# Patient Record
Sex: FEMALE | Race: WHITE | NOT HISPANIC OR LATINO | Employment: FULL TIME | URBAN - METROPOLITAN AREA
[De-identification: names, ages, dates, MRNs, and addresses within clinical notes are randomized per-mention and may not be internally consistent; named-entity substitution may affect disease eponyms.]

---

## 2017-03-17 ENCOUNTER — LAB REQUISITION (OUTPATIENT)
Dept: LAB | Facility: HOSPITAL | Age: 37
End: 2017-03-17

## 2017-03-17 DIAGNOSIS — H60.399 OTHER INFECTIVE OTITIS EXTERNA, UNSPECIFIED EAR: ICD-10-CM

## 2017-03-17 PROCEDURE — 87070 CULTURE OTHR SPECIMN AEROBIC: CPT | Performed by: OTOLARYNGOLOGY

## 2017-03-17 PROCEDURE — 87205 SMEAR GRAM STAIN: CPT | Performed by: OTOLARYNGOLOGY

## 2017-03-17 PROCEDURE — 87147 CULTURE TYPE IMMUNOLOGIC: CPT | Performed by: OTOLARYNGOLOGY

## 2017-03-19 LAB
BACTERIA EAR AEROBE CULT: NORMAL
GRAM STN SPEC: NORMAL
GRAM STN SPEC: NORMAL

## 2017-05-30 ENCOUNTER — APPOINTMENT (EMERGENCY)
Dept: RADIOLOGY | Facility: HOSPITAL | Age: 37
End: 2017-05-30
Payer: COMMERCIAL

## 2017-05-30 ENCOUNTER — HOSPITAL ENCOUNTER (EMERGENCY)
Facility: HOSPITAL | Age: 37
Discharge: HOME | End: 2017-05-30
Attending: EMERGENCY MEDICINE | Admitting: EMERGENCY MEDICINE
Payer: COMMERCIAL

## 2017-05-30 VITALS
HEART RATE: 84 BPM | WEIGHT: 140 LBS | OXYGEN SATURATION: 96 % | DIASTOLIC BLOOD PRESSURE: 57 MMHG | SYSTOLIC BLOOD PRESSURE: 108 MMHG | HEIGHT: 62 IN | TEMPERATURE: 97.6 F | BODY MASS INDEX: 25.76 KG/M2 | RESPIRATION RATE: 18 BRPM

## 2017-05-30 DIAGNOSIS — N93.9 VAGINAL BLEEDING: ICD-10-CM

## 2017-05-30 DIAGNOSIS — R10.9 ABDOMINAL PAIN: Primary | ICD-10-CM

## 2017-05-30 LAB
ABO GROUP BLD: NORMAL
ALBUMIN SERPL BCP-MCNC: 3.6 G/DL (ref 3.5–5)
ALP SERPL-CCNC: 71 U/L (ref 46–116)
ALT SERPL W P-5'-P-CCNC: 38 U/L (ref 12–78)
ANION GAP SERPL CALCULATED.3IONS-SCNC: 5 MMOL/L (ref 4–13)
AST SERPL W P-5'-P-CCNC: 35 U/L (ref 5–45)
B-HCG SERPL-ACNC: ABNORMAL MIU/ML
BACTERIA UR QL AUTO: ABNORMAL /HPF
BASOPHILS # BLD AUTO: 0.06 THOUSANDS/ΜL (ref 0–0.1)
BASOPHILS NFR BLD AUTO: 1 % (ref 0–1)
BILIRUB SERPL-MCNC: 0.21 MG/DL (ref 0.2–1)
BILIRUB UR QL STRIP: NEGATIVE
BLD GP AB SCN SERPL QL: NEGATIVE
BUN SERPL-MCNC: 13 MG/DL (ref 5–25)
CALCIUM SERPL-MCNC: 8.9 MG/DL (ref 8.3–10.1)
CHLORIDE SERPL-SCNC: 108 MMOL/L (ref 100–108)
CLARITY UR: CLEAR
CLARITY, POC: CLEAR
CO2 SERPL-SCNC: 24 MMOL/L (ref 21–32)
COLOR UR: YELLOW
COLOR, POC: YELLOW
CREAT SERPL-MCNC: 0.54 MG/DL (ref 0.6–1.3)
EOSINOPHIL # BLD AUTO: 0.18 THOUSAND/ΜL (ref 0–0.61)
EOSINOPHIL NFR BLD AUTO: 2 % (ref 0–6)
ERYTHROCYTE [DISTWIDTH] IN BLOOD BY AUTOMATED COUNT: 16.7 % (ref 11.6–15.1)
GFR SERPL CREATININE-BSD FRML MDRD: >60 ML/MIN/1.73SQ M
GLUCOSE SERPL-MCNC: 93 MG/DL (ref 65–140)
GLUCOSE UR STRIP-MCNC: NEGATIVE MG/DL
HCG UR QL: POSITIVE
HCT VFR BLD AUTO: 36.4 % (ref 34.8–46.1)
HGB BLD-MCNC: 11.9 G/DL (ref 11.5–15.4)
HGB UR QL STRIP.AUTO: NEGATIVE
HYALINE CASTS #/AREA URNS LPF: ABNORMAL /LPF
KETONES UR STRIP-MCNC: NEGATIVE MG/DL
LEUKOCYTE ESTERASE UR QL STRIP: NEGATIVE
LYMPHOCYTES # BLD AUTO: 2.74 THOUSANDS/ΜL (ref 0.6–4.47)
LYMPHOCYTES NFR BLD AUTO: 35 % (ref 14–44)
MCH RBC QN AUTO: 27.3 PG (ref 26.8–34.3)
MCHC RBC AUTO-ENTMCNC: 32.7 G/DL (ref 31.4–37.4)
MCV RBC AUTO: 84 FL (ref 82–98)
MONOCYTES # BLD AUTO: 0.97 THOUSAND/ΜL (ref 0.17–1.22)
MONOCYTES NFR BLD AUTO: 12 % (ref 4–12)
NEUTROPHILS # BLD AUTO: 3.93 THOUSANDS/ΜL (ref 1.85–7.62)
NEUTS SEG NFR BLD AUTO: 50 % (ref 43–75)
NITRITE UR QL STRIP: NEGATIVE
NON-SQ EPI CELLS URNS QL MICRO: ABNORMAL /HPF
NRBC BLD AUTO-RTO: 0 /100 WBCS
PH UR STRIP.AUTO: 8.5 [PH] (ref 4.5–8)
PLATELET # BLD AUTO: 341 THOUSANDS/UL (ref 149–390)
PMV BLD AUTO: 10.3 FL (ref 8.9–12.7)
POTASSIUM SERPL-SCNC: 3.9 MMOL/L (ref 3.5–5.3)
PROT SERPL-MCNC: 7.5 G/DL (ref 6.4–8.2)
PROT UR STRIP-MCNC: ABNORMAL MG/DL
RBC # BLD AUTO: 4.36 MILLION/UL (ref 3.81–5.12)
RBC #/AREA URNS AUTO: ABNORMAL /HPF
RH BLD: POSITIVE
SODIUM SERPL-SCNC: 137 MMOL/L (ref 136–145)
SP GR UR STRIP.AUTO: 1.01 (ref 1–1.03)
SPECIMEN EXPIRATION DATE: NORMAL
UROBILINOGEN UR QL STRIP.AUTO: 0.2 E.U./DL
WBC # BLD AUTO: 7.9 THOUSAND/UL (ref 4.31–10.16)
WBC #/AREA URNS AUTO: ABNORMAL /HPF

## 2017-05-30 PROCEDURE — 81001 URINALYSIS AUTO W/SCOPE: CPT

## 2017-05-30 PROCEDURE — 96376 TX/PRO/DX INJ SAME DRUG ADON: CPT

## 2017-05-30 PROCEDURE — 86901 BLOOD TYPING SEROLOGIC RH(D): CPT | Performed by: EMERGENCY MEDICINE

## 2017-05-30 PROCEDURE — 76801 OB US < 14 WKS SINGLE FETUS: CPT

## 2017-05-30 PROCEDURE — 84702 CHORIONIC GONADOTROPIN TEST: CPT | Performed by: EMERGENCY MEDICINE

## 2017-05-30 PROCEDURE — 85025 COMPLETE CBC W/AUTO DIFF WBC: CPT | Performed by: EMERGENCY MEDICINE

## 2017-05-30 PROCEDURE — 99284 EMERGENCY DEPT VISIT MOD MDM: CPT

## 2017-05-30 PROCEDURE — 80053 COMPREHEN METABOLIC PANEL: CPT | Performed by: EMERGENCY MEDICINE

## 2017-05-30 PROCEDURE — 76705 ECHO EXAM OF ABDOMEN: CPT

## 2017-05-30 PROCEDURE — 86850 RBC ANTIBODY SCREEN: CPT | Performed by: EMERGENCY MEDICINE

## 2017-05-30 PROCEDURE — 36415 COLL VENOUS BLD VENIPUNCTURE: CPT | Performed by: EMERGENCY MEDICINE

## 2017-05-30 PROCEDURE — 96374 THER/PROPH/DIAG INJ IV PUSH: CPT

## 2017-05-30 PROCEDURE — 81002 URINALYSIS NONAUTO W/O SCOPE: CPT | Performed by: EMERGENCY MEDICINE

## 2017-05-30 PROCEDURE — 81025 URINE PREGNANCY TEST: CPT | Performed by: EMERGENCY MEDICINE

## 2017-05-30 PROCEDURE — 96361 HYDRATE IV INFUSION ADD-ON: CPT

## 2017-05-30 PROCEDURE — 86900 BLOOD TYPING SEROLOGIC ABO: CPT | Performed by: EMERGENCY MEDICINE

## 2017-05-30 RX ORDER — FERROUS SULFATE 325(65) MG
325 TABLET ORAL
COMMUNITY
End: 2018-06-01 | Stop reason: ALTCHOICE

## 2017-05-30 RX ORDER — ONDANSETRON 2 MG/ML
4 INJECTION INTRAMUSCULAR; INTRAVENOUS ONCE
Status: COMPLETED | OUTPATIENT
Start: 2017-05-30 | End: 2017-05-30

## 2017-05-30 RX ORDER — ONDANSETRON 4 MG/1
4 TABLET, FILM COATED ORAL EVERY 6 HOURS
Qty: 12 TABLET | Refills: 0 | Status: SHIPPED | OUTPATIENT
Start: 2017-05-30 | End: 2018-03-09 | Stop reason: HOSPADM

## 2017-05-30 RX ADMIN — ONDANSETRON 4 MG: 2 INJECTION INTRAMUSCULAR; INTRAVENOUS at 19:30

## 2017-05-30 RX ADMIN — SODIUM CHLORIDE 1000 ML: 0.9 INJECTION, SOLUTION INTRAVENOUS at 16:57

## 2017-05-30 RX ADMIN — ONDANSETRON 4 MG: 2 INJECTION INTRAMUSCULAR; INTRAVENOUS at 17:25

## 2017-06-01 ENCOUNTER — ALLSCRIPTS OFFICE VISIT (OUTPATIENT)
Dept: OTHER | Facility: OTHER | Age: 37
End: 2017-06-01

## 2017-06-02 ENCOUNTER — GENERIC CONVERSION - ENCOUNTER (OUTPATIENT)
Dept: OTHER | Facility: OTHER | Age: 37
End: 2017-06-02

## 2017-06-08 ENCOUNTER — ALLSCRIPTS OFFICE VISIT (OUTPATIENT)
Dept: OTHER | Facility: OTHER | Age: 37
End: 2017-06-08

## 2017-06-12 ENCOUNTER — ALLSCRIPTS OFFICE VISIT (OUTPATIENT)
Dept: OTHER | Facility: OTHER | Age: 37
End: 2017-06-12

## 2017-06-13 ENCOUNTER — LAB REQUISITION (OUTPATIENT)
Dept: LAB | Facility: HOSPITAL | Age: 37
End: 2017-06-13
Payer: COMMERCIAL

## 2017-06-13 DIAGNOSIS — R10.9 ABDOMINAL PAIN: ICD-10-CM

## 2017-06-13 LAB
BACTERIA UR QL AUTO: NORMAL /HPF
BILIRUB UR QL STRIP: NEGATIVE
CLARITY UR: CLEAR
COLOR UR: YELLOW
GLUCOSE UR STRIP-MCNC: NEGATIVE MG/DL
HGB UR QL STRIP.AUTO: ABNORMAL
HYALINE CASTS #/AREA URNS LPF: NORMAL /LPF
KETONES UR STRIP-MCNC: NEGATIVE MG/DL
LEUKOCYTE ESTERASE UR QL STRIP: NEGATIVE
NITRITE UR QL STRIP: NEGATIVE
NON-SQ EPI CELLS URNS QL MICRO: NORMAL /HPF
PH UR STRIP.AUTO: 6.5 [PH] (ref 4.5–8)
PROT UR STRIP-MCNC: NEGATIVE MG/DL
RBC #/AREA URNS AUTO: NORMAL /HPF
SP GR UR STRIP.AUTO: 1.01 (ref 1–1.03)
UROBILINOGEN UR QL STRIP.AUTO: 0.2 E.U./DL
WBC #/AREA URNS AUTO: NORMAL /HPF

## 2017-06-13 PROCEDURE — 81001 URINALYSIS AUTO W/SCOPE: CPT | Performed by: OBSTETRICS & GYNECOLOGY

## 2017-06-13 PROCEDURE — 87086 URINE CULTURE/COLONY COUNT: CPT | Performed by: OBSTETRICS & GYNECOLOGY

## 2017-06-14 ENCOUNTER — GENERIC CONVERSION - ENCOUNTER (OUTPATIENT)
Dept: OTHER | Facility: OTHER | Age: 37
End: 2017-06-14

## 2017-06-14 ENCOUNTER — TRANSCRIBE ORDERS (OUTPATIENT)
Dept: ADMINISTRATIVE | Facility: HOSPITAL | Age: 37
End: 2017-06-14

## 2017-06-14 ENCOUNTER — APPOINTMENT (OUTPATIENT)
Dept: LAB | Facility: MEDICAL CENTER | Age: 37
End: 2017-06-14
Payer: COMMERCIAL

## 2017-06-14 DIAGNOSIS — N93.9 VAGINAL HEMORRHAGE: ICD-10-CM

## 2017-06-14 DIAGNOSIS — O03.9 UNSPECIFIED SPONTANEOUS ABORTION WITHOUT MENTION OF COMPLICATION: ICD-10-CM

## 2017-06-14 DIAGNOSIS — O03.9 UNSPECIFIED SPONTANEOUS ABORTION WITHOUT MENTION OF COMPLICATION: Primary | ICD-10-CM

## 2017-06-14 LAB
BASOPHILS # BLD AUTO: 0.04 THOUSANDS/ΜL (ref 0–0.1)
BASOPHILS NFR BLD AUTO: 1 % (ref 0–1)
EOSINOPHIL # BLD AUTO: 0.2 THOUSAND/ΜL (ref 0–0.61)
EOSINOPHIL NFR BLD AUTO: 2 % (ref 0–6)
ERYTHROCYTE [DISTWIDTH] IN BLOOD BY AUTOMATED COUNT: 17.6 % (ref 11.6–15.1)
HCT VFR BLD AUTO: 34.8 % (ref 34.8–46.1)
HGB BLD-MCNC: 11.2 G/DL (ref 11.5–15.4)
LYMPHOCYTES # BLD AUTO: 2.14 THOUSANDS/ΜL (ref 0.6–4.47)
LYMPHOCYTES NFR BLD AUTO: 26 % (ref 14–44)
MCH RBC QN AUTO: 27.2 PG (ref 26.8–34.3)
MCHC RBC AUTO-ENTMCNC: 32.2 G/DL (ref 31.4–37.4)
MCV RBC AUTO: 85 FL (ref 82–98)
MONOCYTES # BLD AUTO: 0.78 THOUSAND/ΜL (ref 0.17–1.22)
MONOCYTES NFR BLD AUTO: 10 % (ref 4–12)
NEUTROPHILS # BLD AUTO: 5 THOUSANDS/ΜL (ref 1.85–7.62)
NEUTS SEG NFR BLD AUTO: 61 % (ref 43–75)
NRBC BLD AUTO-RTO: 0 /100 WBCS
PLATELET # BLD AUTO: 371 THOUSANDS/UL (ref 149–390)
PMV BLD AUTO: 11.1 FL (ref 8.9–12.7)
RBC # BLD AUTO: 4.12 MILLION/UL (ref 3.81–5.12)
WBC # BLD AUTO: 8.18 THOUSAND/UL (ref 4.31–10.16)

## 2017-06-14 PROCEDURE — 85025 COMPLETE CBC W/AUTO DIFF WBC: CPT

## 2017-06-14 PROCEDURE — 36415 COLL VENOUS BLD VENIPUNCTURE: CPT

## 2017-06-15 ENCOUNTER — GENERIC CONVERSION - ENCOUNTER (OUTPATIENT)
Dept: OTHER | Facility: OTHER | Age: 37
End: 2017-06-15

## 2017-06-15 LAB — BACTERIA UR CULT: NORMAL

## 2017-06-16 ENCOUNTER — GENERIC CONVERSION - ENCOUNTER (OUTPATIENT)
Dept: OTHER | Facility: OTHER | Age: 37
End: 2017-06-16

## 2017-06-23 ENCOUNTER — ALLSCRIPTS OFFICE VISIT (OUTPATIENT)
Dept: OTHER | Facility: OTHER | Age: 37
End: 2017-06-23

## 2017-11-20 ENCOUNTER — TRANSCRIBE ORDERS (OUTPATIENT)
Dept: ADMINISTRATIVE | Facility: HOSPITAL | Age: 37
End: 2017-11-20

## 2017-11-20 ENCOUNTER — GENERIC CONVERSION - ENCOUNTER (OUTPATIENT)
Dept: OTHER | Facility: OTHER | Age: 37
End: 2017-11-20

## 2017-11-20 ENCOUNTER — APPOINTMENT (OUTPATIENT)
Dept: LAB | Facility: MEDICAL CENTER | Age: 37
End: 2017-11-20
Payer: COMMERCIAL

## 2017-11-20 DIAGNOSIS — Z32.01 ENCOUNTER FOR PREGNANCY TEST, RESULT POSITIVE: ICD-10-CM

## 2017-11-20 DIAGNOSIS — Z87.59 PERSONAL HISTORY OF TROPHOBLASTIC DISEASE: Primary | ICD-10-CM

## 2017-11-20 DIAGNOSIS — Z87.59 PERSONAL HISTORY OF OTHER COMPLICATIONS OF PREGNANCY, CHILDBIRTH AND THE PUERPERIUM: ICD-10-CM

## 2017-11-20 LAB
B-HCG SERPL-ACNC: 734 MIU/ML
PROGEST SERPL-MCNC: 11.9 NG/ML

## 2017-11-20 PROCEDURE — 84144 ASSAY OF PROGESTERONE: CPT | Performed by: OBSTETRICS & GYNECOLOGY

## 2017-11-20 PROCEDURE — 36415 COLL VENOUS BLD VENIPUNCTURE: CPT

## 2017-11-20 PROCEDURE — 84702 CHORIONIC GONADOTROPIN TEST: CPT

## 2017-11-21 ENCOUNTER — GENERIC CONVERSION - ENCOUNTER (OUTPATIENT)
Dept: OTHER | Facility: OTHER | Age: 37
End: 2017-11-21

## 2017-11-22 ENCOUNTER — APPOINTMENT (OUTPATIENT)
Dept: LAB | Facility: MEDICAL CENTER | Age: 37
End: 2017-11-22
Payer: COMMERCIAL

## 2017-11-22 ENCOUNTER — TRANSCRIBE ORDERS (OUTPATIENT)
Dept: ADMINISTRATIVE | Facility: HOSPITAL | Age: 37
End: 2017-11-22

## 2017-11-22 DIAGNOSIS — O20.9 HEMORRHAGE BEFORE 22 WEEKS GESTATION: Primary | ICD-10-CM

## 2017-11-22 LAB — B-HCG SERPL-ACNC: 1630 MIU/ML

## 2017-11-22 PROCEDURE — 84702 CHORIONIC GONADOTROPIN TEST: CPT | Performed by: OBSTETRICS & GYNECOLOGY

## 2017-11-22 PROCEDURE — 36415 COLL VENOUS BLD VENIPUNCTURE: CPT | Performed by: OBSTETRICS & GYNECOLOGY

## 2017-11-28 ENCOUNTER — GENERIC CONVERSION - ENCOUNTER (OUTPATIENT)
Dept: OTHER | Facility: OTHER | Age: 37
End: 2017-11-28

## 2017-12-13 ENCOUNTER — GENERIC CONVERSION - ENCOUNTER (OUTPATIENT)
Dept: OTHER | Facility: OTHER | Age: 37
End: 2017-12-13

## 2017-12-22 ENCOUNTER — GENERIC CONVERSION - ENCOUNTER (OUTPATIENT)
Dept: OTHER | Facility: OTHER | Age: 37
End: 2017-12-22

## 2017-12-22 ENCOUNTER — APPOINTMENT (OUTPATIENT)
Dept: LAB | Facility: MEDICAL CENTER | Age: 37
End: 2017-12-22
Payer: COMMERCIAL

## 2017-12-22 DIAGNOSIS — Z91.89 OTHER SPECIFIED PERSONAL RISK FACTORS, NOT ELSEWHERE CLASSIFIED: ICD-10-CM

## 2017-12-22 LAB
BACTERIA UR QL AUTO: ABNORMAL /HPF
BILIRUB UR QL STRIP: NEGATIVE
CLARITY UR: ABNORMAL
COLOR UR: YELLOW
GLUCOSE UR STRIP-MCNC: NEGATIVE MG/DL
HGB UR QL STRIP.AUTO: ABNORMAL
HYALINE CASTS #/AREA URNS LPF: ABNORMAL /LPF
KETONES UR STRIP-MCNC: NEGATIVE MG/DL
LEUKOCYTE ESTERASE UR QL STRIP: ABNORMAL
NITRITE UR QL STRIP: NEGATIVE
NON-SQ EPI CELLS URNS QL MICRO: ABNORMAL /HPF
PH UR STRIP.AUTO: 7 [PH] (ref 4.5–8)
PROT UR STRIP-MCNC: ABNORMAL MG/DL
RBC #/AREA URNS AUTO: ABNORMAL /HPF
SP GR UR STRIP.AUTO: 1.02 (ref 1–1.03)
UROBILINOGEN UR QL STRIP.AUTO: 1 E.U./DL
WBC #/AREA URNS AUTO: ABNORMAL /HPF

## 2017-12-22 PROCEDURE — 81001 URINALYSIS AUTO W/SCOPE: CPT

## 2017-12-22 PROCEDURE — 87086 URINE CULTURE/COLONY COUNT: CPT

## 2017-12-23 LAB — BACTERIA UR CULT: NORMAL

## 2017-12-27 ENCOUNTER — ALLSCRIPTS OFFICE VISIT (OUTPATIENT)
Dept: OTHER | Facility: OTHER | Age: 37
End: 2017-12-27

## 2017-12-27 ENCOUNTER — GENERIC CONVERSION - ENCOUNTER (OUTPATIENT)
Dept: OTHER | Facility: OTHER | Age: 37
End: 2017-12-27

## 2017-12-28 ENCOUNTER — GENERIC CONVERSION - ENCOUNTER (OUTPATIENT)
Dept: OBGYN CLINIC | Facility: CLINIC | Age: 37
End: 2017-12-28

## 2018-01-03 ENCOUNTER — APPOINTMENT (OUTPATIENT)
Dept: LAB | Facility: HOSPITAL | Age: 38
End: 2018-01-03
Attending: OBSTETRICS & GYNECOLOGY
Payer: COMMERCIAL

## 2018-01-03 ENCOUNTER — TRANSCRIBE ORDERS (OUTPATIENT)
Dept: ADMINISTRATIVE | Facility: HOSPITAL | Age: 38
End: 2018-01-03

## 2018-01-03 DIAGNOSIS — Z34.91 ENCOUNTER FOR SUPERVISION OF NORMAL PREGNANCY IN FIRST TRIMESTER, UNSPECIFIED GRAVIDITY: ICD-10-CM

## 2018-01-03 DIAGNOSIS — Z34.91 ENCOUNTER FOR SUPERVISION OF NORMAL PREGNANCY IN FIRST TRIMESTER, UNSPECIFIED GRAVIDITY: Primary | ICD-10-CM

## 2018-01-03 LAB
ABO GROUP BLD: NORMAL
BASOPHILS # BLD AUTO: 0.05 THOUSANDS/ΜL (ref 0–0.1)
BASOPHILS NFR BLD AUTO: 1 % (ref 0–1)
BILIRUB UR QL STRIP: NEGATIVE
BLD GP AB SCN SERPL QL: NEGATIVE
CLARITY UR: CLEAR
COLOR UR: YELLOW
EOSINOPHIL # BLD AUTO: 0.25 THOUSAND/ΜL (ref 0–0.61)
EOSINOPHIL NFR BLD AUTO: 4 % (ref 0–6)
ERYTHROCYTE [DISTWIDTH] IN BLOOD BY AUTOMATED COUNT: 14.5 % (ref 11.6–15.1)
GLUCOSE UR STRIP-MCNC: NEGATIVE MG/DL
HCT VFR BLD AUTO: 35.4 % (ref 34.8–46.1)
HGB BLD-MCNC: 11.7 G/DL (ref 11.5–15.4)
HGB UR QL STRIP.AUTO: NEGATIVE
KETONES UR STRIP-MCNC: NEGATIVE MG/DL
LEUKOCYTE ESTERASE UR QL STRIP: NEGATIVE
LYMPHOCYTES # BLD AUTO: 1.98 THOUSANDS/ΜL (ref 0.6–4.47)
LYMPHOCYTES NFR BLD AUTO: 33 % (ref 14–44)
MCH RBC QN AUTO: 28.4 PG (ref 26.8–34.3)
MCHC RBC AUTO-ENTMCNC: 33.1 G/DL (ref 31.4–37.4)
MCV RBC AUTO: 86 FL (ref 82–98)
MONOCYTES # BLD AUTO: 0.53 THOUSAND/ΜL (ref 0.17–1.22)
MONOCYTES NFR BLD AUTO: 9 % (ref 4–12)
NEUTROPHILS # BLD AUTO: 3.22 THOUSANDS/ΜL (ref 1.85–7.62)
NEUTS SEG NFR BLD AUTO: 53 % (ref 43–75)
NITRITE UR QL STRIP: NEGATIVE
NRBC BLD AUTO-RTO: 0 /100 WBCS
PH UR STRIP.AUTO: 6 [PH] (ref 4.5–8)
PLATELET # BLD AUTO: 311 THOUSANDS/UL (ref 149–390)
PMV BLD AUTO: 10.8 FL (ref 8.9–12.7)
PROT UR STRIP-MCNC: NEGATIVE MG/DL
RBC # BLD AUTO: 4.12 MILLION/UL (ref 3.81–5.12)
RH BLD: POSITIVE
RUBV IGG SERPL IA-ACNC: 11.8 IU/ML
SP GR UR STRIP.AUTO: 1.02 (ref 1–1.03)
SPECIMEN EXPIRATION DATE: NORMAL
UROBILINOGEN UR QL STRIP.AUTO: 0.2 E.U./DL
WBC # BLD AUTO: 6.05 THOUSAND/UL (ref 4.31–10.16)

## 2018-01-03 PROCEDURE — 81003 URINALYSIS AUTO W/O SCOPE: CPT

## 2018-01-03 PROCEDURE — 87086 URINE CULTURE/COLONY COUNT: CPT

## 2018-01-03 PROCEDURE — 36415 COLL VENOUS BLD VENIPUNCTURE: CPT

## 2018-01-03 PROCEDURE — 80081 OBSTETRIC PANEL INC HIV TSTG: CPT

## 2018-01-04 ENCOUNTER — GENERIC CONVERSION - ENCOUNTER (OUTPATIENT)
Dept: OTHER | Facility: OTHER | Age: 38
End: 2018-01-04

## 2018-01-04 LAB
BACTERIA UR CULT: NORMAL
HBV SURFACE AG SER QL: NORMAL
RPR SER QL: NORMAL

## 2018-01-05 LAB — HIV 1+2 AB+HIV1 P24 AG SERPL QL IA: NORMAL

## 2018-01-12 NOTE — MISCELLANEOUS
Message   Recorded as Task   Date: 2017 08:39 AM, Created By: Ronaldo Jefferson   Task Name: Care Coordination   Assigned To: Jamilah Garcia   Regarding Patient: Sol Monreal, Status: In Progress   CommentDane Litten - 2017 8:39 AM     TASK CREATED  Caller: Self; Care Coordination; [phone] (Home)  pt had call wanted to see Dr Monica Sterling stated she has a history of having miscarriages and she is preganant her last period was 10/19/17 possitive test results  needs to know should she have any other testing done before she see dr per Dr Le Johnson stated the Dr knows her history and told her to call if she become's pregnant again  [phone]  Papito Tyron - 2017 9:06 AM     TASK IN PROGRESS    2:40 PM     TASK EDITED  What would you like for her? Thanks   Cookie Bray - 2017 4:16 PM     TASK REPLIED TO: Previously Assigned To Cookie Bray  let's check hcg and progesterone    6:31 PM     TASK EDITED  Pt said she spoke with someone earlier today - she already had 2 labs drawn - I'm not sure what cause I cannot find nurses note  Hope it was hcg and progesterone  Nat Prince - 2017 8:21 AM     TASK EDITED  Patient requesting further instructions regarding HCG and Progesterone results  Following ED's review  [phone]        Active Problems    1  Abdominal pain (789 00) (R10 9)   2  Bleeding in early pregnancy (640 90) (O20 9)   3  Complete spontaneous  (634 92) (O03 9)   4  Episode of heavy vaginal bleeding (626 9) (N93 9)   5  History of spontaneous  (V13 29) (Z87 59)   6  Positive urine pregnancy test (V72 42) (Z32 01)   7  SAB (spontaneous ) (634 90) (O03 9)    Current Meds   1  Prenatal Vitamin TABS; Therapy: (Recorded:2017) to Recorded    Allergies    1   No Known Drug Allergies    Signatures   Electronically signed by : Moi Kc, ; 2017 8:21AM EST                       (Author)

## 2018-01-13 VITALS
DIASTOLIC BLOOD PRESSURE: 70 MMHG | HEIGHT: 63 IN | SYSTOLIC BLOOD PRESSURE: 120 MMHG | BODY MASS INDEX: 24.27 KG/M2 | WEIGHT: 137 LBS

## 2018-01-13 VITALS
BODY MASS INDEX: 24.31 KG/M2 | DIASTOLIC BLOOD PRESSURE: 64 MMHG | SYSTOLIC BLOOD PRESSURE: 110 MMHG | HEIGHT: 63 IN | WEIGHT: 137.2 LBS

## 2018-01-13 VITALS
SYSTOLIC BLOOD PRESSURE: 110 MMHG | DIASTOLIC BLOOD PRESSURE: 60 MMHG | BODY MASS INDEX: 24.27 KG/M2 | WEIGHT: 137 LBS | HEIGHT: 63 IN

## 2018-01-13 NOTE — MISCELLANEOUS
Message   Recorded as Task   Date: 06/14/2017 09:15 AM, Created By: Isaías Blue   Task Name: Call Back   Assigned To: Analisa Zamora   Regarding Patient: Terry Coelho, Status: In Progress   Comment:    Hanny Rodríguez - 14 Jun 2017 9:15 AM     TASK CREATED  Pt's fiancÃÂ© called stating last night Jus Allison had very bad abdominal pains w/pelvic pains, when using the bathroom she noticed a jelly like clot being dark brown then turning dark red and now lighter red in color  Pt's fiancÃÂ© believes Jus Allison had miscarried was seen in office w/Dr Deepa Rust 6/12/17 for this, would like a nurse to call back and can be reached @[phone]  Seth Brown - 14 Jun 2017 9:25 AM     TASK IN PROGRESS   Seth Neighbor - 14 Jun 2017 9:28 AM     TASK EDITED  Spoke with pt  States she is still bleeding heavily (needs to change a maxi-pad q1 5hrs)  She believes she passed pregnancy last night - she put what she believes to be the pregnancy in a jar and has it home in the event genetic testing is recommended? Advised pt to stay home from work today, rest, hydrate, take OTC pain management for cramps  Her fiancÃÂ© will be with at home  She is requesting CBC given hx of anemia,,, Would it be ok to order this for pt? Would you like HCG as well? Thank you! Cookie Bray - 14 Jun 2017 11:02 AM     TASK REPLIED TO: Previously Assigned To Cookie Bray  that is fine, genetic testing is up to her - an office visit may be a good idea today if she is interested in that for a discussion about it - and for an ultrasound  cbc is okay, no hcg needed   Seth Brown - 14 Jun 2017 11:24 AM     TASK IN PROGRESS   Sethtess Brown  14 Jun 2017 11:39 AM     TASK REPLIED TO: Previously Assigned To [address] with pt  At this time she declines fetal genetic testing and will dispose of products of conception at home  She will go for CBC today or tomorrow  Out of work provided for today and tomorrow  Joanne Primus - 15 Óscar 2017 10:10 AM     TASK Holly Sawyer - 2017 8:26 AM     TASK EDITED   Joanne Primus - 2017 11:52 AM     TASK IN PROGRESS   Joanne Primus - 2017 11:52 AM     TASK REPLIED TO: Previously Assigned To ANDREINA CHAVIRA,Team  LMOM for pt to c/b - will review CBC and see how pt is feeling  Nayeli Haddad - 2017 12:06 PM     TASK REPLIED TO: Previously Assigned To [address] with pt  States she is feeling better  Bleeding as slowed, pain is improved  Fiance provides good emotional support  Pt will keep pending f/u apt next Friday  Active Problems    1  Abdominal pain (789 00) (R10 9)   2  Bleeding in early pregnancy (640 90) (O20 9)   3  Episode of heavy vaginal bleeding (626 9) (N93 9)   4  SAB (spontaneous ) (634 90) (O03 9)    Current Meds   1  Ondansetron 4 MG Oral Tablet Disintegrating; Therapy: (Recorded:2017) to Recorded   2  Prenatal Vitamin TABS;    Therapy: (Recorded:2017) to Recorded    Signatures   Electronically signed by : Jey Randle, ; 2017 12:06PM EST                       (Author)

## 2018-01-13 NOTE — MISCELLANEOUS
Message    To Whom it May Concern,    Raenelle Boeck is a current patient under our professional care  Please excuse her from work on 6/14/2017 and 6/15/2017  Signatures    Sincerely,      [facility]          Electronically signed by : Deisy Burnett, ; Jun 14 2017 11:38AM EST                       (Author)

## 2018-01-13 NOTE — MISCELLANEOUS
Message   Recorded as Task   Date: 2017 09:34 AM, Created By: Sandip Diaz   Task Name: Call Back   Assigned To: Dannette Shone   Regarding Patient: Annmarie Ramirez, Status: In Progress   Comment:    Regla Carrizales  9:34 AM     TASK CREATED  Caller: Self; Results Inquiry; [phone] (Home)  pt calling for lab results from  [facility]  she is @ [phone]  Paris Dorman - 2017 10:08 AM     TASK IN PROGRESS   [place]le - 2017 10:08 AM     TASK EDITED   [facility] - 2017 2:22 PM     TASK IN PROGRESS   [facility] - 2017 2:27 PM     TASK EDITED  Patient calling for repeat HCG results  Advised doubled but Dr Henri Santiago to review and advise with follow up  Patient feels well  No bleeding or cramping  Routed to provider to advise  [facility] - 2017 11:51 AM     TASK REASSIGNED: Previously Assigned To Annetta Shabazz - 2017 11:52 AM     TASK REASSIGNED: Previously Assigned To Neel Coombs - 2017 5:19 PM     TASK EDITED  Spoke with Colby Montenegro  Reviewed HCGs with pt and reassured her  Apt schedule with CAT for ~7 5wks  Active Problems    1  Abdominal pain (789 00) (R10 9)   2  Bleeding in early pregnancy (640 90) (O20 9)   3  Complete spontaneous  (634 92) (O03 9)   4  Episode of heavy vaginal bleeding (626 9) (N93 9)   5  History of spontaneous  (V13 29) (Z87 59)   6  Positive urine pregnancy test (V72 42) (Z32 01)   7  SAB (spontaneous ) (634 90) (O03 9)    Current Meds   1  Prenatal Vitamin TABS; Therapy: (Recorded:2017) to Recorded    Allergies    1   No Known Drug Allergies    Signatures   Electronically signed by : Jose Antonio Mae, ; 2017  5:19PM EST                       (Author)
(0) understands/communicates without difficulty

## 2018-01-15 VITALS
SYSTOLIC BLOOD PRESSURE: 110 MMHG | BODY MASS INDEX: 24.65 KG/M2 | WEIGHT: 139.13 LBS | DIASTOLIC BLOOD PRESSURE: 60 MMHG | HEIGHT: 63 IN

## 2018-01-15 NOTE — MISCELLANEOUS
Message   Recorded as Task   Date: 06/15/2017 12:33 PM, Created By: Honey Davis   Task Name: Call Back   Assigned To: Philip Mittal   Regarding Patient: Kelly Cordero, Status: In Progress   Steffiradha Sophy - 15 Óscar 2017 12:33 PM     TASK CREATED  Caller: Alea Trevino, Significant Other; Results Inquiry  Alea Trevino (pt fiancÃÂ©) calling for pt in regards to blood work results from   Alea Trevino is on pt consent form  Alea Trevino is @ [phone]   Jenifer Vazquez - 15 Óscar 2017 1:32 PM     TASK IN PROGRESS   Jenifer Vazquez - 15 Óscar 2017 1:38 PM     TASK EDITED  I told fiancÃÂ© - blood in urine but pt has some vag bleeding  cbc shows slightly low hgb  To eat iron rich foods - will f/u with another ob u/s next week   Jenifer Vazquez - 15 Óscar 2017 1:40 PM     TASK EDITED  I did not see an hcg - pt having another u/s next week, though        Active Problems    1  Abdominal pain (789 00) (R10 9)   2  Bleeding in early pregnancy (640 90) (O20 9)   3  Episode of heavy vaginal bleeding (626 9) (N93 9)   4  SAB (spontaneous ) (634 90) (O03 9)    Current Meds   1  Ondansetron 4 MG Oral Tablet Disintegrating; Therapy: (Recorded:2017) to Recorded   2  Prenatal Vitamin TABS; Therapy: (Recorded:2017) to Recorded    Signatures   Electronically signed by :  Carmela Pereira, ; Óscar 15 2017  1:40PM EST                       (Author)

## 2018-01-15 NOTE — MISCELLANEOUS
Message   Recorded as Task   Date: 05/30/2017 02:18 PM, Created By: Harriett Vazquez   Task Name: Medical Complaint Callback   Assigned To: [facility]   Regarding Patient: Brigette Parra, Status: In Progress   Comment:    Aviva Lopez - 30 May 2017 2:18 PM     TASK CREATED  Caller: Self; Medical Complaint; [phone] (Home)  Pt LMP 04/02   has appt 06/12 for early US  Pt started w moderate spotting on Sat 05/27  Donel Toy it was on & off  Mon it was light and today is very light)  Pt has cramping on R side  Pt would like to speak with a nurse  [facility] - 30 May 2017 2:40 PM     TASK IN PROGRESS   [facility] - 30 May 2017 2:50 PM     TASK REPLIED TO: Previously Assigned To [address] with pt  She reports siginificant bleeding over the weekend that has been improving  She has also had some severe pains - states they come intermittently and can be severe, always on the Right side  Per pt, this pain can travel up to her right shoulder blade  She has also had intermittent periods of shortness of breath  Pt is new to our office has not been seen in the pregnancy  Advised pt to report to [address] [place] [address] ER for evaluation and rule out ectopic pregnancy  States she will have her fiancÃÂ© take her now  She will call to schedule f/u upon d/c  [facility] - 30 May 2017 2:50 PM     TASK REASSIGNED: Previously Assigned To Rick Mobley - 02 Jun 2017 2:51 PM     TASK REPLIED TO: Previously Assigned To Nayeli Haddad  Pt was seen by CG yesterday in our office  Current Meds   1  Ondansetron 4 MG Oral Tablet Disintegrating; Therapy: (Recorded:01Jun2017) to Recorded   2  Prenatal Vitamin TABS;    Therapy: (Recorded:01Jun2017) to Recorded    Signatures   Electronically signed by : Jey Randle, ; Jun 2 2017  2:52PM EST                       (Author)

## 2018-01-15 NOTE — MISCELLANEOUS
Message   Recorded as Task   Date: 2017 11:42 AM, Created By: Glendy Stephens   Task Name: Call Back   Assigned To: [facility]   Regarding Patient: Aydin Lyman, Status: In Progress   CommentArabella Cortes - 2017 11:42 AM     TASK CREATED  Caller: Self; Results Inquiry; [phone] (Home)  Pt calling for results of HCG done yesterday   Paris Dorman - 2017 11:56 AM     TASK IN PROGRESS   Paris Dorman - 2017 12:41 PM     TASK EDITED  Spoke with pt - advised her the results are available but need to be verified by the provider  Please review and advise  Thanks! Vicky Jimenez - 2017 1:39 PM     TASK REASSIGNED: Previously Assigned To Vicky Jimenez  Please see results  She is requesting you but somehow they ordered the labs under my name  Neli Cortes - 2017 2:55 PM     TASK EDITED  Pt is waiting for a call back as to what the course of action will be  Cookie Bray - 2017 3:23 PM     TASK REPLIED TO: Previously Assigned To [facility],Team                    Can you let Mars Barron know we'll repeat her hcg in ~ 48 hours to follow trend? Her progesterone looks okay  Analisa Aguila - 2017 3:25 PM     TASK IN PROGRESS   Analisa Aguila - 2017 3:34 PM     TASK EDITED  order placed in allscripts for elizabeth hcg   unable to notify pt    mailbox is full   Analisa Aguila - 2017 3:47 PM     TASK EDITED  spke with pt    she will follow ct's instructions        Active Problems    1  Abdominal pain (789 00) (R10 9)   2  Bleeding in early pregnancy (640 90) (O20 9)   3  Complete spontaneous  (634 92) (O03 9)   4  Episode of heavy vaginal bleeding (626 9) (N93 9)   5  History of spontaneous  (V13 29) (Z87 59)   6  Positive urine pregnancy test (V72 42) (Z32 01)   7  SAB (spontaneous ) (634 90) (O03 9)    Current Meds   1  Prenatal Vitamin TABS;    Therapy: (Recorded:2017) to Recorded    Allergies    1  No Known Drug Allergies    Plan  Bleeding in early pregnancy    · (1) HCG QUANT; Status:Active - Retrospective By Protocol Authorization;  Requested  SLE:[id number];     Signatures   Electronically signed by : Encentuate Bryan, ; Nov 21 2017  3:48PM EST                       (Author)

## 2018-01-18 ENCOUNTER — ALLSCRIPTS OFFICE VISIT (OUTPATIENT)
Dept: OTHER | Facility: OTHER | Age: 38
End: 2018-01-18

## 2018-01-18 ENCOUNTER — APPOINTMENT (OUTPATIENT)
Dept: PERINATAL CARE | Facility: CLINIC | Age: 38
End: 2018-01-18
Payer: COMMERCIAL

## 2018-01-18 ENCOUNTER — GENERIC CONVERSION - ENCOUNTER (OUTPATIENT)
Dept: OTHER | Facility: OTHER | Age: 38
End: 2018-01-18

## 2018-01-18 PROCEDURE — 76813 OB US NUCHAL MEAS 1 GEST: CPT | Performed by: OBSTETRICS & GYNECOLOGY

## 2018-01-18 PROCEDURE — 87491 CHLMYD TRACH DNA AMP PROBE: CPT | Performed by: OBSTETRICS & GYNECOLOGY

## 2018-01-18 PROCEDURE — G0145 SCR C/V CYTO,THINLAYER,RESCR: HCPCS | Performed by: OBSTETRICS & GYNECOLOGY

## 2018-01-18 PROCEDURE — 87591 N.GONORRHOEAE DNA AMP PROB: CPT | Performed by: OBSTETRICS & GYNECOLOGY

## 2018-01-18 PROCEDURE — 87624 HPV HI-RISK TYP POOLED RSLT: CPT | Performed by: OBSTETRICS & GYNECOLOGY

## 2018-01-18 PROCEDURE — 76801 OB US < 14 WKS SINGLE FETUS: CPT | Performed by: OBSTETRICS & GYNECOLOGY

## 2018-01-23 ENCOUNTER — LAB REQUISITION (OUTPATIENT)
Dept: LAB | Facility: HOSPITAL | Age: 38
End: 2018-01-23
Payer: COMMERCIAL

## 2018-01-23 DIAGNOSIS — Z34.91 ENCOUNTER FOR SUPERVISION OF NORMAL PREGNANCY IN FIRST TRIMESTER: ICD-10-CM

## 2018-01-23 NOTE — MISCELLANEOUS
Message  Pt called RN line re: NIPT testing, needing authorization for [facility]  Pt's next appt is 18 @ 1130am for first trimester ultrasound  Pt's prenatal records faxed, along with authorization form to [facility] on 18 @ 111  Pt aware of this information, agreed with plan  Active Problems    1  Amenorrhea, secondary (626 0) (N91 1)   2  At risk of UTI (V49 89) (Z91 89)   3  Encounter for supervision of multigravida of advanced maternal age in first trimester   (V23 82) (O200 65)   4  Encounter for supervision of normal pregnancy in first trimester (V22 1) (Z34 91)   5  History of spontaneous  (V13 29) (Z87 59)    Current Meds   1  Nitrofurantoin Monohyd Macro 100 MG Oral Capsule (Macrobid); TAKE 1 CAPSULE   TWICE DAILY; Therapy: 23Eng6820 to (Evaluate:81Gph1732)  Requested for: 70Xza6235; Last   Rx:[id number] Ordered   2  Prenatal Vitamin TABS; Therapy: (Recorded:2017) to Recorded    Allergies    1   No Known Drug Allergies    Signatures   Electronically signed by : Linda Figueroa RN; 2018 11:23AM EST                       (Author)

## 2018-01-23 NOTE — PROGRESS NOTES
2018         RE: Stef Lyon                              To: [facility]  MR#: [id number]                                  [facility]  : [address]: [id number]:Cornelia Yi U  6    (Exam #: [id number])                           Fax: [phone]      The LMP of this 40year old,  G5, P3-0-1-3 patient was OCT 23 2017, giving   her an ARIEL of 2018 and a current gestational age of 17 weeks 0 days   by dates  A sonographic examination was performed on 2018 using   real time equipment  The ultrasound examination was performed using   abdominal technique  The patient has a BMI of 24 5  Her blood pressure   today was 118/60  Earliest US on record: 17  7w3d  18 ARIEL Multiple longitudinal   and transverse sections revealed a dowell intrauterine pregnancy with   the fetus in transverse presentation  The placenta is anterior in   implantation, grade I in appearance, and there is no placenta previa  A normal gestational sac was documented  A normal fetal pole was   visualized  Cardiac motion was observed at 157 bpm  The yolk sac was not   seen  INDICATIONS      first trimester genetic screening   advanced maternal age   previous  (s)      Exam Types      Level I      RESULTS      Fetus # 1 of 1   Transverse presentation   Fetal growth appeared normal      MEASUREMENTS (* Included In Average GA)      CRL              6 5 cm        12 weeks 4 days*   Nuchal Trans    1 70 mm   NBL              2 6 mm      THE AVERAGE GESTATIONAL AGE is 12 weeks 4 days +/- 7 days  ANATOMY COMMENTS      Anatomic detail is limited at this gestational age  The fetal cranium   appeared normal in shape and the nuchal translucency was normal in size   (1 7mm)  The nasal bone appears to be present  The intracranial anatomy   was unremarkable    Evaluation of the spine revealed no obvious evidence   for a neural tube defect  Anatomy of the fetal thorax appeared within   normal limits with a normal cardiac axis  The cardiac rhythm was regular   and documented with M-mode  Within the abdomen, the stomach & bladder   were visualized and the abdominal wall appeared intact  A three vessel   cord appears to be present  Active movement of the fetal body &   extremities was seen  There is no suspicion of a subchorionic bleed  The   placental cord insertion appeared normal     There is no suspicion of a   uterine myoma  Free fluid is not seen in the posterior cul-de-sac  ADNEXA      The left ovary was not visualized  The right ovary appeared normal and   measured 2 8 x 3 3 x 2 1 cm with a volume of 9 9 cc       AMNIOTIC FLUID         Largest Vertical Pocket = 2 4 cm   Amniotic Fluid: Normal      IMPRESSION      Hernandez IUP   12 weeks and 4 days by this ultrasound  (ARIEL=2018)   Transverse presentation   Fetal growth appeared normal   Regular fetal heart rate of 157 bpm   Anterior placenta   No placenta previa      GENERAL COMMENT      Ms Bryant Mcintyre is here for nuchal translucency  She is of advanced   maternal age with 3 prior  deliveries  There is a single live intrauterine pregnancy with size equivalent to   dates  No gross anomalies were identified on limited views  Amniotic fluid is within normal limits  Nuchal translucency measures 1 7mm which is within normal limits for this   crown-rump length  Evaluation and Management:   The patient was counseled regarding the above findings  A total of 15   minutes were spent in this encounter with >50% of the time spent in   face-to-face counseling and in coordination of care  The limitations of ultrasound and aneuploidy screening were explained to   her  Genetic screening and diagnostic testing options were discussed with   her    Blood work was drawn today for NIPT; she opted in for sex chromosome   analysis  She desires genetic counseling after NIPT results are back  She should return in 7 weeks for anatomy survey and cervical length   screening  We discussed her history of prior  section  In a setting of 3   prior  deliveries, assessment of placental location is indicated   at the time of anatomy scan to assess her risk factors of abnormal   placentation  Increasing number of  deliveries confers an   increased risk of placenta accreta spectrum however this is dramatically   amplified in the setting of a complete placenta previa if present  At the conclusion of today's encounter, all questions were answered to her   satisfaction  Thank you very much for this kind referral and please do   not hesitate to contact me with any further questions or concerns  RICARDO Moore M D     Maternal Fetal Medicine   Electronically signed 18 14:39

## 2018-01-23 NOTE — MISCELLANEOUS
Message   Recorded as Task   Date: 2017 07:44 AM, Created By: Kylah Du   Task Name: Medical Complaint Callback   Assigned To: Holland Lozano   Regarding Patient: Anton Ventura, Status: In Progress   ChrystalAntonallison Wina - 22 Dec 2017 7:44 AM     TASK CREATED  Caller: Self; Medical Complaint; [phone] (Home)  I received a call from her fiancÃÂ© Long Rivera  She has been having urinary frequency and burning for about 3 days  Denies fever, has some cramping and a little back pain  She has NKDA and uses [facility]  Terrance,Chavez - 22 Dec 2017 7:51 AM     TASK IN PROGRESS   Terrance,Chavez - 22 Dec 2017 8:12 AM     TASK EDITED  called pt- states, has frequency, burning with urination & when she urinates "dribbles, but goes every 15min " also has pelvic pain  UA & C&S ordered to [facility]  VO of DR WARREN for macrobid eprescribed to pharmacy  advised pt to have urine CC done prior to picking up rx & to increase water intake while taking antibiotic   pt verbalized understanding  Active Problems    1  Abdominal pain (789 00) (R10 9)   2  Amenorrhea, secondary (626 0) (N91 1)   3  At risk of UTI (V49 89) (Z91 89)   4  History of spontaneous  (V13 29) (Z87 59)    Current Meds   1  Prenatal Vitamin TABS; Therapy: (Recorded:2017) to Recorded    Allergies    1  No Known Drug Allergies    Plan  At risk of UTI    · Nitrofurantoin Monohyd Macro 100 MG Oral Capsule (Macrobid); TAKE 1 CAPSULE  TWICE DAILY   · (1) URINALYSIS w URINE C/S REFLEX (will reflex a microscopy if leukocytes, occult  blood, or nitrites are not within normal limits); Status:Active - Retrospective Authorization; Requested for:11Stf3423;    · (1) URINE CULTURE; Source:Urine, Clean Catch; Status:Active - Retrospective  Authorization;  Requested for:01Vwj6014;     Signatures   Electronically signed by : Dionisio Arvizu RN; Dec 22 2017  8:13AM EST                       (Author)

## 2018-01-24 VITALS
SYSTOLIC BLOOD PRESSURE: 100 MMHG | BODY MASS INDEX: 23.43 KG/M2 | DIASTOLIC BLOOD PRESSURE: 61 MMHG | HEIGHT: 63 IN | WEIGHT: 132.25 LBS

## 2018-01-24 VITALS
WEIGHT: 136.25 LBS | BODY MASS INDEX: 24.14 KG/M2 | DIASTOLIC BLOOD PRESSURE: 60 MMHG | SYSTOLIC BLOOD PRESSURE: 100 MMHG | HEIGHT: 63 IN

## 2018-01-24 VITALS
DIASTOLIC BLOOD PRESSURE: 60 MMHG | HEIGHT: 63 IN | HEART RATE: 75 BPM | WEIGHT: 133.8 LBS | SYSTOLIC BLOOD PRESSURE: 118 MMHG | BODY MASS INDEX: 23.71 KG/M2

## 2018-01-24 VITALS
SYSTOLIC BLOOD PRESSURE: 102 MMHG | HEIGHT: 63 IN | BODY MASS INDEX: 23.92 KG/M2 | DIASTOLIC BLOOD PRESSURE: 60 MMHG | WEIGHT: 135 LBS

## 2018-01-25 LAB
CHLAMYDIA DNA CVX QL NAA+PROBE: NORMAL
N GONORRHOEA DNA GENITAL QL NAA+PROBE: NORMAL

## 2018-01-26 LAB — HPV RRNA GENITAL QL NAA+PROBE: NORMAL

## 2018-01-29 ENCOUNTER — TELEPHONE (OUTPATIENT)
Dept: OBGYN CLINIC | Facility: CLINIC | Age: 38
End: 2018-01-29

## 2018-01-29 LAB
LAB AP GYN PRIMARY INTERPRETATION: NORMAL
LAB AP LMP: NORMAL
Lab: NORMAL

## 2018-01-29 NOTE — TELEPHONE ENCOUNTER
Returned Pt's call today  Pt states she was calling to obtain genetic testing results from lab work she completed on 1/18/18  Pt informed that genetic testing results are not available at this time  Pt further informed that [facility] staff will contact her upon receipt and review of said test results by a provider

## 2018-01-29 NOTE — TELEPHONE ENCOUNTER
Pt called office today, left voicemail message  Pt called to request recent test results  Pt saw Dr Ricky Farfan on 1/18/18  Pt can be reached @ [phone]

## 2018-01-31 ENCOUNTER — TELEPHONE (OUTPATIENT)
Dept: PERINATAL CARE | Facility: CLINIC | Age: 38
End: 2018-01-31

## 2018-02-01 ENCOUNTER — TELEPHONE (OUTPATIENT)
Dept: PERINATAL CARE | Facility: CLINIC | Age: 38
End: 2018-02-01

## 2018-02-02 ENCOUNTER — TELEPHONE (OUTPATIENT)
Dept: PERINATAL CARE | Facility: CLINIC | Age: 38
End: 2018-02-02

## 2018-02-02 ENCOUNTER — OFFICE VISIT (OUTPATIENT)
Dept: PERINATAL CARE | Facility: CLINIC | Age: 38
End: 2018-02-02
Payer: COMMERCIAL

## 2018-02-02 VITALS
SYSTOLIC BLOOD PRESSURE: 110 MMHG | HEART RATE: 78 BPM | BODY MASS INDEX: 23.46 KG/M2 | HEIGHT: 63 IN | WEIGHT: 132.4 LBS | DIASTOLIC BLOOD PRESSURE: 71 MMHG

## 2018-02-02 DIAGNOSIS — O09.522 MATERNAL AGE > 35, MULTIGRAVIDA, SECOND TRIMESTER: Primary | ICD-10-CM

## 2018-02-02 PROCEDURE — 99024 POSTOP FOLLOW-UP VISIT: CPT | Performed by: GENETIC COUNSELOR, MS

## 2018-02-02 RX ORDER — PNV NO.95/FERROUS FUM/FOLIC AC 28MG-0.8MG
TABLET ORAL
COMMUNITY
End: 2018-05-30 | Stop reason: ALTCHOICE

## 2018-02-02 RX ORDER — CALCIUM CARBONATE 200(500)MG
1 TABLET,CHEWABLE ORAL AS NEEDED
COMMUNITY
End: 2019-03-11 | Stop reason: ALTCHOICE

## 2018-02-02 NOTE — PROGRESS NOTES
I have reviewed the notes, assessments, and/or procedures performed by Aakash Palacios, I concur with her/his documentation of Mark Pemberton

## 2018-02-02 NOTE — PROGRESS NOTES
Genetic Counseling Note        Arya Morales     Appointment Date:  2018  Referred By: Barbee Frankel, MD  YOB: 1980  Partner:  Chapo Guajardo      Pregnancy History:   Estimated Date of Delivery: 18   Estimated Gestational Age: 14 weeks     Genetic Counseling:advanced maternal age    Hiral is a(n) 41 y o  female who is here to discuss maternal age related risk for aneuploidy    Issues Discussed:    Average population risk: 3-4% in the average pregnancy of serious condition or birth defect  2-3% risk of mental retardation  Not all detected by prenatal testing  Chromosomal: non-disjunction:  risk for aneuploidy overall,  risk for Down syndrome  Maternal age  Test results    Options Discussed:    Amniocentesis: risks and limitations discussed  Ethnic screening discussed: clinical and genetic basis of CF, SMA, Fragile X and hemoglobinopathies  Level II ultrasound to screen for structural anomalies  Cell free fetal DNA testing    Additional Information / Impression / Plan / Tests Ordered:  Patient and partner were advised of her cell free DNA test results which were screen negative for trisomy 21, 13 and 18  The patient was advised of fetal gender (female) in private  Discussed that cell free DNA is a screening test and remaining risk for aneuploidy is 1 in several [place] in with a negative test result  Patient and partner id by Jane Tierney of the availability of diagnostic testing via amniocentesis which they elected to decline  They are planning to pursue MSAFP screening and level 2 ultrasound evaluation at the appropriate times  During our counseling session histories were taken on the patient's family and her partner's family both of which were negative for any prior known cases of intellectual disability, birth defects or single gene disorders   Hiral reports being of [place] descent with no known Gnosticism ancestry while her partner reports being of [place], [place], [place] and [place] descent with no known Zoroastrian ancestry  They elected to decline all genetic carrier screening  Hemoglobinopathy screening is recommended if not previously performed  Lastly, we discussed the fact that it is important to keep in mind that everyone in the general population regardless of age, family history, or medical background has approximately a 3% risk of having a child with some type of her defect, genetic disease or intellectual disability  Currently there are no tests available to rule out all birth defects or health problems            Time spent with Genetic Counselor: 30 minutes

## 2018-02-15 ENCOUNTER — ROUTINE PRENATAL (OUTPATIENT)
Dept: OBGYN CLINIC | Facility: CLINIC | Age: 38
End: 2018-02-15

## 2018-02-15 VITALS — SYSTOLIC BLOOD PRESSURE: 110 MMHG | WEIGHT: 133.8 LBS | DIASTOLIC BLOOD PRESSURE: 60 MMHG | BODY MASS INDEX: 24.08 KG/M2

## 2018-02-15 DIAGNOSIS — Z98.891 HISTORY OF 3 CESAREAN SECTIONS: ICD-10-CM

## 2018-02-15 DIAGNOSIS — Z34.82 PRENATAL CARE, SUBSEQUENT PREGNANCY, SECOND TRIMESTER: Primary | ICD-10-CM

## 2018-02-15 DIAGNOSIS — O09.522 ADVANCED MATERNAL AGE IN MULTIGRAVIDA, SECOND TRIMESTER: ICD-10-CM

## 2018-02-15 PROCEDURE — PNV: Performed by: OBSTETRICS & GYNECOLOGY

## 2018-02-15 NOTE — ASSESSMENT & PLAN NOTE
Anterior Placenta  Plan repeat c/s @ 39wks  Most recent OP report reviewed, no major adhesions noted

## 2018-02-15 NOTE — PROGRESS NOTES
Some vulvar discomfort  Feeling fetal movement  It's a girl! Level II US in March at [facility]  Has labslip for MSAFP from [facility], will obtain      Problem List Items Addressed This Visit        Other    Advanced maternal age in multigravida, second trimester - Primary     [id number] - normal           History of 3  sections     Anterior Placenta  Plan repeat c/s @ 39wks  Most recent OP report reviewed, no major adhesions noted

## 2018-03-07 NOTE — PROGRESS NOTES
Education  Baby & Me Education 1st Trimester:   First Trimester Education provided:   benefits of breastfeeding, importance of exclusive breastfeeding, early initiation of breastfeeding and exclusive breastfeeding for the first 6 months   The patient is planning on breastfeeding     Prenatal education provided by: Marixa Torrez   Electronically signed by : Jillian Burnett, ; Dec 27 2017  3:42PM EST                       (Author)

## 2018-03-08 ENCOUNTER — TRANSCRIBE ORDERS (OUTPATIENT)
Dept: ADMINISTRATIVE | Facility: HOSPITAL | Age: 38
End: 2018-03-08

## 2018-03-08 ENCOUNTER — APPOINTMENT (OUTPATIENT)
Dept: LAB | Facility: HOSPITAL | Age: 38
End: 2018-03-08
Payer: COMMERCIAL

## 2018-03-08 DIAGNOSIS — Z36.9 RESEARCH REQUESTED ANTENATAL ULTRASOUND SCAN: Primary | ICD-10-CM

## 2018-03-08 DIAGNOSIS — Z36.9 RESEARCH REQUESTED ANTENATAL ULTRASOUND SCAN: ICD-10-CM

## 2018-03-08 DIAGNOSIS — Z33.1 PREGNANT STATE, INCIDENTAL: ICD-10-CM

## 2018-03-08 PROCEDURE — 36415 COLL VENOUS BLD VENIPUNCTURE: CPT

## 2018-03-08 PROCEDURE — 82105 ALPHA-FETOPROTEIN SERUM: CPT

## 2018-03-09 ENCOUNTER — ROUTINE PRENATAL (OUTPATIENT)
Dept: PERINATAL CARE | Facility: MEDICAL CENTER | Age: 38
End: 2018-03-09
Payer: COMMERCIAL

## 2018-03-09 VITALS
DIASTOLIC BLOOD PRESSURE: 65 MMHG | HEIGHT: 62 IN | BODY MASS INDEX: 25.76 KG/M2 | SYSTOLIC BLOOD PRESSURE: 114 MMHG | WEIGHT: 140 LBS | HEART RATE: 78 BPM

## 2018-03-09 DIAGNOSIS — Z36.86 ENCOUNTER FOR ANTENATAL SCREENING FOR CERVICAL LENGTH: Primary | ICD-10-CM

## 2018-03-09 DIAGNOSIS — Z3A.20 20 WEEKS GESTATION OF PREGNANCY: ICD-10-CM

## 2018-03-09 DIAGNOSIS — Z98.891 HISTORY OF 3 CESAREAN SECTIONS: ICD-10-CM

## 2018-03-09 DIAGNOSIS — O09.522 ELDERLY MULTIGRAVIDA IN SECOND TRIMESTER: ICD-10-CM

## 2018-03-09 PROCEDURE — 76817 TRANSVAGINAL US OBSTETRIC: CPT | Performed by: OBSTETRICS & GYNECOLOGY

## 2018-03-09 PROCEDURE — 76811 OB US DETAILED SNGL FETUS: CPT | Performed by: OBSTETRICS & GYNECOLOGY

## 2018-03-09 PROCEDURE — 99212 OFFICE O/P EST SF 10 MIN: CPT | Performed by: OBSTETRICS & GYNECOLOGY

## 2018-03-09 NOTE — PROGRESS NOTES
Please refer to the ultrasound report for additional information regarding the [facility] visit to [address] [place] [address] today

## 2018-03-09 NOTE — LETTER
March 9, 2018     Leelee [address]itas [address] [place] [address]    Patient: Mark Pemberton   YOB: 1980   Date of Visit: 3/9/2018       Dear Dr Chou Comes: Thank you for referring Mark Pemberton to me for evaluation  Below are my notes for this consultation  If you have questions, please do not hesitate to call me  I look forward to following your patient along with you  Sincerely,        Aly Lewis MD        CC: No Recipients  Aly Lewis MD  3/9/2018  2:18 PM  Sign at close encounter  Please refer to the ultrasound report for additional information regarding the [facility] visit to [facility]

## 2018-03-11 LAB
2ND TRIMESTER 4 SCREEN SERPL-IMP: NORMAL
AFP ADJ MOM SERPL: 0.69
AFP INTERP AMN-IMP: NORMAL
AFP INTERP SERPL-IMP: NORMAL
AFP INTERP SERPL-IMP: NORMAL
AFP SERPL-MCNC: 42 NG/ML
AGE AT DELIVERY: 37.5 YEARS
GA METHOD: NORMAL
GA: 20 WEEKS
IDDM PATIENT QL: NO
MULTIPLE PREGNANCY: NO
NEURAL TUBE DEFECT RISK FETUS: NORMAL %

## 2018-03-12 ENCOUNTER — TELEPHONE (OUTPATIENT)
Dept: PERINATAL CARE | Facility: CLINIC | Age: 38
End: 2018-03-12

## 2018-03-15 ENCOUNTER — ROUTINE PRENATAL (OUTPATIENT)
Dept: OBGYN CLINIC | Facility: CLINIC | Age: 38
End: 2018-03-15

## 2018-03-15 VITALS — BODY MASS INDEX: 25.61 KG/M2 | DIASTOLIC BLOOD PRESSURE: 64 MMHG | SYSTOLIC BLOOD PRESSURE: 110 MMHG | WEIGHT: 140 LBS

## 2018-03-15 DIAGNOSIS — Z98.891 HISTORY OF 3 CESAREAN SECTIONS: ICD-10-CM

## 2018-03-15 DIAGNOSIS — Z34.82 PRENATAL CARE, SUBSEQUENT PREGNANCY, SECOND TRIMESTER: Primary | ICD-10-CM

## 2018-03-15 PROCEDURE — PNV: Performed by: OBSTETRICS & GYNECOLOGY

## 2018-03-15 NOTE — PROGRESS NOTES
Patient had a normal level 2 ultrasound    Had been feeling tired  She had a history of anemia  She will start using iron tablets daily  For repeat  but not for tubal ligation

## 2018-04-11 ENCOUNTER — ROUTINE PRENATAL (OUTPATIENT)
Dept: OBGYN CLINIC | Facility: CLINIC | Age: 38
End: 2018-04-11

## 2018-04-11 VITALS — BODY MASS INDEX: 26.56 KG/M2 | DIASTOLIC BLOOD PRESSURE: 60 MMHG | SYSTOLIC BLOOD PRESSURE: 104 MMHG | WEIGHT: 145.2 LBS

## 2018-04-11 DIAGNOSIS — Z3A.24 24 WEEKS GESTATION OF PREGNANCY: Primary | ICD-10-CM

## 2018-04-11 DIAGNOSIS — O09.522 ADVANCED MATERNAL AGE IN MULTIGRAVIDA, SECOND TRIMESTER: ICD-10-CM

## 2018-04-11 DIAGNOSIS — Z98.891 HISTORY OF 3 CESAREAN SECTIONS: ICD-10-CM

## 2018-04-11 DIAGNOSIS — O09.522 ELDERLY MULTIGRAVIDA IN SECOND TRIMESTER: ICD-10-CM

## 2018-04-11 PROCEDURE — PNV: Performed by: OBSTETRICS & GYNECOLOGY

## 2018-04-11 NOTE — ASSESSMENT & PLAN NOTE
Bothered by seasonal allergies, not taking anything    Discussed OTC Flonase, loratadine, pseudophed, neti pot    28 week labs ordered

## 2018-04-11 NOTE — PROGRESS NOTES
Problem List Items Addressed This Visit        Other    Advanced maternal age in multigravida, second trimester    History of 3  sections     Will plan R C/S at 44 weeks         Elderly multigravida in second trimester     Bothered by seasonal allergies, not taking anything    Discussed OTC Flonase, loratadine, pseudophed, neti pot    28 week labs ordered           Other Visit Diagnoses     24 weeks gestation of pregnancy    -  Primary    Relevant Orders    RPR    CBC    Glucose, 1H PG

## 2018-04-13 ENCOUNTER — TELEPHONE (OUTPATIENT)
Dept: OBGYN CLINIC | Facility: CLINIC | Age: 38
End: 2018-04-13

## 2018-04-13 NOTE — TELEPHONE ENCOUNTER
Spoke with pt Ledy Kearney - advised for the seasonal allergies to take what was recommended by Donald Tom at her recent appointment - they are going to get it today  As for the cat scratches, cleanse the area really well - if it gets infected either go to PCP or Er

## 2018-04-13 NOTE — TELEPHONE ENCOUNTER
Pt's trevon Kulkarni called stating that the pt has concerns about what to take for seasonal and cat allergies  Pt's fijason states that the pt was scratched by their cat the other day and the site became: red, slightly swollen, and bled a little  Pt's trevon states that occasionally when the pt is scratched by a cat the pt experiences shortness of breath  Pt's trevon Kulkarni and/or the pt would like a call back       Best number to reach fiance/pt: [phone]

## 2018-04-25 ENCOUNTER — APPOINTMENT (OUTPATIENT)
Dept: LAB | Facility: MEDICAL CENTER | Age: 38
End: 2018-04-25
Payer: COMMERCIAL

## 2018-04-25 LAB
ERYTHROCYTE [DISTWIDTH] IN BLOOD BY AUTOMATED COUNT: 13.3 % (ref 11.6–15.1)
GLUCOSE 1H P 50 G GLC PO SERPL-MCNC: 92 MG/DL
HCT VFR BLD AUTO: 31.6 % (ref 34.8–46.1)
HGB BLD-MCNC: 10 G/DL (ref 11.5–15.4)
MCH RBC QN AUTO: 27.9 PG (ref 26.8–34.3)
MCHC RBC AUTO-ENTMCNC: 31.6 G/DL (ref 31.4–37.4)
MCV RBC AUTO: 88 FL (ref 82–98)
PLATELET # BLD AUTO: 436 THOUSANDS/UL (ref 149–390)
PMV BLD AUTO: 10.1 FL (ref 8.9–12.7)
RBC # BLD AUTO: 3.58 MILLION/UL (ref 3.81–5.12)
WBC # BLD AUTO: 9.19 THOUSAND/UL (ref 4.31–10.16)

## 2018-04-25 PROCEDURE — 82950 GLUCOSE TEST: CPT | Performed by: OBSTETRICS & GYNECOLOGY

## 2018-04-25 PROCEDURE — 85027 COMPLETE CBC AUTOMATED: CPT | Performed by: OBSTETRICS & GYNECOLOGY

## 2018-04-25 PROCEDURE — 36415 COLL VENOUS BLD VENIPUNCTURE: CPT | Performed by: OBSTETRICS & GYNECOLOGY

## 2018-04-25 PROCEDURE — 86592 SYPHILIS TEST NON-TREP QUAL: CPT | Performed by: OBSTETRICS & GYNECOLOGY

## 2018-04-26 ENCOUNTER — TELEPHONE (OUTPATIENT)
Dept: OBGYN CLINIC | Facility: CLINIC | Age: 38
End: 2018-04-26

## 2018-04-26 LAB — RPR SER QL: NORMAL

## 2018-04-26 NOTE — TELEPHONE ENCOUNTER
Patient is aware of results  She will take the Fe supplements and aware of iron riched foods  Will add them to her diet  She is taking her PNV - but they were the gummies - will change it back to her old ones

## 2018-04-26 NOTE — TELEPHONE ENCOUNTER
----- Message from Shagufta Eldridge DO sent at 4/25/2018  4:01 PM EDT -----  Hgb abnormal confirm pt taking PNV; add Fe supplement once daily(twice daily if Hgb < 10 0); review iron-rich foods(raisins, dried apricots, leafy green vegetables, cream of wheat cereal, occasional red meat)

## 2018-05-02 ENCOUNTER — ROUTINE PRENATAL (OUTPATIENT)
Dept: OBGYN CLINIC | Facility: CLINIC | Age: 38
End: 2018-05-02

## 2018-05-02 VITALS — WEIGHT: 147 LBS | SYSTOLIC BLOOD PRESSURE: 100 MMHG | DIASTOLIC BLOOD PRESSURE: 62 MMHG | BODY MASS INDEX: 26.89 KG/M2

## 2018-05-02 DIAGNOSIS — O09.522 ADVANCED MATERNAL AGE IN MULTIGRAVIDA, SECOND TRIMESTER: Primary | ICD-10-CM

## 2018-05-02 PROCEDURE — PNV: Performed by: PHYSICIAN ASSISTANT

## 2018-05-02 RX ORDER — AMOXICILLIN 875 MG/1
TABLET, COATED ORAL
Refills: 0 | COMMUNITY
Start: 2018-04-18 | End: 2018-05-16 | Stop reason: ALTCHOICE

## 2018-05-02 RX ORDER — LORATADINE 10 MG/1
CAPSULE, LIQUID FILLED ORAL
COMMUNITY
End: 2022-03-14 | Stop reason: ALTCHOICE

## 2018-05-02 NOTE — PROGRESS NOTES
Problem List Items Addressed This Visit     Advanced maternal age in multigravida, second trimester - Primary     Feels well overall  Tired  Taking iron - gave Niferex samples  Passed 1hr gtt  Had some bleeding from her vulva this past weekend with soreness - exam with multiple vulvar varicosities, large hemorrhoid  Samples of Analpram cream, warm soaks in Epsom salt water recommended  V2 supporter suggested  Works on her feet all day with trucks at [employer]  Some swelling at the end of the day - rec   Compression stockings  TDAP next visit

## 2018-05-02 NOTE — ASSESSMENT & PLAN NOTE
Feels well overall  Tired  Taking iron - gave Niferex samples  Passed 1hr gtt  Had some bleeding from her vulva this past weekend with soreness - exam with multiple vulvar varicosities, large hemorrhoid  Samples of Analpram cream, warm soaks in Epsom salt water recommended  V2 supporter suggested  Works on her feet all day with trucks at [employer]  Some swelling at the end of the day - rec   Compression stockings  TDAP next visit

## 2018-05-16 ENCOUNTER — ROUTINE PRENATAL (OUTPATIENT)
Dept: OBGYN CLINIC | Facility: CLINIC | Age: 38
End: 2018-05-16

## 2018-05-16 VITALS — DIASTOLIC BLOOD PRESSURE: 70 MMHG | SYSTOLIC BLOOD PRESSURE: 120 MMHG | BODY MASS INDEX: 27.25 KG/M2 | WEIGHT: 149 LBS

## 2018-05-16 DIAGNOSIS — O09.522 ADVANCED MATERNAL AGE IN MULTIGRAVIDA, SECOND TRIMESTER: Primary | ICD-10-CM

## 2018-05-16 DIAGNOSIS — Z98.891 HISTORY OF 3 CESAREAN SECTIONS: ICD-10-CM

## 2018-05-16 PROCEDURE — PNV: Performed by: PHYSICIAN ASSISTANT

## 2018-05-16 NOTE — ASSESSMENT & PLAN NOTE
Feels well  Good fetal movement  It's a girl  Taking her iron without problems  Getting  on Saturday

## 2018-05-16 NOTE — PROGRESS NOTES
Problem List Items Addressed This Visit     Advanced maternal age in multigravida, second trimester - Primary     Feels well  Good fetal movement  It's a girl  Taking her iron without problems  Getting  on Saturday         History of 3  sections

## 2018-05-29 ENCOUNTER — TELEPHONE (OUTPATIENT)
Dept: OBGYN CLINIC | Facility: CLINIC | Age: 38
End: 2018-05-29

## 2018-05-29 NOTE — TELEPHONE ENCOUNTER
Patient called, she is 32 weeks  Last night from 8 pm until 2 am she said the baby was very active and she had back pain, she said she could not get into a comfortable position to sleep  Today her back feels sore where the pain was and she has a slight headache  I recommended she take tylenol and call with any concerns  She has an appointment tomorrow

## 2018-05-30 ENCOUNTER — ROUTINE PRENATAL (OUTPATIENT)
Dept: OBGYN CLINIC | Facility: CLINIC | Age: 38
End: 2018-05-30
Payer: COMMERCIAL

## 2018-05-30 VITALS — WEIGHT: 154 LBS | SYSTOLIC BLOOD PRESSURE: 110 MMHG | DIASTOLIC BLOOD PRESSURE: 70 MMHG | BODY MASS INDEX: 28.17 KG/M2

## 2018-05-30 DIAGNOSIS — Z23 NEED FOR TDAP VACCINATION: ICD-10-CM

## 2018-05-30 DIAGNOSIS — O09.523 ADVANCED MATERNAL AGE IN MULTIGRAVIDA, THIRD TRIMESTER: Primary | ICD-10-CM

## 2018-05-30 DIAGNOSIS — Z98.891 HISTORY OF 3 CESAREAN SECTIONS: ICD-10-CM

## 2018-05-30 PROCEDURE — 90715 TDAP VACCINE 7 YRS/> IM: CPT

## 2018-05-30 PROCEDURE — 90471 IMMUNIZATION ADMIN: CPT

## 2018-05-30 PROCEDURE — PNV: Performed by: OBSTETRICS & GYNECOLOGY

## 2018-05-30 NOTE — PROGRESS NOTES
Patient states she got bruise from scratching leg - reassured last platelets were high normal   Will monitor  She is currently taking Niferex 150mg - concerned about dose given she was previously on higher dose of iron  Reassured that different types of iron have different doses  Discussed normal aches/pains of third trimester   labor precautions given - history of term deliveries, last two at 38 weeks  Wishes to have  as soon as we can schedule  Will send task to Hazel Rojas to have him schedule on   Tdap given today  Breast pump slip given  Check-in card

## 2018-06-01 ENCOUNTER — ULTRASOUND (OUTPATIENT)
Dept: PERINATAL CARE | Facility: MEDICAL CENTER | Age: 38
End: 2018-06-01
Payer: COMMERCIAL

## 2018-06-01 VITALS
HEIGHT: 62 IN | SYSTOLIC BLOOD PRESSURE: 123 MMHG | HEART RATE: 81 BPM | BODY MASS INDEX: 27.79 KG/M2 | DIASTOLIC BLOOD PRESSURE: 72 MMHG | WEIGHT: 151 LBS

## 2018-06-01 DIAGNOSIS — Z98.891 HISTORY OF 3 CESAREAN SECTIONS: ICD-10-CM

## 2018-06-01 DIAGNOSIS — O09.523 ADVANCED MATERNAL AGE IN MULTIGRAVIDA, THIRD TRIMESTER: Primary | ICD-10-CM

## 2018-06-01 DIAGNOSIS — Z3A.32 32 WEEKS GESTATION OF PREGNANCY: ICD-10-CM

## 2018-06-01 PROCEDURE — 76816 OB US FOLLOW-UP PER FETUS: CPT | Performed by: OBSTETRICS & GYNECOLOGY

## 2018-06-01 NOTE — PATIENT INSTRUCTIONS

## 2018-06-14 ENCOUNTER — ROUTINE PRENATAL (OUTPATIENT)
Dept: OBGYN CLINIC | Facility: CLINIC | Age: 38
End: 2018-06-14

## 2018-06-14 VITALS — DIASTOLIC BLOOD PRESSURE: 68 MMHG | WEIGHT: 155 LBS | BODY MASS INDEX: 28.35 KG/M2 | SYSTOLIC BLOOD PRESSURE: 120 MMHG

## 2018-06-14 DIAGNOSIS — Z98.891 HISTORY OF 3 CESAREAN SECTIONS: ICD-10-CM

## 2018-06-14 DIAGNOSIS — O99.013 ANEMIA DURING PREGNANCY IN THIRD TRIMESTER: ICD-10-CM

## 2018-06-14 DIAGNOSIS — O09.523 ELDERLY MULTIGRAVIDA IN THIRD TRIMESTER: Primary | ICD-10-CM

## 2018-06-14 PROCEDURE — PNV: Performed by: OBSTETRICS & GYNECOLOGY

## 2018-06-14 NOTE — PROGRESS NOTES
Problem List Items Addressed This Visit        Other    History of 3  sections    Elderly multigravida in third trimester - Primary    Anemia during pregnancy in third trimester     Stopped iron - was making her itchy  Will recheck CBC  Relevant Orders    CBC and Platelet        Has  scheduled with RK, but does not think she will make it until then  GBS next visit  Planning to use [facility] peds at [facility]

## 2018-06-20 ENCOUNTER — APPOINTMENT (OUTPATIENT)
Dept: LAB | Facility: MEDICAL CENTER | Age: 38
End: 2018-06-20
Payer: COMMERCIAL

## 2018-06-20 DIAGNOSIS — O99.013 ANEMIA DURING PREGNANCY IN THIRD TRIMESTER: ICD-10-CM

## 2018-06-20 LAB
ERYTHROCYTE [DISTWIDTH] IN BLOOD BY AUTOMATED COUNT: 14.4 % (ref 11.6–15.1)
HCT VFR BLD AUTO: 31.4 % (ref 34.8–46.1)
HGB BLD-MCNC: 9.2 G/DL (ref 11.5–15.4)
MCH RBC QN AUTO: 24.3 PG (ref 26.8–34.3)
MCHC RBC AUTO-ENTMCNC: 29.3 G/DL (ref 31.4–37.4)
MCV RBC AUTO: 83 FL (ref 82–98)
PLATELET # BLD AUTO: 364 THOUSANDS/UL (ref 149–390)
PMV BLD AUTO: 11.3 FL (ref 8.9–12.7)
RBC # BLD AUTO: 3.79 MILLION/UL (ref 3.81–5.12)
WBC # BLD AUTO: 7.92 THOUSAND/UL (ref 4.31–10.16)

## 2018-06-20 PROCEDURE — 85027 COMPLETE CBC AUTOMATED: CPT

## 2018-06-20 PROCEDURE — 36415 COLL VENOUS BLD VENIPUNCTURE: CPT

## 2018-06-21 ENCOUNTER — TELEPHONE (OUTPATIENT)
Dept: OBGYN CLINIC | Facility: CLINIC | Age: 38
End: 2018-06-21

## 2018-06-21 DIAGNOSIS — O99.019 ANEMIA IN PREGNANCY: Primary | ICD-10-CM

## 2018-06-21 RX ORDER — FERROUS SULFATE TAB EC 324 MG (65 MG FE EQUIVALENT) 324 (65 FE) MG
324 TABLET DELAYED RESPONSE ORAL
Qty: 60 TABLET | Refills: 2 | Status: SHIPPED | OUTPATIENT
Start: 2018-06-21 | End: 2021-04-19 | Stop reason: SDUPTHER

## 2018-06-21 NOTE — TELEPHONE ENCOUNTER
Spoke with pt - she is aware of results  Does not want to do Iron infusion  She would like an Rx for the iron supplements  Advised she can get it OTC, but states that she rather have an RX  Please advise  Thanks!

## 2018-06-21 NOTE — TELEPHONE ENCOUNTER
----- Message from Quin Crump MD sent at 6/21/2018  9:31 AM EDT -----  Can you let Carola Okeefe know that her hemoglobin dropped further - should definitely restart iron, or see hematology if she would prefer iron infusions    Thanks

## 2018-06-21 NOTE — TELEPHONE ENCOUNTER
Spoke with Pt today via phone call  Pt informed that Dr Abimael Santa filled prescription for iron supplements as requested by Pt  Pt further informed prescription was forwarded to [facility] in [place], [place]  Reiterated to Pt that if she has any questions/concerns to contact office

## 2018-06-28 ENCOUNTER — ROUTINE PRENATAL (OUTPATIENT)
Dept: OBGYN CLINIC | Facility: CLINIC | Age: 38
End: 2018-06-28

## 2018-06-28 VITALS — DIASTOLIC BLOOD PRESSURE: 64 MMHG | BODY MASS INDEX: 28.53 KG/M2 | SYSTOLIC BLOOD PRESSURE: 122 MMHG | WEIGHT: 156 LBS

## 2018-06-28 DIAGNOSIS — Z3A.36 36 WEEKS GESTATION OF PREGNANCY: Primary | ICD-10-CM

## 2018-06-28 DIAGNOSIS — O09.523 ADVANCED MATERNAL AGE IN MULTIGRAVIDA, THIRD TRIMESTER: ICD-10-CM

## 2018-06-28 PROCEDURE — PNV: Performed by: PHYSICIAN ASSISTANT

## 2018-06-28 PROCEDURE — 87653 STREP B DNA AMP PROBE: CPT | Performed by: PHYSICIAN ASSISTANT

## 2018-06-28 NOTE — PROGRESS NOTES
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

## 2018-06-28 NOTE — ASSESSMENT & PLAN NOTE
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

## 2018-06-30 LAB — GP B STREP DNA SPEC QL NAA+PROBE: NORMAL

## 2018-07-05 ENCOUNTER — ROUTINE PRENATAL (OUTPATIENT)
Dept: OBGYN CLINIC | Facility: CLINIC | Age: 38
End: 2018-07-05

## 2018-07-05 VITALS — DIASTOLIC BLOOD PRESSURE: 70 MMHG | BODY MASS INDEX: 28.9 KG/M2 | SYSTOLIC BLOOD PRESSURE: 124 MMHG | WEIGHT: 158 LBS

## 2018-07-05 DIAGNOSIS — O09.523 ELDERLY MULTIGRAVIDA IN THIRD TRIMESTER: Primary | ICD-10-CM

## 2018-07-05 PROCEDURE — PNV: Performed by: PHYSICIAN ASSISTANT

## 2018-07-05 NOTE — PROGRESS NOTES
Patient w/o OB complaints  (+) good fetal movement, denies any bleeding, fluid leakage or ctx  C/S scheduled  GBS negative      Problem List Items Addressed This Visit     Elderly multigravida in third trimester - Primary     RTO 1 wk  For C/S, surgical instructions given, office out of hibiclens  GBS negative  Reviewed labor precautions fetal kick counts and reasons to call

## 2018-07-05 NOTE — ASSESSMENT & PLAN NOTE
RTO 1 wk  For C/S, surgical instructions given, office out of hibiclens  GBS negative  Reviewed labor precautions fetal kick counts and reasons to call

## 2018-07-11 ENCOUNTER — ROUTINE PRENATAL (OUTPATIENT)
Dept: OBGYN CLINIC | Facility: CLINIC | Age: 38
End: 2018-07-11

## 2018-07-11 VITALS — SYSTOLIC BLOOD PRESSURE: 126 MMHG | BODY MASS INDEX: 28.72 KG/M2 | DIASTOLIC BLOOD PRESSURE: 80 MMHG | WEIGHT: 157 LBS

## 2018-07-11 DIAGNOSIS — B37.3 YEAST VAGINITIS: Primary | ICD-10-CM

## 2018-07-11 DIAGNOSIS — O09.523 ELDERLY MULTIGRAVIDA IN THIRD TRIMESTER: ICD-10-CM

## 2018-07-11 PROBLEM — B37.31 YEAST VAGINITIS: Status: ACTIVE | Noted: 2018-07-11

## 2018-07-11 PROCEDURE — PNV: Performed by: OBSTETRICS & GYNECOLOGY

## 2018-07-11 NOTE — ASSESSMENT & PLAN NOTE
Has been having fluid discharge past 3 days  Occ ctx      Spec exam:  Thick white discharge, pH 3, no fern(numerous WBC and hyphae on slide)    Labor precautions discussed

## 2018-07-11 NOTE — PROGRESS NOTES
Problem List Items Addressed This Visit        Genitourinary    Yeast vaginitis - Primary     Will RX terconazole         Relevant Medications    terconazole (TERAZOL 3) 0 8 % vaginal cream       Other    Elderly multigravida in third trimester     Has been having fluid discharge past 3 days  Occ ctx      Spec exam:  Thick white discharge, pH 3, no fern(numerous WBC and hyphae on slide)    Labor precautions discussed

## 2018-07-19 ENCOUNTER — HOSPITAL ENCOUNTER (INPATIENT)
Facility: HOSPITAL | Age: 38
LOS: 3 days | Discharge: HOME | End: 2018-07-22
Attending: OBSTETRICS & GYNECOLOGY | Admitting: OBSTETRICS & GYNECOLOGY
Payer: COMMERCIAL

## 2018-07-19 ENCOUNTER — ANESTHESIA (INPATIENT)
Dept: LABOR AND DELIVERY | Facility: HOSPITAL | Age: 38
End: 2018-07-19
Payer: COMMERCIAL

## 2018-07-19 ENCOUNTER — ANESTHESIA EVENT (INPATIENT)
Dept: LABOR AND DELIVERY | Facility: HOSPITAL | Age: 38
End: 2018-07-19
Payer: COMMERCIAL

## 2018-07-19 DIAGNOSIS — O34.219 PREVIOUS CESAREAN SECTION COMPLICATING PREGNANCY: ICD-10-CM

## 2018-07-19 DIAGNOSIS — Z98.891 S/P REPEAT LOW TRANSVERSE C-SECTION: ICD-10-CM

## 2018-07-19 DIAGNOSIS — Z98.891 HISTORY OF 3 CESAREAN SECTIONS: Primary | ICD-10-CM

## 2018-07-19 PROBLEM — O09.523 ELDERLY MULTIGRAVIDA IN THIRD TRIMESTER: Status: RESOLVED | Noted: 2018-03-09 | Resolved: 2018-07-19

## 2018-07-19 PROBLEM — O99.013 ANEMIA DURING PREGNANCY IN THIRD TRIMESTER: Status: RESOLVED | Noted: 2018-06-14 | Resolved: 2018-07-19

## 2018-07-19 PROBLEM — B37.3 YEAST VAGINITIS: Status: RESOLVED | Noted: 2018-07-11 | Resolved: 2018-07-19

## 2018-07-19 PROBLEM — B37.31 YEAST VAGINITIS: Status: RESOLVED | Noted: 2018-07-11 | Resolved: 2018-07-19

## 2018-07-19 LAB
ABO GROUP BLD: NORMAL
BASE EXCESS BLDCOA CALC-SCNC: -3.7 MMOL/L (ref 3–11)
BASE EXCESS BLDCOV CALC-SCNC: -2.9 MMOL/L (ref 1–9)
BLD GP AB SCN SERPL QL: NEGATIVE
ERYTHROCYTE [DISTWIDTH] IN BLOOD BY AUTOMATED COUNT: 16.4 % (ref 11.6–15.1)
HCO3 BLDCOA-SCNC: 24.6 MMOL/L (ref 17.3–27.3)
HCO3 BLDCOV-SCNC: 24.7 MMOL/L (ref 12.2–28.6)
HCT VFR BLD AUTO: 35.1 % (ref 34.8–46.1)
HGB BLD-MCNC: 10.4 G/DL (ref 11.5–15.4)
MCH RBC QN AUTO: 23.5 PG (ref 26.8–34.3)
MCHC RBC AUTO-ENTMCNC: 29.6 G/DL (ref 31.4–37.4)
MCV RBC AUTO: 79 FL (ref 82–98)
O2 CT VFR BLDCOA CALC: 6.8 ML/DL
OXYHGB MFR BLDCOA: 33.7 %
OXYHGB MFR BLDCOV: 45.8 %
PCO2 BLDCOA: 58.7 MM[HG] (ref 30–60)
PCO2 BLDCOV: 54.2 MM HG (ref 27–43)
PH BLDCOA: 7.24 [PH] (ref 7.23–7.43)
PH BLDCOV: 7.28 [PH] (ref 7.19–7.49)
PLATELET # BLD AUTO: 345 THOUSANDS/UL (ref 149–390)
PMV BLD AUTO: 11.7 FL (ref 8.9–12.7)
PO2 BLDCOA: 17.2 MM HG (ref 5–25)
PO2 BLDCOV: 20.2 MM HG (ref 15–45)
RBC # BLD AUTO: 4.42 MILLION/UL (ref 3.81–5.12)
RH BLD: POSITIVE
SAO2 % BLDCOV: 9.5 ML/DL
SPECIMEN EXPIRATION DATE: NORMAL
WBC # BLD AUTO: 7.99 THOUSAND/UL (ref 4.31–10.16)

## 2018-07-19 PROCEDURE — 4A1HXCZ MONITORING OF PRODUCTS OF CONCEPTION, CARDIAC RATE, EXTERNAL APPROACH: ICD-10-PCS | Performed by: OBSTETRICS & GYNECOLOGY

## 2018-07-19 PROCEDURE — 86900 BLOOD TYPING SEROLOGIC ABO: CPT | Performed by: OBSTETRICS & GYNECOLOGY

## 2018-07-19 PROCEDURE — 86850 RBC ANTIBODY SCREEN: CPT | Performed by: OBSTETRICS & GYNECOLOGY

## 2018-07-19 PROCEDURE — 85027 COMPLETE CBC AUTOMATED: CPT | Performed by: OBSTETRICS & GYNECOLOGY

## 2018-07-19 PROCEDURE — 59510 CESAREAN DELIVERY: CPT | Performed by: OBSTETRICS & GYNECOLOGY

## 2018-07-19 PROCEDURE — 94762 N-INVAS EAR/PLS OXIMTRY CONT: CPT | Performed by: SOCIAL WORKER

## 2018-07-19 PROCEDURE — 86901 BLOOD TYPING SEROLOGIC RH(D): CPT | Performed by: OBSTETRICS & GYNECOLOGY

## 2018-07-19 PROCEDURE — 04LF0ZU OCCLUSION OF LEFT UTERINE ARTERY, OPEN APPROACH: ICD-10-PCS | Performed by: OBSTETRICS & GYNECOLOGY

## 2018-07-19 PROCEDURE — 10907ZC DRAINAGE OF AMNIOTIC FLUID, THERAPEUTIC FROM PRODUCTS OF CONCEPTION, VIA NATURAL OR ARTIFICIAL OPENING: ICD-10-PCS | Performed by: OBSTETRICS & GYNECOLOGY

## 2018-07-19 PROCEDURE — 0DNW0ZZ RELEASE PERITONEUM, OPEN APPROACH: ICD-10-PCS | Performed by: OBSTETRICS & GYNECOLOGY

## 2018-07-19 PROCEDURE — 86592 SYPHILIS TEST NON-TREP QUAL: CPT | Performed by: OBSTETRICS & GYNECOLOGY

## 2018-07-19 PROCEDURE — 82805 BLOOD GASES W/O2 SATURATION: CPT | Performed by: OBSTETRICS & GYNECOLOGY

## 2018-07-19 RX ORDER — SODIUM CHLORIDE, SODIUM LACTATE, POTASSIUM CHLORIDE, CALCIUM CHLORIDE 600; 310; 30; 20 MG/100ML; MG/100ML; MG/100ML; MG/100ML
125 INJECTION, SOLUTION INTRAVENOUS CONTINUOUS
Status: DISCONTINUED | OUTPATIENT
Start: 2018-07-19 | End: 2018-07-22 | Stop reason: HOSPADM

## 2018-07-19 RX ORDER — DOCUSATE SODIUM 100 MG/1
100 CAPSULE, LIQUID FILLED ORAL 2 TIMES DAILY
Status: DISCONTINUED | OUTPATIENT
Start: 2018-07-19 | End: 2018-07-22 | Stop reason: HOSPADM

## 2018-07-19 RX ORDER — DIPHENHYDRAMINE HYDROCHLORIDE 50 MG/ML
25 INJECTION INTRAMUSCULAR; INTRAVENOUS EVERY 6 HOURS PRN
Status: DISCONTINUED | OUTPATIENT
Start: 2018-07-19 | End: 2018-07-22 | Stop reason: HOSPADM

## 2018-07-19 RX ORDER — DEXAMETHASONE SODIUM PHOSPHATE 4 MG/ML
8 INJECTION, SOLUTION INTRA-ARTICULAR; INTRALESIONAL; INTRAMUSCULAR; INTRAVENOUS; SOFT TISSUE ONCE AS NEEDED
Status: ACTIVE | OUTPATIENT
Start: 2018-07-19 | End: 2018-07-20

## 2018-07-19 RX ORDER — ONDANSETRON 2 MG/ML
4 INJECTION INTRAMUSCULAR; INTRAVENOUS EVERY 8 HOURS PRN
Status: DISCONTINUED | OUTPATIENT
Start: 2018-07-19 | End: 2018-07-22 | Stop reason: HOSPADM

## 2018-07-19 RX ORDER — CEFAZOLIN SODIUM 1 G/3ML
INJECTION, POWDER, FOR SOLUTION INTRAMUSCULAR; INTRAVENOUS AS NEEDED
Status: DISCONTINUED | OUTPATIENT
Start: 2018-07-19 | End: 2018-07-19 | Stop reason: SURG

## 2018-07-19 RX ORDER — OXYCODONE HYDROCHLORIDE AND ACETAMINOPHEN 5; 325 MG/1; MG/1
2 TABLET ORAL EVERY 4 HOURS PRN
Status: DISCONTINUED | OUTPATIENT
Start: 2018-07-19 | End: 2018-07-22 | Stop reason: HOSPADM

## 2018-07-19 RX ORDER — ONDANSETRON 2 MG/ML
4 INJECTION INTRAMUSCULAR; INTRAVENOUS EVERY 4 HOURS PRN
Status: DISCONTINUED | OUTPATIENT
Start: 2018-07-19 | End: 2018-07-19

## 2018-07-19 RX ORDER — ONDANSETRON 2 MG/ML
INJECTION INTRAMUSCULAR; INTRAVENOUS AS NEEDED
Status: DISCONTINUED | OUTPATIENT
Start: 2018-07-19 | End: 2018-07-19 | Stop reason: SURG

## 2018-07-19 RX ORDER — METOCLOPRAMIDE HYDROCHLORIDE 5 MG/ML
5 INJECTION INTRAMUSCULAR; INTRAVENOUS EVERY 6 HOURS PRN
Status: ACTIVE | OUTPATIENT
Start: 2018-07-19 | End: 2018-07-20

## 2018-07-19 RX ORDER — OXYTOCIN/RINGER'S LACTATE 30/500 ML
PLASTIC BAG, INJECTION (ML) INTRAVENOUS
Status: COMPLETED
Start: 2018-07-19 | End: 2018-07-19

## 2018-07-19 RX ORDER — DIPHENHYDRAMINE HYDROCHLORIDE 50 MG/ML
INJECTION INTRAMUSCULAR; INTRAVENOUS
Status: COMPLETED
Start: 2018-07-19 | End: 2018-07-19

## 2018-07-19 RX ORDER — KETOROLAC TROMETHAMINE 30 MG/ML
30 INJECTION, SOLUTION INTRAMUSCULAR; INTRAVENOUS EVERY 6 HOURS PRN
Status: DISCONTINUED | OUTPATIENT
Start: 2018-07-19 | End: 2018-07-22 | Stop reason: HOSPADM

## 2018-07-19 RX ORDER — NALBUPHINE HCL 10 MG/ML
5 AMPUL (ML) INJECTION
Status: DISPENSED | OUTPATIENT
Start: 2018-07-19 | End: 2018-07-20

## 2018-07-19 RX ORDER — DEXAMETHASONE SODIUM PHOSPHATE 4 MG/ML
INJECTION, SOLUTION INTRA-ARTICULAR; INTRALESIONAL; INTRAMUSCULAR; INTRAVENOUS; SOFT TISSUE AS NEEDED
Status: DISCONTINUED | OUTPATIENT
Start: 2018-07-19 | End: 2018-07-19 | Stop reason: SURG

## 2018-07-19 RX ORDER — KETOROLAC TROMETHAMINE 30 MG/ML
INJECTION, SOLUTION INTRAMUSCULAR; INTRAVENOUS AS NEEDED
Status: DISCONTINUED | OUTPATIENT
Start: 2018-07-19 | End: 2018-07-19 | Stop reason: SURG

## 2018-07-19 RX ORDER — BUPIVACAINE HYDROCHLORIDE 7.5 MG/ML
INJECTION, SOLUTION INTRASPINAL AS NEEDED
Status: DISCONTINUED | OUTPATIENT
Start: 2018-07-19 | End: 2018-07-19 | Stop reason: SURG

## 2018-07-19 RX ORDER — IBUPROFEN 600 MG/1
600 TABLET ORAL EVERY 4 HOURS PRN
Status: DISCONTINUED | OUTPATIENT
Start: 2018-07-19 | End: 2018-07-22 | Stop reason: HOSPADM

## 2018-07-19 RX ORDER — OXYTOCIN/RINGER'S LACTATE 30/500 ML
62.5 PLASTIC BAG, INJECTION (ML) INTRAVENOUS ONCE
Status: COMPLETED | OUTPATIENT
Start: 2018-07-19 | End: 2018-07-19

## 2018-07-19 RX ORDER — SODIUM CHLORIDE, SODIUM LACTATE, POTASSIUM CHLORIDE, CALCIUM CHLORIDE 600; 310; 30; 20 MG/100ML; MG/100ML; MG/100ML; MG/100ML
250 INJECTION, SOLUTION INTRAVENOUS CONTINUOUS
Status: DISCONTINUED | OUTPATIENT
Start: 2018-07-19 | End: 2018-07-19

## 2018-07-19 RX ORDER — ACETAMINOPHEN 325 MG/1
650 TABLET ORAL EVERY 4 HOURS PRN
Status: DISCONTINUED | OUTPATIENT
Start: 2018-07-19 | End: 2018-07-22 | Stop reason: HOSPADM

## 2018-07-19 RX ORDER — CALCIUM CARBONATE 200(500)MG
1000 TABLET,CHEWABLE ORAL DAILY PRN
Status: DISCONTINUED | OUTPATIENT
Start: 2018-07-19 | End: 2018-07-22 | Stop reason: HOSPADM

## 2018-07-19 RX ORDER — OXYCODONE HYDROCHLORIDE AND ACETAMINOPHEN 5; 325 MG/1; MG/1
1 TABLET ORAL EVERY 4 HOURS PRN
Status: DISCONTINUED | OUTPATIENT
Start: 2018-07-19 | End: 2018-07-22 | Stop reason: HOSPADM

## 2018-07-19 RX ADMIN — DIPHENHYDRAMINE HYDROCHLORIDE 25 MG: 50 INJECTION INTRAMUSCULAR; INTRAVENOUS at 13:40

## 2018-07-19 RX ADMIN — NALBUPHINE HYDROCHLORIDE 5 MG: 10 INJECTION, SOLUTION INTRAMUSCULAR; INTRAVENOUS; SUBCUTANEOUS at 20:25

## 2018-07-19 RX ADMIN — SODIUM CHLORIDE, SODIUM LACTATE, POTASSIUM CHLORIDE, AND CALCIUM CHLORIDE 250 ML/HR: .6; .31; .03; .02 INJECTION, SOLUTION INTRAVENOUS at 09:28

## 2018-07-19 RX ADMIN — PHENYLEPHRINE HYDROCHLORIDE 20 MCG/MIN: 10 INJECTION INTRAVENOUS at 10:42

## 2018-07-19 RX ADMIN — SODIUM CHLORIDE, SODIUM LACTATE, POTASSIUM CHLORIDE, AND CALCIUM CHLORIDE 1000 ML: .6; .31; .03; .02 INJECTION, SOLUTION INTRAVENOUS at 08:48

## 2018-07-19 RX ADMIN — SODIUM CHLORIDE, SODIUM LACTATE, POTASSIUM CHLORIDE, AND CALCIUM CHLORIDE 125 ML/HR: .6; .31; .03; .02 INJECTION, SOLUTION INTRAVENOUS at 12:37

## 2018-07-19 RX ADMIN — Medication 30 UNITS: at 12:43

## 2018-07-19 RX ADMIN — SODIUM CHLORIDE, SODIUM LACTATE, POTASSIUM CHLORIDE, AND CALCIUM CHLORIDE 125 ML/HR: .6; .31; .03; .02 INJECTION, SOLUTION INTRAVENOUS at 20:29

## 2018-07-19 RX ADMIN — BUPIVACAINE HYDROCHLORIDE IN DEXTROSE 1.4 ML: 7.5 INJECTION, SOLUTION SUBARACHNOID at 10:32

## 2018-07-19 RX ADMIN — DEXAMETHASONE SODIUM PHOSPHATE 4 MG: 4 INJECTION, SOLUTION INTRAMUSCULAR; INTRAVENOUS at 10:48

## 2018-07-19 RX ADMIN — SODIUM CHLORIDE, SODIUM LACTATE, POTASSIUM CHLORIDE, AND CALCIUM CHLORIDE: .6; .31; .03; .02 INJECTION, SOLUTION INTRAVENOUS at 10:33

## 2018-07-19 RX ADMIN — DIPHENHYDRAMINE HYDROCHLORIDE 25 MG: 50 INJECTION, SOLUTION INTRAMUSCULAR; INTRAVENOUS at 13:40

## 2018-07-19 RX ADMIN — NALBUPHINE HYDROCHLORIDE 5 MG: 10 INJECTION, SOLUTION INTRAMUSCULAR; INTRAVENOUS; SUBCUTANEOUS at 12:35

## 2018-07-19 RX ADMIN — CEFAZOLIN 2000 MG: 1 INJECTION, POWDER, FOR SOLUTION INTRAVENOUS at 10:39

## 2018-07-19 RX ADMIN — ONDANSETRON 4 MG: 2 INJECTION INTRAMUSCULAR; INTRAVENOUS at 10:35

## 2018-07-19 RX ADMIN — KETOROLAC TROMETHAMINE 30 MG: 30 INJECTION, SOLUTION INTRAMUSCULAR at 11:12

## 2018-07-19 RX ADMIN — NALBUPHINE HYDROCHLORIDE 5 MG: 10 INJECTION, SOLUTION INTRAMUSCULAR; INTRAVENOUS; SUBCUTANEOUS at 15:53

## 2018-07-19 RX ADMIN — KETOROLAC TROMETHAMINE 30 MG: 30 INJECTION, SOLUTION INTRAMUSCULAR at 20:25

## 2018-07-19 RX ADMIN — DOCUSATE SODIUM 100 MG: 100 CAPSULE, LIQUID FILLED ORAL at 17:23

## 2018-07-19 NOTE — H&P
H&P Exam - Obstetrics   Ilia Watson 41 y o  female MRN: [id number]  Unit/Bed#:  Encounter: [id number]    >2 Midnights    INPATIENT     History of Present Illness   Chief Complaint: Elective     HPI:  Ilia Watson is a 41 y o   female with an ARIEL of 2018, by Last Menstrual Period at 38w6d weeks gestation who is being admitted for Elective   Her current obstetrical history is significant for prior  x3  Contractions: None  Leakage of fluid: None  Bleeding: None  Fetal movement: present  Pregnancy complications: none  Review of Systems   All other systems reviewed and are negative  Historical Information   OB History    Para Term  AB Living   5 3 3   1 3   SAB TAB Ectopic Multiple Live Births   1              # Outcome Date GA Lbr Jani/2nd Weight Sex Delivery Anes PTL Lv   5 Current            4 Term            3 Term            2 Term            1 SAB                 Baby complications/comments: none  Past Medical History:   Diagnosis Date    Asthma     History of one miscarriage     2017 10 weeks     Past Surgical History:   Procedure Laterality Date     SECTION      2004 son,    daughter,    daughter     Social History   History   Alcohol Use No     History   Drug Use No     History   Smoking Status    Never Smoker   Smokeless Tobacco    Never Used     Family History: non-contributory    Meds/Allergies   {  No prescriptions prior to admission  No Known Allergies    Objective   Vitals: Last menstrual period 10/19/2017  There is no height or weight on file to calculate BMI  Invasive Devices          No matching active lines, drains, or airways          Physical Exam   Constitutional: She is oriented to person, place, and time  She appears well-developed and well-nourished  HENT:   Head: Normocephalic and atraumatic  Nose: Nose normal    Eyes: EOM are normal  Pupils are equal, round, and reactive to light     Neck: Normal range of motion  Neck supple  No thyromegaly present  Cardiovascular: Normal rate and regular rhythm  Pulmonary/Chest: [place] normal and breath sounds normal  No respiratory distress  Breasts no masses, tenderness, skin changes   Abdominal: Soft  Bowel sounds are normal  She exhibits no distension and no mass  There is no tenderness  Hernia confirmed negative in the right inguinal area and confirmed negative in the left inguinal area  Genitourinary: Vagina normal and uterus normal  No breast swelling, tenderness, discharge or bleeding  Pelvic exam was performed with patient supine  No labial fusion  There is no rash, tenderness, lesion or injury on the right labia  There is no rash, tenderness, lesion or injury on the left labia  Cervix exhibits no motion tenderness, no discharge and no friability  Genitourinary Comments: Ext genitalia nl female w/o lesions  Vag healthy without lesions or discharge  Cervix healthy w/o lesions or discharge  uterus nl size, NT, no mass  Adnexa NT, no mass   Musculoskeletal: Normal range of motion  She exhibits no edema  Lymphadenopathy:        Right: No inguinal adenopathy present  Left: No inguinal adenopathy present  Neurological: She is alert and oriented to person, place, and time  She has normal reflexes  Skin: Skin is warm and dry  No rash noted  Psychiatric: She has a normal mood and affect  Her behavior is normal  Thought content normal    Nursing note and vitals reviewed      not evaluated  not felt  no lesions or cervical motion tenderness      Prenatal Labs:   Blood Type:   Lab Results   Component Value Date/Time    ABO Grouping O 01/03/2018 11:06 AM     , D (Rh type):   Lab Results   Component Value Date/Time    Rh Factor Positive 01/03/2018 11:06 AM     , Antibody Screen:   Lab Results   Component Value Date/Time    Antibody Screen Negative 01/03/2018 11:06 AM    , HCT/HGB:   Lab Results   Component Value Date/Time    Hematocrit 31 4 (L) 06/20/2018 04:09 PM    Hemoglobin 9 2 (L) 06/20/2018 04:09 PM      , MCV:   Lab Results   Component Value Date/Time    MCV 83 06/20/2018 04:09 PM      , Platelets:   Lab Results   Component Value Date/Time    Platelets 004 94/84/8965 04:09 PM      , 1 hour Glucola:   Lab Results   Component Value Date/Time    Glucose 92 04/25/2018 12:36 PM   , Rubella:   Lab Results   Component Value Date/Time    Rubella IgG Quant 11 8 01/03/2018 11:06 AM        , VDRL/RPR:   Lab Results   Component Value Date/Time    RPR Non-Reactive 04/25/2018 12:36 PM      , Urine Culture/Screen:   Lab Results   Component Value Date/Time    Urine Culture No Growth <1000 cfu/mL 01/03/2018 11:06 AM       , Hep B:   Lab Results   Component Value Date/Time    Hepatitis B Surface Ag Non-reactive 01/03/2018 11:06 AM     , Hep C: No components found for: HEPCSAG, EXTHEPCSAG   , HIV:   Lab Results   Component Value Date/Time    HIV-1/HIV-2 Ab Non-Reactive 01/03/2018 11:06 AM     , Chlamydia: No results found for: EXTCHLAMYDIA  , Gonorrhea:   Lab Results   Component Value Date/Time    N gonorrhoeae, DNA Probe N  gonorrhoeae Amplified DNA Negative 01/18/2018         Imaging, EKG, Pathology, and Other Studies: I have personally reviewed pertinent reports        Assessment/Plan     Assessment:  IUP at 39w0d  Previous C/S X3  Plan:  R C/S

## 2018-07-19 NOTE — PROGRESS NOTES
Patient is PO Day #0 following RLTCS with left uterine artery ligation  She is doing well  No complaints  She is now eating a small lunch as she was just moved to postpartum room from PACU  Denies nausea and vomiting  Adequately voiding through aaron  No bowel movements  Has not ambulated  Breastfeeding going well  Will check CBC tomorrow morning  Preop Hgb 10 4g/dL  Incision covered by pressure dressing  Will uncover dressing tomorrow morning  Uterus at umbilicus      Guero Hatch MD  7/19/2018  4:41 PM

## 2018-07-19 NOTE — LACTATION NOTE
This note was copied from a baby's chart  CONSULT - LACTATION  Baby Girl  Kavin Goldberg) Specian 0 days female MRN: [id number]    [facility] Room / Bed: (N)/(N) Encounter: [id number]    Maternal Information     MOTHER:  Sen Durbin  Maternal Age: 41 y o    OB History: #: 1, Date: None, Sex: None, Weight: None, GA: None, Delivery: None, Apgar1: None, Apgar5: None, Living: None, Birth Comments: None    #: 2, Date: 04, Sex: Female, Weight: 3175 g (7 lb), GA: 39w5d, Delivery: , Unspecified, Apgar1: None, Apgar5: None, Living: Living, Birth Comments: None    #: 3, Date: 07, Sex: Female, Weight: 3175 g (7 lb), GA: 38w0d, Delivery: , Unspecified, Apgar1: None, Apgar5: None, Living: Living, Birth Comments: None    #: 4, Date: 11, Sex: Male, Weight: 3175 g (7 lb), GA: 38w0d, Delivery: , Unspecified, Apgar1: None, Apgar5: None, Living: Living, Birth Comments: None    #: 5, Date: 18, Sex: Female, Weight: 3340 g (7 lb 5 8 oz), GA: 39w0d, Delivery: , Low Transverse, Apgar1: 9, Apgar5: 9, Living: Living, Birth Comments: None   Previouse breast reduction surgery?  No    Lactation history:   Has patient previously breast fed: Yes   How long had patient previously breast fed: 14 months, 6 months and 2 months each   Previous breast feeding complications: Infant separation     Past Surgical History:   Procedure Laterality Date     SECTION      2004 son,    daughter,    daughter       Birth information:  YOB: 2017   Time of birth: 11:06 AM   Sex: female   Delivery type: , Low Transverse   Birth Weight: 3340 g (7 lb 5 8 oz)   Percent of Weight Change: 0%     Gestational Age: 39w0d   [unfilled]    Assessment     Breast and nipple assessment: normal assessment     Assessment: normal assessment    Feeding assessment: feeding well  LATCH:  Latch: Grasps breast, tongue down, lips flanged, rhythmic sucking   Audible Swallowing: Spontaneous and intermittent (24 hours old)   Type of Nipple: Everted (After stimulation)   Comfort (Breast/Nipple): Soft/non-tender   Hold (Positioning): Full assist, teach one side, mother does other, staff holds   [facility] Score: 9          Feeding recommendations:  breast feed on demand     Jennifer and baby's father fed infant 2 ounces of enfamil while in pacu before they were educated about how much to feed her and what to limit intake to  Discussed 2nd night syndrome and ways to calm infant  Hand out given  Information on hand expression given  Discussed benefits of knowing how to manually express breast including stimulating milk supply, softening nipple for latch and evacuating breast in the event of engorgement  Met with mother  Provided mother with Ready, Set, Baby booklet  Discussed Skin to Skin contact an benefits to mom and baby  Talked about the delay of the first bath until baby has adjusted  Spoke about the benefits of rooming in  Feeding on cue and what that means for recognizing infant's hunger  Avoidance of pacifiers for the first month discussed  Talked about exclusive breastfeeding for the first 6 months  Positioning and latch reviewed as well as showing images of other feeding positions  Discussed the properties of a good latch in any position  Reviewed hand/manual expression  Discussed s/s that baby is getting enough milk and some s/s that breastfeeding dyad may need further help  Gave information on common concerns, what to expect the first few weeks after delivery, preparing for other caregivers, and how partners can help  Resources for support also provided  Gave suggestions on how to accomplish deep latch by starting latch with infant's nose at the nipple  Then, stroke the upper lip with the nipple   As infant opens mouth, insert nipple in on an upward angle so that the nipple impacts with the soft palate to increase comfort with the feeding and to keep infant interested in the feeding longer  Encoraged MOB and FOB to call for assistance, questions and concerns  Extension number for inpatient lactation support provided      Prasanth Sánchez RN 7/19/2018 7:50 PM

## 2018-07-19 NOTE — ANESTHESIA PREPROCEDURE EVALUATION
Review of Systems/Medical History  Patient summary reviewed  Chart reviewed      Cardiovascular   Pulmonary  Asthma , well controlled/ stable ,        GI/Hepatic            Endo/Other     GYN  Currently pregnant ,          Hematology  Anemia anemia of chronic disease,     Musculoskeletal       Neurology   Psychology           Physical Exam    Airway    Mallampati score: II  TM Distance: >3 FB  Neck ROM: full     Dental   No notable dental hx     Cardiovascular  Rhythm: regular, Rate: normal, Cardiovascular exam normal    Pulmonary  Pulmonary exam normal Breath sounds clear to auscultation,     Other Findings        Anesthesia Plan  ASA Score- 2     Anesthesia Type- spinal with ASA Monitors  Additional Monitors:   Airway Plan:         Plan Factors-    Induction-     Postoperative Plan- Plan for postoperative opioid use  Informed Consent- Anesthetic plan and risks discussed with patient and spouse  I personally reviewed this patient with the CRNA  Discussed and agreed on the Anesthesia Plan with the CRNA  Janet Marie

## 2018-07-19 NOTE — ANESTHESIA PROCEDURE NOTES
Spinal Block    Patient location during procedure: OB  Start time: 7/19/2018 10:28 AM  Reason for block: procedure for pain, at surgeon's request, post-op pain management and primary anesthetic  Staffing  Anesthesiologist: Coreen Graham  Performed: anesthesiologist   Preanesthetic Checklist  Completed: patient identified, site marked, surgical consent, pre-op evaluation, timeout performed, IV checked, risks and benefits discussed and monitors and equipment checked  Spinal Block  Patient position: sitting  Prep: Betadine  Patient monitoring: heart rate, cardiac monitor, continuous pulse ox and frequent blood pressure checks  Approach: midline  Location: L3-4  Injection technique: single-shot  Needle  Needle type: pencil-tip   Needle gauge: 22 G  Needle length: 5 cm  Assessment  Sensory level: T4  Events: cerebrospinal fluid  Injection Assessment:  negative aspiration for heme, no paresthesia on injection and positive aspiration for clear CSF    Post-procedure:  site cleaned

## 2018-07-19 NOTE — DISCHARGE SUMMARY
Discharge Summary - Frankey Salvage 41 y o  female MRN: [id number]    Unit/Bed#: LD PACU-03 Encounter: [id number]    Admission Date: 2018     Discharge Date: 2018    Discharge Attending: Dr Marita Lucas    Diagnosis:  Pregnancy at 39w0d   Prior  section x 3  Advanced maternal age  Anemia     Procedures: Repeat low transverse  section with left uterine artery ligation    Complications: none apparent     Pt is a 47yo [id number] who was admitted for repeat low transverse  section at 39w0d  Her pregnancy is complicated by advanced maternal age and three prior  sections  She underwent a low transverse  section where a left uterine artery ligation was completed for hemostasis  She delivered a viable female  at 0 on 18  Weight was 7lbs 6oz with APGARs of 9 and 9 at 1 and 5 minutes  Her preoperative Hb was 10 4  Her postoperative Hb was 8 4  Her postoperative course was uncomplicated  Condition at discharge: stable     On day of discharge pain was well controlled, patient was tolerating PO, passing flatus  She was discharged with standard post partum/ post operative instructions to follow up with her physician in 1 week for an incision check and in 3-6 weeks for a postpartum appointment  Discharge instructions/Information to patient and family:   -Do not place anything (no partner, tampons or douche) in your vagina for 6 weeks    -You may walk for exercise for the first 6 weeks then gradually return to your usual activities    -Please do not drive for 1 week if you have no stitches and for 2 weeks if you have stitches or underwent a  delivery     -You may take baths or shower per your preference    -Please look at your bust (breasts) in the mirror daily and call for redness or tenderness or increased warmth    -Please call us for temperature > 100 4*F or 38* C, worsening pain or a foul discharge       Discharge Medications:   Prenatal vitamin daily for 6 months or the duration of nursing whichever is longer  Motrin 600 mg orally every 6 hours as needed for pain  Tylenol (over the counter) per bottle directions as needed for pain: do NOT use with percocet  Hydrocortisone cream 1% (over the counter) applied 1-2x daily to hemorrhoids as needed  Percocet as needed    Provisions for Follow-Up Care: Follow up with your doctor in 1 week for incision site check       Planned Readmission: no

## 2018-07-19 NOTE — DISCHARGE INSTRUCTIONS
WHAT YOU NEED TO KNOW:   A , or  section, is abdominal surgery to deliver your baby  DISCHARGE INSTRUCTIONS:   Call 911 for any of the following:   · You feel lightheaded, short of breath, and have chest pain  · You cough up blood  Seek care immediately if:   · Blood soaks through your bandage  · Your stitches come apart  · Your arm or leg feels warm, tender, and painful  It may look swollen and red  Contact your OB if:   · You have heavy vaginal bleeding that fills 1 or more sanitary pads in 1 hour  · You have a fever  · Your incision is swollen, red, or draining pus  · You have questions or concerns about yourself or your baby  Medicines: You may  need any of the following:  · Prescription pain medicine  may be given  Ask how to take this medicine safely  · Acetaminophen  decreases pain and fever  It is available without a doctor's order  Ask how much to take and how often to take it  Follow directions  Acetaminophen can cause liver damage if not taken correctly  · NSAIDs , such as ibuprofen, help decrease swelling, pain, and fever  NSAIDs can cause stomach bleeding or kidney problems in certain people  If you take blood thinner medicine, always ask your healthcare provider if NSAIDs are safe for you  Always read the medicine label and follow directions  · Take your medicine as directed  Contact your healthcare provider if you think your medicine is not helping or if you have side effects  Tell him or her if you are allergic to any medicine  Keep a list of the medicines, vitamins, and herbs you take  Include the amounts, and when and why you take them  Bring the list or the pill bottles to follow-up visits  Carry your medicine list with you in case of an emergency  Wound care:  Carefully wash your wound with soap and water every day  Keep your wound clean and dry  Wear loose, comfortable clothes that do not rub against your wound   Ask your OB about bathing and showering  Limit activity as directed:   · Ask when it is safe for you to drive, walk up stairs, lift heavy objects, and have sex  · Ask when it is okay to exercise, and what types of exercise to do  Start slowly and do more as you get stronger  Drink liquids as directed:  Liquids help keep you hydrated after your procedure and decrease your risk for a blood clot  Ask how much liquid to drink each day and which liquids are best for you  Follow up with your OB as directed: You may need to return to have your stitches or staples removed  Write down your questions so you remember to ask them during your visits  © 2017 [employer] Information is for End User's use only and may not be sold, redistributed or otherwise used for commercial purposes  All illustrations and images included in CareNotes® are the copyrighted property of [employer]  or Butch Aparicio  The above information is an  only  It is not intended as medical advice for individual conditions or treatments  Talk to your doctor, nurse or pharmacist before following any medical regimen to see if it is safe and effective for you

## 2018-07-19 NOTE — OP NOTE
OPERATIVE REPORT  PATIENT NAME: Crystal Kumar    :  1980  MRN: [id number]  Pt Location: [facility] L&D OR ROOM 02    SURGERY DATE: 2018    Surgeon(s) and Role:     * Jesus Alberto Pitt DO - Primary     * Moi Klein MD - Assisting    Preop Diagnosis:  Previous  section complicating pregnancy [O91 257]    Post-Op Diagnosis Codes:     * Previous  section complicating pregnancy [Z40 107]    Procedure(s) (LRB):   SECTION () REPEAT (N/A)    Specimen(s):  ID Type Source Tests Collected by Time Destination   A :  Cord Blood Cord BLOOD GAS, VENOUS, CORD, BLOOD GAS, ARTERIAL, CORD Jesus Alberto Pitt DO 2018 11:08 AM    B :  Tissue (Placenta on Hold) OB Only Placenta PLACENTA IN STORAGE Jesus Alberto Pitt DO 2018 11:08 AM        Estimated Blood Loss:   900 mL    Drains:  Urethral Catheter (Active)   Site Assessment Skin intact; Clean 2018 12:05 PM   Collection Container Standard drainage bag 2018 12:05 PM   Securement Method Securing device (Describe) 2018 12:05 PM   Number of days: 0       Anesthesia Type:   spinal    Operative Indications:  Previous  section complicating pregnancy [O26 340]      Operative Findings:  Extensive intraperitoneal adhesions  Normal uterus, fallopian tubes ovaries    Complications:   None       Section Procedure Note      Pre-operative Diagnosis: 39 week 0 day pregnancy  Previous C/S X 3                 Post-operative Diagnosis: same               Viable female                Intraperitoneal adhesions    Procedure(s) performed:  Repeat LTCS              Lysis of adhesions    Surgeon: Jesus Alberto Pitt DO      Assistant(s): Fan    Findings:  Dense intraperitoneal adhesions  Viable female weighing 7lbs 6oz;  Apgar scores of 9 at one minute and 9 at five minutes     clear amniotic fluid  Normal uterus, tubes, and ovaries  Normal placenta with 3-vessel cord    Specimens: Arterial and venous cord gases, cord blood, segment of umbilical cord, placenta to routine storage     Estimated Blood Loss:  900 mL    Drains: aaron, clear           Complications:  None; patient tolerated the procedure well  Disposition: PACU            Condition: stable    Procedure Details         The patient was seen prior to the procedure  Risks, benefits, possible complications, alternate treatment options, and expected outcomes were discussed with the patient  The patient agreed with the proposed plan and gave informed consent  The patient was taken to [facility] Operating Room  She had already received appropriate spinal anesthesia  Fetal heart tones had been assessed and a Aaron catheter and SCDs were in place  Betadine vaginal prep was accomplished  The abdomen was prepped with Chloraprep and following appropriate drying time, the patient was draped in the usual sterile manner  A Time Out was held and the above information confirmed  The patient was identified as Sneha Samano and the procedure verified as  Delivery  The patient had received Ancef for prophylaxis  Patient was noted to have a prior Pfannenstiel scar that was significantly low on the abdomen within mm of her pubic symphysis  A Pfannenstiel incision was made and carried down through the underlying subcutaneous tissue to the fascia using a scalpel  Rectus fascia was then incised  The rectus muscles were  and the peritoneum was identified, entered, and extended longitudinally with blunt dissection  There were dense adhesions in the intraperitoneal space necessitating sharp dissection  The bladder blade was inserted  A low transverse uterine incision was made with the scalpel and extended laterally with blunt dissection  The amnion was entered bluntly the fetal head was in a variable presentation and despite making additional room with bandage scissors the head was not easily delivered through the uterine incision   A kiwi vacuum was then placed just anterior to the posterior fontanelle and with 1 tractive effort the head was delivered through the incision, followed by the body without difficulty  Baby had spontaneous cry with good color and tone  Delayed cord clamping was employed  The umbilical cord was clamped and cut  The infant was handed off to the  providers  Pitocin infusion was begun  Arterial and venous cord gases, cord blood, and a segment of umbilical cord were obtained for evaluation  The placenta delivered spontaneously with uterine fundal massage and appeared normal  The uterus was exteriorized and curretted with a moist lap sponge  Uterus, tubes and ovaries appeared normal   The uterine incision was closed with a running locked suture of 0 Vicryl  A second layer of the same suture was used to imbricate the first   Additional bleeding was and countered at the midline of the hysterotomy attempt was made at control with figure-of-eight sutures  This was initially ineffective  A window was created in the left broad ligament and a suture ligature of 0 Vicryl was then passed in typical O Sarles stitch fashion  Hemostasis was noted to be excellent  The uterus was returned to the abdomen  The gutters were inspected and cleared of all clots and debris with irrigation and moist lap sponges  The fascia was closed with a running suture of 0 Vicryl  Subcutaneous tissues were closed with 2- 0 Vicryl suture     The skin was closed with surgical steel staples  Sterile dressing was applied  An abdominal binder was then placed    ApH:7 241      BE:-3 7           VpH:7 277        BE:-2 9    The patient appeared to tolerate the procedure very well  Lap sponge, needle, and instrument counts were correct x2  The patient was transferred to her postpartum recovery room in stable condition and her infant went to the  nursery  Attending Attestation: I was present for the entire procedure                   I was present for the entire procedure    Patient Disposition:  PACU     SIGNATURE: Shalom Monk DO  DATE: July 19, 2018  TIME: 12:28 PM

## 2018-07-19 NOTE — ANESTHESIA POSTPROCEDURE EVALUATION
Post-Op Assessment Note      CV Status:  Stable    Mental Status:  Awake and alert    Hydration Status:  Stable    PONV Controlled:  None    Airway Patency:  Patent    Post Op Vitals Reviewed: Yes          Staff: Anesthesiologist           BP   106/58   Temp      Pulse  74   Resp      SpO2   96

## 2018-07-20 LAB
ERYTHROCYTE [DISTWIDTH] IN BLOOD BY AUTOMATED COUNT: 16.5 % (ref 11.6–15.1)
HCT VFR BLD AUTO: 28.4 % (ref 34.8–46.1)
HGB BLD-MCNC: 8.4 G/DL (ref 11.5–15.4)
MCH RBC QN AUTO: 23.9 PG (ref 26.8–34.3)
MCHC RBC AUTO-ENTMCNC: 29.6 G/DL (ref 31.4–37.4)
MCV RBC AUTO: 81 FL (ref 82–98)
PLATELET # BLD AUTO: 277 THOUSANDS/UL (ref 149–390)
PMV BLD AUTO: 11.8 FL (ref 8.9–12.7)
RBC # BLD AUTO: 3.51 MILLION/UL (ref 3.81–5.12)
RPR SER QL: NORMAL
WBC # BLD AUTO: 8.83 THOUSAND/UL (ref 4.31–10.16)

## 2018-07-20 PROCEDURE — 85027 COMPLETE CBC AUTOMATED: CPT | Performed by: OBSTETRICS & GYNECOLOGY

## 2018-07-20 RX ORDER — FERROUS SULFATE 325(65) MG
325 TABLET ORAL 2 TIMES DAILY WITH MEALS
Status: DISCONTINUED | OUTPATIENT
Start: 2018-07-20 | End: 2018-07-22 | Stop reason: HOSPADM

## 2018-07-20 RX ORDER — SIMETHICONE 80 MG
80 TABLET,CHEWABLE ORAL EVERY 6 HOURS PRN
Status: DISCONTINUED | OUTPATIENT
Start: 2018-07-20 | End: 2018-07-22 | Stop reason: HOSPADM

## 2018-07-20 RX ADMIN — KETOROLAC TROMETHAMINE 30 MG: 30 INJECTION, SOLUTION INTRAMUSCULAR at 16:03

## 2018-07-20 RX ADMIN — SIMETHICONE CHEW TAB 80 MG 80 MG: 80 TABLET ORAL at 11:35

## 2018-07-20 RX ADMIN — NALBUPHINE HYDROCHLORIDE 5 MG: 10 INJECTION, SOLUTION INTRAMUSCULAR; INTRAVENOUS; SUBCUTANEOUS at 00:28

## 2018-07-20 RX ADMIN — SIMETHICONE CHEW TAB 80 MG 80 MG: 80 TABLET ORAL at 03:53

## 2018-07-20 RX ADMIN — Medication 325 MG: at 19:12

## 2018-07-20 RX ADMIN — DOCUSATE SODIUM 100 MG: 100 CAPSULE, LIQUID FILLED ORAL at 19:12

## 2018-07-20 RX ADMIN — KETOROLAC TROMETHAMINE 30 MG: 30 INJECTION, SOLUTION INTRAMUSCULAR at 03:30

## 2018-07-20 RX ADMIN — NALBUPHINE HYDROCHLORIDE 5 MG: 10 INJECTION, SOLUTION INTRAMUSCULAR; INTRAVENOUS; SUBCUTANEOUS at 08:25

## 2018-07-20 RX ADMIN — OXYCODONE HYDROCHLORIDE AND ACETAMINOPHEN 1 TABLET: 5; 325 TABLET ORAL at 21:52

## 2018-07-20 RX ADMIN — Medication 325 MG: at 07:46

## 2018-07-20 RX ADMIN — DOCUSATE SODIUM 100 MG: 100 CAPSULE, LIQUID FILLED ORAL at 07:46

## 2018-07-20 RX ADMIN — SIMETHICONE CHEW TAB 80 MG 80 MG: 80 TABLET ORAL at 16:03

## 2018-07-20 NOTE — SOCIAL WORK
Breast pump consult  Per pt request, Ameda pump ordered from [facility] via [facility] for room delivery today  No other CM needs noted

## 2018-07-21 PROCEDURE — 99024 POSTOP FOLLOW-UP VISIT: CPT | Performed by: OBSTETRICS & GYNECOLOGY

## 2018-07-21 RX ADMIN — Medication 325 MG: at 08:47

## 2018-07-21 RX ADMIN — Medication 325 MG: at 17:29

## 2018-07-21 RX ADMIN — OXYCODONE HYDROCHLORIDE AND ACETAMINOPHEN 1 TABLET: 5; 325 TABLET ORAL at 08:47

## 2018-07-21 RX ADMIN — OXYCODONE HYDROCHLORIDE AND ACETAMINOPHEN 1 TABLET: 5; 325 TABLET ORAL at 17:29

## 2018-07-21 RX ADMIN — DOCUSATE SODIUM 100 MG: 100 CAPSULE, LIQUID FILLED ORAL at 08:47

## 2018-07-21 RX ADMIN — IBUPROFEN 600 MG: 600 TABLET ORAL at 06:12

## 2018-07-21 RX ADMIN — DOCUSATE SODIUM 100 MG: 100 CAPSULE, LIQUID FILLED ORAL at 17:29

## 2018-07-21 NOTE — PROGRESS NOTES
Progress Note - OB/GYN  Kia Dill 41 y o  female MRN: [id number]  Unit/Bed#: -01 Encounter: [id number]      A/P: POD # 2 s/p repeat low transverse  section with left uterine artery ligation    1) Acute blood loss anemia   - Hgb 10 4 --> 8 4   - Patient asymptomatic   - PO ferrous sulfate, BID  2) Continue routine post-op care   - Encourage ambulation    - Encourage breastfeeding   - Anticipate discharge 2018      ______________      Subjective:  Patient doing well this morning  She complains of mild soreness at her incision site and on the right side of her lower abdomen  Pain: well controlled  Tolerating PO: yes  Voiding: yes  Flatus: yes  BM: no  Ambulating: yes  Breastfeeding:  yes  Chest pain: no  Shortness of breath: no  Leg pain: no  Lochia: minimal    Vitals:   /69 (BP Location: Right arm)   Pulse 74   Temp 98 6 °F (37 °C) (Oral)   Resp 20   Ht 5' 2" (1 575 m)   Wt 71 7 kg (158 lb)   LMP 10/19/2017   SpO2 98%   Breastfeeding?  Yes   BMI 28 90 kg/m²       Intake/Output Summary (Last 24 hours) at 18 0714  Last data filed at 18 1213   Gross per 24 hour   Intake                0 ml   Output             1100 ml   Net            -1100 ml       Physical Exam:   GEN: alert and oriented x 3, pleasant and cooperative  PULM: CTAB  CVS: S1 S2 regular rate and rhythm   ABDOMEN: soft, no tenderness, fundus 1 cm below the umbilicus, Incision C/D/I with staples  EXTREMITIES: SCDs on, 2+ DP pulses B/L      Labs:   Admission on 2018   Component Date Value    WBC 2018 7 99     RBC 2018 4 42     Hemoglobin 2018 10 4*    Hematocrit 2018 35 1     MCV 2018 79*    MCH 2018 23 5*    MCHC 2018 29 6*    RDW 2018 16 4*    Platelets  345     MPV 2018 11 7     RPR 2018 Non-Reactive     ABO Grouping 2018 O     Rh Factor 2018 Positive     Antibody Screen 2018 Negative     Specimen Expiration Date 2018 [id number]     pH, Cord Justen 2018 7 277     pCO2, Cord Justen 2018 54 2*    pO2, Cord Justen 2018 20 2     HCO3, Cord Justen 2018 24 7    [facility] Exc, Cord Justen 2018 -2 9*    O2 Cont, Cord Justen 2018 9 5     O2 HGB,VENOUS CORD 2018 45 8     pH, Cord Art 2018 7  241     pCO2, Cord Art 2018 58 7     pO2, Cord Art 2018 17 2     HCO3, Cord Art 2018 24 6     Base Exc, Cord Art 2018 -3 7*    O2 Content, Cord Art 2018 6 8     O2 Hgb, Arterial Cord 2018 33 7     WBC 2018 8 83     RBC 2018 3 51*    Hemoglobin 2018 8 4*    Hematocrit 2018 28 4*    MCV 2018 81*    MCH 2018 23 9*    MCHC 2018 29 6*    RDW 2018 16 5*    Platelets  277     MPV 2018 11 8          Patient Active Problem List   Diagnosis    History of 3  sections    S/P repeat low transverse           Pernell Hernandez MD  2018  7:14 AM

## 2018-07-21 NOTE — LACTATION NOTE
This note was copied from a baby's chart  Mom states infant is feeding well  Encouraged continued feeding on cue  Encouraged to call forr additional assistance as needed

## 2018-07-22 VITALS
OXYGEN SATURATION: 98 % | TEMPERATURE: 98.2 F | HEIGHT: 62 IN | BODY MASS INDEX: 29.08 KG/M2 | RESPIRATION RATE: 20 BRPM | HEART RATE: 76 BPM | DIASTOLIC BLOOD PRESSURE: 76 MMHG | SYSTOLIC BLOOD PRESSURE: 122 MMHG | WEIGHT: 158 LBS

## 2018-07-22 PROCEDURE — 99024 POSTOP FOLLOW-UP VISIT: CPT | Performed by: OBSTETRICS & GYNECOLOGY

## 2018-07-22 RX ORDER — OXYCODONE HYDROCHLORIDE AND ACETAMINOPHEN 5; 325 MG/1; MG/1
1 TABLET ORAL EVERY 4 HOURS PRN
Qty: 15 TABLET | Refills: 0
Start: 2018-07-22 | End: 2018-08-01

## 2018-07-22 RX ORDER — OXYCODONE HYDROCHLORIDE AND ACETAMINOPHEN 5; 325 MG/1; MG/1
1 TABLET ORAL EVERY 6 HOURS PRN
Qty: 25 TABLET | Refills: 0
Start: 2018-07-22 | End: 2018-07-22

## 2018-07-22 RX ORDER — OXYCODONE HYDROCHLORIDE AND ACETAMINOPHEN 5; 325 MG/1; MG/1
1 TABLET ORAL EVERY 6 HOURS PRN
Qty: 15 TABLET | Refills: 0 | Status: SHIPPED | OUTPATIENT
Start: 2018-07-22 | End: 2018-08-01

## 2018-07-22 RX ADMIN — OXYCODONE HYDROCHLORIDE AND ACETAMINOPHEN 1 TABLET: 5; 325 TABLET ORAL at 02:04

## 2018-07-22 RX ADMIN — Medication 325 MG: at 10:35

## 2018-07-22 RX ADMIN — DOCUSATE SODIUM 100 MG: 100 CAPSULE, LIQUID FILLED ORAL at 10:35

## 2018-07-22 NOTE — PLAN OF CARE
INFECTION - ADULT     Absence of fever/infection during neutropenic period Completed          ALTERATION IN THE BREAST     Optimize infant feeding at the breast [address] Discharge to home or other facility with appropriate resources Progressing        INADEQUATE LATCH, SUCK OR SWALLOW     Demonstrate ability to latch and sustain latch, audible swallowing and satiety Progressing        INFECTION - ADULT     Absence or prevention of progression during hospitalization Progressing        Knowledge Deficit     Patient/family/caregiver demonstrates understanding of disease process, treatment plan, medications, and discharge instructions Progressing        PAIN - ADULT     Verbalizes/displays adequate comfort level or baseline comfort level Progressing        POSTPARTUM     Experiences normal postpartum course Progressing     Appropriate maternal -  bonding Progressing     Establishment of infant feeding pattern Progressing     Incision(s), wounds(s) or drain site(s) healing without S/S of infection Progressing        SAFETY ADULT     Patient will remain free of falls Progressing     Maintain or return to baseline ADL function Progressing     Maintain or return mobility status to optimal level Progressing

## 2018-07-22 NOTE — PROGRESS NOTES
Patient seen and examined 2/2 incisional erythema per RN  Incision clean/dry/intact with staples, no incisional erythema or discharge noted  There is mild ecchymoses and erythema around the perimeter of the incision,, no warmth, appropriate tenderness  Left inferior incisional edge with mild induration and ecchyomosis  No clinical indication of infection, afebrile  Examination consistent with postoperatively bruising  Left lower border marked to assess for progressing, will evaluate in a few hours to assess borders

## 2018-07-22 NOTE — PROGRESS NOTES
Progress Note - OB/GYN   Grzegorz Mcdonnell 41 y o  female MRN: [id number]  Unit/Bed#: -01 Encounter: [id number]    Subjective/Objective   Chief Complaint: Post-operative Day #3 s/p RLTCS & left uterine artery ligation    Subjective: Pain well controlled  Eating a regular diet without difficulty  Flatus Yes  Bowel movements are not yet  Voiding without difficulty  Ambulating Yes  Breastfeeding Yes  Lochia minimal  Denies chest pain, shortness of breath, leg pains  Objective:    Vitals: Blood pressure 98/52, pulse 57, temperature 98 3 °F (36 8 °C), temperature source Oral, resp  rate 18, height 5' 2" (1 575 m), weight 71 7 kg (158 lb), last menstrual period 10/19/2017, SpO2 98 %, currently breastfeeding  ,Body mass index is 28 9 kg/m²  No intake or output data in the 24 hours ending 07/22/18 0652    Invasive Devices          No matching active lines, drains, or airways          Physical Exam:  GEN: Grzegorz Mcdonnell appears well, alert and oriented x 3, pleasant and cooperative    HEART: regular rhythm, normal S1 and S2, no murmurs, clicks, gallops or rubs   LUNGS: clear to auscultation bilaterally; no wheezes, rales, or rhonchi   ABDOMEN: normal bowel sounds, soft, no tenderness, no distention  : Uterus firm at umbilicus nontender; incision clean dry intact w/ staples, mild erythema/ecchymosis surrounding incision  EXTREMITIES: peripheral pulses normal; no clubbing, cyanosis, or edema    Labs:   Lab Results   Component Value Date    WBC 8 83 07/20/2018    HGB 8 4 (L) 07/20/2018    HCT 28 4 (L) 07/20/2018    MCV 81 (L) 07/20/2018     07/20/2018       Lab, Imaging and other studies: I have personally reviewed pertinent reports      MEDS:   Current Facility-Administered Medications   Medication Dose Route Frequency    acetaminophen (TYLENOL) tablet 650 mg  650 mg Oral Q4H PRN    calcium carbonate (TUMS) chewable tablet 1,000 mg  1,000 mg Oral Daily PRN    diphenhydrAMINE (BENADRYL) injection 25 mg  25 mg Intravenous Q6H PRN    docusate sodium (COLACE) capsule 100 mg  100 mg Oral BID    ferrous sulfate tablet 325 mg  325 mg Oral BID With Meals    HYDROmorphone (DILAUDID) injection 0 5 mg  0 5 mg Intravenous Q1H PRN    HYDROmorphone (DILAUDID) injection 1 mg  1 mg Intravenous Q2H PRN    ibuprofen (MOTRIN) tablet 600 mg  600 mg Oral Q4H PRN    ketorolac (TORADOL) injection 30 mg  30 mg Intravenous Q6H PRN    lactated ringers infusion  125 mL/hr Intravenous Continuous    ondansetron (ZOFRAN) injection 4 mg  4 mg Intravenous Q8H PRN    oxyCODONE-acetaminophen (PERCOCET) 5-325 mg per tablet 1 tablet  1 tablet Oral Q4H PRN    oxyCODONE-acetaminophen (PERCOCET) 5-325 mg per tablet 2 tablet  2 tablet Oral Q4H PRN    simethicone (MYLICON) chewable tablet 80 mg  80 mg Oral Q6H PRN       Assessment:  Post-Operative Day #3 s/p RLTCS & left uterine artery ligation    Plan:  Continue routine postpartum care  Encourage ambulation  Periincisional ecchymoses: stable, minimal, no induration  Afebrile, no evidence of infection  Anemia: Hgb 10 4-->8  4  Secondary to surgical blood loss  No evidence of active bleeding  PO iron  Anticipate D/C home today      Andrew Arriola MD  PGY-IV

## 2018-07-23 ENCOUNTER — HOSPITAL ENCOUNTER (OUTPATIENT)
Facility: HOSPITAL | Age: 38
Setting detail: OBSERVATION
Discharge: HOME | End: 2018-07-25
Attending: EMERGENCY MEDICINE | Admitting: OBSTETRICS & GYNECOLOGY
Payer: COMMERCIAL

## 2018-07-23 ENCOUNTER — TELEPHONE (OUTPATIENT)
Dept: LABOR AND DELIVERY | Facility: HOSPITAL | Age: 38
End: 2018-07-23

## 2018-07-23 DIAGNOSIS — R06.02 SOB (SHORTNESS OF BREATH): Primary | ICD-10-CM

## 2018-07-23 DIAGNOSIS — R60.0 PEDAL EDEMA: ICD-10-CM

## 2018-07-24 ENCOUNTER — APPOINTMENT (EMERGENCY)
Dept: RADIOLOGY | Facility: HOSPITAL | Age: 38
End: 2018-07-24
Payer: COMMERCIAL

## 2018-07-24 ENCOUNTER — APPOINTMENT (OUTPATIENT)
Dept: NON INVASIVE DIAGNOSTICS | Facility: HOSPITAL | Age: 38
End: 2018-07-24
Payer: COMMERCIAL

## 2018-07-24 PROBLEM — R06.00 DYSPNEA: Status: ACTIVE | Noted: 2018-07-24

## 2018-07-24 LAB
ALBUMIN SERPL BCP-MCNC: 2.3 G/DL (ref 3.5–5)
ALP SERPL-CCNC: 172 U/L (ref 46–116)
ALT SERPL W P-5'-P-CCNC: 47 U/L (ref 12–78)
ANION GAP SERPL CALCULATED.3IONS-SCNC: 7 MMOL/L (ref 4–13)
AST SERPL W P-5'-P-CCNC: 62 U/L (ref 5–45)
ATRIAL RATE: 83 BPM
BASOPHILS # BLD AUTO: 0.05 THOUSANDS/ΜL (ref 0–0.1)
BASOPHILS NFR BLD AUTO: 1 % (ref 0–1)
BILIRUB SERPL-MCNC: 0.34 MG/DL (ref 0.2–1)
BUN SERPL-MCNC: 6 MG/DL (ref 5–25)
CALCIUM SERPL-MCNC: 8.3 MG/DL (ref 8.3–10.1)
CHLORIDE SERPL-SCNC: 109 MMOL/L (ref 100–108)
CO2 SERPL-SCNC: 24 MMOL/L (ref 21–32)
CREAT SERPL-MCNC: 0.41 MG/DL (ref 0.6–1.3)
EOSINOPHIL # BLD AUTO: 0.41 THOUSAND/ΜL (ref 0–0.61)
EOSINOPHIL NFR BLD AUTO: 4 % (ref 0–6)
ERYTHROCYTE [DISTWIDTH] IN BLOOD BY AUTOMATED COUNT: 17.3 % (ref 11.6–15.1)
GFR SERPL CREATININE-BSD FRML MDRD: 132 ML/MIN/1.73SQ M
GLUCOSE SERPL-MCNC: 89 MG/DL (ref 65–140)
HCT VFR BLD AUTO: 30.9 % (ref 34.8–46.1)
HGB BLD-MCNC: 9.3 G/DL (ref 11.5–15.4)
IMM GRANULOCYTES # BLD AUTO: 0.03 THOUSAND/UL (ref 0–0.2)
IMM GRANULOCYTES NFR BLD AUTO: 0 % (ref 0–2)
LYMPHOCYTES # BLD AUTO: 2.16 THOUSANDS/ΜL (ref 0.6–4.47)
LYMPHOCYTES NFR BLD AUTO: 22 % (ref 14–44)
MCH RBC QN AUTO: 24.3 PG (ref 26.8–34.3)
MCHC RBC AUTO-ENTMCNC: 30.1 G/DL (ref 31.4–37.4)
MCV RBC AUTO: 81 FL (ref 82–98)
MONOCYTES # BLD AUTO: 0.65 THOUSAND/ΜL (ref 0.17–1.22)
MONOCYTES NFR BLD AUTO: 7 % (ref 4–12)
NEUTROPHILS # BLD AUTO: 6.53 THOUSANDS/ΜL (ref 1.85–7.62)
NEUTS SEG NFR BLD AUTO: 66 % (ref 43–75)
NRBC BLD AUTO-RTO: 0 /100 WBCS
NT-PROBNP SERPL-MCNC: 180 PG/ML
P AXIS: 65 DEGREES
PLATELET # BLD AUTO: 429 THOUSANDS/UL (ref 149–390)
PMV BLD AUTO: 10.7 FL (ref 8.9–12.7)
POTASSIUM SERPL-SCNC: 3.9 MMOL/L (ref 3.5–5.3)
PR INTERVAL: 150 MS
PROT SERPL-MCNC: 6 G/DL (ref 6.4–8.2)
QRS AXIS: 63 DEGREES
QRSD INTERVAL: 82 MS
QT INTERVAL: 342 MS
QTC INTERVAL: 401 MS
RBC # BLD AUTO: 3.82 MILLION/UL (ref 3.81–5.12)
SODIUM SERPL-SCNC: 140 MMOL/L (ref 136–145)
T WAVE AXIS: 50 DEGREES
TROPONIN I SERPL-MCNC: <0.02 NG/ML
VENTRICULAR RATE: 83 BPM
WBC # BLD AUTO: 9.83 THOUSAND/UL (ref 4.31–10.16)

## 2018-07-24 PROCEDURE — 84484 ASSAY OF TROPONIN QUANT: CPT | Performed by: EMERGENCY MEDICINE

## 2018-07-24 PROCEDURE — 36415 COLL VENOUS BLD VENIPUNCTURE: CPT | Performed by: EMERGENCY MEDICINE

## 2018-07-24 PROCEDURE — 99204 OFFICE O/P NEW MOD 45 MIN: CPT | Performed by: INTERNAL MEDICINE

## 2018-07-24 PROCEDURE — 83880 ASSAY OF NATRIURETIC PEPTIDE: CPT | Performed by: EMERGENCY MEDICINE

## 2018-07-24 PROCEDURE — 80053 COMPREHEN METABOLIC PANEL: CPT | Performed by: EMERGENCY MEDICINE

## 2018-07-24 PROCEDURE — 99285 EMERGENCY DEPT VISIT HI MDM: CPT

## 2018-07-24 PROCEDURE — 85025 COMPLETE CBC W/AUTO DIFF WBC: CPT | Performed by: EMERGENCY MEDICINE

## 2018-07-24 PROCEDURE — 71046 X-RAY EXAM CHEST 2 VIEWS: CPT

## 2018-07-24 PROCEDURE — 93005 ELECTROCARDIOGRAM TRACING: CPT

## 2018-07-24 PROCEDURE — 93306 TTE W/DOPPLER COMPLETE: CPT | Performed by: INTERNAL MEDICINE

## 2018-07-24 PROCEDURE — 71275 CT ANGIOGRAPHY CHEST: CPT

## 2018-07-24 PROCEDURE — 93306 TTE W/DOPPLER COMPLETE: CPT

## 2018-07-24 PROCEDURE — 93010 ELECTROCARDIOGRAM REPORT: CPT | Performed by: INTERNAL MEDICINE

## 2018-07-24 RX ORDER — IBUPROFEN 600 MG/1
600 TABLET ORAL EVERY 6 HOURS PRN
Status: DISCONTINUED | OUTPATIENT
Start: 2018-07-24 | End: 2018-07-25 | Stop reason: HOSPADM

## 2018-07-24 RX ORDER — POTASSIUM CHLORIDE 20 MEQ/1
20 TABLET, EXTENDED RELEASE ORAL ONCE
Status: COMPLETED | OUTPATIENT
Start: 2018-07-24 | End: 2018-07-24

## 2018-07-24 RX ORDER — OXYCODONE HYDROCHLORIDE AND ACETAMINOPHEN 5; 325 MG/1; MG/1
2 TABLET ORAL EVERY 4 HOURS PRN
Status: DISCONTINUED | OUTPATIENT
Start: 2018-07-24 | End: 2018-07-25 | Stop reason: HOSPADM

## 2018-07-24 RX ORDER — FUROSEMIDE 10 MG/ML
40 INJECTION INTRAMUSCULAR; INTRAVENOUS ONCE
Status: COMPLETED | OUTPATIENT
Start: 2018-07-24 | End: 2018-07-24

## 2018-07-24 RX ORDER — ONDANSETRON 2 MG/ML
4 INJECTION INTRAMUSCULAR; INTRAVENOUS EVERY 6 HOURS PRN
Status: DISCONTINUED | OUTPATIENT
Start: 2018-07-24 | End: 2018-07-25 | Stop reason: HOSPADM

## 2018-07-24 RX ORDER — OXYCODONE HYDROCHLORIDE AND ACETAMINOPHEN 5; 325 MG/1; MG/1
1 TABLET ORAL EVERY 4 HOURS PRN
Status: DISCONTINUED | OUTPATIENT
Start: 2018-07-24 | End: 2018-07-25 | Stop reason: HOSPADM

## 2018-07-24 RX ORDER — SODIUM CHLORIDE, SODIUM LACTATE, POTASSIUM CHLORIDE, CALCIUM CHLORIDE 600; 310; 30; 20 MG/100ML; MG/100ML; MG/100ML; MG/100ML
125 INJECTION, SOLUTION INTRAVENOUS CONTINUOUS
Status: DISCONTINUED | OUTPATIENT
Start: 2018-07-24 | End: 2018-07-24

## 2018-07-24 RX ADMIN — IOHEXOL 85 ML: 350 INJECTION, SOLUTION INTRAVENOUS at 05:19

## 2018-07-24 RX ADMIN — SODIUM CHLORIDE, SODIUM LACTATE, POTASSIUM CHLORIDE, AND CALCIUM CHLORIDE 125 ML/HR: .6; .31; .03; .02 INJECTION, SOLUTION INTRAVENOUS at 06:43

## 2018-07-24 RX ADMIN — FUROSEMIDE 40 MG: 10 INJECTION, SOLUTION INTRAMUSCULAR; INTRAVENOUS at 16:33

## 2018-07-24 RX ADMIN — POTASSIUM CHLORIDE 20 MEQ: 1500 TABLET, EXTENDED RELEASE ORAL at 16:33

## 2018-07-24 NOTE — CONSULTS
Met with Salma Graves and her significant other who had questions about the safety of contrast dye and breastfeeding  Medication that was used was Iohexol which is an L2 Category drug which classifies it as "Probably Compatible" on a scale of L1-L5 which ranges from "Compatible" with breastfeeding to "Hazardous" in Medications & Mothers' Milk by Ad Boyle @  Rosa Horton, PhD     Reminded Salma Graves of paced bottle feeding and made mention of the resource she already has at home in her discharge booklet breastfeeding log which lists [address] [place] [address] at [phone] if she ever has further questions of compatibility of medications and feeding her infant breast milk  [facility] MOB and FOB to call for assistance, questions and concerns  Extension number for inpatient lactation support provided

## 2018-07-24 NOTE — ED ATTENDING ATTESTATION
Huma Neely MD, saw and evaluated the patient  I have discussed the patient with the resident/non-physician practitioner and agree with the resident's/non-physician practitioner's findings, Plan of Care, and MDM as documented in the resident's/non-physician practitioner's note, except where noted  All available labs and Radiology studies were reviewed  At this point I agree with the current assessment done in the Emergency Department  I have conducted an independent evaluation of this patient including a focused history and a physical exam     80-year-old female, , 5 days postop from Caesarean section for repeat , was discharged from the hospital head is read presenting for evaluation increased dyspnea  Patient states that she was unable to lie recumbent last night because of significant shortness of breath  Additionally the patient has noted moderate pitting edema of her bilateral lower extremities with her right affected worse than her left  Patient denies any chest pain, reports a mild intermittent nonproductive cough, denies any fever  Patient has a little bit of localized pain around her  incision site, however states that there has been no drainage or worsening erythema  Patient describes lochia  Ten systems reviewed negative except as noted in the history of present illness  Patient is noted to be mildly tachypneic  The patient appears nontoxic  HEENT reveals moist mucous membranes  Head is normocephalic and atraumatic  Conjunctiva and sclera are normal  Neck is nontender and supple with full range of motion to flexion, extension, lateral rotation  No meningismus appreciated  No masses are appreciated  Lungs are clear to auscultation bilaterally without any wheezes, rales or rhonchi  Heart is regular rate and rhythm without any murmurs, rubs or gallops  Abdomen is soft and nontender without any rebound or guarding   Lower extremities are significant for 2+ bilateral lower extremity pitting edema  Patient is awake, alert, and oriented x3  The patient has normal interaction  Motor is 5 out of 5  Assessment and plan:  50-year-old female presenting to the emergency department for dyspnea and lower extremity edema  Differential considerations include cardiomyopathy of pregnancy, pulmonary embolism, anemia, volume over resuscitation during operative procedure      Labs Reviewed   CBC AND DIFFERENTIAL - Abnormal        Result Value Ref Range Status    WBC 9 83  4 31 - 10 16 Thousand/uL Final    RBC 3 82  3 81 - 5 12 Million/uL Final    Hemoglobin 9 3 (*) 11 5 - 15 4 g/dL Final    Hematocrit 30 9 (*) 34 8 - 46 1 % Final    MCV 81 (*) 82 - 98 fL Final    MCH 24 3 (*) 26 8 - 34 3 pg Final    MCHC 30 1 (*) 31 4 - 37 4 g/dL Final    RDW 17 3 (*) 11 6 - 15 1 % Final    MPV 10 7  8 9 - 12 7 fL Final    Platelets 340 (*) 884 - 390 Thousands/uL Final    nRBC 0  /100 WBCs Final    Neutrophils Relative 66  43 - 75 % Final    Immat GRANS % 0  0 - 2 % Final    Lymphocytes Relative 22  14 - 44 % Final    Monocytes Relative 7  4 - 12 % Final    Eosinophils Relative 4  0 - 6 % Final    Basophils Relative 1  0 - 1 % Final    Neutrophils Absolute 6 53  1 85 - 7 62 Thousands/µL Final    Immature Grans Absolute 0 03  0 00 - 0 20 Thousand/uL Final    Lymphocytes Absolute 2 16  0 60 - 4 47 Thousands/µL Final    Monocytes Absolute 0 65  0 17 - 1 22 Thousand/µL Final    Eosinophils Absolute 0 41  0 00 - 0 61 Thousand/µL Final    Basophils Absolute 0 05  0 00 - 0 10 Thousands/µL Final   COMPREHENSIVE METABOLIC PANEL - Abnormal     Sodium 140  136 - 145 mmol/L Final    Potassium 3 9  3 5 - 5 3 mmol/L Final    Chloride 109 (*) 100 - 108 mmol/L Final    CO2 24  21 - 32 mmol/L Final    Anion Gap 7  4 - 13 mmol/L Final    BUN 6  5 - 25 mg/dL Final    Creatinine 0 41 (*) 0 60 - 1 30 mg/dL Final    Comment: Standardized to IDMS reference method    Glucose 89  65 - 140 mg/dL Final    Comment:   If the patient is fasting, the ADA then defines impaired fasting glucose as > 100 mg/dL and diabetes as > or equal to 123 mg/dL  Specimen collection should occur prior to Sulfasalazine administration due to the potential for falsely depressed results  Specimen collection should occur prior to Sulfapyridine administration due to the potential for falsely elevated results  Calcium 8 3  8 3 - 10 1 mg/dL Final    AST 62 (*) 5 - 45 U/L Final    Comment:   Specimen collection should occur prior to Sulfasalazine administration due to the potential for falsely depressed results  ALT 47  12 - 78 U/L Final    Comment:   Specimen collection should occur prior to Sulfasalazine and/or Sulfapyridine administration due to the potential for falsely depressed results  Alkaline Phosphatase 172 (*) 46 - 116 U/L Final    Total Protein 6 0 (*) 6 4 - 8 2 g/dL Final    Albumin 2 3 (*) 3 5 - 5 0 g/dL Final    Total Bilirubin 0 34  0 20 - 1 00 mg/dL Final    eGFR 132  ml/min/1 73sq m Final    Narrative:     National Kidney Disease Education Program recommendations are as follows:  GFR calculation is accurate only with a steady state creatinine  Chronic Kidney disease less than 60 ml/min/1 73 sq  meters  Kidney failure less than 15 ml/min/1 73 sq  meters  NT-BNP PRO (BRAIN NATRIURETIC PEPTIDE) - Abnormal     NT-proBNP 180 (*) <125 pg/mL Final   TROPONIN I - Normal    Troponin I <0 02  <=0 04 ng/mL Final    Comment:   [employer] Chemistry analyzer 99% cutoff is > 0 04 ng/mL in network labs     o cTnI 99% cutoff is useful only when applied to patients in the clinical setting of myocardial ischemia   o cTnI 99% cutoff should be interpreted in the context of clinical history, ECG findings and possibly cardiac imaging to establish correct diagnosis  o cTnI 99% cutoff may be suggestive but clearly not indicative of a coronary event without the clinical setting of myocardial ischemia       PROTEIN / CREATININE RATIO, URINE   POCT URINALYSIS DIPSTICK       CTA ED chest PE study   Final Result      1  There is no main or lobar pulmonary embolism  Evaluation of the pulmonary arteries distal to this level is severely limited due to poor contrast bolus  2   Small bilateral pleural effusions  Workstation performed: [id number]         XR chest 2 views   ED Interpretation   No acute abnormality        Bedside ultrasound was performed and demonstrated normal myocardial ejection fraction

## 2018-07-24 NOTE — LACTATION NOTE
Leelee Wilkinson called on [facility]'s behalf to find out the effects of Lasix on breastfeeding  Informed him that lasix is an L3 which means it is "Probably Compatible" on Dr Oralia Henriquez Category scale  Based on the protein binding and the adult max half life, it is thought this medication would not reach the infant through the breast milk  [facility] MOB and FOB to call for assistance, questions and concerns  Extension number for inpatient lactation support provided

## 2018-07-24 NOTE — PROGRESS NOTES
Pt ordered 1 time dose of 40mg IV lasix  Per Dr Jeff Gonzalez with cardiology pt cannot breastfeed with this  Notified pt  Pt wants to pump so she has milk for baby prior to receiving dose  Alejandro Mancini with OB down to clean her pump and set her up  Will give dose of lasix after pt is finished pumping  Pt also was given formula for baby  Will continue to monitor

## 2018-07-24 NOTE — SOCIAL WORK
Met with the patient and her spouse to complete assessment and explain role of CM  The patient lives with her spouse and her 4 children ages 13,6, 9, and  in a ranch style home with 2 ERNESTINE  She is independent, employed and drives  The patient has no DME except for breast pump  The patient's sister, mother and mother-in-law are caring for the children while patient is hospitalized  The  infant is in patient's room and her souse is here to assist  The patient has no PCP and was given an [employer] card  The patient has prescription coverage and uses either [facility] [place] or [facility] [place]  She has no Advance directive and her spouse is contact-[place] cell # [phone]  CM reviewed d/c planning process including the following: identifying help at home, patient preference for d/c planning needs, Discharge Lounge, [facility] Meds to Bed program, availability of treatment team to discuss questions or concerns patient and/or family may have regarding understanding medications and recognizing signs and symptoms once discharged  CM also encouraged patient to follow up with all recommended appointments after discharge  Patient advised of importance for patient and family to participate in managing patients medical well being  Patient/caregiver received discharge checklist  Content reviewed  Patient/caregiver encouraged to participate in discharge plan of care prior to discharge home

## 2018-07-24 NOTE — ED NOTES
Attempt at straight cath the pt for a urine sample was performed, however the pt is in too much pain to allow for the advancement of the catheter and screamed, requesting that I stop and remove the catheter at once  I did so as there was no urine return noted  I have paged OB and informed them of this and that the pt refuses at this point to allow for a catheter to be attempted  The resident has returned my page and said that it is ok and will inform the dr    I will now inform, ED resident, Dr Amira Bedolla of these events        Aimee Hoffman RN  07/24/18 0039

## 2018-07-24 NOTE — H&P
History and Physical - OB/GYN   Christos Arnold 41 y o  female MRN: [id number]  [facility]: [id number]        Please see consultation for full evaluation of the patient  In the interim, patient underwent CTA as planned, which was not suggestive of pulmonary embolism  Decision was made to admit patient after this time given still unknown etiology of her current complaint  Assessment:  41 y o  [id number] POD#5 s/p RLTCS with gradually worsening dyspnea  CTA not suggestive of pulmonary embolism  Her BPs remain well controlled and not overtly suggestive of preeclampsia  Again, no significant pulmonary edema appreciated by CT  Peripartum cardiomyopathy remains on differential and will require further investigation to exclude  1  Progressively worsening dyspnea  - Admit for OBS to OBGYN  - Echo  - Cardiology consultation  - Telemetry ordered until cleared by Cardiology  2  Breastfeeding mother  - Desires to room-in with her   She understands that while admitted for management of her own medical problems, she is not a sufficient guardian for her child and she needs another adult with her for this to be safe  - Breast pump to bedside as needed  - Please do not hesitate to contact OB/GYN for concerns related to this   3  POD#5 s/p   - Routine postop care  4  FEN  - Regular diet  - IVF  5   VTE ppx  - SCDs    Discussed with Dr Robert Shaffer

## 2018-07-24 NOTE — ED PROVIDER NOTES
History  Chief Complaint   Patient presents with    Post-op Problem     pt reports SOB, feet swelling, blurred vision from left eye, pt s/p C section on 2018     Patient is a 52-year-old female with a history of repeat   who presents with 2 days shortness of breath, bilateral leg swelling, and temporary blurry vision in the left eye  She was discharged from the hospital yesterday  Since arriving home, she has noted persistent shortness of breath that is moderate in severity and worse with sitting and laying down  Interfered with her ability to sleep last night  She notes new L>R bilateral ankle swelling since yesterday as well  She also reports 1 episode L eye blurry vision that started suddenly without inciting event and resolved spontaneously after an hour  She denies similar symptoms in the past  She denies associated headache, sore throat, cough, fever, chest pain, nausea/vomiting, change in urinary frequency, or rash  She does report abdominal pain and vaginal bleeding secondary to the CS  She denies cardiac history or history of DVT or PE  Prior to Admission Medications   Prescriptions Last Dose Informant Patient Reported? Taking?    Docosahexaenoic Acid (PRENATAL DHA) 200 MG CAPS 2018 at Unknown time Self Yes Yes   Sig: Take by mouth   Loratadine (CLARITIN) 10 MG CAPS Past Week at Unknown time Self Yes Yes   Sig: Take by mouth   calcium carbonate (TUMS) 500 mg chewable tablet Past Week at Unknown time Self Yes Yes   Sig: Chew 1 tablet as needed for indigestion or heartburn   ferrous sulfate 324 (65 Fe) mg Past Week at Unknown time Self No Yes   Sig: Take 1 tablet (324 mg total) by mouth 2 (two) times a day before meals   oxyCODONE-acetaminophen (PERCOCET) 5-325 mg per tablet Past Week at Unknown time  No Yes   Sig: Take 1 tablet by mouth every 4 (four) hours as needed for moderate pain for up to 10 days Max Daily Amount: 6 tablets   oxyCODONE-acetaminophen (PERCOCET) 5-325 mg per tablet Past Week at Unknown time  No Yes   Sig: Take 1 tablet by mouth every 6 (six) hours as needed for severe pain for up to 10 days Max Daily Amount: 4 tablets      Facility-Administered Medications: None       Past Medical History:   Diagnosis Date    Asthma     not as adult    History of one miscarriage     2017 10 weeks    Varicella        Past Surgical History:   Procedure Laterality Date     SECTION      2004 son,    daughter,    daughter   Dory Mishra N/A 2018    Procedure: Theoplis Cumber () REPEAT;  Surgeon: Ginger Wiggins DO;  Location: [facility] ;  Service: Obstetrics       Family History   Problem Relation Age of Onset    Adopted: Yes    Crohn's disease Mother     Hypertension Mother     Cancer Maternal Grandmother         lung    Arthritis Maternal Grandmother     Alcohol abuse Father     Drug abuse Father     Asthma Sister     Diabetes Maternal Grandfather     Hearing loss Maternal Grandfather     Heart disease Maternal Grandfather      I have reviewed and agree with the history as documented  Social History   Substance Use Topics    Smoking status: Never Smoker    Smokeless tobacco: Never Used    Alcohol use No        Review of Systems   Constitutional: Negative for chills, fatigue and fever  HENT: Negative for congestion and sore throat  Eyes: Positive for visual disturbance  Negative for pain, discharge and redness  Respiratory: Positive for shortness of breath  Negative for cough  Cardiovascular: Positive for leg swelling  Negative for chest pain and palpitations  Gastrointestinal: Positive for abdominal pain  Negative for diarrhea, nausea and vomiting  Endocrine: Negative for polyuria  Genitourinary: Positive for pelvic pain and vaginal bleeding  Negative for decreased urine volume, difficulty urinating, dysuria, flank pain and frequency  Musculoskeletal: Negative for arthralgias  Skin: Negative for rash  Neurological: Negative for dizziness, light-headedness and headaches  All other systems reviewed and are negative  Physical Exam  ED Triage Vitals   Temperature Pulse Respirations Blood Pressure SpO2   07/23/18 2258 07/23/18 2258 07/23/18 2258 07/23/18 2258 07/23/18 2258   (!) 97 3 °F (36 3 °C) 72 16 143/86 95 %      Temp Source Heart Rate Source Patient Position - Orthostatic VS BP Location FiO2 (%)   07/23/18 2258 07/23/18 2258 07/23/18 2258 07/23/18 2258 --   Tympanic Monitor Sitting Left arm       Pain Score       07/24/18 0009       3           Orthostatic Vital Signs  Vitals:    07/24/18 1917 07/24/18 2300 07/25/18 0500 07/25/18 0759   BP: 112/66 111/62 106/58 116/73   Pulse: 78 72 66 81   Patient Position - Orthostatic VS: Lying Lying Lying        Physical Exam   Constitutional: She is oriented to person, place, and time  She appears well-developed and well-nourished  No distress  HENT:   Head: Normocephalic and atraumatic  Eyes: EOM are normal  Pupils are equal, round, and reactive to light  Neck: Normal range of motion  Neck supple  Cardiovascular: Normal rate, regular rhythm and normal heart sounds  Pulmonary/Chest: Breath sounds normal  Tachypnea noted  No respiratory distress  Abdominal: Soft  Bowel sounds are normal  There is tenderness in the suprapubic area  There is no rigidity, no rebound and no guarding  Musculoskeletal: Normal range of motion  Trace pitting edema BLE   Neurological: She is alert and oriented to person, place, and time  Skin: Skin is warm and dry  Psychiatric: She has a normal mood and affect  Nursing note and vitals reviewed        ED Medications  Medications   iohexol (OMNIPAQUE) 350 MG/ML injection (MULTI-DOSE) 85 mL (85 mL Intravenous Given 7/24/18 0519)   furosemide (LASIX) injection 40 mg (40 mg Intravenous Given 7/24/18 1633)   potassium chloride (K-DUR,KLOR-CON) CR tablet 20 mEq (20 mEq Oral Given 7/24/18 1633)       Diagnostic Studies  Results Reviewed     Procedure Component Value Units Date/Time    CBC and differential [[id number]]  (Abnormal) Collected:  07/24/18 0046    Lab Status:  Final result Specimen:  Blood from Arm, Right Updated:  07/24/18 0116     WBC 9 83 Thousand/uL      RBC 3 82 Million/uL      Hemoglobin 9 3 (L) g/dL      Hematocrit 30 9 (L) %      MCV 81 (L) fL      MCH 24 3 (L) pg      MCHC 30 1 (L) g/dL      RDW 17 3 (H) %      MPV 10 7 fL      Platelets 625 (H) Thousands/uL      nRBC 0 /100 WBCs      Neutrophils Relative 66 %      Immat GRANS % 0 %      Lymphocytes Relative 22 %      Monocytes Relative 7 %      Eosinophils Relative 4 %      Basophils Relative 1 %      Neutrophils Absolute 6 53 Thousands/µL      Immature Grans Absolute 0 03 Thousand/uL      Lymphocytes Absolute 2 16 Thousands/µL      Monocytes Absolute 0 65 Thousand/µL      Eosinophils Absolute 0 41 Thousand/µL      Basophils Absolute 0 05 Thousands/µL     B-type natriuretic peptide [[id number]]  (Abnormal) Collected:  07/24/18 0046    Lab Status:  Final result Specimen:  Blood from Arm, Right Updated:  07/24/18 0113     NT-proBNP 180 (H) pg/mL     Troponin I [[id number]]  (Normal) Collected:  07/24/18 0046    Lab Status:  Final result Specimen:  Blood from Arm, Right Updated:  07/24/18 0112     Troponin I <0 02 ng/mL     Comprehensive metabolic panel [[id number]]  (Abnormal) Collected:  07/24/18 0046    Lab Status:  Final result Specimen:  Blood from Arm, Right Updated:  07/24/18 0110     Sodium 140 mmol/L      Potassium 3 9 mmol/L      Chloride 109 (H) mmol/L      CO2 24 mmol/L      Anion Gap 7 mmol/L      BUN 6 mg/dL      Creatinine 0 41 (L) mg/dL      Glucose 89 mg/dL      Calcium 8 3 mg/dL      AST 62 (H) U/L      ALT 47 U/L      Alkaline Phosphatase 172 (H) U/L      Total Protein 6 0 (L) g/dL      Albumin 2 3 (L) g/dL      Total Bilirubin 0 34 mg/dL      eGFR 132 ml/min/1 73sq m     Narrative:         National Kidney Disease Education Program recommendations are as follows:  GFR calculation is accurate only with a steady state creatinine  Chronic Kidney disease less than 60 ml/min/1 73 sq  meters  Kidney failure less than 15 ml/min/1 73 sq  meters  XR chest 2 views   ED Interpretation by Foreign Benitez MD ( [id number])   No acute abnormality      Final Result by Kvng Willams MD ( [id number])      No acute cardiopulmonary disease  Workstation performed: [id number]         CTA ED chest PE study   Final Result by Niya Philippe MD ( [id number])      1  There is no main or lobar pulmonary embolism  Evaluation of the pulmonary arteries distal to this level is severely limited due to poor contrast bolus  2   Small bilateral pleural effusions           Workstation performed: [id number]               Procedures  ECG 12 Lead Documentation  Date/Time: 2018 12:59 AM  Performed by: July Brown  Authorized by: July Brown     Indications / Diagnosis:  Shortness of breath  ECG reviewed by me, the ED Provider: yes    Patient location:  ED  Previous ECG:     Previous ECG:  Unavailable    Comparison to cardiac monitor: Yes    Interpretation:     Interpretation: normal    Rate:     ECG rate:  83    ECG rate assessment: normal    Rhythm:     Rhythm: sinus rhythm    Ectopy:     Ectopy: none    QRS:     QRS axis:  Normal  Conduction:     Conduction: normal    ST segments:     ST segments:  Normal  T waves:     T waves: normal            Phone Consults  ED Phone Contact    ED Course  ED Course as of e 2018   0121 Alkaline Phosphatase: (!) 172   0122 AST: (!) 62                               MDM  Number of Diagnoses or Management Options  Pedal edema:   SOB (shortness of breath):   Diagnosis management comments: Patient is a 44-year-old female with a history of repeat   who presents with 2 days shortness of breath, bilateral leg swelling, and temporary blurry vision in the left eye  On exam, she is satting 95% on RA, has mild tachypnea and increased work of breathing, and has trace pitting edema BLE  Will obtain labs to evaluate for anemia, CXR for signs of fluid overload or pneumonia, and perform bedside echo to evaluate EF  Labs show mild anemia stable from prior; CXR is wnl; bedside echo grossly nl  Discussed with OB and will obtain CTA to evaluate for PE  CTA negative for PE  Will therefore admit to [facility] for further monitoring and formal cardiac echo  CritCare Time    Disposition  Final diagnoses:   SOB (shortness of breath)   Pedal edema     Time reflects when diagnosis was documented in both MDM as applicable and the Disposition within this note     Time User Action Codes Description Comment    7/24/2018 12:06 AM Alison Sheppard Add [R06 02] SOB (shortness of breath)     7/24/2018 12:06 AM Alison Sheppard Add [R60 0] Pedal edema       ED Disposition     ED Disposition Condition Comment    Admit  Case was discussed with OBGYN and the patient's admission status was agreed to be Admission Status: observation status to the service of Dr Jose G Torres           Follow-up Information     Follow up With Specialties Details Why Guerline Brar MD Obstetrics and Gynecology, Obstetrics, Gynecology Call in 1 week(s)  [address]  [phone]            Discharge Medication List as of 7/25/2018  8:59 AM      CONTINUE these medications which have NOT CHANGED    Details   calcium carbonate (TUMS) 500 mg chewable tablet Chew 1 tablet as needed for indigestion or heartburn, Historical Med      Docosahexaenoic Acid (PRENATAL DHA) 200 MG CAPS Take by mouth, Historical Med      ferrous sulfate 324 (65 Fe) mg Take 1 tablet (324 mg total) by mouth 2 (two) times a day before meals, Starting Thu 6/21/2018, Normal      Loratadine (CLARITIN) 10 MG CAPS Take by mouth, Historical Med      !! oxyCODONE-acetaminophen (PERCOCET) 5-325 mg per tablet Take 1 tablet by mouth every 4 (four) hours as needed for moderate pain for up to 10 days Max Daily Amount: 6 tablets, Starting Sun 7/22/2018, Until Wed 8/1/2018, No Print      !! oxyCODONE-acetaminophen (PERCOCET) 5-325 mg per tablet Take 1 tablet by mouth every 6 (six) hours as needed for severe pain for up to 10 days Max Daily Amount: 4 tablets, Starting Sun 7/22/2018, Until Wed 8/1/2018, Print       !! - Potential duplicate medications found  Please discuss with provider  No discharge procedures on file  ED Provider  Attending physically available and evaluated Heddy Lennox  ROGER managed the patient along with the ED Attending      Electronically Signed by         Edilson Linder MD  07/27/18 8744

## 2018-07-24 NOTE — TELEPHONE ENCOUNTER
Patient called with increasing SOB and left leg swelling greater than right  Patient recently underwent  18  Patient denies history of DVT  Recommended patient go to [facility] AND [facility] ER for evaluation

## 2018-07-24 NOTE — CONSULTS
H&P Exam - Cardiology   Darren Hodges 41 y o  female MRN: [id number]  Unit/Bed#: [facility] 523-01 Encounter: [id number]    Assessment/Plan     Bilateral pleural effusions       Secondary to fluid overload  NT-proBNP slightly elevated at 180 pg/mL with signs and symptoms of fluid overload (orthopnea, SOB, BRIONES, pedal edema)       Discontinue lactated ringers infusion  Start IV furosemide 40mg once  EKG - NSR with HR of 83  Echo performed with EF of 60% and otherwise normal        CTA negative for main or lobar pulmonary embolisms with inability to evaluate distal pulmonary arteries due to poor contrast bolus  CTA positive for small bilateral pleural effusions      History of Present Illness   HPI:  Darren Hodges is a 41 y o  female s/p scheduled   with no significant medical history who presents with two days of SOB, BRIONES, bilateral leg swelling (left greater than right), and episode of left eye blurry vision with left-sided headache prior to arrival at ED  Pt states that blurry vision and headache have resolved but she continues to have shortness of breath at rest which is worse while lying flat and with exertion  Pt states that her leg swelling has improved slightly overnight while resting  Pt denies chest pain, palpitations, or abdominal pain besides expected  incision pain  Pt states she has never experienced these symptoms and has never had an echocardiogram     Review of Systems   Constitutional: Negative for chills, diaphoresis and fever  Eyes: Negative for pain and visual disturbance  Respiratory: Positive for shortness of breath  Negative for wheezing  Cardiovascular: Positive for leg swelling  Negative for chest pain and palpitations  Gastrointestinal: Negative for abdominal pain, constipation, diarrhea, nausea and vomiting  Allergic/Immunologic: Positive for environmental allergies  Neurological: Negative for headaches         Historical Information   Past Medical History:   Diagnosis Date    Asthma     not as adult    History of one miscarriage     2017 10 weeks    Varicella      Past Surgical History:   Procedure Laterality Date     SECTION      2004 son,   2007 daughter,   2011 daughter   Jeffkamran Marcano N/A 2018    Procedure: Alcario Shallow () REPEAT;  Surgeon: Shelton Mcghee DO;  Location: [facility];  Service: Obstetrics     Social History   History   Alcohol Use No     History   Drug Use No     History   Smoking Status    Never Smoker   Smokeless Tobacco    Never Used     Family History:   Family History   Problem Relation Age of Onset    Adopted: Yes    Crohn's disease Mother     Hypertension Mother     Cancer Maternal Grandmother         lung    Arthritis Maternal Grandmother     Alcohol abuse Father     Drug abuse Father     Asthma Sister     Diabetes Maternal Grandfather     Hearing loss Maternal Grandfather     Heart disease Maternal Grandfather        Meds/Allergies   all medications and allergies reviewed  Allergies   Allergen Reactions    Iron        Objective   Vitals: Blood pressure 136/90, pulse 75, temperature 97 5 °F (36 4 °C), resp  rate 18, height 5' 2" (1 575 m), weight 68 1 kg (150 lb 2 1 oz), last menstrual period 10/19/2017, SpO2 96 %, currently breastfeeding  Orthostatic Blood Pressures      Most Recent Value   Blood Pressure  136/90 filed at 2018 9192   Patient Position - Orthostatic VS  Lying filed at 2018 0559          No intake or output data in the 24 hours ending 18 0909    Invasive Devices     Peripheral Intravenous Line            Peripheral IV 18 Right Antecubital less than 1 day                Physical Exam   Constitutional: She is oriented to person, place, and time  She appears well-developed and well-nourished  No distress  HENT:   Head: Normocephalic and atraumatic     Mouth/Throat: Oropharynx is clear and moist    Eyes: Right eye exhibits no discharge  Left eye exhibits no discharge  Cardiovascular: Normal rate, regular rhythm and normal heart sounds  No extrasystoles are present  Exam reveals no gallop and no friction rub  No murmur heard  Pulmonary/Chest: Effort normal and breath sounds normal  No stridor  No respiratory distress  She has no wheezes  She has no rales  Abdominal: Soft  Musculoskeletal:   Nonpitting edema, left greater than right   Neurological: She is alert and oriented to person, place, and time  Skin: Skin is warm and dry  Psychiatric: She has a normal mood and affect  Her behavior is normal        Lab Results: I have personally reviewed pertinent lab results  Imaging: I have personally reviewed pertinent reports  Pathology and Other Studies: I have personally reviewed pertinent reports      VTE Prophylaxis: Sequential compression device (Venodyne)     Code Status: Level 1 - Full Code

## 2018-07-24 NOTE — CONSULTS
Consult - OB/GYN   Heddy Lennox 41 y o  female MRN: [id number]  Unit/Bed#: ED 10 Encounter: [id number]      Chief complaint:  Shortness of breath, leg swelling    HPI:  Ms Helga Godoy is a 41 y o   who is POD#5 s/p RLTCS (18) presents with complaints of shortness of breath and leg swelling (L>R)  Patient reports her symptoms started yesterday after she was discharged  She notes that she went to bed that evening, laid down, and "felt claustrophobic " She felt a chest heaviness at that time and started with the shortness of breath  She got out of bed and went to the restroom, where she noted her left eye was "blurry, as if you were crying really hard and couldn't see through it " This was accompanied by a left sided headache  Her acute symptoms resolved, but she continued to have shortness of breath  She is unable to lay flat or rest comfortably  She also feels she cannot eat, as she is struggling to breathe with food in her mouth  She notes her legs are both swollen, but her left is moreso than her right  They feel tense, but no calf pain or redness per pt report  Denies fever/chills, chest pain, palpitations, nausea/vomiting, abdominal pain out of proportion with expected  pain         Pregnancy Complications:  Patient Active Problem List   Diagnosis    History of 3  sections    S/P repeat low transverse        PMH:  Past Medical History:   Diagnosis Date    Asthma     not as adult    History of one miscarriage     2017 10 weeks    Varicella        PSH:  Past Surgical History:   Procedure Laterality Date     SECTION       son,    daughter,    daughter   Malcom Hdz N/A 2018    Procedure:  SECTION () REPEAT;  Surgeon: Prasanna Vazquez DO;  Location: [facility] ;  Service: Obstetrics       Social Hx:  Denies tobacco, drugs, EtOH    OB Hx:  Obstetric History       T4      L4     SAB1   TAB0   Arbie Copper Live Births4       # Outcome Date GA Lbr Jani/2nd Weight Sex Delivery Anes PTL Lv   5 Term 07/19/18 39w0d  3340 g (7 lb 5 8 oz) F CS-LTranv Spinal N KONSTANTIN      Name: Marie Castellon  (Livier Crandall)      Apgar1:  9                Apgar5: 9   4 Term 02/04/11 38w0d  3175 g (7 lb) M CS-Unspec Spinal  KONSTANTIN   3 Term 09/21/07 38w0d  3175 g (7 lb) F CS-Unspec Spinal  KONSTANTIN   2 Term 11/09/04 39w5d  3175 g (7 lb) F CS-Unspec EPI  KONSTANTIN      Complications: Failure to Progress in Second Stage   1 SAB                   Meds:  No current facility-administered medications on file prior to encounter  Current Outpatient Prescriptions on File Prior to Encounter   Medication Sig Dispense Refill    calcium carbonate (TUMS) 500 mg chewable tablet Chew 1 tablet as needed for indigestion or heartburn      Docosahexaenoic Acid (PRENATAL DHA) 200 MG CAPS Take by mouth      ferrous sulfate 324 (65 Fe) mg Take 1 tablet (324 mg total) by mouth 2 (two) times a day before meals 60 tablet 2    Loratadine (CLARITIN) 10 MG CAPS Take by mouth      oxyCODONE-acetaminophen (PERCOCET) 5-325 mg per tablet Take 1 tablet by mouth every 4 (four) hours as needed for moderate pain for up to 10 days Max Daily Amount: 6 tablets 15 tablet 0    oxyCODONE-acetaminophen (PERCOCET) 5-325 mg per tablet Take 1 tablet by mouth every 6 (six) hours as needed for severe pain for up to 10 days Max Daily Amount: 4 tablets 15 tablet 0       Allergies: Allergies   Allergen Reactions    Iron          Physical Exam:  /69   Pulse 82   Temp (!) 97 3 °F (36 3 °C) (Tympanic)   Resp 19   LMP 10/19/2017   SpO2 94%     Physical Exam   Constitutional: She is oriented to person, place, and time  She appears well-developed and well-nourished  No distress  HENT:   Head: Normocephalic and atraumatic  Eyes: No scleral icterus  Cardiovascular: Normal rate, regular rhythm and normal heart sounds  Exam reveals no gallop and no friction rub      No murmur heard   Pulmonary/Chest: She is in respiratory distress (increased work of breathing, using accessory muscles, splinting)  She has no wheezes  She has no rales  Decreased breath sounds in lower lobes bilaterally  Otherwise clear to auscultation   Abdominal: She exhibits no distension  There is no tenderness  There is no rebound and no guarding  Incision clean, dry, and intact  Reapproximated with staples  No erythema or drainage  Musculoskeletal: She exhibits edema (bilateral 1+ pitting edema of LE to knees, left very slightly greater than right)  She exhibits no tenderness (neg homans) or deformity  Neurological: She is alert and oriented to person, place, and time  Skin: Skin is warm and dry  No rash noted  She is not diaphoretic  No erythema  No pallor  Psychiatric: She has a normal mood and affect  Her behavior is normal      Lab Results   Component Value Date    WBC 9 83 07/24/2018    HGB 9 3 (L) 07/24/2018    HCT 30 9 (L) 07/24/2018    MCV 81 (L) 07/24/2018     (H) 07/24/2018     Lab Results   Component Value Date    GLUCOSE 89 07/24/2018    CALCIUM 8 3 07/24/2018     07/24/2018    K 3 9 07/24/2018    CO2 24 07/24/2018     (H) 07/24/2018    BUN 6 07/24/2018    CREATININE 0 41 (L) 07/24/2018     Lab Results   Component Value Date    ALT 47 07/24/2018    AST 62 (H) 07/24/2018    ALKPHOS 172 (H) 07/24/2018    BILITOT 0 34 07/24/2018     BNP: 180   Troponin: <0 02    CXR  No acute abnormalities by ED wet read    Per ED physicians, their bedside echocardiogram showed a normal ejection fraction    Assessment:   41 y o  [id number] POD#5 s/p RLTCS with gradually worsening dyspnea  Increased work of breathing noted on exam despite overall reassuring vital signs  Differential for this patient includes preeclampsia, pulmonary embolism, and peripartum cardiomyopathy   Preeclampsia is unlikely given only 1 elevated BP and no evidence of pulmonary edema, however cannot reliably exclude PE/cardiomyopathy without further evaluation  Plan:   1  Progressively worsening dyspnea  - CTA pending  - Counseled patient on need for contrast administration  She does not need to avoid feeding her  after this and may resume breastfeeding   - If CTA negative, will admit to [facility] for Cardiology consultation and formal echocardigram      Discussed and examined with Dr Janey Santana

## 2018-07-24 NOTE — CASE MANAGEMENT
Initial Clinical Review    Admission: Date/Time/Statement:18 @ [address] [place] [address] This Encounter   Procedures    Place in Observation (expected length of stay for this patient is less than two midnights)     Standing Status:   Standing     Number of Occurrences:   1     Order Specific Question:   Admitting Physician     Answer:   Riccardo Primrose [[id number]]     Order Specific Question:   Level of Care     Answer:   Med Surg [[id number]]     ED: Date/Time/Mode of Arrival:   ED Arrival Information     Expected Arrival Acuity Means of Arrival Escorted By Service Admission Type    - 2018 22:55 Emergent Walk-In Self OB/GYN Emergency    Arrival Complaint    SOB, Leg Swelling, Blurred Vision          Chief Complaint:   Chief Complaint   Patient presents with    Post-op Problem     pt reports SOB, feet swelling, blurred vision from left eye, pt s/p C section on 2018       History of Illness:     45-year-old female, , 5 days postop from Caesarean section for repeat , was discharged from the hospital  is  presenting for evaluation increased dyspnea  Patient states that she was unable to lie recumbent last night because of significant shortness of breath  Additionally the patient has noted moderate pitting edema of her bilateral lower extremities with her right affected worse than her left  Patient denies any chest pain, reports a mild intermittent nonproductive cough, denies any fever  Patient has a little bit of localized pain around her  incision site, however states that there has been no drainage or worsening erythema  Patient describes lochia          ED Vital Signs:   ED Triage Vitals   Temperature Pulse Respirations Blood Pressure SpO2   18   (!) 97 3 °F (36 3 °C) 72 16 143/86 95 %      Temp Source Heart Rate Source Patient Position - Orthostatic VS BP Location FiO2 (%)   18 18 --   Tympanic Monitor Sitting Left arm       Pain Score       18 0009       3        Wt Readings from Last 1 Encounters:   18 68 1 kg (150 lb 2 1 oz)       Vital Signs: WNL    Abnormal Labs/Diagnostic Test Results:     proBNP = 180, AST = 62, Alk Phos = 172, Albumin = 2 3, H&H = 9 3/30 9, Platelets = 687,338    CT chest PE study: B/L pleural effusions  No PE     EKG: NSR    Chest x-ray: WNL      ED Treatment:   Medication Administration from 2018 2255 to 2018 8219       Date/Time Order Dose Route Action Comments     2018 0519 iohexol (OMNIPAQUE) 350 MG/ML injection (MULTI-DOSE) 85 mL 85 mL Intravenous Given           Past Medical/Surgical History: Active Ambulatory Problems     Diagnosis Date Noted    History of 3  sections 02/15/2018    S/P repeat low transverse  2018     Resolved Ambulatory Problems     Diagnosis Date Noted    Advanced maternal age in multigravida, third trimester 02/15/2018    Elderly multigravida in third trimester 2018    Anemia during pregnancy in third trimester 2018    Yeast vaginitis 2018     Past Medical History:   Diagnosis Date    Asthma     History of one miscarriage     Varicella        Admitting Diagnosis: SOB (shortness of breath) [R06 02]  Pedal edema [R60 0]    Age/Sex: 41 y o  female    Assessment/Plan:     1  Progressively worsening dyspnea  - Admit for OBS to OBGYN  - Echo  - Cardiology consultation  - Telemetry ordered until cleared by Cardiology  2  Breastfeeding mother  - Desires to room-in with her   She understands that while admitted for management of her own medical problems, she is not a sufficient guardian for her child and she needs another adult with her for this to be safe  - Breast pump to bedside as needed  - Please do not hesitate to contact OB/GYN for concerns related to this   3  POD#5 s/p   - Routine postop care  4   FEN  - Regular diet  - IVF  5  VTE ppx  - SCDs    Admission Orders:    Cardiology consult, ECHO, Telemetry monitoring, sequential compression device, OOB      Scheduled Meds:   Current Facility-Administered Medications:  ibuprofen 600 mg Oral Q6H PRN   ondansetron 4 mg Intravenous Q6H PRN   oxyCODONE-acetaminophen 1 tablet Oral Q4H PRN   oxyCODONE-acetaminophen 2 tablet Oral Q4H PRN     Continuous Infusions:   lactated ringers 125 mL/hr     ==============================================================    Continued Stay Review    Date: 7/24/18 @ 1514    Vital Signs: /71 (BP Location: Left arm)   Pulse 73   Temp 97 9 °F (36 6 °C) (Oral)   Resp 18   Ht 5' 2" (1 575 m)   Wt 68 1 kg (150 lb 2 1 oz)   LMP 10/19/2017   SpO2 96% on Room Air  BMI 27 46 kg/m²     Medications:   Scheduled Meds:   Current Facility-Administered Medications:  furosemide 40 mg Intravenous Once   ibuprofen 600 mg Oral Q6H PRN   ondansetron 4 mg Intravenous Q6H PRN   oxyCODONE-acetaminophen 1 tablet Oral Q4H PRN   oxyCODONE-acetaminophen 2 tablet Oral Q4H PRN   potassium chloride 20 mEq Oral Once     Abnormal Labs/Diagnostic Results:     ECHO:    LEFT VENTRICLE: Size was normal  Systolic function was normal  Ejection fraction was estimated to be 60 %  There were no regional wall motion abnormalities  Wall thickness was normal  DOPPLER: The transmitral flow pattern was normal  Left  ventricular diastolic function parameters were normal      RIGHT VENTRICLE: The size was normal  Systolic function was normal  Wall thickness was normal      LEFT ATRIUM: Size was normal      RIGHT ATRIUM: Size was normal       PERICARDIUM: There was no pericardial effusion  The pericardium was normal in appearance      AORTA: The root exhibited normal size      SYSTEMIC VEINS: IVC: The inferior vena cava was normal in size   Respirophasic changes were normal       Age/Sex: 41 y o  female        ==========================================================  7/24/18 Cardiology Consult:      Bilateral pleural effusions       Secondary to fluid overload  NT-proBNP slightly elevated at 180 pg/mL with signs and symptoms of fluid overload (orthopnea, SOB, BRIONES, pedal edema)       Discontinue lactated ringers infusion  Start IV furosemide 40mg once  EKG - NSR with HR of 83  Echo performed with EF of 60% and otherwise normal        CTA negative for main or lobar pulmonary embolisms with inability to evaluate distal pulmonary arteries due to poor contrast bolus        Discharge Plan: KAIN                      Thank you,  Luis Armando Pearson  [address] Utilization Review Department  Phone: [phone]; Fax [phone]  ATTENTION: The Network Utilization Review Department is now centralized for our 9 Facilities  Make a note that we have a new phone and fax numbers for our Department  Please call with any questions or concerns to [phone] and carefully follow the prompts so that you are directed to the right person  All voicemails are confidential  Fax any determinations, approvals, denials, and requests for initial or continue stay review clinical to [phone]  Due to HIGH CALL volume, it would be easier if you could please send faxed requests to expedite your requests and in part, help us provide discharge notifications faster

## 2018-07-25 VITALS
OXYGEN SATURATION: 100 % | HEIGHT: 62 IN | DIASTOLIC BLOOD PRESSURE: 73 MMHG | WEIGHT: 150.13 LBS | SYSTOLIC BLOOD PRESSURE: 116 MMHG | BODY MASS INDEX: 27.63 KG/M2 | RESPIRATION RATE: 18 BRPM | TEMPERATURE: 98 F | HEART RATE: 81 BPM

## 2018-07-25 PROCEDURE — 99214 OFFICE O/P EST MOD 30 MIN: CPT | Performed by: INTERNAL MEDICINE

## 2018-07-25 NOTE — DISCHARGE INSTRUCTIONS
Please call your OB to schedule a follow-up appointment including postoperative  Dyspnea   WHAT YOU NEED TO KNOW:   Dyspnea is breathing difficulty or discomfort  You may have labored, painful, or shallow breathing  You may feel breathless or short of breath  Dyspnea can occur during rest or with activity  You may have dyspnea for a short time, or it might become chronic  Dyspnea is often a symptom of a disease or condition  DISCHARGE INSTRUCTIONS:   Seek care immediately if:   · Your signs and symptoms are the same or worse within 24 hours of treatment  · You have shaking chills or a fever over 102°F      · You have new pain, pressure, or tightness in your chest      · You have a new or worse cough or wheezing, or you cough up blood  · You feel like you cannot get enough air  · The skin over your ribs or on your neck sinks in when you breathe  · You have a severe headache with vomiting and abdominal pain  · You feel confused or dizzy  Contact your healthcare provider or specialist if:   · You have questions or concerns about your condition or care  Medicines:   · Medicines  may be used to treat the cause of your dyspnea  Medicines may reduce swelling in your airway or decrease extra fluid from around your heart or lungs  Other medicines may be used to decrease anxiety and help you feel calm and relaxed  · Take your medicine as directed  Contact your healthcare provider if you think your medicine is not helping or if you have side effects  Tell him or her if you are allergic to any medicine  Keep a list of the medicines, vitamins, and herbs you take  Include the amounts, and when and why you take them  Bring the list or the pill bottles to follow-up visits  Carry your medicine list with you in case of an emergency  Manage long-term dyspnea:   · Create an action plan  You and your healthcare provider can work together to create a plan for how to handle episodes of dyspnea   The plan can include daily activities, treatment changes, and what to do if you have severe breathing problems  · Lean forward on your elbows when you sit  This helps your lungs expand and may make it easier to breathe  · Use pursed-lip breathing any time you feel short of breath  Breathe in through your nose and then slowly breathe out through your mouth with your lips slightly puckered  It should take you twice as long to breathe out as it did to breathe in  · Do not smoke  Nicotine and other chemicals in cigarettes and cigars can cause lung damage and make it harder to breathe  Ask your healthcare provider for information if you currently smoke and need help to quit  E-cigarettes or smokeless tobacco still contain nicotine  Talk to your healthcare provider before you use these products  · Reach or maintain a healthy weight  Your healthcare provider can help you create a safe weight loss plan if you are overweight  · Exercise as directed  Exercise can help your lungs work more easily  Exercise can also help you lose weight if needed  Try to get at least 30 minutes of exercise most days of the week  Your healthcare provider can help you create an exercise plan that is safe for you  Follow up with your healthcare provider or specialist as directed:  Write down your questions so you remember to ask them during your visits  © 2017 [facility] Information is for End User's use only and may not be sold, redistributed or otherwise used for commercial purposes  All illustrations and images included in CareNotes® are the copyrighted property of [employer] , [employer]  or Butch Aparicio  The above information is an  only  It is not intended as medical advice for individual conditions or treatments  Talk to your doctor, nurse or pharmacist before following any medical regimen to see if it is safe and effective for you

## 2018-07-25 NOTE — PROGRESS NOTES
Progress Note - Cardiology   Hellen Villalobos 41 y o  female MRN: [id number]  Unit/Bed#: [facility] 523-01 Encounter: [id number]    Assessment/Plan:  Dyspnea, resolved       Likely due to iatrogenic fluid overload and fluid shifts of pregnancy       Fluids were discontinued and one dose of furosemide 40mg were given yesterday with oral potassium to avoid hypokalemia  Pt feels well today with resolution of presenting symptoms and no new complaints, and she will not require cardiology followup as outpatient  Subjective/Objective   Subjective: 47yo female s/p   who presented with symptoms of SOB, BRIONES, and orthopnea feels well today  Denies SOB, states she was able to lie flat to sleep last night and slept well, has been walking around and conversing with no SOB and feels ready to go home  Denies leg swelling, nausea, vomiting, constipation, diarrhea, chest pain, palpitations, wheezing, or any other complaints  Objective: Physical Exam   Constitutional: She is oriented to person, place, and time  She appears well-developed and well-nourished  No distress  HENT:   Head: Normocephalic and atraumatic  Eyes: EOM are normal  Right eye exhibits no discharge  Left eye exhibits no discharge  Cardiovascular: Normal rate, regular rhythm, S1 normal, S2 normal and normal heart sounds  No extrasystoles are present  Exam reveals no gallop, no S3, no S4 and no friction rub  No murmur heard  Pulmonary/Chest: Effort normal  No accessory muscle usage  No tachypnea and no bradypnea  No respiratory distress  She has no wheezes  She has no rales  She exhibits no tenderness  Abdominal: Soft  Musculoskeletal: She exhibits no edema or tenderness  Neurological: She is alert and oriented to person, place, and time  Skin: Skin is warm and dry  She is not diaphoretic  No erythema  No pallor  Psychiatric: She has a normal mood and affect   Her behavior is normal        Vitals: /73   Pulse 81   Temp 98 °F (36 7 °C)   Resp 18   Ht 5' 2" (1 575 m)   Wt 68 1 kg (150 lb 2 1 oz)   LMP 10/19/2017   SpO2 100%   BMI 27 46 kg/m²   Vitals:    07/24/18 7356   Weight: 68 1 kg (150 lb 2 1 oz)     Orthostatic Blood Pressures      Most Recent Value   Blood Pressure  116/73 filed at 07/25/2018 0759   Patient Position - Orthostatic VS  Lying filed at 07/25/2018 0500            Intake/Output Summary (Last 24 hours) at 07/25/18 6414  Last data filed at 07/25/18 0802   Gross per 24 hour   Intake             1120 ml   Output             7500 ml   Net            -6380 ml       Invasive Devices     Peripheral Intravenous Line            Peripheral IV 07/24/18 Right Antecubital 1 day              Review of Systems   Constitutional: Negative for appetite change, chills and fever  Eyes: Negative for discharge and visual disturbance  Respiratory: Negative for chest tightness, shortness of breath and wheezing  Cardiovascular: Negative for chest pain, palpitations and leg swelling  Gastrointestinal: Negative for abdominal pain, constipation, diarrhea, nausea and vomiting  Musculoskeletal: Negative for joint swelling  Skin: Negative for color change and pallor  Psychiatric/Behavioral: Negative for sleep disturbance  Lab Results: I have personally reviewed pertinent lab results  Imaging: I have personally reviewed pertinent reports

## 2018-07-25 NOTE — PLAN OF CARE
Problem: PAIN - ADULT  Goal: Verbalizes/displays adequate comfort level or baseline comfort level  Interventions:  - Encourage patient to monitor pain and request assistance  - Assess pain using appropriate pain scale  - Administer analgesics based on type and severity of pain and evaluate response  - Implement non-pharmacological measures as appropriate and evaluate response  - Consider cultural and social influences on pain and pain management  - Notify physician/advanced practitioner if interventions unsuccessful or patient reports new pain   Outcome: Progressing      Problem: INFECTION - ADULT  Goal: Absence or prevention of progression during hospitalization  INTERVENTIONS:  - Assess and monitor for signs and symptoms of infection  - Monitor lab/diagnostic results  - Monitor all insertion sites, i e  indwelling lines, tubes, and drains  - Monitor endotracheal (as able) and nasal secretions for changes in amount and color  - [place] appropriate cooling/warming therapies per order  - Administer medications as ordered  - Instruct and encourage patient and family to use good hand hygiene technique  - Identify and instruct in appropriate isolation precautions for identified infection/condition   Outcome: Progressing    Goal: Absence of fever/infection during neutropenic period  INTERVENTIONS:  - Monitor WBC  - Implement neutropenic guidelines   Outcome: Progressing      Problem: SAFETY ADULT  Goal: Patient will remain free of falls  INTERVENTIONS:  - Assess patient frequently for physical needs  -  Identify cognitive and physical deficits and behaviors that affect risk of falls    -  [place] fall precautions as indicated by assessment   - Educate patient/family on patient safety including physical limitations  - Instruct patient to call for assistance with activity based on assessment  - Modify environment to reduce risk of injury  - Consider OT/PT consult to assist with strengthening/mobility   Outcome: Progressing    Goal: Maintain or return to baseline ADL function  INTERVENTIONS:  -  Assess patient's ability to carry out ADLs; assess patient's baseline for ADL function and identify physical deficits which impact ability to perform ADLs (bathing, care of mouth/teeth, toileting, grooming, dressing, etc )  - Assess/evaluate cause of self-care deficits   - Assess range of motion  - Assess patient's mobility; develop plan if impaired  - Assess patient's need for assistive devices and provide as appropriate  - Encourage maximum independence but intervene and supervise when necessary  ¯ Involve family in performance of ADLs  ¯ Assess for home care needs following discharge   ¯ Request OT consult to assist with ADL evaluation and planning for discharge  ¯ Provide patient education as appropriate   Outcome: Progressing    Goal: Maintain or return mobility status to optimal level  INTERVENTIONS:  - Assess patient's baseline mobility status (ambulation, transfers, stairs, etc )    - Identify cognitive and physical deficits and behaviors that affect mobility  - Identify mobility aids required to assist with transfers and/or ambulation (gait belt, sit-to-stand, lift, walker, cane, etc )  - [place] fall precautions as indicated by assessment  - Record patient progress and toleration of activity level on Mobility SBAR; progress patient to next Phase/Stage  - Instruct patient to call for assistance with activity based on assessment  - Request Rehabilitation consult to assist with strengthening/weightbearing, etc    Outcome: Progressing      Problem: DISCHARGE PLANNING  Goal: Discharge to home or other facility with appropriate resources  INTERVENTIONS:  - Identify barriers to discharge w/patient and caregiver  - Arrange for needed discharge resources and transportation as appropriate  - Identify discharge learning needs (meds, wound care, etc )  - Arrange for interpretive services to assist at discharge as needed  - Refer to Case Management Department for coordinating discharge planning if the patient needs post-hospital services based on physician/advanced practitioner order or complex needs related to functional status, cognitive ability, or social support system   Outcome: Progressing      Problem: Knowledge Deficit  Goal: Patient/family/caregiver demonstrates understanding of disease process, treatment plan, medications, and discharge instructions  Complete learning assessment and assess knowledge base  Interventions:  - Provide teaching at level of understanding  - Provide teaching via preferred learning methods   Outcome: Progressing      Problem: CARDIOVASCULAR - ADULT  Goal: Maintains optimal cardiac output and hemodynamic stability  INTERVENTIONS:  - Monitor I/O, vital signs and rhythm  - Monitor for S/S and trends of decreased cardiac output i e  bleeding, hypotension  - Administer and titrate ordered vasoactive medications to optimize hemodynamic stability  - Assess quality of pulses, skin color and temperature  - Assess for signs of decreased coronary artery perfusion - ex   Angina  - Instruct patient to report change in severity of symptoms   Outcome: Progressing    Goal: Absence of cardiac dysrhythmias or at baseline rhythm  INTERVENTIONS:  - Continuous cardiac monitoring, monitor vital signs, obtain 12 lead EKG if indicated  - Administer antiarrhythmic and heart rate control medications as ordered  - Monitor electrolytes and administer replacement therapy as ordered   Outcome: Progressing      Problem: RESPIRATORY - ADULT  Goal: Achieves optimal ventilation and oxygenation  INTERVENTIONS:  - Assess for changes in respiratory status  - Assess for changes in mentation and behavior  - Position to facilitate oxygenation and minimize respiratory effort  - Oxygen administration by appropriate delivery method based on oxygen saturation (per order) or ABGs  - Initiate smoking cessation education as indicated  - Encourage broncho-pulmonary hygiene including cough, deep breathe, Incentive Spirometry  - Assess the need for suctioning and aspirate as needed  - Assess and instruct to report SOB or any respiratory difficulty  - Respiratory Therapy support as indicated   Outcome: Progressing      Problem: DISCHARGE PLANNING - CARE MANAGEMENT  Goal: Discharge to post-acute care or home with appropriate resources  INTERVENTIONS:  - Conduct assessment to determine patient/family and health care team treatment goals, and need for post-acute services based on payer coverage, community resources, and patient preferences, and barriers to discharge  - Address psychosocial, clinical, and financial barriers to discharge as identified in assessment in conjunction with the patient/family and health care team  - Arrange appropriate level of post-acute services according to patient's   needs and preference and payer coverage in collaboration with the physician and health care team  - Communicate with and update the patient/family, physician, and health care team regarding progress on the discharge plan  - Arrange appropriate transportation to post-acute venues   Outcome: Progressing

## 2018-07-25 NOTE — PROGRESS NOTES
Called to bedside for patient with additional questions about lasix in breastfeeding  Patient reports she spoke to lactation and was told lasix was category L3  She was told to wait approx 2hr after her dose to feed baby  She just wants to confirm that she can feed her milk to her baby (waited >3h s/p dose)  Discussed with patient that L3 means that we don't have good data on the safety of the drug in breastfeeding  With lasix, this is because it's not a common medication for young, healthy patients  The data we do have does not suggest harm (or it would be L4+), but it is insufficient in quantity to achieve an L1-2 rating  Discussed lasix passes into breastmilk in very small quantities, but as with most medications once it is metabolized by her body it is no longer in her milk in significant quantity  Based on the half life, I agree with ANGELES Valdez's recommendation and her milk is safe to feed to her baby  Discussed side effects of lasix, which would be similar in her baby if affected  She should be watchful of increased urination in her child and, if noted, she should be watchful for hypovolemia  I do not expect her to have an issue with either of these, but they would be the most common side effects  Patient had all questions answered to her satisfaction      Phoebe Andres MD   07/24/18  8:54 PM

## 2018-07-25 NOTE — PROGRESS NOTES
Progress Note - OB/GYN   Kathy Sigala 41 y o  female MRN: [id number]  Unit/Bed#: [facility] 523-01 Encounter: [id number]    Assessment:  Patient is a 45yo [id number] POD #6 s/p RLTCS who presented with gradually worsening dyspnea, currently feeling well and asymptomatic  Plan:  1) Progressively worsening dyspnea, likely iatrogenic volume overload and fluid shifts   Patient reports feeling very well this morning, RN states pt had no issues overnight  S/p 40mg IV lasix   CTA showed no evidence of large vessel PE despite small bilateral pleural effusions  Echo showed preserved LV function with no evidence of diastolic function   CBC WNL, BNP elevated (180), trop neg   No IVF at this time  2) Postop   Routine care   Denies lochia or pelvic pain   Currently breastfeeding    3) Diet: regular    4) VTE prophylaxis: bilateral SCDs    Subjective/Objective   Chief Complaint:     Patient reports that she is feeling well this morning  Since 40mg lasix, she has not had any palpitations or dyspnea  She has noticed that the swelling in her lower extremities have gone down  She reports minimal uterine cramping and minimal lochia  She denies chest pain, shortness of breath, leg pain      Objective:     Vitals: /58 (BP Location: Right arm)   Pulse 66   Temp 97 8 °F (36 6 °C)   Resp 16   Ht 5' 2" (1 575 m)   Wt 68 1 kg (150 lb 2 1 oz)   LMP 10/19/2017   SpO2 97%   BMI 27 46 kg/m²       Intake/Output Summary (Last 24 hours) at 07/25/18 0603  Last data filed at 07/24/18 2301   Gross per 24 hour   Intake             1120 ml   Output             7300 ml   Net            -6180 ml       Physical Exam:     General: AAOx3, NAD  Cardiovascular: RRR, no S3 or S4 noted  Pulmonary: CTA b/l, no WRR  Abdomen: Soft, non-tender, non-distended, no rebound or guarding, no CVA tenderness      Lab Results   Component Value Date    WBC 9 83 07/24/2018    HGB 9 3 (L) 07/24/2018    HCT 30 9 (L) 07/24/2018    MCV 81 (L) 07/24/2018    PLT 429 (H) 07/24/2018                         Paul Burgos MD  7/25/2018  6:03 AM

## 2018-07-26 NOTE — DISCHARGE SUMMARY
Discharge Summary - Gynecology  Frankey Salvage 41 y o  female MRN: [id number]  Unit/Bed#: [facility] 523-01 Encounter: [id number]    Admission Date: 2018   Discharge Date: 18    Attending Physician:   Dr Hadley Medrano Physician(s):   Cardiology, Dr Nelida uGtierrez    Admitting Diagnosis:   Dyspnea    Discharge Diagnosis:   Dyspnea, resolved    HPI:    Hospital Course - Problem Based:    Patient is a 47yo  female who was POD #5 from repeat low transverse  section when she presented with gradually worsening dyspnea  She also reported PND and orthopnea  Labs were drawn  CBC was within normal limits, BNP was slightly elevated at 180 and troponins were negative  Due to concern for pulmonary embolism, CT was ordered which showed no evidence of large vessel PE despite small bilateral pleural effusions  An echocardiogram also showed preserved left ventricle function with no evidence of diastolic dysfunction  Due to negative imaging with no evidence of CHF and low risk for PE, her dyspnea was likely due to iatrogenic volume overload with fluid shifts in postpartum period  She was given 40mg lasix once and responded well  She felt significantly better and edema drastically improved  She was discharged with prescription for 20mg oral lasix prn  She was discharged on 18 after cleared by cardiology  Lab Results:   Lab Results   Component Value Date    WBC 9 83 2018    HGB 9 3 (L) 2018    HCT 30 9 (L) 2018    MCV 81 (L) 2018     (H) 2018     Lab Results   Component Value Date    GLUCOSE 89 2018    CALCIUM 8 3 2018     2018    K 3 9 2018    CO2 24 2018     (H) 2018    BUN 6 2018    CREATININE 0 41 (L)      Complications:   None  Condition at Discharge:   good     Discharge Medications:   See after visit summary for reconciled discharge medications provided to patient and family        Discharge instructions: See after visit summary for complete information  Do not place anything (no partner, tampons or douche) in your vagina for 6 weeks  You may walk for exercise for the first 6 weeks then gradually return to your usual activities     You may take stairs one at a time, touching each step with both feet for the first few days, then as tolerated  Please do not drive until tolerating pain and off of narcotics     Please look at your incision in the mirror daily and call for redness or tenderness or increased warmth   Call us for increasing redness or steady drainage from the incision     Please call us for temperature > 100 4*F or 38* C, worsening pain or a foul discharge  No heavy lifting more than one full gallon of milk (about 8 lbs)  No tub baths or swimming  Provisions for Follow-Up Care:   See after visit summary for information related to follow-up care and any pertinent home health orders        Disposition: Home  Planned Readmission: No    Code Status: Full    Angelito Gaona MD  7/26/2018  8:01 AM

## 2018-07-27 ENCOUNTER — POSTPARTUM VISIT (OUTPATIENT)
Dept: OBGYN CLINIC | Facility: MEDICAL CENTER | Age: 38
End: 2018-07-27

## 2018-07-27 VITALS — DIASTOLIC BLOOD PRESSURE: 74 MMHG | WEIGHT: 141.4 LBS | BODY MASS INDEX: 25.86 KG/M2 | SYSTOLIC BLOOD PRESSURE: 126 MMHG

## 2018-07-27 DIAGNOSIS — Z98.891 S/P REPEAT LOW TRANSVERSE C-SECTION: Primary | ICD-10-CM

## 2018-07-27 DIAGNOSIS — R06.02 SHORTNESS OF BREATH: ICD-10-CM

## 2018-07-27 LAB — PLACENTA IN STORAGE: NORMAL

## 2018-07-27 PROCEDURE — 99024 POSTOP FOLLOW-UP VISIT: CPT | Performed by: OBSTETRICS & GYNECOLOGY

## 2018-07-27 NOTE — PROGRESS NOTES
Pt here for staple removal  Staples removed  Incision C,D,I  Steri strips applied with benzoin  Pt is breastfeeding   "CARISA"  Baby is doing well    PP appt will be made

## 2018-07-27 NOTE — PROGRESS NOTES
Assessment/Plan:      Diagnoses and all orders for this visit:    Shortness of breath    S/P repeat low transverse     Postpartum care and examination        Patient had her staples removed and benzoin and Steri-Strips were placed  We discussed her shortness of breath an sequence of events likely due to postoperative fluid overload combined with acute blood loss anemia  Patient was cautioned that she still may have continued issues and that she requires close follow-up  I suggested that she take her prenatal vitamin as well as 2 additional iron tablets daily  We also discussed nutrition and hydration which she appears to be doing well with  I have asked her to return to the office in 1 week for re-evaluation  The patient was advised that if she has increase in shortness of breath or chest discomfort or worsening of her edema that she is to call immediately  Patient voiced understanding and will return as scheduled  Subjective:     Patient ID: Shivani Patel is a 41 y o  female  Patient returns 8 days status post repeat  section x4  Patient was discharged on postoperative day 3  She re-presented to the ER that evening with progressive shortness of breath  She notes that she had detailed evaluation including CT of the chest as well as EKG  She was diagnosed with fluid in the lungs  She notes that preeclamptic laboratory studies were normal and workup for embolic issues was negative  Patient was given a diuretic and is significantly improved with her shortness of breath  She does note that her discharge hemoglobin was 8  She has minimal lochia  She has some pain but is but not taking oxycodone regularly  Suture / Staple Removal         Review of Systems   All other systems reviewed and are negative  Objective:     Physical Exam   Constitutional: She is oriented to person, place, and time  She appears well-developed and well-nourished     Cardiovascular: Normal rate, regular rhythm and normal heart sounds  Pulmonary/Chest: Effort normal and breath sounds normal  No respiratory distress  She has no wheezes  She has no rales  She exhibits no tenderness  Abdominal: Soft  There is no tenderness  Incision with staples in place no evidence of erythema or discharge  Musculoskeletal:   Trace edema bilateral lower extremities   Neurological: She is alert and oriented to person, place, and time  Skin: Skin is warm and dry  Psychiatric: She has a normal mood and affect   Her behavior is normal  Judgment and thought content normal

## 2018-08-03 ENCOUNTER — OFFICE VISIT (OUTPATIENT)
Dept: OBGYN CLINIC | Facility: MEDICAL CENTER | Age: 38
End: 2018-08-03

## 2018-08-03 VITALS — BODY MASS INDEX: 25.35 KG/M2 | DIASTOLIC BLOOD PRESSURE: 80 MMHG | SYSTOLIC BLOOD PRESSURE: 128 MMHG | WEIGHT: 138.6 LBS

## 2018-08-03 DIAGNOSIS — Z98.891 S/P REPEAT LOW TRANSVERSE C-SECTION: Primary | ICD-10-CM

## 2018-08-03 PROCEDURE — 1111F DSCHRG MED/CURRENT MED MERGE: CPT | Performed by: OBSTETRICS & GYNECOLOGY

## 2018-08-03 PROCEDURE — 99024 POSTOP FOLLOW-UP VISIT: CPT | Performed by: OBSTETRICS & GYNECOLOGY

## 2018-08-03 NOTE — PROGRESS NOTES
Assessment/Plan:      Diagnoses and all orders for this visit:    S/P repeat low transverse         We discussed the patient's continued improvement  I suspect with time as her anemia corrects she will be much improved  I have asked patient to return to the office in 2 weeks for final postoperative evaluation  She was advised to call me in these upcoming 2 weeks if she does not feel that she is having progressive improvement  Subjective:     Patient ID: Kasi Reynolds is a 41 y o  female  Patient returns 2 weeks status post repeat  section  Patient had re-evaluation in the hospital for significant shortness of breath and was felt to have fluid overload  She was also noted to have acute blood loss anemia  Patient has been using iron supplementation and has been having proper nutrition and hydration  She notes an improvement in her level of fatigue and has noted a decline in her shortness of breath  Her lochia is minimal   She has lost 12 pounds since her visit last week  Wound Check         Review of Systems   All other systems reviewed and are negative  Objective:     Physical Exam   Constitutional: She appears well-developed and well-nourished  Pulmonary/Chest: Effort normal    Abdominal: Soft  There is no tenderness  Incision healing well without erythema; mild superior macario-incisional induration; Steri-Strips and skin noted to be intact  Musculoskeletal: She exhibits no edema

## 2018-08-03 NOTE — CASE MANAGEMENT
Inocencio Perry RN Registered Nurse Addendum   Case Management Date of Service: 2018 10:42 AM         []Hide copied text  Initial Clinical Review     Admission: Date/Time/Statement:18 @ 0543 OBSERVATION           Orders Placed This Encounter   Procedures    Place in Observation (expected length of stay for this patient is less than two midnights)       Standing Status:   Standing       Number of Occurrences:   1       Order Specific Question:   Admitting Physician       Answer:   Merlinda Bush [[id number]]       Order Specific Question:   Level of Care       Answer:   Med Surg [16]      ED: Date/Time/Mode of Arrival:             ED Arrival Information      Expected Arrival Acuity Means of Arrival Escorted By Service Admission Type     - 2018 22:55 Emergent Walk-In Self OB/GYN Emergency     Arrival Complaint     SOB, Leg Swelling, Blurred Vision             Chief Complaint:        Chief Complaint   Patient presents with    Post-op Problem       pt reports SOB, feet swelling, blurred vision from left eye, pt s/p C section on 2018         History of Illness:      26-year-old female, , 5 days postop from Caesarean section for repeat , was discharged from the hospital  is  presenting for evaluation increased dyspnea   Patient states that she was unable to lie recumbent last night because of significant shortness of breath   Additionally the patient has noted moderate pitting edema of her bilateral lower extremities with her right affected worse than her left   Patient denies any chest pain, reports a mild intermittent nonproductive cough, denies any fever   Patient has a little bit of localized pain around her  incision site, however states that there has been no drainage or worsening erythema   Patient describes lochia           ED Vital Signs:            ED Triage Vitals   Temperature Pulse Respirations Blood Pressure SpO2   188 18 18   (!) 97 3 °F (36 3 °C) 72 16 143/86 95 %       Temp Source Heart Rate Source Patient Position - Orthostatic VS BP Location FiO2 (%)   18 --   Tympanic Monitor Sitting Left arm         Pain Score           18 0009           3                Wt Readings from Last 1 Encounters:   18 68 1 kg (150 lb 2 1 oz)         Vital Signs: WNL     Abnormal Labs/Diagnostic Test Results:      proBNP = 180, AST = 62, Alk Phos = 172, Albumin = 2 3, H&H = 9 3/30 9, Platelets = 416,693     CT chest PE study: B/L pleural effusions  No PE      EKG: NSR     Chest x-ray: WNL        ED Treatment:              Medication Administration from 2018 2255 to 2018 3376        Date/Time Order Dose Route Action Comments       2018 0519 iohexol (OMNIPAQUE) 350 MG/ML injection (MULTI-DOSE) 85 mL 85 mL Intravenous Given               Past Medical/Surgical History: Active Ambulatory Problems     Diagnosis Date Noted    History of 3  sections 02/15/2018    S/P repeat low transverse  2018           Resolved Ambulatory Problems     Diagnosis Date Noted    Advanced maternal age in multigravida, third trimester 02/15/2018    Elderly multigravida in third trimester 2018    Anemia during pregnancy in third trimester 2018    Yeast vaginitis 2018           Past Medical History:   Diagnosis Date    Asthma      History of one miscarriage      Varicella           Admitting Diagnosis: SOB (shortness of breath) [R06 02]  Pedal edema [R60 0]     Age/Sex: 41 y o  female     Assessment/Plan:      1  Progressively worsening dyspnea  - Admit for OBS to OBGYN  - Echo  - Cardiology consultation  - Telemetry ordered until cleared by Cardiology  2  Breastfeeding mother  - Desires to room-in with her    She understands that while admitted for management of her own medical problems, she is not a sufficient guardian for her child and she needs another adult with her for this to be safe  - Breast pump to bedside as needed  - Please do not hesitate to contact OB/GYN for concerns related to this   3  POD#5 s/p   - Routine postop care  4  FEN  - Regular diet  - IVF  5  VTE ppx  - SCDs     Admission Orders:     Cardiology consult, ECHO, Telemetry monitoring, sequential compression device, OOB        Scheduled Meds:   Current Facility-Administered Medications:  ibuprofen 600 mg Oral Q6H PRN   ondansetron 4 mg Intravenous Q6H PRN   oxyCODONE-acetaminophen 1 tablet Oral Q4H PRN   oxyCODONE-acetaminophen 2 tablet Oral Q4H PRN      Continuous Infusions:   lactated ringers 125 mL/hr      ==============================================================     Continued Stay Review     Date: 18 @ 1514     Vital Signs: /71 (BP Location: Left arm)   Pulse 73   Temp 97 9 °F (36 6 °C) (Oral)   Resp 18   Ht 5' 2" (1 575 m)   Wt 68 1 kg (150 lb 2 1 oz)   LMP 10/19/2017   SpO2 96% on Room Air  BMI 27 46 kg/m²      Medications:   Scheduled Meds:   Current Facility-Administered Medications:  furosemide 40 mg Intravenous Once   ibuprofen 600 mg Oral Q6H PRN   ondansetron 4 mg Intravenous Q6H PRN   oxyCODONE-acetaminophen 1 tablet Oral Q4H PRN   oxyCODONE-acetaminophen 2 tablet Oral Q4H PRN   potassium chloride 20 mEq Oral Once      Abnormal Labs/Diagnostic Results:      ECHO:     LEFT VENTRICLE: Size was normal  Systolic function was normal  Ejection fraction was estimated to be 60 %  There were no regional wall motion abnormalities  Wall thickness was normal  DOPPLER: The transmitral flow pattern was normal  Left  ventricular diastolic function parameters were normal      RIGHT VENTRICLE: The size was normal  Systolic function was normal  Wall thickness was normal      LEFT ATRIUM: Size was normal      RIGHT ATRIUM: Size was normal       PERICARDIUM: There was no pericardial effusion   The pericardium was normal in appearance      AORTA: The root exhibited normal size      SYSTEMIC VEINS: IVC: The inferior vena cava was normal in size  Respirophasic changes were normal         Age/Sex: 41 y o  female         ==========================================================  7/24/18 Cardiology Consult:        Bilateral pleural effusions       Secondary to fluid overload        NT-proBNP slightly elevated at 180 pg/mL with signs and symptoms of fluid overload (orthopnea, SOB, BRIONES, pedal edema)       Discontinue lactated ringers infusion        Start IV furosemide 40mg once        EKG - NSR with HR of 83        Echo performed with EF of 60% and otherwise normal        CTA negative for main or lobar pulmonary embolisms with inability to evaluate distal pulmonary arteries due to poor contrast bolus         Discharge Plan: TBD        Notification of Discharge  This is a Notification of Discharge from our facility [address]  Please be advised that this patient has been discharge from our facility  Below you will find the admission and discharge date and time including the patients disposition  PRESENTATION DATE: 7/23/2018 11:13 PM  DISCHARGE DATE: 7/25/2018  9:50 AM  DISPOSITION: Home/Self Care    [address] [facility] in [facility]  Phone: [phone]; Fax [phone]  ATTENTION: The Network Utilization Review Department is now centralized for our 9 Facilities  Make a note that we have a new phone and fax numbers for our Department  Please call with any questions or concerns to [phone] and carefully follow the prompts so that you are directed to the right person  All voicemails are confidential  Fax any determinations, approvals, denials, and requests for initial or continue stay review clinical to [phone]   Due to HIGH CALL volume, it would be easier if you could please send faxed requests to expedite your requests and in part, help us provide discharge notifications faster

## 2018-08-17 ENCOUNTER — POSTPARTUM VISIT (OUTPATIENT)
Dept: OBGYN CLINIC | Facility: MEDICAL CENTER | Age: 38
End: 2018-08-17

## 2018-08-17 VITALS — BODY MASS INDEX: 25.42 KG/M2 | SYSTOLIC BLOOD PRESSURE: 100 MMHG | WEIGHT: 139 LBS | DIASTOLIC BLOOD PRESSURE: 72 MMHG

## 2018-08-17 PROCEDURE — 99024 POSTOP FOLLOW-UP VISIT: CPT | Performed by: OBSTETRICS & GYNECOLOGY

## 2018-08-17 NOTE — PROGRESS NOTES
Assessment/Plan:      Diagnoses and all orders for this visit:    Postpartum care and examination        Postpartum examination was completed  Patient will continue with iron and prenatal vitamin  Patient and  will discuss options for contraception I have encouraged further discussion on vasectomy given the patient's significant Caesarean history  The patient will use condoms for the interim  I have asked that she return to the office in 4 months for her annual visit  Subjective:     Patient ID: Rohan Verde is a 41 y o  female  Patient returns status post repeat  section  She has noted almost complete resolution of her edema  Her fatigue is slowly improving  She has no bleeding at this point  Her baby is doing well  She is breast-feeding  She has not decided plans for contraception  Review of Systems   All other systems reviewed and are negative  Objective:     Physical Exam   Constitutional: She appears well-developed and well-nourished  Pulmonary/Chest: Effort normal    Abdominal: Soft  There is no tenderness  Incision well healed   Genitourinary: Vagina normal and uterus normal    Musculoskeletal: She exhibits no edema  Skin: Skin is warm and dry  Psychiatric: She has a normal mood and affect   Her behavior is normal  Judgment and thought content normal

## 2018-10-08 ENCOUNTER — TELEPHONE (OUTPATIENT)
Dept: OBGYN CLINIC | Facility: CLINIC | Age: 38
End: 2018-10-08

## 2018-10-08 ENCOUNTER — APPOINTMENT (EMERGENCY)
Dept: RADIOLOGY | Facility: HOSPITAL | Age: 38
End: 2018-10-08
Payer: COMMERCIAL

## 2018-10-08 ENCOUNTER — HOSPITAL ENCOUNTER (EMERGENCY)
Facility: HOSPITAL | Age: 38
Discharge: HOME | End: 2018-10-08
Attending: EMERGENCY MEDICINE
Payer: COMMERCIAL

## 2018-10-08 VITALS
HEART RATE: 78 BPM | RESPIRATION RATE: 20 BRPM | SYSTOLIC BLOOD PRESSURE: 117 MMHG | TEMPERATURE: 98.4 F | DIASTOLIC BLOOD PRESSURE: 67 MMHG | OXYGEN SATURATION: 94 %

## 2018-10-08 DIAGNOSIS — R06.00 DYSPNEA: Primary | ICD-10-CM

## 2018-10-08 DIAGNOSIS — N28.1 RENAL CYST: ICD-10-CM

## 2018-10-08 LAB
ANION GAP SERPL CALCULATED.3IONS-SCNC: 6 MMOL/L (ref 4–13)
BASOPHILS # BLD AUTO: 0.06 THOUSANDS/ΜL (ref 0–0.1)
BASOPHILS NFR BLD AUTO: 1 % (ref 0–1)
BUN SERPL-MCNC: 7 MG/DL (ref 5–25)
CALCIUM SERPL-MCNC: 8.7 MG/DL (ref 8.3–10.1)
CHLORIDE SERPL-SCNC: 107 MMOL/L (ref 100–108)
CO2 SERPL-SCNC: 26 MMOL/L (ref 21–32)
CREAT SERPL-MCNC: 0.66 MG/DL (ref 0.6–1.3)
DEPRECATED D DIMER PPP: 368 NG/ML (FEU) (ref 0–424)
EOSINOPHIL # BLD AUTO: 0.49 THOUSAND/ΜL (ref 0–0.61)
EOSINOPHIL NFR BLD AUTO: 8 % (ref 0–6)
ERYTHROCYTE [DISTWIDTH] IN BLOOD BY AUTOMATED COUNT: 22.5 % (ref 11.6–15.1)
GFR SERPL CREATININE-BSD FRML MDRD: 113 ML/MIN/1.73SQ M
GLUCOSE SERPL-MCNC: 118 MG/DL (ref 65–140)
HCT VFR BLD AUTO: 37.2 % (ref 34.8–46.1)
HGB BLD-MCNC: 11.7 G/DL (ref 11.5–15.4)
IMM GRANULOCYTES # BLD AUTO: 0.01 THOUSAND/UL (ref 0–0.2)
IMM GRANULOCYTES NFR BLD AUTO: 0 % (ref 0–2)
LYMPHOCYTES # BLD AUTO: 2.56 THOUSANDS/ΜL (ref 0.6–4.47)
LYMPHOCYTES NFR BLD AUTO: 40 % (ref 14–44)
MCH RBC QN AUTO: 25.9 PG (ref 26.8–34.3)
MCHC RBC AUTO-ENTMCNC: 31.5 G/DL (ref 31.4–37.4)
MCV RBC AUTO: 83 FL (ref 82–98)
MONOCYTES # BLD AUTO: 0.61 THOUSAND/ΜL (ref 0.17–1.22)
MONOCYTES NFR BLD AUTO: 10 % (ref 4–12)
NEUTROPHILS # BLD AUTO: 2.68 THOUSANDS/ΜL (ref 1.85–7.62)
NEUTS SEG NFR BLD AUTO: 41 % (ref 43–75)
NRBC BLD AUTO-RTO: 0 /100 WBCS
NT-PROBNP SERPL-MCNC: 50 PG/ML
PLATELET # BLD AUTO: 305 THOUSANDS/UL (ref 149–390)
PMV BLD AUTO: 10.6 FL (ref 8.9–12.7)
POTASSIUM SERPL-SCNC: 3.8 MMOL/L (ref 3.5–5.3)
RBC # BLD AUTO: 4.51 MILLION/UL (ref 3.81–5.12)
SODIUM SERPL-SCNC: 139 MMOL/L (ref 136–145)
TROPONIN I SERPL-MCNC: <0.02 NG/ML
WBC # BLD AUTO: 6.41 THOUSAND/UL (ref 4.31–10.16)

## 2018-10-08 PROCEDURE — 83880 ASSAY OF NATRIURETIC PEPTIDE: CPT | Performed by: EMERGENCY MEDICINE

## 2018-10-08 PROCEDURE — 36415 COLL VENOUS BLD VENIPUNCTURE: CPT | Performed by: EMERGENCY MEDICINE

## 2018-10-08 PROCEDURE — 99285 EMERGENCY DEPT VISIT HI MDM: CPT

## 2018-10-08 PROCEDURE — 80048 BASIC METABOLIC PNL TOTAL CA: CPT | Performed by: EMERGENCY MEDICINE

## 2018-10-08 PROCEDURE — 85379 FIBRIN DEGRADATION QUANT: CPT | Performed by: EMERGENCY MEDICINE

## 2018-10-08 PROCEDURE — 71046 X-RAY EXAM CHEST 2 VIEWS: CPT

## 2018-10-08 PROCEDURE — 84484 ASSAY OF TROPONIN QUANT: CPT | Performed by: EMERGENCY MEDICINE

## 2018-10-08 PROCEDURE — 85025 COMPLETE CBC W/AUTO DIFF WBC: CPT | Performed by: EMERGENCY MEDICINE

## 2018-10-08 PROCEDURE — 93005 ELECTROCARDIOGRAM TRACING: CPT

## 2018-10-08 NOTE — ED ATTENDING ATTESTATION
Edita Escudero MD, saw and evaluated the patient  I have discussed the patient with the resident/non-physician practitioner and agree with the resident's/non-physician practitioner's findings, Plan of Care, and MDM as documented in the resident's/non-physician practitioner's note, except where noted  All available labs and Radiology studies were reviewed  At this point I agree with the current assessment done in the Emergency Department  I have conducted an independent evaluation of this patient a history and physical is as follows:  79-year-old female here with shortness of breath  Patient states that she is only short of breath when she is trying to speak  Patient states she has similar symptoms after having her child almost 3 months ago  At that time, the patient had peripheral edema and was placed on Lasix  She was admitted to the hospital had extensive evaluation which was unremarkable  The patient denies chest pain  She has not had fevers or limb swelling  She does not have cardiac risk factors  She has not had fevers or chills  Her review of systems otherwise negative in 12 systems reviewed  On exam Vital signs were reviewed  Patient is awake, alert, interactive  The patient's pupils are equally round reactive to light  Oropharynx is clear with moist mucous membranes  Neck is supple and nontender with no adenopathy or JVD  Heart is regular with no murmurs, rubs, or gallops  Lungs are clear and equal with no wheezes, rales, or rhonchi  Abdomen is soft and nontender with no masses, rebound, or guarding  There is no CVA tenderness  The patient was completely exposed  There is no skin breakdown  There are no rashes or skin changes  Extremities are warm and well perfused with good pulses  The patient has normal strength, sensation, and cranial nerves  Impression:  Dyspnea    Will plan to do cardiac evaluation, D-dimer    Critical Care Time  CritCare Time    Procedures

## 2018-10-08 NOTE — TELEPHONE ENCOUNTER
Pt delivered in July   Had pp edema and was sob  She went to ED 7/23 and they put her on lasix and sent her home  Pt had edema last Thurs but none now  But is extremely sob  I could hear it in her voice  Derek Dominguez it began last night  I am having  take her to ED  Just am notifying you  Per RK I was told to alert you  She is pp for 2 1/2 mos     She is not in ER as yet - waiting for  to get home

## 2018-10-08 NOTE — ED NOTES
Patient breastfeeding at this time, will obtain lab work and x-rays when patient is done     Garcia Sy RN  10/08/18 3182

## 2018-10-08 NOTE — ED PROVIDER NOTES
History  Chief Complaint   Patient presents with    Shortness of Breath     since last night, noticed difficulty breathing and difficulty cathing breath at work  pain "at the bottom of my lungs"  states she has had fluid retention before and needed lasix  noticed swelling in legs last week  41year old female approximately 2 5 months post partum presents for evaluation of conversational dyspnea that started last night  She also reports b/l LE edema last week that has resolved  Patient states she had similar symptoms right after delivery and was placed on Lasix which improved her symptoms  Denies associated chest pain, nausea, vomiting, abdominal pain, fevers, urinary symptoms of dysuria, frequency, urgency  No history of pulmonary embolism, no recent travel, surgery, trauma  Prior to Admission Medications   Prescriptions Last Dose Informant Patient Reported? Taking? Docosahexaenoic Acid (PRENATAL DHA) 200 MG CAPS  Self Yes No   Sig: Take by mouth   Loratadine (CLARITIN) 10 MG CAPS  Self Yes No   Sig: Take by mouth   calcium carbonate (TUMS) 500 mg chewable tablet  Self Yes No   Sig: Chew 1 tablet as needed for indigestion or heartburn   ferrous sulfate 324 (65 Fe) mg  Self No No   Sig: Take 1 tablet (324 mg total) by mouth 2 (two) times a day before meals      Facility-Administered Medications: None       Past Medical History:   Diagnosis Date    Asthma     not as adult    History of one miscarriage     2017 10 weeks    Varicella        Past Surgical History:   Procedure Laterality Date     SECTION       son,    daughter,    daughter   Lian Duarte N/A 2018    Procedure: Marita Samuel () REPEAT;  Surgeon: Yaritza Rodriguez DO;  [facility];  Service: Obstetrics       Family History   Problem Relation Age of Onset    Adopted:  Yes    Crohn's disease Mother     Hypertension Mother     Cancer Maternal Grandmother         lung    Arthritis Maternal Grandmother     Alcohol abuse Father     Drug abuse Father     Asthma Sister     Diabetes Maternal Grandfather     Hearing loss Maternal Grandfather     Heart disease Maternal Grandfather      I have reviewed and agree with the history as documented  Social History   Substance Use Topics    Smoking status: Never Smoker    Smokeless tobacco: Never Used    Alcohol use No        Review of Systems   Constitutional: Negative for chills, diaphoresis and fever  HENT: Negative for congestion and rhinorrhea  Eyes: Negative for pain and visual disturbance  Respiratory: Positive for shortness of breath  Negative for cough and wheezing  Cardiovascular: Negative for chest pain and leg swelling  Gastrointestinal: Negative for abdominal pain, diarrhea, nausea and vomiting  Genitourinary: Negative for difficulty urinating, dysuria, frequency, hematuria, pelvic pain, urgency, vaginal bleeding and vaginal discharge  Musculoskeletal: Negative for back pain and neck pain  Skin: Negative for color change and rash  Neurological: Negative for syncope, numbness and headaches  All other systems reviewed and are negative  Physical Exam  ED Triage Vitals [10/08/18 1638]   Temperature Pulse Respirations Blood Pressure SpO2   98 4 °F (36 9 °C) 83 20 128/62 95 %      Temp Source Heart Rate Source Patient Position - Orthostatic VS BP Location FiO2 (%)   Tympanic Monitor Sitting Right arm --      Pain Score       3           Orthostatic Vital Signs  Vitals:    10/08/18 1638 10/08/18 1815 10/08/18 1930 10/08/18 2000   BP: 128/62 144/80 106/60 117/67   Pulse: 83 82 80 78   Patient Position - Orthostatic VS: Sitting Lying Lying Sitting       Physical Exam   Constitutional: She is oriented to person, place, and time  She appears well-developed and well-nourished  No distress  HENT:   Head: Normocephalic and atraumatic  Eyes: Conjunctivae and EOM are normal    Neck: Normal range of motion  Neck supple  Cardiovascular: Normal rate and regular rhythm  Pulmonary/Chest: Effort normal and breath sounds normal  No respiratory distress  She has no wheezes  She has no rales  Abdominal: Soft  Bowel sounds are normal  There is no tenderness  There is no guarding  Musculoskeletal: Normal range of motion  She exhibits no edema or tenderness  Neurological: She is alert and oriented to person, place, and time  No cranial nerve deficit  Skin: Skin is warm  She is not diaphoretic  No erythema  Psychiatric: She has a normal mood and affect  Her behavior is normal    Nursing note and vitals reviewed  ED Medications  Medications - No data to display    Diagnostic Studies  Results Reviewed     Procedure Component Value Units Date/Time    D-dimer, quantitative [[id number]]  (Normal) Collected:  10/08/18 1843    Lab Status:  Final result Specimen:  Blood from Arm, Left Updated:  10/08/18 1921     D-Dimer, Quant 368 ng/ml (FEU)     Troponin I [[id number]]  (Normal) Collected:  10/08/18 1843    Lab Status:  Final result Specimen:  Blood from Arm, Left Updated:  10/08/18 1910     Troponin I <0 02 ng/mL     NT-BNP PRO [[id number]]  (Normal) Collected:  10/08/18 1843    Lab Status:  Final result Specimen:  Blood from Arm, Left Updated:  10/08/18 1909     NT-proBNP 50 pg/mL     Basic metabolic panel [[id number]] Collected:  10/08/18 1843    Lab Status:  Final result Specimen:  Blood from Arm, Left Updated:  10/08/18 1909     Sodium 139 mmol/L      Potassium 3 8 mmol/L      Chloride 107 mmol/L      CO2 26 mmol/L      ANION GAP 6 mmol/L      BUN 7 mg/dL      Creatinine 0 66 mg/dL      Glucose 118 mg/dL      Calcium 8 7 mg/dL      eGFR 113 ml/min/1 73sq m     Narrative:         National Kidney Disease Education Program recommendations are as follows:  GFR calculation is accurate only with a steady state creatinine  Chronic Kidney disease less than 60 ml/min/1 73 sq  meters  Kidney failure less than 15 ml/min/1 73 sq  meters  CBC and differential [[id number]]  (Abnormal) Collected:  10/08/18 1843    Lab Status:  Final result Specimen:  Blood from Arm, Left Updated:  10/08/18 1854     WBC 6 41 Thousand/uL      RBC 4 51 Million/uL      Hemoglobin 11 7 g/dL      Hematocrit 37 2 %      MCV 83 fL      MCH 25 9 (L) pg      MCHC 31 5 g/dL      RDW 22 5 (H) %      MPV 10 6 fL      Platelets 847 Thousands/uL      nRBC 0 /100 WBCs      Neutrophils Relative 41 (L) %      Immat GRANS % 0 %      Lymphocytes Relative 40 %      Monocytes Relative 10 %      Eosinophils Relative 8 (H) %      Basophils Relative 1 %      Neutrophils Absolute 2 68 Thousands/µL      Immature Grans Absolute 0 01 Thousand/uL      Lymphocytes Absolute 2 56 Thousands/µL      Monocytes Absolute 0 61 Thousand/µL      Eosinophils Absolute 0 49 Thousand/µL      Basophils Absolute 0 06 Thousands/µL                  X-ray chest 2 views    (Results Pending)         Procedures  Procedures      Phone Consults  ED Phone Contact    ED Course                               MDM  Number of Diagnoses or Management Options  Dyspnea:   Renal cyst:   Diagnosis management comments: 56-year-old female presenting with conversational dyspnea  Will obtain cardiac evaluation including EKG, troponin, labs, chest x-ray, BNP and D-dimer  Bedside ultrasound displays small physiologic pericardial effusion and left-sided renal simple cyst   Instructed to follow up with primary care provider for outpatient ultrasound  Strict return precautions provided  Discussion with patient regarding Lasix not being indicated at this time as she has no pleural effusion on chest x-ray and lungs are clear to auscultation bilaterally  Also no lower extremity edema  No evidence of fluid overload on physical exam findings or workup      CritCare Time    Disposition  Final diagnoses:   Dyspnea   Renal cyst     Time reflects when diagnosis was documented in both MDM as applicable and the Disposition within this note     Time User Action Codes Description Comment    10/8/2018  8:00 PM Billie Hetoya Add [R06 00] Dyspnea     10/8/2018  8:00 PM Billie  Add [N28 1] Renal cyst       ED Disposition     ED Disposition Condition Comment    Discharge  Jh Alberts discharge to home/self care  Condition at discharge: Stable        Follow-up Information     Follow up With Specialties Details Why Contact Info Additional Italo Victor  In 1 week For further evaluation [address] [facility] [address] [facility] [id number] [address] [facility] Emergency Department Emergency Medicine  If symptoms worsen [address]  [phone]  ED, [address] [place] [address], [facility]          Discharge Medication List as of 10/8/2018  8:01 PM      CONTINUE these medications which have NOT CHANGED    Details   calcium carbonate (TUMS) 500 mg chewable tablet Chew 1 tablet as needed for indigestion or heartburn, Historical Med      Docosahexaenoic Acid (PRENATAL DHA) 200 MG CAPS Take by mouth, Historical Med      ferrous sulfate 324 (65 Fe) mg Take 1 tablet (324 mg total) by mouth 2 (two) times a day before meals, Starting Thu 6/21/2018, Normal      Loratadine (CLARITIN) 10 MG CAPS Take by mouth, Historical Med           No discharge procedures on file  ED Provider  Attending physically available and evaluated Jh Alberts I managed the patient along with the ED Attending      Electronically Signed by         Vani Saravia DO  10/08/18 0667

## 2018-10-08 NOTE — TELEPHONE ENCOUNTER
Pt was told to call when she was having problems again goes back to when she had her baby  Says she is experience out breath, and ankles swollen  And not sure if she is retaining water again

## 2018-10-09 LAB
ATRIAL RATE: 84 BPM
P AXIS: 80 DEGREES
PR INTERVAL: 152 MS
QRS AXIS: 85 DEGREES
QRSD INTERVAL: 86 MS
QT INTERVAL: 364 MS
QTC INTERVAL: 430 MS
T WAVE AXIS: 40 DEGREES
VENTRICULAR RATE: 84 BPM

## 2018-10-09 PROCEDURE — 93010 ELECTROCARDIOGRAM REPORT: CPT | Performed by: INTERNAL MEDICINE

## 2018-10-09 NOTE — DISCHARGE INSTRUCTIONS
Dyspnea   WHAT YOU NEED TO KNOW:   Dyspnea is breathing difficulty or discomfort  You may have labored, painful, or shallow breathing  You may feel breathless or short of breath  Dyspnea can occur during rest or with activity  You may have dyspnea for a short time, or it might become chronic  Dyspnea is often a symptom of a disease or condition  DISCHARGE INSTRUCTIONS:   Return to the emergency department if:   · Your signs and symptoms are the same or worse within 24 hours of treatment  · You have shaking chills or a fever over 102°F      · You have new pain, pressure, or tightness in your chest      · You have a new or worse cough or wheezing, or you cough up blood  · You feel like you cannot get enough air  · The skin over your ribs or on your neck sinks in when you breathe  · You have a severe headache with vomiting and abdominal pain  · You feel confused or dizzy  Contact your healthcare provider or specialist if:   · You have questions or concerns about your condition or care  Medicines:   · Medicines  may be used to treat the cause of your dyspnea  Medicines may reduce swelling in your airway or decrease extra fluid from around your heart or lungs  Other medicines may be used to decrease anxiety and help you feel calm and relaxed  · Take your medicine as directed  Contact your healthcare provider if you think your medicine is not helping or if you have side effects  Tell him or her if you are allergic to any medicine  Keep a list of the medicines, vitamins, and herbs you take  Include the amounts, and when and why you take them  Bring the list or the pill bottles to follow-up visits  Carry your medicine list with you in case of an emergency  Manage long-term dyspnea:   · Create an action plan  You and your healthcare provider can work together to create a plan for how to handle episodes of dyspnea   The plan can include daily activities, treatment changes, and what to do if you have severe breathing problems  · Lean forward on your elbows when you sit  This helps your lungs expand and may make it easier to breathe  · Use pursed-lip breathing any time you feel short of breath  Breathe in through your nose and then slowly breathe out through your mouth with your lips slightly puckered  It should take you twice as long to breathe out as it did to breathe in  · Do not smoke  Nicotine and other chemicals in cigarettes and cigars can cause lung damage and make it harder to breathe  Ask your healthcare provider for information if you currently smoke and need help to quit  E-cigarettes or smokeless tobacco still contain nicotine  Talk to your healthcare provider before you use these products  · Reach or maintain a healthy weight  Your healthcare provider can help you create a safe weight loss plan if you are overweight  · Exercise as directed  Exercise can help your lungs work more easily  Exercise can also help you lose weight if needed  Try to get at least 30 minutes of exercise most days of the week  Your healthcare provider can help you create an exercise plan that is safe for you  Follow up with your healthcare provider or specialist as directed:  Write down your questions so you remember to ask them during your visits  © 2017 [employer]  Information is for End User's use only and may not be sold, redistributed or otherwise used for commercial purposes  All illustrations and images included in CareNotes® are the copyrighted property of [employer]  or [employer]  The above information is an  only  It is not intended as medical advice for individual conditions or treatments  Talk to your doctor, nurse or pharmacist before following any medical regimen to see if it is safe and effective for you  Kidney Cyst   WHAT YOU NEED TO KNOW:   A kidney cyst is a fluid-filled sac that grows in your kidney   A simple cyst usually does not contain cancer  A complex cyst contains calcium deposits and needs to be checked over time for cancer  You may have one or more cysts  Both kidneys may form cysts at the same time  DISCHARGE INSTRUCTIONS:   Medicines:   · Medicines  may be given to relieve pain or to prevent or treat a bacterial infection  Ask your healthcare provider how to take prescription pain medicine safely  · Take your medicine as directed  Contact your healthcare provider if you think your medicine is not helping or if you have side effects  Tell him or her if you are allergic to any medicine  Keep a list of the medicines, vitamins, and herbs you take  Include the amounts, and when and why you take them  Bring the list or the pill bottles to follow-up visits  Carry your medicine list with you in case of an emergency  Return to the emergency department if:   · You have blood in your urine or your urine is dark  · You have trouble urinating, or you are urinating more often than usual     · You have a fever along with pain or tenderness below your ribcage and above your hip bones  Contact your healthcare provider if:   · You have questions or concerns about your condition or care  Follow up with your healthcare provider as directed: You may need to have ultrasounds to check the size, shape, and contents of the cyst over time  Write down your questions so you remember to ask them during your visits  Drink liquids as directed:  Liquids help your kidneys work correctly  They can also help prevent a urinary tract infection  Ask your healthcare provider how much liquid to have each day and which liquids are best for you  Ask if you need to limit or not drink alcohol  Alcohol may damage your kidneys  Manage health conditions:  Over time, conditions such as diabetes and high blood pressure that are not controlled may damage your kidneys     Do not smoke:  Smoking may narrow blood vessels in your kidneys and raise your blood pressure  Smoking can also damage your kidneys  Ask your healthcare provider for information if you currently smoke and need help quitting  E-cigarettes or smokeless tobacco still contain nicotine  Talk to your healthcare provider before you use these products  © 2017 [facility] Information is for End User's use only and may not be sold, redistributed or otherwise used for commercial purposes  All illustrations and images included in CareNotes® are the copyrighted property of [employer]  or [employer]  The above information is an  only  It is not intended as medical advice for individual conditions or treatments  Talk to your doctor, nurse or pharmacist before following any medical regimen to see if it is safe and effective for you

## 2018-10-10 ENCOUNTER — OFFICE VISIT (OUTPATIENT)
Dept: FAMILY MEDICINE CLINIC | Facility: CLINIC | Age: 38
End: 2018-10-10
Payer: COMMERCIAL

## 2018-10-10 VITALS
HEART RATE: 69 BPM | OXYGEN SATURATION: 98 % | HEIGHT: 61 IN | SYSTOLIC BLOOD PRESSURE: 108 MMHG | BODY MASS INDEX: 25.86 KG/M2 | WEIGHT: 137 LBS | RESPIRATION RATE: 18 BRPM | DIASTOLIC BLOOD PRESSURE: 68 MMHG

## 2018-10-10 DIAGNOSIS — R06.02 SHORTNESS OF BREATH: ICD-10-CM

## 2018-10-10 DIAGNOSIS — J45.20 MILD INTERMITTENT ASTHMA WITHOUT COMPLICATION: ICD-10-CM

## 2018-10-10 DIAGNOSIS — Z76.89 ENCOUNTER TO ESTABLISH CARE: Primary | ICD-10-CM

## 2018-10-10 PROCEDURE — 1036F TOBACCO NON-USER: CPT | Performed by: NURSE PRACTITIONER

## 2018-10-10 PROCEDURE — 99204 OFFICE O/P NEW MOD 45 MIN: CPT | Performed by: NURSE PRACTITIONER

## 2018-10-10 RX ORDER — INFLUENZA A VIRUS A/MICHIGAN/45/2015 X-275 (H1N1) ANTIGEN (FORMALDEHYDE INACTIVATED), INFLUENZA A VIRUS A/HONG KONG/4801/2014 X-263B (H3N2) ANTIGEN (FORMALDEHYDE INACTIVATED), INFLUENZA B VIRUS B/PHUKET/3073/2013 ANTIGEN (FORMALDEHYDE INACTIVATED), AND INFLUENZA B VIRUS B/BRISBANE/60/2008 ANTIGEN (FORMALDEHYDE INACTIVATED) 15; 15; 15; 15 UG/.5ML; UG/.5ML; UG/.5ML; UG/.5ML
INJECTION, SUSPENSION INTRAMUSCULAR
Refills: 0 | COMMUNITY
Start: 2018-08-31 | End: 2019-03-11 | Stop reason: ALTCHOICE

## 2018-10-10 RX ORDER — ALBUTEROL SULFATE 90 UG/1
2 AEROSOL, METERED RESPIRATORY (INHALATION) EVERY 6 HOURS PRN
Qty: 8.5 G | Refills: 1 | Status: SHIPPED | OUTPATIENT
Start: 2018-10-10 | End: 2018-12-09

## 2018-10-10 NOTE — PATIENT INSTRUCTIONS
Shortness of breath- most likely asthma   Check ECHO and Pulmonary Function Testing  At this time, patient is breast feeding  Unable to start singulair because she is breastfeeding  Obtain above testing and then decide upon treatment  Ok to start ProAir for shortness of breath  Do not overuse while you are still nursing  When breastfeeding is completely done consider starting long acting daily inhaler  Renal cyst- incidental finding  No hematuria  Monitor

## 2018-10-10 NOTE — PROGRESS NOTES
Assessment/Plan:    No problem-specific Assessment & Plan notes found for this encounter  Diagnoses and all orders for this visit:    Encounter to establish care    Shortness of breath  -     Echo complete with contrast if indicated; Future  -     Spirometry pre and post bronchodilator; Future  -     albuterol (PROAIR HFA) 90 mcg/act inhaler; Inhale 2 puffs every 6 (six) hours as needed for wheezing for up to 60 days    Mild intermittent asthma without complication  -     Spirometry pre and post bronchodilator; Future  -     albuterol (PROAIR HFA) 90 mcg/act inhaler; Inhale 2 puffs every 6 (six) hours as needed for wheezing for up to 60 days    Other orders  -     FLUZONE QUADRIVALENT 0 5 ML ALYSHA; TO BE ADMINISTERED BY PHARMACIST FOR IMMUNIZATION          Subjective:      Patient ID: Christine Escudero is a 41 y o  female  Pt is here to become Established  She is her with her   By hx, in July, pt gave birth to a baby  A few days after leaving the hospital, she developed swelling of her legs and shortness of breath  She went back to the ER and was given lasix  She lost the water and felt better  She had been doing better   Two days ago , she was having shortness of breath and went to the ER  She tells me that an ECHO was done and she was told she had "fluid around my heart"  Renal cyst also detected  She returned to work last week  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  She noticed swelling in her legs one week after starting back to work  Since leaving the hospital, she has still been short of breath She describes that her shortness of breath is present at rest and when she is talking  NO shortness of breath with activity  I have reviewed diagnostics performed in the ER  There is no ECHO report in her EMR  Her ER discharge papers show ECG and CXR were done  Pt has had asthma in the past  She feels like her pregnancy triggered her asthma   In the past, she was using two inhalers for 5 years  She does not smoke  She does have "bad" environmental allergies  Fall triggers her allergie  Biometrics done at work and are reportedly wnl  Pt also tells me that an ultrasound was done in ER and she was told she has renal cyst  I see no note of this in her chart and no mention that the test was done  The following portions of the patient's history were reviewed and updated as appropriate:   She  has a past medical history of Asthma; History of one miscarriage; Renal cyst; and Varicella  She   Patient Active Problem List    Diagnosis Date Noted    Encounter to establish care 10/10/2018    Mild intermittent asthma without complication     Postpartum care and examination 2018    Dyspnea 2018    S/P repeat low transverse  2018    History of 3  sections 02/15/2018     She  has a past surgical history that includes  section and pr  delivery only (N/A, 2018)  Her family history includes Alcohol abuse in her father; Arthritis in her maternal grandmother; Asthma in her sister; Cancer in her maternal grandmother; Crohn's disease in her mother; Diabetes in her maternal grandfather; Drug abuse in her father; Hearing loss in her maternal grandfather; Heart disease in her maternal grandfather; Hypertension in her mother  She was adopted  She  reports that she has never smoked  She has never used smokeless tobacco  She reports that she does not drink alcohol or use drugs    Current Outpatient Prescriptions   Medication Sig Dispense Refill    albuterol (PROAIR HFA) 90 mcg/act inhaler Inhale 2 puffs every 6 (six) hours as needed for wheezing for up to 60 days 8 5 g 1    calcium carbonate (TUMS) 500 mg chewable tablet Chew 1 tablet as needed for indigestion or heartburn      Docosahexaenoic Acid (PRENATAL DHA) 200 MG CAPS Take by mouth      ferrous sulfate 324 (65 Fe) mg Take 1 tablet (324 mg total) by mouth 2 (two) times a day before meals 60 tablet 2    FLUZONE QUADRIVALENT 0 5 ML ALYSHA TO BE ADMINISTERED BY PHARMACIST FOR IMMUNIZATION  0    Loratadine (CLARITIN) 10 MG CAPS Take by mouth       No current facility-administered medications for this visit  Current Outpatient Prescriptions on File Prior to Visit   Medication Sig    calcium carbonate (TUMS) 500 mg chewable tablet Chew 1 tablet as needed for indigestion or heartburn    Docosahexaenoic Acid (PRENATAL DHA) 200 MG CAPS Take by mouth    ferrous sulfate 324 (65 Fe) mg Take 1 tablet (324 mg total) by mouth 2 (two) times a day before meals    Loratadine (CLARITIN) 10 MG CAPS Take by mouth     No current facility-administered medications on file prior to visit  She is allergic to iron       Review of Systems   Constitutional: Negative  Negative for fatigue and fever  HENT: Positive for postnasal drip and rhinorrhea  Negative for congestion and ear pain  Eyes: Negative  Negative for visual disturbance  Respiratory: Positive for shortness of breath  Negative for cough and wheezing  Cardiovascular: Negative  Negative for chest pain and palpitations  Gastrointestinal: Negative  Negative for abdominal pain  Endocrine: Negative for polyuria  Genitourinary: Negative for hematuria and pelvic pain  Musculoskeletal: Negative  Negative for arthralgias, back pain and gait problem  Skin: Negative  Negative for rash and wound  Allergic/Immunologic: Positive for environmental allergies  Negative for immunocompromised state  Neurological: Negative  Negative for dizziness and headaches  Hematological: Negative for adenopathy  Psychiatric/Behavioral: Negative  All other systems reviewed and are negative          Objective:      /68 (BP Location: Left arm, Patient Position: Sitting)   Pulse 69   Resp 18   Ht 5' 1" (1 549 m)   Wt 62 1 kg (137 lb)   SpO2 98%   BMI 25 89 kg/m²          Physical Exam   Constitutional: She is oriented to person, place, and time  She appears well-developed and well-nourished  No distress  HENT:   Head: Normocephalic and atraumatic  Right Ear: External ear normal    Left Ear: External ear normal    Nose: Nose normal    Mouth/Throat: Oropharynx is clear and moist    Eyes: Pupils are equal, round, and reactive to light  Conjunctivae are normal  Right eye exhibits no discharge  Left eye exhibits no discharge  No scleral icterus  Neck: Normal range of motion  Neck supple  No JVD present  No thyromegaly present  Cardiovascular: Normal rate, regular rhythm, normal heart sounds and intact distal pulses  Exam reveals no gallop and no friction rub  No murmur heard  Pulmonary/Chest: Effort normal and breath sounds normal  No respiratory distress  She has no wheezes  Abdominal: Soft  Bowel sounds are normal  She exhibits distension  There is no tenderness  Musculoskeletal: Normal range of motion  She exhibits no edema, tenderness or deformity  Lymphadenopathy:     She has no cervical adenopathy  Neurological: She is alert and oriented to person, place, and time  Skin: Skin is warm and dry  No rash noted  She is not diaphoretic  No pallor  Psychiatric: She has a normal mood and affect  Her behavior is normal  Judgment and thought content normal    Nursing note and vitals reviewed

## 2018-11-05 ENCOUNTER — HOSPITAL ENCOUNTER (OUTPATIENT)
Dept: PULMONOLOGY | Facility: HOSPITAL | Age: 38
Discharge: HOME | End: 2018-11-05
Payer: COMMERCIAL

## 2018-11-05 ENCOUNTER — HOSPITAL ENCOUNTER (OUTPATIENT)
Dept: NON INVASIVE DIAGNOSTICS | Facility: HOSPITAL | Age: 38
Discharge: HOME | End: 2018-11-05
Payer: COMMERCIAL

## 2018-11-05 DIAGNOSIS — R06.02 SHORTNESS OF BREATH: ICD-10-CM

## 2018-11-05 DIAGNOSIS — J45.20 MILD INTERMITTENT ASTHMA WITHOUT COMPLICATION: ICD-10-CM

## 2018-11-05 PROCEDURE — 93306 TTE W/DOPPLER COMPLETE: CPT | Performed by: INTERNAL MEDICINE

## 2018-11-05 PROCEDURE — 94060 EVALUATION OF WHEEZING: CPT | Performed by: INTERNAL MEDICINE

## 2018-11-05 PROCEDURE — 94760 N-INVAS EAR/PLS OXIMETRY 1: CPT

## 2018-11-05 PROCEDURE — 93306 TTE W/DOPPLER COMPLETE: CPT

## 2018-11-05 PROCEDURE — 94060 EVALUATION OF WHEEZING: CPT

## 2018-11-05 RX ORDER — ALBUTEROL SULFATE 2.5 MG/3ML
2.5 SOLUTION RESPIRATORY (INHALATION) ONCE
Status: COMPLETED | OUTPATIENT
Start: 2018-11-05 | End: 2018-11-05

## 2018-11-05 RX ADMIN — ALBUTEROL SULFATE 2.5 MG: 2.5 SOLUTION RESPIRATORY (INHALATION) at 11:05

## 2018-11-06 DIAGNOSIS — R06.02 SHORTNESS OF BREATH: Primary | ICD-10-CM

## 2019-03-11 ENCOUNTER — ANNUAL EXAM (OUTPATIENT)
Dept: OBGYN CLINIC | Facility: CLINIC | Age: 39
End: 2019-03-11
Payer: COMMERCIAL

## 2019-03-11 VITALS
SYSTOLIC BLOOD PRESSURE: 127 MMHG | DIASTOLIC BLOOD PRESSURE: 76 MMHG | BODY MASS INDEX: 24.62 KG/M2 | WEIGHT: 133.8 LBS | HEIGHT: 62 IN

## 2019-03-11 DIAGNOSIS — Z30.011 ENCOUNTER FOR INITIAL PRESCRIPTION OF CONTRACEPTIVE PILLS: ICD-10-CM

## 2019-03-11 DIAGNOSIS — Z01.419 ENCOUNTER FOR GYNECOLOGICAL EXAMINATION (GENERAL) (ROUTINE) WITHOUT ABNORMAL FINDINGS: Primary | ICD-10-CM

## 2019-03-11 PROCEDURE — 99395 PREV VISIT EST AGE 18-39: CPT | Performed by: OBSTETRICS & GYNECOLOGY

## 2019-03-11 RX ORDER — NORETHINDRONE ACETATE AND ETHINYL ESTRADIOL AND FERROUS FUMARATE 1MG-20(24)
1 KIT ORAL DAILY
Qty: 28 TABLET | Refills: 6 | Status: SHIPPED | OUTPATIENT
Start: 2019-03-11 | End: 2019-10-11 | Stop reason: SDUPTHER

## 2019-03-11 NOTE — PROGRESS NOTES
Assessment/Plan:    No problem-specific Assessment & Plan notes found for this encounter  Diagnoses and all orders for this visit:    Encounter for gynecological examination (general) (routine) without abnormal findings    Encounter for initial prescription of contraceptive pills  -     norethindrone-ethinyl estradiol-ferrous fumarate (LOESTIN 24 FE) 1-20 MG-MCG(24) per tablet; Take 1 tablet by mouth daily    Other orders  -     ALBUTEROL IN; INHALE 2 PUFFS EVERY 6 HOURS AS NEEDED FOR WHEEZING        Annual examination was completed  Discussion was had regarding contraceptive options  Patient would appear to be an excellent candidate for OCPs  We discussed the pros and cons  Prescription was forwarded  She was cautioned to expect breakthrough bleeding for several cycles  Patient will use condoms during the 1st month  Patient will call with any concerns otherwise return to the office in 1 year  Subjective:      Patient ID: Shon Santos is a 46 y o  female  Patient returns for annual gyn visit  She is status post repeat  section 7 months ago and has done well  She is not using anything for contraception  She has desire to start OCPs her baby is thriving  She is breast-feeding without difficulty  She has noted menses have been erratic over the last 3 months  Gynecologic Exam         The following portions of the patient's history were reviewed and updated as appropriate:   She  has a past medical history of Asthma, History of one miscarriage, Renal cyst, and Varicella    She   Patient Active Problem List    Diagnosis Date Noted    Encounter for gynecological examination (general) (routine) without abnormal findings 2019    Encounter to establish care 10/10/2018    Mild intermittent asthma without complication     Postpartum care and examination 2018    Dyspnea 2018    S/P repeat low transverse  2018    History of 3  sections 02/15/2018     She  has a past surgical history that includes  section and pr  delivery only (N/A, 2018)  Her family history includes Alcohol abuse in her father; Arthritis in her maternal grandmother; Asthma in her sister; Cancer in her maternal grandmother; Crohn's disease in her mother; Diabetes in her maternal grandfather; Drug abuse in her father; Hearing loss in her maternal grandfather; Heart disease in her maternal grandfather; Hypertension in her mother  She was adopted  She  reports that she has never smoked  She has never used smokeless tobacco  She reports that she does not drink alcohol or use drugs  Current Outpatient Medications   Medication Sig Dispense Refill    ALBUTEROL IN INHALE 2 PUFFS EVERY 6 HOURS AS NEEDED FOR WHEEZING  0    Docosahexaenoic Acid (PRENATAL DHA) 200 MG CAPS Take by mouth      ferrous sulfate 324 (65 Fe) mg Take 1 tablet (324 mg total) by mouth 2 (two) times a day before meals 60 tablet 2    Loratadine (CLARITIN) 10 MG CAPS Take by mouth      norethindrone-ethinyl estradiol-ferrous fumarate (LOESTIN 24 FE) 1-20 MG-MCG(24) per tablet Take 1 tablet by mouth daily 28 tablet 6     No current facility-administered medications for this visit  Current Outpatient Medications on File Prior to Visit   Medication Sig    ALBUTEROL IN INHALE 2 PUFFS EVERY 6 HOURS AS NEEDED FOR WHEEZING    Docosahexaenoic Acid (PRENATAL DHA) 200 MG CAPS Take by mouth    ferrous sulfate 324 (65 Fe) mg Take 1 tablet (324 mg total) by mouth 2 (two) times a day before meals    Loratadine (CLARITIN) 10 MG CAPS Take by mouth    [DISCONTINUED] calcium carbonate (TUMS) 500 mg chewable tablet Chew 1 tablet as needed for indigestion or heartburn    [DISCONTINUED] FLUZONE QUADRIVALENT 0 5 ML ALYSHA TO BE ADMINISTERED BY PHARMACIST FOR IMMUNIZATION     No current facility-administered medications on file prior to visit        She is allergic to iron     Review of Systems   All other systems reviewed and are negative  Objective:      /76 (BP Location: Left arm, Patient Position: Sitting, Cuff Size: Standard)   Ht 5' 2" (1 575 m)   Wt 60 7 kg (133 lb 12 8 oz)   LMP 03/03/2019 (Approximate)   Breastfeeding? Yes   BMI 24 47 kg/m²          Physical Exam   Constitutional: She is oriented to person, place, and time  She appears well-developed and well-nourished  HENT:   Head: Normocephalic and atraumatic  Nose: Nose normal    Eyes: Pupils are equal, round, and reactive to light  EOM are normal    Neck: Normal range of motion  Neck supple  No thyromegaly present  Cardiovascular: Normal rate and regular rhythm  Pulmonary/Chest: Effort normal and breath sounds normal  No respiratory distress  No breast tenderness, discharge or bleeding  Breasts no masses, tenderness, skin changes   Abdominal: Soft  Bowel sounds are normal  She exhibits no distension and no mass  There is no tenderness  Hernia confirmed negative in the right inguinal area and confirmed negative in the left inguinal area  Genitourinary: Vagina normal and uterus normal  No breast tenderness, discharge or bleeding  Pelvic exam was performed with patient supine  No labial fusion  There is no rash, tenderness, lesion or injury on the right labia  There is no rash, tenderness, lesion or injury on the left labia  Cervix exhibits no motion tenderness, no discharge and no friability  Genitourinary Comments: Ext genitalia nl female w/o lesions  Vag healthy without lesions or discharge  Cervix healthy w/o lesions or discharge  uterus nl size, NT, no mass  Adnexa NT, no mass   Musculoskeletal: Normal range of motion  She exhibits no edema  Lymphadenopathy:        Right: No inguinal adenopathy present  Left: No inguinal adenopathy present  Neurological: She is alert and oriented to person, place, and time  She has normal reflexes  Skin: Skin is warm and dry   No rash noted    Psychiatric: She has a normal mood and affect  Her behavior is normal  Thought content normal    Nursing note and vitals reviewed

## 2019-04-16 ENCOUNTER — OFFICE VISIT (OUTPATIENT)
Dept: FAMILY MEDICINE CLINIC | Facility: CLINIC | Age: 39
End: 2019-04-16
Payer: COMMERCIAL

## 2019-04-16 VITALS
SYSTOLIC BLOOD PRESSURE: 122 MMHG | WEIGHT: 137 LBS | RESPIRATION RATE: 18 BRPM | TEMPERATURE: 98.9 F | BODY MASS INDEX: 25.21 KG/M2 | HEIGHT: 62 IN | OXYGEN SATURATION: 98 % | HEART RATE: 80 BPM | DIASTOLIC BLOOD PRESSURE: 80 MMHG

## 2019-04-16 DIAGNOSIS — H66.001 NON-RECURRENT ACUTE SUPPURATIVE OTITIS MEDIA OF RIGHT EAR WITHOUT SPONTANEOUS RUPTURE OF TYMPANIC MEMBRANE: Primary | ICD-10-CM

## 2019-04-16 PROCEDURE — 3008F BODY MASS INDEX DOCD: CPT | Performed by: NURSE PRACTITIONER

## 2019-04-16 PROCEDURE — 99213 OFFICE O/P EST LOW 20 MIN: CPT | Performed by: NURSE PRACTITIONER

## 2019-04-16 PROCEDURE — 1036F TOBACCO NON-USER: CPT | Performed by: NURSE PRACTITIONER

## 2019-04-16 RX ORDER — AMOXICILLIN 500 MG/1
500 CAPSULE ORAL EVERY 8 HOURS SCHEDULED
Qty: 21 CAPSULE | Refills: 0 | Status: SHIPPED | OUTPATIENT
Start: 2019-04-16 | End: 2019-04-23

## 2019-06-12 ENCOUNTER — TELEPHONE (OUTPATIENT)
Dept: OBGYN CLINIC | Facility: CLINIC | Age: 39
End: 2019-06-12

## 2019-10-11 DIAGNOSIS — Z30.011 ENCOUNTER FOR INITIAL PRESCRIPTION OF CONTRACEPTIVE PILLS: ICD-10-CM

## 2019-10-14 RX ORDER — NORETHINDRONE ACETATE AND ETHINYL ESTRADIOL 1MG-20(24)
KIT ORAL
Qty: 84 TABLET | Refills: 0 | Status: SHIPPED | OUTPATIENT
Start: 2019-10-14 | End: 2019-12-27 | Stop reason: ALTCHOICE

## 2019-12-27 ENCOUNTER — OFFICE VISIT (OUTPATIENT)
Dept: FAMILY MEDICINE CLINIC | Facility: CLINIC | Age: 39
End: 2019-12-27
Payer: COMMERCIAL

## 2019-12-27 VITALS
SYSTOLIC BLOOD PRESSURE: 102 MMHG | DIASTOLIC BLOOD PRESSURE: 72 MMHG | WEIGHT: 139 LBS | HEART RATE: 83 BPM | HEIGHT: 62 IN | OXYGEN SATURATION: 99 % | TEMPERATURE: 97.8 F | BODY MASS INDEX: 25.58 KG/M2

## 2019-12-27 DIAGNOSIS — Z30.011 ENCOUNTER FOR INITIAL PRESCRIPTION OF CONTRACEPTIVE PILLS: ICD-10-CM

## 2019-12-27 DIAGNOSIS — J45.20 MILD INTERMITTENT ASTHMA WITHOUT COMPLICATION: ICD-10-CM

## 2019-12-27 DIAGNOSIS — Z11.4 SCREENING FOR HIV (HUMAN IMMUNODEFICIENCY VIRUS): ICD-10-CM

## 2019-12-27 DIAGNOSIS — Z12.4 SCREENING FOR CERVICAL CANCER: ICD-10-CM

## 2019-12-27 DIAGNOSIS — Z76.89 ENCOUNTER TO ESTABLISH CARE: Primary | ICD-10-CM

## 2019-12-27 DIAGNOSIS — Z00.00 ANNUAL PHYSICAL EXAM: ICD-10-CM

## 2019-12-27 DIAGNOSIS — B35.1 FUNGAL TOENAIL INFECTION: ICD-10-CM

## 2019-12-27 PROBLEM — H66.001 NON-RECURRENT ACUTE SUPPURATIVE OTITIS MEDIA OF RIGHT EAR WITHOUT SPONTANEOUS RUPTURE OF TYMPANIC MEMBRANE: Status: RESOLVED | Noted: 2019-04-16 | Resolved: 2019-12-27

## 2019-12-27 PROBLEM — R06.00 DYSPNEA: Status: RESOLVED | Noted: 2018-07-24 | Resolved: 2019-12-27

## 2019-12-27 PROBLEM — Z01.419 ENCOUNTER FOR GYNECOLOGICAL EXAMINATION (GENERAL) (ROUTINE) WITHOUT ABNORMAL FINDINGS: Status: RESOLVED | Noted: 2019-03-11 | Resolved: 2019-12-27

## 2019-12-27 PROCEDURE — 3008F BODY MASS INDEX DOCD: CPT | Performed by: NURSE PRACTITIONER

## 2019-12-27 PROCEDURE — 99214 OFFICE O/P EST MOD 30 MIN: CPT | Performed by: NURSE PRACTITIONER

## 2019-12-27 PROCEDURE — 1036F TOBACCO NON-USER: CPT | Performed by: NURSE PRACTITIONER

## 2019-12-27 RX ORDER — NORGESTIMATE AND ETHINYL ESTRADIOL 0.25-0.035
1 KIT ORAL DAILY
Qty: 28 TABLET | Refills: 5 | Status: SHIPPED | OUTPATIENT
Start: 2019-12-27 | End: 2020-06-01

## 2019-12-27 RX ORDER — ALBUTEROL SULFATE 90 UG/1
2 AEROSOL, METERED RESPIRATORY (INHALATION) EVERY 6 HOURS PRN
Qty: 1 INHALER | Refills: 2 | Status: SHIPPED | OUTPATIENT
Start: 2019-12-27 | End: 2020-12-28 | Stop reason: SDUPTHER

## 2019-12-27 NOTE — PROGRESS NOTES
BMI Counseling: Body mass index is 25 42 kg/m²  The BMI is above normal  Nutrition recommendations include decreasing portion sizes and reducing intake of cholesterol  Exercise recommendations include exercising 3-5 times per week  Assessment/Plan:     Chronic Problems:  Mild intermittent asthma without complication    Well controlled with p r n  Albuterol inhaler  Sent in refills  Fungal toenail infection    Will treat with Penlac solution  Visit Diagnosis:  Diagnoses and all orders for this visit:    Encounter to establish care    Mild intermittent asthma without complication  -     albuterol (PROVENTIL HFA,VENTOLIN HFA) 90 mcg/act inhaler; Inhale 2 puffs every 6 (six) hours as needed for wheezing or shortness of breath    Annual physical exam  -     Lipid panel; Future  -     CBC and Platelet; Future  -     Comprehensive metabolic panel; Future  -     TSH, 3rd generation with Free T4 reflex; Future    Screening for cervical cancer  -     Ambulatory referral to Obstetrics / Gynecology; Future    Screening for HIV (human immunodeficiency virus)  -     HIV 1/2 AG-AB combo; Future    Fungal toenail infection  -     ciclopirox (PENLAC) 8 % solution; Apply topically daily at bedtime    Encounter for initial prescription of contraceptive pills  -     norgestimate-ethinyl estradiol (ORTHO-CYCLEN) 0 25-35 MG-MCG per tablet; Take 1 tablet by mouth daily          Subjective:    Patient ID: Yanick Elizondo is a 46 y o  female  Here to establish care  Has not been seen in a while for PCP  Gets ear infections frequently-- last seen by ENT in 2017  Stopped OCP due to cramping, headaches and menorrhagia  Due for pap end of January--needs referral    She also has had a fungal infection under her toenails for quite some time  She has tried hydrogen peroxide and soaks to the toes with little improvement  She has had nail Polish off of the toes since the summer     She does have a history of mild intermittent asthma which acts up in the wintertime  She does need a refill on her albuterol inhaler  Takes all other meds as directed  No side effects noted        The following portions of the patient's history were reviewed and updated as appropriate: allergies, current medications, past family history, past medical history, past social history, past surgical history and problem list     Review of Systems   Constitutional: Positive for fatigue  Negative for chills and fever  Respiratory: Negative for chest tightness, shortness of breath and wheezing  Cardiovascular: Negative for chest pain and palpitations  Genitourinary: Negative  Psychiatric/Behavioral: Negative for dysphoric mood and sleep disturbance  The patient is not nervous/anxious            /72   Pulse 83   Temp 97 8 °F (36 6 °C)   Ht 5' 2" (1 575 m)   Wt 63 kg (139 lb)   SpO2 99%   BMI 25 42 kg/m²   Social History     Socioeconomic History    Marital status:      Spouse name: Not on file    Number of children: Not on file    Years of education: Not on file    Highest education level: Not on file   Occupational History    Not on file   Social Needs    Financial resource strain: Not on file    Food insecurity:     Worry: Not on file     Inability: Not on file    Transportation needs:     Medical: Not on file     Non-medical: Not on file   Tobacco Use    Smoking status: Never Smoker    Smokeless tobacco: Never Used   Substance and Sexual Activity    Alcohol use: No    Drug use: No    Sexual activity: Yes     Partners: Male   Lifestyle    Physical activity:     Days per week: Not on file     Minutes per session: Not on file    Stress: Not on file   Relationships    Social connections:     Talks on phone: Not on file     Gets together: Not on file     Attends [employer] service: Not on file     Active member of club or organization: Not on file     Attends meetings of clubs or organizations: Not on file     Relationship status: Not on file    Intimate partner violence:     Fear of current or ex partner: Not on file     Emotionally abused: Not on file     Physically abused: Not on file     Forced sexual activity: Not on file   Other Topics Concern    Not on file   Social History Narrative    Engaged to be     Always uses seatbelts     Past Medical History:   Diagnosis Date    Asthma     not as adult    History of one miscarriage     2017 10 weeks    Renal cyst     Varicella      Family History   Adopted: Yes   Problem Relation Age of Onset   Brittny De León Crohn's disease Mother     Hypertension Mother     Cancer Maternal Grandmother         lung    Arthritis Maternal Grandmother     Alcohol abuse Father     Drug abuse Father     Asthma Sister     Diabetes Maternal Grandfather     Hearing loss Maternal Grandfather     Heart disease Maternal Grandfather      Past Surgical History:   Procedure Laterality Date     SECTION      2004 son,    daughter,    daughter   Yvonnie Lines N/A 2018    Procedure:  SECTION () REPEAT;  Surgeon: Prasanth Broussard DO;  Location: [facility] ;  Service: Obstetrics       Current Outpatient Medications:     ferrous sulfate 324 (65 Fe) mg, Take 1 tablet (324 mg total) by mouth 2 (two) times a day before meals, Disp: 60 tablet, Rfl: 2    albuterol (PROVENTIL HFA,VENTOLIN HFA) 90 mcg/act inhaler, Inhale 2 puffs every 6 (six) hours as needed for wheezing or shortness of breath, Disp: 1 Inhaler, Rfl: 2    ciclopirox (PENLAC) 8 % solution, Apply topically daily at bedtime, Disp: 6 6 mL, Rfl: 0    Docosahexaenoic Acid (PRENATAL DHA) 200 MG CAPS, Take by mouth, Disp: , Rfl:     Loratadine (CLARITIN) 10 MG CAPS, Take by mouth, Disp: , Rfl:     norgestimate-ethinyl estradiol (ORTHO-CYCLEN) 0 25-35 MG-MCG per tablet, Take 1 tablet by mouth daily, Disp: 28 tablet, Rfl: 5    Allergies   Allergen Reactions    Iron      Gold label only          Lab Review   No visits with results within 2 Month(s) from this visit  Latest known visit with results is:   Admission on 10/08/2018, Discharged on 10/08/2018   Component Date Value    WBC 10/08/2018 6 41     RBC 10/08/2018 4 51     Hemoglobin 10/08/2018 11 7     Hematocrit 10/08/2018 37 2     MCV 10/08/2018 83     MCH 10/08/2018 25 9*    MCHC 10/08/2018 31 5     RDW 10/08/2018 22 5*    MPV 10/08/2018 10 6     Platelets 06/23/6507 305     nRBC 10/08/2018 0     Neutrophils Relative 10/08/2018 41*    Immat GRANS % 10/08/2018 0     Lymphocytes Relative 10/08/2018 40     Monocytes Relative 10/08/2018 10     Eosinophils Relative 10/08/2018 8*    Basophils Relative 10/08/2018 1     Neutrophils Absolute 10/08/2018 2 68     Immature Grans Absolute 10/08/2018 0 01     Lymphocytes Absolute 10/08/2018 2 56     Monocytes Absolute 10/08/2018 0 61     Eosinophils Absolute 10/08/2018 0 49     Basophils Absolute 10/08/2018 0 06     Sodium 10/08/2018 139     Potassium 10/08/2018 3 8     Chloride 10/08/2018 107     CO2 10/08/2018 26     ANION GAP 10/08/2018 6     BUN 10/08/2018 7     Creatinine 10/08/2018 0 66     Glucose 10/08/2018 118     Calcium 10/08/2018 8 7     eGFR 10/08/2018 113     Troponin I 10/08/2018 <0 02     NT-proBNP 10/08/2018 50     D-Dimer, Quant 10/08/2018 368     Ventricular Rate 10/08/2018 84     Atrial Rate 10/08/2018 84     MS Interval 10/08/2018 152     QRSD Interval 10/08/2018 86     QT Interval 10/08/2018 364     QTC Interval 10/08/2018 430     P Axis 10/08/2018 80     QRS Axis 10/08/2018 85     T Wave Axis 10/08/2018 40         Imaging: No results found  Objective:     Physical Exam   Constitutional: She is oriented to person, place, and time  She appears well-developed and well-nourished  No distress  HENT:   Head: Normocephalic     Right Ear: External ear normal    Left Ear: External ear normal    Nose: Nose normal    Mouth/Throat: Oropharynx is clear and moist  No oropharyngeal exudate  Eyes: Pupils are equal, round, and reactive to light  Conjunctivae and EOM are normal  Right eye exhibits no discharge  Left eye exhibits no discharge  Neck: Normal range of motion  Neck supple  No thyromegaly present  Cardiovascular: Normal rate, regular rhythm and normal heart sounds  Pulmonary/Chest: Effort normal and breath sounds normal  No respiratory distress  She has no wheezes  She exhibits no tenderness  Abdominal: Soft  Bowel sounds are normal    Musculoskeletal: Normal range of motion  She exhibits no edema or tenderness  Lymphadenopathy:     She has no cervical adenopathy  Neurological: She is alert and oriented to person, place, and time  Skin: Skin is warm and dry  She is not diaphoretic  Fungal infection under toenails: R big toe, L foot all toes   Psychiatric: She has a normal mood and affect  Vitals reviewed  Patient Instructions     Get labs done and will call with results  Wellness Visit for Adults   AMBULATORY CARE:   A wellness visit  is when you see your healthcare provider to get screened for health problems  You can also get advice on how to stay healthy  Write down your questions so you remember to ask them  Ask your healthcare provider how often you should have a wellness visit  What happens at a wellness visit:  Your healthcare provider will ask about your health, and your family history of health problems  This includes high blood pressure, heart disease, and cancer  He or she will ask if you have symptoms that concern you, if you smoke, and about your mood  You may also be asked about your intake of medicines, supplements, food, and alcohol  Any of the following may be done:  · Your weight  will be checked  Your height may also be checked so your body mass index (BMI) can be calculated  Your BMI shows if you are at a healthy weight  · Your blood pressure  and heart rate will be checked  Your temperature may also be checked  · Blood and urine tests  may be done  Blood tests may be done to check your cholesterol levels  Abnormal cholesterol levels increase your risk for heart disease and stroke  You may also need a blood or urine test to check for diabetes if you are at increased risk  Urine tests may be done to look for signs of an infection or kidney disease  · A physical exam  includes checking your heartbeat and lungs with a stethoscope  Your healthcare provider may also check your skin to look for sun damage  · Screening tests  may be recommended  A screening test is done to check for diseases that may not cause symptoms  The screening tests you may need depend on your age, gender, family history, and lifestyle habits  For example, colorectal screening may be recommended if you are 48years old or older  Screening tests you need if you are a woman:   · A Pap smear  is used to screen for cervical cancer  Pap smears are usually done every 3 to 5 years depending on your age  You may need them more often if you have had abnormal Pap smear test results in the past  Ask your healthcare provider how often you should have a Pap smear  · A mammogram  is an x-ray of your breasts to screen for breast cancer  Experts recommend mammograms every 2 years starting at age 48 years  You may need a mammogram at age 52 years or younger if you have an increased risk for breast cancer  Talk to your healthcare provider about when you should start having mammograms and how often you need them  Vaccines you may need:   · Get an influenza vaccine  every year  The influenza vaccine protects you from the flu  Several types of viruses cause the flu  The viruses change over time, so new vaccines are made each year  · Get a tetanus-diphtheria (Td) booster vaccine  every 10 years  This vaccine protects you against tetanus and diphtheria  Tetanus is a severe infection that may cause painful muscle spasms and lockjaw   Diphtheria is a severe bacterial infection that causes a thick covering in the back of your mouth and throat  · Get a human papillomavirus (HPV) vaccine  if you are female and aged 23 to 32 or male 23 to 24 and never received it  This vaccine protects you from HPV infection  HPV is the most common infection spread by sexual contact  HPV may also cause vaginal, penile, and anal cancers  · Get a pneumococcal vaccine  if you are aged 72 years or older  The pneumococcal vaccine is an injection given to protect you from pneumococcal disease  Pneumococcal disease is an infection caused by pneumococcal bacteria  The infection may cause pneumonia, meningitis, or an ear infection  · Get a shingles vaccine  if you are aged 61 or older, even if you have had shingles before  The shingles vaccine is an injection to protect you from the varicella-zoster virus  This is the same virus that causes chickenpox  Shingles is a painful rash that develops in people who had chickenpox or have been exposed to the virus  How to eat healthy:  My Plate is a model for planning healthy meals  It shows the types and amounts of foods that should go on your plate  Fruits and vegetables make up about half of your plate, and grains and protein make up the other half  A serving of dairy is included on the side of your plate  The amount of calories and serving sizes you need depends on your age, gender, weight, and height  Examples of healthy foods are listed below:  · Eat a variety of vegetables  such as dark green, red, and orange vegetables  You can also include canned vegetables low in sodium (salt) and frozen vegetables without added butter or sauces  · Eat a variety of fresh fruits , canned fruit in 100% juice, frozen fruit, and dried fruit  · Include whole grains  At least half of the grains you eat should be whole grains   Examples include whole-wheat bread, wheat pasta, brown rice, and whole-grain cereals such as oatmeal     · Eat a variety of protein foods such as seafood (fish and shellfish), lean meat, and poultry without skin (turkey and chicken)  Examples of lean meats include pork leg, shoulder, or tenderloin, and beef round, sirloin, tenderloin, and extra lean ground beef  Other protein foods include eggs and egg substitutes, beans, peas, soy products, nuts, and seeds  · Choose low-fat dairy products such as skim or 1% milk or low-fat yogurt, cheese, and cottage cheese  · Limit unhealthy fats  such as butter, hard margarine, and shortening  Exercise:  Exercise at least 30 minutes per day on most days of the week  Some examples of exercise include walking, biking, dancing, and swimming  You can also fit in more physical activity by taking the stairs instead of the elevator or parking farther away from stores  Include muscle strengthening activities 2 days each week  Regular exercise provides many health benefits  It helps you manage your weight, and decreases your risk for type 2 diabetes, heart disease, stroke, and high blood pressure  Exercise can also help improve your mood  Ask your healthcare provider about the best exercise plan for you  General health and safety guidelines:   · Do not smoke  Nicotine and other chemicals in cigarettes and cigars can cause lung damage  Ask your healthcare provider for information if you currently smoke and need help to quit  E-cigarettes or smokeless tobacco still contain nicotine  Talk to your healthcare provider before you use these products  · Limit alcohol  A drink of alcohol is 12 ounces of beer, 5 ounces of wine, or 1½ ounces of liquor  · Lose weight, if needed  Being overweight increases your risk of certain health conditions  These include heart disease, high blood pressure, type 2 diabetes, and certain types of cancer  · Protect your skin  Do not sunbathe or use tanning beds  Use sunscreen with a SPF 15 or higher  Apply sunscreen at least 15 minutes before you go outside  Reapply sunscreen every 2 hours  Wear protective clothing, hats, and sunglasses when you are outside  · Drive safely  Always wear your seatbelt  Make sure everyone in your car wears a seatbelt  A seatbelt can save your life if you are in an accident  Do not use your cell phone when you are driving  This could distract you and cause an accident  Pull over if you need to make a call or send a text message  · Practice safe sex  Use latex condoms if are sexually active and have more than one partner  Your healthcare provider may recommend screening tests for sexually transmitted infections (STIs)  · Wear helmets, lifejackets, and protective gear  Always wear a helmet when you ride a bike or motorcycle, go skiing, or play sports that could cause a head injury  Wear protective equipment when you play sports  Wear a lifejacket when you are on a boat or doing water sports  © 2017 [address] [facility] Information is for End User's use only and may not be sold, redistributed or otherwise used for commercial purposes  All illustrations and images included in CareNotes® are the copyrighted property of A D A M [employer]  or Butch Aparicio  The above information is an  only  It is not intended as medical advice for individual conditions or treatments  Talk to your doctor, nurse or pharmacist before following any medical regimen to see if it is safe and effective for you  LENI Campuzano    Portions of the record may have been created with voice recognition software  Occasional wrong word or "sound a like" substitutions may have occurred due to the inherent limitations of voice recognition software  Read the chart carefully and recognize, using context, where substitutions have occurred

## 2019-12-27 NOTE — PATIENT INSTRUCTIONS
Get labs done and will call with results  Wellness Visit for Adults   AMBULATORY CARE:   A wellness visit  is when you see your healthcare provider to get screened for health problems  You can also get advice on how to stay healthy  Write down your questions so you remember to ask them  Ask your healthcare provider how often you should have a wellness visit  What happens at a wellness visit:  Your healthcare provider will ask about your health, and your family history of health problems  This includes high blood pressure, heart disease, and cancer  He or she will ask if you have symptoms that concern you, if you smoke, and about your mood  You may also be asked about your intake of medicines, supplements, food, and alcohol  Any of the following may be done:  · Your weight  will be checked  Your height may also be checked so your body mass index (BMI) can be calculated  Your BMI shows if you are at a healthy weight  · Your blood pressure  and heart rate will be checked  Your temperature may also be checked  · Blood and urine tests  may be done  Blood tests may be done to check your cholesterol levels  Abnormal cholesterol levels increase your risk for heart disease and stroke  You may also need a blood or urine test to check for diabetes if you are at increased risk  Urine tests may be done to look for signs of an infection or kidney disease  · A physical exam  includes checking your heartbeat and lungs with a stethoscope  Your healthcare provider may also check your skin to look for sun damage  · Screening tests  may be recommended  A screening test is done to check for diseases that may not cause symptoms  The screening tests you may need depend on your age, gender, family history, and lifestyle habits  For example, colorectal screening may be recommended if you are 48years old or older  Screening tests you need if you are a woman:   · A Pap smear  is used to screen for cervical cancer   Pap smears are usually done every 3 to 5 years depending on your age  You may need them more often if you have had abnormal Pap smear test results in the past  Ask your healthcare provider how often you should have a Pap smear  · A mammogram  is an x-ray of your breasts to screen for breast cancer  Experts recommend mammograms every 2 years starting at age 48 years  You may need a mammogram at age 52 years or younger if you have an increased risk for breast cancer  Talk to your healthcare provider about when you should start having mammograms and how often you need them  Vaccines you may need:   · Get an influenza vaccine  every year  The influenza vaccine protects you from the flu  Several types of viruses cause the flu  The viruses change over time, so new vaccines are made each year  · Get a tetanus-diphtheria (Td) booster vaccine  every 10 years  This vaccine protects you against tetanus and diphtheria  Tetanus is a severe infection that may cause painful muscle spasms and lockjaw  Diphtheria is a severe bacterial infection that causes a thick covering in the back of your mouth and throat  · Get a human papillomavirus (HPV) vaccine  if you are female and aged 23 to 32 or male 23 to 24 and never received it  This vaccine protects you from HPV infection  HPV is the most common infection spread by sexual contact  HPV may also cause vaginal, penile, and anal cancers  · Get a pneumococcal vaccine  if you are aged 72 years or older  The pneumococcal vaccine is an injection given to protect you from pneumococcal disease  Pneumococcal disease is an infection caused by pneumococcal bacteria  The infection may cause pneumonia, meningitis, or an ear infection  · Get a shingles vaccine  if you are aged 61 or older, even if you have had shingles before  The shingles vaccine is an injection to protect you from the varicella-zoster virus  This is the same virus that causes chickenpox   Shingles is a painful rash that develops in people who had chickenpox or have been exposed to the virus  How to eat healthy:  My Plate is a model for planning healthy meals  It shows the types and amounts of foods that should go on your plate  Fruits and vegetables make up about half of your plate, and grains and protein make up the other half  A serving of dairy is included on the side of your plate  The amount of calories and serving sizes you need depends on your age, gender, weight, and height  Examples of healthy foods are listed below:  · Eat a variety of vegetables  such as dark green, red, and orange vegetables  You can also include canned vegetables low in sodium (salt) and frozen vegetables without added butter or sauces  · Eat a variety of fresh fruits , canned fruit in 100% juice, frozen fruit, and dried fruit  · Include whole grains  At least half of the grains you eat should be whole grains  Examples include whole-wheat bread, wheat pasta, brown rice, and whole-grain cereals such as oatmeal     · Eat a variety of protein foods such as seafood (fish and shellfish), lean meat, and poultry without skin (turkey and chicken)  Examples of lean meats include pork leg, shoulder, or tenderloin, and beef round, sirloin, tenderloin, and extra lean ground beef  Other protein foods include eggs and egg substitutes, beans, peas, soy products, nuts, and seeds  · Choose low-fat dairy products such as skim or 1% milk or low-fat yogurt, cheese, and cottage cheese  · Limit unhealthy fats  such as butter, hard margarine, and shortening  Exercise:  Exercise at least 30 minutes per day on most days of the week  Some examples of exercise include walking, biking, dancing, and swimming  You can also fit in more physical activity by taking the stairs instead of the elevator or parking farther away from stores  Include muscle strengthening activities 2 days each week  Regular exercise provides many health benefits   It helps you manage your weight, and decreases your risk for type 2 diabetes, heart disease, stroke, and high blood pressure  Exercise can also help improve your mood  Ask your healthcare provider about the best exercise plan for you  General health and safety guidelines:   · Do not smoke  Nicotine and other chemicals in cigarettes and cigars can cause lung damage  Ask your healthcare provider for information if you currently smoke and need help to quit  E-cigarettes or smokeless tobacco still contain nicotine  Talk to your healthcare provider before you use these products  · Limit alcohol  A drink of alcohol is 12 ounces of beer, 5 ounces of wine, or 1½ ounces of liquor  · Lose weight, if needed  Being overweight increases your risk of certain health conditions  These include heart disease, high blood pressure, type 2 diabetes, and certain types of cancer  · Protect your skin  Do not sunbathe or use tanning beds  Use sunscreen with a SPF 15 or higher  Apply sunscreen at least 15 minutes before you go outside  Reapply sunscreen every 2 hours  Wear protective clothing, hats, and sunglasses when you are outside  · Drive safely  Always wear your seatbelt  Make sure everyone in your car wears a seatbelt  A seatbelt can save your life if you are in an accident  Do not use your cell phone when you are driving  This could distract you and cause an accident  Pull over if you need to make a call or send a text message  · Practice safe sex  Use latex condoms if are sexually active and have more than one partner  Your healthcare provider may recommend screening tests for sexually transmitted infections (STIs)  · Wear helmets, lifejackets, and protective gear  Always wear a helmet when you ride a bike or motorcycle, go skiing, or play sports that could cause a head injury  Wear protective equipment when you play sports  Wear a lifejacket when you are on a boat or doing water sports    © 2017 [facility] [address] [facility] is for End User's use only and may not be sold, redistributed or otherwise used for commercial purposes  All illustrations and images included in CareNotes® are the copyrighted property of A D A M , Inc  or Butch Aparicio  The above information is an  only  It is not intended as medical advice for individual conditions or treatments  Talk to your doctor, nurse or pharmacist before following any medical regimen to see if it is safe and effective for you

## 2019-12-31 RX ORDER — NORETHINDRONE ACETATE AND ETHINYL ESTRADIOL 1MG-20(24)
KIT ORAL
Qty: 84 TABLET | Refills: 0 | OUTPATIENT
Start: 2019-12-31

## 2020-02-05 ENCOUNTER — TELEPHONE (OUTPATIENT)
Dept: FAMILY MEDICINE CLINIC | Facility: CLINIC | Age: 40
End: 2020-02-05

## 2020-02-05 NOTE — TELEPHONE ENCOUNTER
Patient received a bill for DOS 12/27/2019  The insurance stated the code needs to be changed to be covered  Stated they will call back once they call the insurance  TRIED TO CONTACT AGAIN TODAY-- TEL IS ALWAYS BUSY

## 2020-02-06 NOTE — TELEPHONE ENCOUNTER
Patient came into office and stated she got a bill for for DOS 12/27/2019  She called her insurance and they stated we billed it as a wellness visit  In order for it to be covered the 1st code needs to be the general visit with abnormal findings  Jennifer's call back # [phone]

## 2020-06-01 DIAGNOSIS — Z30.011 ENCOUNTER FOR INITIAL PRESCRIPTION OF CONTRACEPTIVE PILLS: ICD-10-CM

## 2020-11-16 DIAGNOSIS — Z30.011 ENCOUNTER FOR INITIAL PRESCRIPTION OF CONTRACEPTIVE PILLS: ICD-10-CM

## 2020-12-28 ENCOUNTER — OFFICE VISIT (OUTPATIENT)
Dept: FAMILY MEDICINE CLINIC | Facility: CLINIC | Age: 40
End: 2020-12-28
Payer: COMMERCIAL

## 2020-12-28 VITALS
WEIGHT: 142 LBS | HEIGHT: 62 IN | TEMPERATURE: 97.8 F | SYSTOLIC BLOOD PRESSURE: 112 MMHG | HEART RATE: 80 BPM | OXYGEN SATURATION: 98 % | BODY MASS INDEX: 26.13 KG/M2 | DIASTOLIC BLOOD PRESSURE: 74 MMHG

## 2020-12-28 DIAGNOSIS — Z00.00 HEALTHCARE MAINTENANCE: ICD-10-CM

## 2020-12-28 DIAGNOSIS — Z00.00 ANNUAL PHYSICAL EXAM: Primary | ICD-10-CM

## 2020-12-28 DIAGNOSIS — J45.20 MILD INTERMITTENT ASTHMA WITHOUT COMPLICATION: ICD-10-CM

## 2020-12-28 DIAGNOSIS — Z12.31 ENCOUNTER FOR SCREENING MAMMOGRAM FOR MALIGNANT NEOPLASM OF BREAST: ICD-10-CM

## 2020-12-28 DIAGNOSIS — Z23 NEED FOR INFLUENZA VACCINATION: ICD-10-CM

## 2020-12-28 PROBLEM — B35.1 FUNGAL TOENAIL INFECTION: Status: RESOLVED | Noted: 2019-12-27 | Resolved: 2020-12-28

## 2020-12-28 PROCEDURE — 3725F SCREEN DEPRESSION PERFORMED: CPT | Performed by: NURSE PRACTITIONER

## 2020-12-28 PROCEDURE — 3008F BODY MASS INDEX DOCD: CPT | Performed by: NURSE PRACTITIONER

## 2020-12-28 PROCEDURE — 90686 IIV4 VACC NO PRSV 0.5 ML IM: CPT | Performed by: NURSE PRACTITIONER

## 2020-12-28 PROCEDURE — 99395 PREV VISIT EST AGE 18-39: CPT | Performed by: NURSE PRACTITIONER

## 2020-12-28 PROCEDURE — 90471 IMMUNIZATION ADMIN: CPT | Performed by: NURSE PRACTITIONER

## 2020-12-28 PROCEDURE — 1036F TOBACCO NON-USER: CPT | Performed by: NURSE PRACTITIONER

## 2020-12-28 RX ORDER — ALBUTEROL SULFATE 90 UG/1
2 AEROSOL, METERED RESPIRATORY (INHALATION) EVERY 6 HOURS PRN
Qty: 1 INHALER | Refills: 2 | Status: SHIPPED | OUTPATIENT
Start: 2020-12-28 | End: 2020-12-28 | Stop reason: SDUPTHER

## 2020-12-28 RX ORDER — ALBUTEROL SULFATE 90 UG/1
2 AEROSOL, METERED RESPIRATORY (INHALATION) EVERY 6 HOURS PRN
Qty: 1 INHALER | Refills: 2 | Status: SHIPPED | OUTPATIENT
Start: 2020-12-28 | End: 2021-03-23

## 2021-01-08 ENCOUNTER — TELEMEDICINE (OUTPATIENT)
Dept: FAMILY MEDICINE CLINIC | Facility: CLINIC | Age: 41
End: 2021-01-08
Payer: COMMERCIAL

## 2021-01-08 DIAGNOSIS — J20.9 BRONCHITIS WITH ASTHMA, ACUTE: Primary | ICD-10-CM

## 2021-01-08 DIAGNOSIS — J45.909 BRONCHITIS WITH ASTHMA, ACUTE: Primary | ICD-10-CM

## 2021-01-08 DIAGNOSIS — J45.20 MILD INTERMITTENT ASTHMA WITHOUT COMPLICATION: ICD-10-CM

## 2021-01-08 PROCEDURE — 99213 OFFICE O/P EST LOW 20 MIN: CPT | Performed by: NURSE PRACTITIONER

## 2021-01-08 RX ORDER — BUDESONIDE AND FORMOTEROL FUMARATE DIHYDRATE 80; 4.5 UG/1; UG/1
2 AEROSOL RESPIRATORY (INHALATION) 2 TIMES DAILY
Qty: 1 INHALER | Refills: 2 | Status: SHIPPED | OUTPATIENT
Start: 2021-01-08 | End: 2022-01-13 | Stop reason: SDUPTHER

## 2021-01-08 RX ORDER — PREDNISONE 20 MG/1
40 TABLET ORAL DAILY
Qty: 10 TABLET | Refills: 0 | Status: SHIPPED | OUTPATIENT
Start: 2021-01-08 | End: 2021-01-13

## 2021-01-08 NOTE — PROGRESS NOTES
Virtual Regular Visit      Assessment/Plan:    Problem List Items Addressed This Visit        Respiratory    Mild intermittent asthma without complication    Relevant Medications    budesonide-formoterol (SYMBICORT) 80-4 5 MCG/ACT inhaler      Other Visit Diagnoses     Bronchitis with asthma, acute    -  Primary    Will treat with prednisone for 5 days  Advised to continue albuterol inhaler as needed  Relevant Medications    predniSONE 20 mg tablet    budesonide-formoterol (SYMBICORT) 80-4 5 MCG/ACT inhaler               Reason for visit is   Chief Complaint   Patient presents with    Virtual Regular Visit        Encounter provider LENI Landis    Provider located at [facility] [id number] [facility]  [address] [place] [id number] [phone]      Recent Visits  No visits were found meeting these conditions  Showing recent visits within past 7 days and meeting all other requirements     Today's Visits  Date Type Provider Dept   01/08/21 Telemedicine LENI Campuzano [facility] Sharlene 8 today's visits and meeting all other requirements     Future Appointments  No visits were found meeting these conditions  Showing future appointments within next 150 days and meeting all other requirements        The patient was identified by name and date of birth  Parish Fuentes was informed that this is a telemedicine visit and that the visit is being conducted through [employer] and patient was informed that this is not a secure, HIPAA-compliant platform  She agrees to proceed     My office door was closed  No one else was in the room  She acknowledged consent and understanding of privacy and security of the video platform  The patient has agreed to participate and understands they can discontinue the visit at any time  Patient is aware this is a billable service       Subjective  Parish Fuentes is a 45 y o  female presenting via [address] [place] [address] sick visit  Patient states that she started about 2 days ago with dizziness, headaches, asthma exacerbation, chest pain  She did stay home from work today as she was not feeling well  Patient denies fevers, chills  She believes this may be attributed to recently starting on Advair  Jorge Duncan HPI     Past Medical History:   Diagnosis Date    Asthma     not as adult    History of one miscarriage     2017 10 weeks    Renal cyst     Varicella        Past Surgical History:   Procedure Laterality Date     SECTION      2004 son,    daughter,    daughter   [place] N/A 2018    Procedure:  SECTION () REPEAT;  Surgeon: Eduarda Armstrong DO;  Location: [facility];  Service: Obstetrics       Current Outpatient Medications   Medication Sig Dispense Refill    albuterol (PROVENTIL HFA,VENTOLIN HFA) 90 mcg/act inhaler Inhale 2 puffs every 6 (six) hours as needed for wheezing or shortness of breath 1 Inhaler 2    budesonide-formoterol (SYMBICORT) 80-4 5 MCG/ACT inhaler Inhale 2 puffs 2 (two) times a day Rinse mouth after use  1 Inhaler 2    ciclopirox (PENLAC) 8 % solution Apply topically daily at bedtime 6 6 mL 0    Docosahexaenoic Acid (PRENATAL DHA) 200 MG CAPS Take by mouth      ferrous sulfate 324 (65 Fe) mg Take 1 tablet (324 mg total) by mouth 2 (two) times a day before meals 60 tablet 2    Loratadine (CLARITIN) 10 MG CAPS Take by mouth      predniSONE 20 mg tablet Take 2 tablets (40 mg total) by mouth daily for 5 days 10 tablet 0    Sprintec 28 0 25-35 MG-MCG per tablet TAKE 1 TABLET BY MOUTH EVERY DAY (Patient not taking: Reported on 2020) 84 tablet 1     No current facility-administered medications for this visit  Allergies   Allergen Reactions    Iron      Gold label only       Review of Systems   Constitutional: Negative for chills and fever  HENT: Negative for ear pain and sore throat      Eyes: Negative for pain and visual disturbance  Respiratory: Positive for chest tightness  Negative for cough, shortness of breath and wheezing  Cardiovascular: Positive for chest pain  Negative for palpitations  Gastrointestinal: Negative for abdominal pain and vomiting  Genitourinary: Negative for dysuria and hematuria  Musculoskeletal: Negative for arthralgias and back pain  Skin: Negative for color change and rash  Neurological: Positive for dizziness and headaches  Negative for seizures and syncope  All other systems reviewed and are negative  Video Exam    There were no vitals filed for this visit  Physical Exam  Constitutional:       General: She is not in acute distress  Pulmonary:      Effort: Pulmonary effort is normal    Neurological:      Mental Status: She is alert and oriented to person, place, and time  I spent 15 minutes directly with the patient during this visit      VIRTUAL VISIT DISCLAIMER    Laura Stewart acknowledges that she has consented to an online visit or consultation  She understands that the online visit is based solely on information provided by her, and that, in the absence of a face-to-face physical evaluation by the physician, the diagnosis she receives is both limited and provisional in terms of accuracy and completeness  This is not intended to replace a full medical face-to-face evaluation by the physician  Laura Stewart understands and accepts these terms

## 2021-02-20 NOTE — PROGRESS NOTES
PFT and ECHO are both wnl  Is her shortness of breath still present?  If so, I would have her see Pulmonary
Patient

## 2021-03-02 DIAGNOSIS — Z30.011 ENCOUNTER FOR INITIAL PRESCRIPTION OF CONTRACEPTIVE PILLS: ICD-10-CM

## 2021-03-20 DIAGNOSIS — J45.20 MILD INTERMITTENT ASTHMA WITHOUT COMPLICATION: ICD-10-CM

## 2021-03-23 RX ORDER — ALBUTEROL SULFATE 90 UG/1
AEROSOL, METERED RESPIRATORY (INHALATION)
Qty: 6.7 INHALER | Refills: 2 | Status: SHIPPED | OUTPATIENT
Start: 2021-03-23 | End: 2021-12-28 | Stop reason: SDUPTHER

## 2021-04-19 ENCOUNTER — TELEPHONE (OUTPATIENT)
Dept: FAMILY MEDICINE CLINIC | Facility: CLINIC | Age: 41
End: 2021-04-19

## 2021-04-19 ENCOUNTER — APPOINTMENT (OUTPATIENT)
Dept: LAB | Facility: HOSPITAL | Age: 41
End: 2021-04-19
Payer: COMMERCIAL

## 2021-04-19 DIAGNOSIS — Z00.00 HEALTHCARE MAINTENANCE: ICD-10-CM

## 2021-04-19 DIAGNOSIS — R51.9 NONINTRACTABLE HEADACHE, UNSPECIFIED CHRONICITY PATTERN, UNSPECIFIED HEADACHE TYPE: ICD-10-CM

## 2021-04-19 DIAGNOSIS — T50.Z95A ADVERSE EFFECT OF VACCINE, INITIAL ENCOUNTER: ICD-10-CM

## 2021-04-19 DIAGNOSIS — R51.9 NONINTRACTABLE HEADACHE, UNSPECIFIED CHRONICITY PATTERN, UNSPECIFIED HEADACHE TYPE: Primary | ICD-10-CM

## 2021-04-19 DIAGNOSIS — O99.019 ANEMIA IN PREGNANCY: ICD-10-CM

## 2021-04-19 DIAGNOSIS — D50.9 IRON DEFICIENCY ANEMIA, UNSPECIFIED IRON DEFICIENCY ANEMIA TYPE: ICD-10-CM

## 2021-04-19 DIAGNOSIS — D50.9 IRON DEFICIENCY ANEMIA, UNSPECIFIED IRON DEFICIENCY ANEMIA TYPE: Primary | ICD-10-CM

## 2021-04-19 LAB
ALBUMIN SERPL BCP-MCNC: 3.5 G/DL (ref 3.5–5)
ALP SERPL-CCNC: 87 U/L (ref 46–116)
ALT SERPL W P-5'-P-CCNC: 31 U/L (ref 12–78)
ANION GAP SERPL CALCULATED.3IONS-SCNC: 9 MMOL/L (ref 4–13)
AST SERPL W P-5'-P-CCNC: 30 U/L (ref 5–45)
BASOPHILS # BLD AUTO: 0.07 THOUSANDS/ΜL (ref 0–0.1)
BASOPHILS NFR BLD AUTO: 1 % (ref 0–1)
BILIRUB SERPL-MCNC: 0.2 MG/DL (ref 0.2–1)
BUN SERPL-MCNC: 14 MG/DL (ref 5–25)
CALCIUM SERPL-MCNC: 8.3 MG/DL (ref 8.3–10.1)
CHLORIDE SERPL-SCNC: 104 MMOL/L (ref 100–108)
CHOLEST SERPL-MCNC: 141 MG/DL (ref 50–200)
CO2 SERPL-SCNC: 26 MMOL/L (ref 21–32)
CREAT SERPL-MCNC: 0.67 MG/DL (ref 0.6–1.3)
D DIMER PPP FEU-MCNC: 1 UG/ML FEU
EOSINOPHIL # BLD AUTO: 0.65 THOUSAND/ΜL (ref 0–0.61)
EOSINOPHIL NFR BLD AUTO: 9 % (ref 0–6)
ERYTHROCYTE [DISTWIDTH] IN BLOOD BY AUTOMATED COUNT: 19.9 % (ref 11.6–15.1)
GFR SERPL CREATININE-BSD FRML MDRD: 110 ML/MIN/1.73SQ M
GLUCOSE P FAST SERPL-MCNC: 106 MG/DL (ref 65–99)
HCT VFR BLD AUTO: 31.1 % (ref 34.8–46.1)
HDLC SERPL-MCNC: 28 MG/DL
HGB BLD-MCNC: 9 G/DL (ref 11.5–15.4)
IMM GRANULOCYTES # BLD AUTO: 0.03 THOUSAND/UL (ref 0–0.2)
IMM GRANULOCYTES NFR BLD AUTO: 0 % (ref 0–2)
LDLC SERPL CALC-MCNC: 95 MG/DL (ref 0–100)
LYMPHOCYTES # BLD AUTO: 3.05 THOUSANDS/ΜL (ref 0.6–4.47)
LYMPHOCYTES NFR BLD AUTO: 40 % (ref 14–44)
MCH RBC QN AUTO: 20.5 PG (ref 26.8–34.3)
MCHC RBC AUTO-ENTMCNC: 28.9 G/DL (ref 31.4–37.4)
MCV RBC AUTO: 71 FL (ref 82–98)
MONOCYTES # BLD AUTO: 0.68 THOUSAND/ΜL (ref 0.17–1.22)
MONOCYTES NFR BLD AUTO: 9 % (ref 4–12)
NEUTROPHILS # BLD AUTO: 3.16 THOUSANDS/ΜL (ref 1.85–7.62)
NEUTS SEG NFR BLD AUTO: 41 % (ref 43–75)
NONHDLC SERPL-MCNC: 113 MG/DL
NRBC BLD AUTO-RTO: 0 /100 WBCS
PLATELET # BLD AUTO: 484 THOUSANDS/UL (ref 149–390)
PMV BLD AUTO: 9.3 FL (ref 8.9–12.7)
POTASSIUM SERPL-SCNC: 4.1 MMOL/L (ref 3.5–5.3)
PROT SERPL-MCNC: 8.2 G/DL (ref 6.4–8.2)
RBC # BLD AUTO: 4.39 MILLION/UL (ref 3.81–5.12)
SODIUM SERPL-SCNC: 139 MMOL/L (ref 136–145)
TRIGL SERPL-MCNC: 88 MG/DL
TSH SERPL DL<=0.05 MIU/L-ACNC: 1.95 UIU/ML (ref 0.36–3.74)
WBC # BLD AUTO: 7.64 THOUSAND/UL (ref 4.31–10.16)

## 2021-04-19 PROCEDURE — 36415 COLL VENOUS BLD VENIPUNCTURE: CPT

## 2021-04-19 PROCEDURE — 80061 LIPID PANEL: CPT

## 2021-04-19 PROCEDURE — 84443 ASSAY THYROID STIM HORMONE: CPT

## 2021-04-19 PROCEDURE — 85025 COMPLETE CBC W/AUTO DIFF WBC: CPT

## 2021-04-19 PROCEDURE — 80053 COMPREHEN METABOLIC PANEL: CPT

## 2021-04-19 PROCEDURE — 85379 FIBRIN DEGRADATION QUANT: CPT

## 2021-04-19 RX ORDER — FERROUS SULFATE TAB EC 324 MG (65 MG FE EQUIVALENT) 324 (65 FE) MG
324 TABLET DELAYED RESPONSE ORAL
Qty: 60 TABLET | Refills: 2 | Status: SHIPPED | OUTPATIENT
Start: 2021-04-19 | End: 2021-04-19 | Stop reason: ALTCHOICE

## 2021-04-19 RX ORDER — FERROUS SULFATE TAB EC 324 MG (65 MG FE EQUIVALENT) 324 (65 FE) MG
324 TABLET DELAYED RESPONSE ORAL
Qty: 60 TABLET | Refills: 1 | Status: SHIPPED | OUTPATIENT
Start: 2021-04-19 | End: 2021-05-11

## 2021-04-19 NOTE — TELEPHONE ENCOUNTER
She had the J&J vaccine on 4/11/2021  She started with nausea last night and woke up with the headache this morning  Taking extra strength tylenol, not doing a thing  It's just on the back of her mind about the vaccine  She does have asthma and allergies  Please advise

## 2021-04-19 NOTE — TELEPHONE ENCOUNTER
Called and spoke to patient about lab results  Patient does have history of ARNOL during pregnancy  She has not been on iron replacement in 3 years  States VICENTE is nothing out of the norm for her allergies  She was concerned due to recent COVID-19 vaccine  Advised if neuro changes, will go to ER  Patient understood and agreed to plan  Iron BID, repeat CBC in 1 month and f/u

## 2021-05-11 DIAGNOSIS — D50.9 IRON DEFICIENCY ANEMIA, UNSPECIFIED IRON DEFICIENCY ANEMIA TYPE: ICD-10-CM

## 2021-05-11 RX ORDER — FERROUS SULFATE TAB EC 324 MG (65 MG FE EQUIVALENT) 324 (65 FE) MG
TABLET DELAYED RESPONSE ORAL
Qty: 60 TABLET | Refills: 1 | Status: SHIPPED | OUTPATIENT
Start: 2021-05-11 | End: 2021-06-08

## 2021-06-07 DIAGNOSIS — D50.9 IRON DEFICIENCY ANEMIA, UNSPECIFIED IRON DEFICIENCY ANEMIA TYPE: ICD-10-CM

## 2021-06-08 RX ORDER — FERROUS SULFATE TAB EC 324 MG (65 MG FE EQUIVALENT) 324 (65 FE) MG
TABLET DELAYED RESPONSE ORAL
Qty: 60 TABLET | Refills: 1 | Status: SHIPPED | OUTPATIENT
Start: 2021-06-08 | End: 2021-07-09

## 2021-06-14 NOTE — PROGRESS NOTES
Please call family.    Concussion  contacted me asking for an updated note prior to Friday's appointment.  Please call and ask what is needed?    Thanks,  Liberty Medrano MD   Progress Note - OB/GYN  Kasi Reynolds 41 y o  female MRN: [id number]  Unit/Bed#: -01 Encounter: [id number]      A/P: POD # 1 s/p repeat low transverse  section with left uterine artery ligation    1) Acute blood loss anemia   - Hgb 10 4 --> 8 4   - Patient asymptomatic   - PO ferrous sulfate, BID  2) Continue routine post-op care   - Voiding trial today   - Encourage ambulation    - Encourage breastfeeding   - Anticipate discharge 2018            ______________      Subjective:  Patient doing well this morning  No complaints at this time    Pain: well controlled  Tolerating PO: yes  Voiding: aaron removed, will attempt to void this morning  Flatus: yes  BM: no  Ambulating: yes  Breastfeeding:  yes  Chest pain: no  Shortness of breath: no  Leg pain: no  Lochia: minimal    Vitals:   BP 90/50 (BP Location: Right arm)   Pulse 71   Temp 98 2 °F (36 8 °C) (Oral)   Resp 18   Ht 5' 2" (1 575 m)   Wt 71 7 kg (158 lb)   LMP 10/19/2017   SpO2 99%   Breastfeeding?  Yes   BMI 28 90 kg/m²       Intake/Output Summary (Last 24 hours) at 18 0640  Last data filed at 18 0456   Gross per 24 hour   Intake          4866 92 ml   Output             2500 ml   Net          2366 92 ml       Physical Exam:   GEN: Kasi Reynolds appears well, alert and oriented x 3, pleasant and cooperative   ABDOMEN: soft, no tenderness, no distention, fundus @ -1, Incision C/D/I  EXTREMITIES: SCDs on      Labs:   Admission on 2018   Component Date Value    WBC 2018 7 99     RBC 2018 4 42     Hemoglobin 2018 10 4*    Hematocrit 2018 35 1     MCV 2018 79*    MCH 2018 23 5*    MCHC 2018 29 6*    RDW 2018 16 4*    Platelets  345     MPV 2018 11 7     ABO Grouping 2018 O     Rh Factor 2018 Positive     Antibody Screen 2018 Negative     Specimen Expiration Date 2018 [id number]     pH, Cord Justen 2018 7 277     pCO2, Cord Justen 2018 54 2*    pO2, Cord Justen 2018 20 2     HCO3, Cord Justen 2018 24 7    [facility], Cord Justen 2018 -2 9*    O2 Cont, Cord Justen 2018 9 5     O2 HGB,VENOUS CORD 2018 45 8     pH, Cord Art 2018 7  241     pCO2, Cord Art 2018 58 7     pO2, Cord Art 2018 17 2     HCO3, Cord Art 2018 24 6     Base Exc, Cord Art 2018 -3 7*    O2 Content, Cord Art 2018 6 8     O2 Hgb, Arterial Cord 2018 33 7     WBC 2018 8 83     RBC 2018 3 51*    Hemoglobin 2018 8 4*    Hematocrit 2018 28 4*    MCV 2018 81*    MCH 2018 23 9*    MCHC 2018 29 6*    RDW 2018 16 5*    Platelets  277     MPV 2018 11 8          Patient Active Problem List   Diagnosis    History of 3  sections    S/P repeat low transverse           Alexsandra Montgomery MD  2018  6:40 AM

## 2021-07-09 DIAGNOSIS — D50.9 IRON DEFICIENCY ANEMIA, UNSPECIFIED IRON DEFICIENCY ANEMIA TYPE: ICD-10-CM

## 2021-07-09 RX ORDER — FERROUS SULFATE TAB EC 324 MG (65 MG FE EQUIVALENT) 324 (65 FE) MG
TABLET DELAYED RESPONSE ORAL
Qty: 60 TABLET | Refills: 1 | Status: SHIPPED | OUTPATIENT
Start: 2021-07-09 | End: 2021-08-14

## 2021-08-19 DIAGNOSIS — Z30.011 ENCOUNTER FOR INITIAL PRESCRIPTION OF CONTRACEPTIVE PILLS: ICD-10-CM

## 2021-08-19 RX ORDER — NORGESTIMATE AND ETHINYL ESTRADIOL 0.25-0.035
KIT ORAL
Qty: 84 TABLET | Refills: 1 | Status: SHIPPED | OUTPATIENT
Start: 2021-08-19 | End: 2021-12-28 | Stop reason: ALTCHOICE

## 2021-08-28 DIAGNOSIS — D50.9 IRON DEFICIENCY ANEMIA, UNSPECIFIED IRON DEFICIENCY ANEMIA TYPE: ICD-10-CM

## 2021-08-28 RX ORDER — FERROUS SULFATE TAB EC 324 MG (65 MG FE EQUIVALENT) 324 (65 FE) MG
TABLET DELAYED RESPONSE ORAL
Qty: 180 TABLET | Refills: 1 | Status: SHIPPED | OUTPATIENT
Start: 2021-08-28 | End: 2022-01-13 | Stop reason: SDUPTHER

## 2021-12-28 ENCOUNTER — OFFICE VISIT (OUTPATIENT)
Dept: FAMILY MEDICINE CLINIC | Facility: CLINIC | Age: 41
End: 2021-12-28
Payer: COMMERCIAL

## 2021-12-28 VITALS
DIASTOLIC BLOOD PRESSURE: 78 MMHG | BODY MASS INDEX: 26.13 KG/M2 | HEIGHT: 62 IN | TEMPERATURE: 97 F | SYSTOLIC BLOOD PRESSURE: 118 MMHG | HEART RATE: 85 BPM | WEIGHT: 142 LBS | OXYGEN SATURATION: 95 %

## 2021-12-28 DIAGNOSIS — J45.21 MILD INTERMITTENT ASTHMA WITH ACUTE EXACERBATION: Primary | ICD-10-CM

## 2021-12-28 DIAGNOSIS — D50.9 IRON DEFICIENCY ANEMIA, UNSPECIFIED IRON DEFICIENCY ANEMIA TYPE: ICD-10-CM

## 2021-12-28 DIAGNOSIS — Z00.00 HEALTHCARE MAINTENANCE: ICD-10-CM

## 2021-12-28 PROCEDURE — 99214 OFFICE O/P EST MOD 30 MIN: CPT | Performed by: NURSE PRACTITIONER

## 2021-12-28 RX ORDER — PREDNISONE 10 MG/1
TABLET ORAL
Qty: 52 TABLET | Refills: 0 | Status: SHIPPED | OUTPATIENT
Start: 2021-12-28 | End: 2022-01-13 | Stop reason: ALTCHOICE

## 2021-12-28 RX ORDER — ALBUTEROL SULFATE 90 UG/1
2 AEROSOL, METERED RESPIRATORY (INHALATION) EVERY 6 HOURS PRN
Qty: 18 G | Refills: 3 | Status: SHIPPED | OUTPATIENT
Start: 2021-12-28

## 2021-12-28 RX ORDER — ALBUTEROL SULFATE 2.5 MG/3ML
2.5 SOLUTION RESPIRATORY (INHALATION) EVERY 6 HOURS PRN
Qty: 180 ML | Refills: 5 | Status: SHIPPED | OUTPATIENT
Start: 2021-12-28

## 2022-01-03 ENCOUNTER — HOSPITAL ENCOUNTER (OUTPATIENT)
Dept: MAMMOGRAPHY | Facility: CLINIC | Age: 42
Discharge: HOME | End: 2022-01-03
Payer: COMMERCIAL

## 2022-01-03 VITALS — BODY MASS INDEX: 26.13 KG/M2 | WEIGHT: 142 LBS | HEIGHT: 62 IN

## 2022-01-03 DIAGNOSIS — Z12.31 ENCOUNTER FOR SCREENING MAMMOGRAM FOR MALIGNANT NEOPLASM OF BREAST: ICD-10-CM

## 2022-01-03 PROCEDURE — 77063 BREAST TOMOSYNTHESIS BI: CPT

## 2022-01-03 PROCEDURE — 77067 SCR MAMMO BI INCL CAD: CPT

## 2022-01-11 ENCOUNTER — APPOINTMENT (OUTPATIENT)
Dept: LAB | Facility: HOSPITAL | Age: 42
End: 2022-01-11
Payer: COMMERCIAL

## 2022-01-11 DIAGNOSIS — D50.9 IRON DEFICIENCY ANEMIA, UNSPECIFIED IRON DEFICIENCY ANEMIA TYPE: ICD-10-CM

## 2022-01-11 DIAGNOSIS — Z00.00 HEALTHCARE MAINTENANCE: ICD-10-CM

## 2022-01-11 LAB
ALBUMIN SERPL BCP-MCNC: 3.6 G/DL (ref 3.5–5)
ALP SERPL-CCNC: 110 U/L (ref 46–116)
ALT SERPL W P-5'-P-CCNC: 39 U/L (ref 12–78)
ANION GAP SERPL CALCULATED.3IONS-SCNC: 9 MMOL/L (ref 4–13)
AST SERPL W P-5'-P-CCNC: 28 U/L (ref 5–45)
BASOPHILS # BLD AUTO: 0.08 THOUSANDS/ΜL (ref 0–0.1)
BASOPHILS NFR BLD AUTO: 1 % (ref 0–1)
BILIRUB SERPL-MCNC: 0.33 MG/DL (ref 0.2–1)
BUN SERPL-MCNC: 9 MG/DL (ref 5–25)
CALCIUM SERPL-MCNC: 9.2 MG/DL (ref 8.3–10.1)
CHLORIDE SERPL-SCNC: 100 MMOL/L (ref 100–108)
CHOLEST SERPL-MCNC: 186 MG/DL
CO2 SERPL-SCNC: 27 MMOL/L (ref 21–32)
CREAT SERPL-MCNC: 0.72 MG/DL (ref 0.6–1.3)
EOSINOPHIL # BLD AUTO: 1 THOUSAND/ΜL (ref 0–0.61)
EOSINOPHIL NFR BLD AUTO: 11 % (ref 0–6)
ERYTHROCYTE [DISTWIDTH] IN BLOOD BY AUTOMATED COUNT: 17.9 % (ref 11.6–15.1)
FERRITIN SERPL-MCNC: 6 NG/ML (ref 8–388)
GFR SERPL CREATININE-BSD FRML MDRD: 105 ML/MIN/1.73SQ M
GLUCOSE P FAST SERPL-MCNC: 115 MG/DL (ref 65–99)
HCT VFR BLD AUTO: 37.1 % (ref 34.8–46.1)
HDLC SERPL-MCNC: 43 MG/DL
HGB BLD-MCNC: 11.3 G/DL (ref 11.5–15.4)
IMM GRANULOCYTES # BLD AUTO: 0.03 THOUSAND/UL (ref 0–0.2)
IMM GRANULOCYTES NFR BLD AUTO: 0 % (ref 0–2)
IRON SATN MFR SERPL: 6 % (ref 15–50)
IRON SERPL-MCNC: 27 UG/DL (ref 50–170)
LDLC SERPL CALC-MCNC: 134 MG/DL (ref 0–100)
LYMPHOCYTES # BLD AUTO: 1.22 THOUSANDS/ΜL (ref 0.6–4.47)
LYMPHOCYTES NFR BLD AUTO: 13 % (ref 14–44)
MCH RBC QN AUTO: 24 PG (ref 26.8–34.3)
MCHC RBC AUTO-ENTMCNC: 30.5 G/DL (ref 31.4–37.4)
MCV RBC AUTO: 79 FL (ref 82–98)
MONOCYTES # BLD AUTO: 1.09 THOUSAND/ΜL (ref 0.17–1.22)
MONOCYTES NFR BLD AUTO: 11 % (ref 4–12)
NEUTROPHILS # BLD AUTO: 6.1 THOUSANDS/ΜL (ref 1.85–7.62)
NEUTS SEG NFR BLD AUTO: 64 % (ref 43–75)
NONHDLC SERPL-MCNC: 143 MG/DL
NRBC BLD AUTO-RTO: 0 /100 WBCS
PLATELET # BLD AUTO: 514 THOUSANDS/UL (ref 149–390)
PMV BLD AUTO: 10 FL (ref 8.9–12.7)
POTASSIUM SERPL-SCNC: 3.9 MMOL/L (ref 3.5–5.3)
PROT SERPL-MCNC: 7.8 G/DL (ref 6.4–8.2)
RBC # BLD AUTO: 4.71 MILLION/UL (ref 3.81–5.12)
SODIUM SERPL-SCNC: 136 MMOL/L (ref 136–145)
TIBC SERPL-MCNC: 420 UG/DL (ref 250–450)
TRIGL SERPL-MCNC: 47 MG/DL
TSH SERPL DL<=0.05 MIU/L-ACNC: 1.01 UIU/ML (ref 0.36–3.74)
WBC # BLD AUTO: 9.52 THOUSAND/UL (ref 4.31–10.16)

## 2022-01-11 PROCEDURE — 80053 COMPREHEN METABOLIC PANEL: CPT

## 2022-01-11 PROCEDURE — 83550 IRON BINDING TEST: CPT

## 2022-01-11 PROCEDURE — 85025 COMPLETE CBC W/AUTO DIFF WBC: CPT

## 2022-01-11 PROCEDURE — 84443 ASSAY THYROID STIM HORMONE: CPT

## 2022-01-11 PROCEDURE — 83540 ASSAY OF IRON: CPT

## 2022-01-11 PROCEDURE — 82728 ASSAY OF FERRITIN: CPT

## 2022-01-11 PROCEDURE — 80061 LIPID PANEL: CPT

## 2022-01-11 PROCEDURE — 36415 COLL VENOUS BLD VENIPUNCTURE: CPT

## 2022-01-13 ENCOUNTER — OFFICE VISIT (OUTPATIENT)
Dept: FAMILY MEDICINE CLINIC | Facility: CLINIC | Age: 42
End: 2022-01-13
Payer: COMMERCIAL

## 2022-01-13 VITALS
BODY MASS INDEX: 26.31 KG/M2 | WEIGHT: 143 LBS | OXYGEN SATURATION: 95 % | SYSTOLIC BLOOD PRESSURE: 108 MMHG | TEMPERATURE: 96.8 F | DIASTOLIC BLOOD PRESSURE: 70 MMHG | HEIGHT: 62 IN | HEART RATE: 103 BPM

## 2022-01-13 DIAGNOSIS — Z00.00 ANNUAL PHYSICAL EXAM: Primary | ICD-10-CM

## 2022-01-13 DIAGNOSIS — J45.20 MILD INTERMITTENT ASTHMA WITHOUT COMPLICATION: ICD-10-CM

## 2022-01-13 DIAGNOSIS — D50.9 IRON DEFICIENCY ANEMIA, UNSPECIFIED IRON DEFICIENCY ANEMIA TYPE: ICD-10-CM

## 2022-01-13 PROCEDURE — 99396 PREV VISIT EST AGE 40-64: CPT | Performed by: NURSE PRACTITIONER

## 2022-01-13 RX ORDER — FERROUS SULFATE TAB EC 324 MG (65 MG FE EQUIVALENT) 324 (65 FE) MG
324 TABLET DELAYED RESPONSE ORAL
Qty: 180 TABLET | Refills: 1 | Status: SHIPPED | OUTPATIENT
Start: 2022-01-13 | End: 2022-06-16

## 2022-01-13 RX ORDER — BUDESONIDE AND FORMOTEROL FUMARATE DIHYDRATE 80; 4.5 UG/1; UG/1
2 AEROSOL RESPIRATORY (INHALATION) 2 TIMES DAILY
Qty: 10.2 G | Refills: 1 | Status: SHIPPED | OUTPATIENT
Start: 2022-01-13 | End: 2022-02-28

## 2022-01-13 RX ORDER — MONTELUKAST SODIUM 10 MG/1
10 TABLET ORAL
Qty: 30 TABLET | Refills: 5 | Status: SHIPPED | OUTPATIENT
Start: 2022-01-13 | End: 2022-07-05

## 2022-01-13 NOTE — ASSESSMENT & PLAN NOTE
Will add on singulair  Nebulizer/inhaler as needed  Restart symbicort twice daily  Will bring back in 2 weeks for recheck  Advised to call with any issues

## 2022-01-13 NOTE — PATIENT INSTRUCTIONS

## 2022-01-13 NOTE — PROGRESS NOTES
Patient presents for annual physical  She continues with asthma exacerbation  She feels OK after nebulizer, but not for long  She is using albuterol inhaler as needed, mostly at work  Continues with mild cough, congestion, chest tightness, mild sob and wheezing  She did complete a steroid taper with little relief  She does not feel sick  She did use advair in the past and it did not work  Denies fevers, chills  She has not been compliant with iron until recently  [facility] [address] [place]     NAME: Komal Guadalupe  AGE: 37 y o  SEX: female  : 1980     DATE: 2022     Assessment and Plan:     Problem List Items Addressed This Visit        Respiratory    Mild intermittent asthma without complication     Will add on singulair  Nebulizer/inhaler as needed  Restart symbicort twice daily  Will bring back in 2 weeks for recheck  Advised to call with any issues  Relevant Medications    montelukast (SINGULAIR) 10 mg tablet    budesonide-formoterol (SYMBICORT) 80-4 5 MCG/ACT inhaler       Other    Iron deficiency anemia     Advised compliance with ferrous sulfate twice daily  Will f/u with blood work in 2 months  Relevant Medications    ferrous sulfate 324 (65 Fe) mg    Other Relevant Orders    Iron Panel (Includes Ferritin, Iron Sat%, Iron, and TIBC)    CBC and differential      Other Visit Diagnoses     Annual physical exam    -  Primary          Immunizations and preventive care screenings were discussed with patient today  Appropriate education was printed on patient's after visit summary  Counseling:  · Exercise: the importance of regular exercise/physical activity was discussed  Recommend exercise 3-5 times per week for at least 30 minutes  BMI Counseling: Body mass index is 26 16 kg/m²   The BMI is above normal  Nutrition recommendations include decreasing portion sizes, encouraging healthy choices of fruits and vegetables, decreasing fast food intake, consuming healthier snacks, limiting drinks that contain sugar, moderation in carbohydrate intake, increasing intake of lean protein, reducing intake of saturated and trans fat and reducing intake of cholesterol  Exercise recommendations include moderate physical activity 150 minutes/week, vigorous physical activity 75 minutes/week and exercising 3-5 times per week  Rationale for BMI follow-up plan is due to patient being overweight or obese  Return in about 2 weeks (around 1/27/2022)  Chief Complaint:     Chief Complaint   Patient presents with    Physical Exam      History of Present Illness:     Adult Annual Physical   Patient here for a comprehensive physical exam  The patient reports problems - coughing, asthma  Diet and Physical Activity  · Diet/Nutrition: well balanced diet  · Exercise: no formal exercise  Depression Screening  PHQ-2/9 Depression Screening         General Health  · Sleep: sleeps poorly  · Hearing: normal - right  · Vision: goes for regular eye exams and wears glasses  · Dental: regular dental visits  /GYN Health  · Patient is: premenopausal  · Last menstrual period: 1/2/2022  · Contraceptive method: none  Review of Systems:     Review of Systems   Constitutional: Negative for chills and fever  HENT: Positive for congestion, sinus pressure and sinus pain  Negative for ear pain and sore throat  Eyes: Negative for pain and visual disturbance  Respiratory: Positive for cough, chest tightness, shortness of breath and wheezing  OK with breathing treatment   Cardiovascular: Negative for chest pain and palpitations  Gastrointestinal: Negative for abdominal pain, blood in stool, constipation, diarrhea, nausea and vomiting  Genitourinary: Negative  Negative for dysuria and hematuria  Musculoskeletal: Positive for neck pain  Negative for arthralgias and back pain     Skin: Negative for color change and rash  Neurological: Positive for headaches (from neck)  Negative for seizures and syncope  Psychiatric/Behavioral: Negative for dysphoric mood  The patient is not nervous/anxious  All other systems reviewed and are negative       Past Medical History:     Past Medical History:   Diagnosis Date    Asthma     not as adult    History of one miscarriage     2017 10 weeks    Renal cyst     Varicella       Past Surgical History:     Past Surgical History:   Procedure Laterality Date     SECTION      2004 son,   2007 daughter,    daughter   Ana Valderrama N/A 2018    Procedure:  SECTION () REPEAT;  Surgeon: Scarlet Sneed DO;  Location[facility];  Service: Obstetrics      Social History:     Social History     Socioeconomic History    Marital status: /Civil Union     Spouse name: None    Number of children: None    Years of education: None    Highest education level: None   Occupational History    None   Tobacco Use    Smoking status: Never Smoker    Smokeless tobacco: Never Used   Substance and Sexual Activity    Alcohol use: No    Drug use: No    Sexual activity: Yes     Partners: Male   Other Topics Concern    None   Social History Narrative    Engaged to be     Always uses seatbelts     Social Determinants of Health     Financial Resource Strain: Not on file   Food Insecurity: Not on file   Transportation Needs: Not on file   Physical Activity: Not on file   Stress: Not on file   Social Connections: Not on file   Intimate Partner Violence: Not on file   Housing Stability: Not on file      Family History:     Family History   Adopted: Yes   Problem Relation Age of Onset    Crohn's disease Mother     Hypertension Mother     Cancer Maternal Grandmother         lung    Arthritis Maternal Grandmother     Alcohol abuse Father     Drug abuse Father     Asthma Sister     Diabetes Maternal Grandfather     Hearing loss Maternal Grandfather     Heart disease Maternal Grandfather       Current Medications:     Current Outpatient Medications   Medication Sig Dispense Refill    albuterol (2 5 mg/3 mL) 0 083 % nebulizer solution Take 3 mL (2 5 mg total) by nebulization every 6 (six) hours as needed for wheezing or shortness of breath 180 mL 5    albuterol (PROVENTIL HFA,VENTOLIN HFA) 90 mcg/act inhaler Inhale 2 puffs every 6 (six) hours as needed for wheezing 18 g 3    budesonide-formoterol (SYMBICORT) 80-4 5 MCG/ACT inhaler Inhale 2 puffs 2 (two) times a day Rinse mouth after use  10 2 g 1    ferrous sulfate 324 (65 Fe) mg Take 1 tablet (324 mg total) by mouth 2 (two) times a day before meals 180 tablet 1    Loratadine (CLARITIN) 10 MG CAPS Take by mouth      montelukast (SINGULAIR) 10 mg tablet Take 1 tablet (10 mg total) by mouth daily at bedtime 30 tablet 5     No current facility-administered medications for this visit  Allergies: Allergies   Allergen Reactions    Iron      Gold label only    Fluticasone-Salmeterol Palpitations     "Didn't like the way it made me feel"      Physical Exam:     /70   Pulse 103   Temp (!) 96 8 °F (36 °C)   Ht 5' 2" (1 575 m)   Wt 64 9 kg (143 lb)   LMP 01/02/2022   SpO2 95%   BMI 26 16 kg/m²     Physical Exam  Vitals and nursing note reviewed  Constitutional:       General: She is not in acute distress  Appearance: She is well-developed  HENT:      Head: Normocephalic and atraumatic  Right Ear: Tympanic membrane normal       Left Ear: Tympanic membrane normal       Mouth/Throat:      Pharynx: No oropharyngeal exudate or posterior oropharyngeal erythema  Eyes:      Conjunctiva/sclera: Conjunctivae normal    Cardiovascular:      Rate and Rhythm: Normal rate and regular rhythm  Heart sounds: No murmur heard  Pulmonary:      Effort: Pulmonary effort is normal  No respiratory distress  Breath sounds: Normal breath sounds   No wheezing or rhonchi  Abdominal:      Palpations: Abdomen is soft  Tenderness: There is no abdominal tenderness  Musculoskeletal:      Cervical back: Neck supple  Skin:     General: Skin is warm and dry  Neurological:      Mental Status: She is alert and oriented to person, place, and time            [id number] [address] [facility] [address] [facility]

## 2022-01-25 ENCOUNTER — OFFICE VISIT (OUTPATIENT)
Dept: FAMILY MEDICINE CLINIC | Facility: CLINIC | Age: 42
End: 2022-01-25
Payer: COMMERCIAL

## 2022-01-25 ENCOUNTER — HOSPITAL ENCOUNTER (OUTPATIENT)
Dept: RADIOLOGY | Facility: HOSPITAL | Age: 42
Discharge: HOME | End: 2022-01-25
Payer: COMMERCIAL

## 2022-01-25 VITALS
HEART RATE: 84 BPM | OXYGEN SATURATION: 97 % | HEIGHT: 62 IN | TEMPERATURE: 97.1 F | SYSTOLIC BLOOD PRESSURE: 106 MMHG | BODY MASS INDEX: 26.87 KG/M2 | DIASTOLIC BLOOD PRESSURE: 72 MMHG | RESPIRATION RATE: 16 BRPM | WEIGHT: 146 LBS

## 2022-01-25 DIAGNOSIS — J45.20 MILD INTERMITTENT ASTHMA WITHOUT COMPLICATION: Primary | ICD-10-CM

## 2022-01-25 DIAGNOSIS — J45.20 MILD INTERMITTENT ASTHMA WITHOUT COMPLICATION: ICD-10-CM

## 2022-01-25 PROCEDURE — 71046 X-RAY EXAM CHEST 2 VIEWS: CPT

## 2022-01-25 PROCEDURE — 3008F BODY MASS INDEX DOCD: CPT | Performed by: NURSE PRACTITIONER

## 2022-01-25 PROCEDURE — 1036F TOBACCO NON-USER: CPT | Performed by: NURSE PRACTITIONER

## 2022-01-25 PROCEDURE — 99214 OFFICE O/P EST MOD 30 MIN: CPT | Performed by: NURSE PRACTITIONER

## 2022-01-25 NOTE — ASSESSMENT & PLAN NOTE
Continue symbicort, singulair, albuterol prn  Will refer to pulm  CXR today  Schedule PFT's  F/U in 1 month

## 2022-01-25 NOTE — PROGRESS NOTES
Assessment/Plan:     Chronic Problems:  Mild intermittent asthma without complication  Continue symbicort, singulair, albuterol prn  Will refer to pulm  CXR today  Schedule PFT's  F/U in 1 month  Visit Diagnosis:  Diagnoses and all orders for this visit:    Mild intermittent asthma without complication  -     XR chest pa & lateral; Future  -     Complete PFT with post bronchodilator; Future  -     Ambulatory Referral to Pulmonology; Future          Subjective:    Patient ID: Angelia Flores is a 40 y o  female  Patient presents for f/u on asthma  She is feeling much improved with singulair daily, symbicort twice daily and albuterol neb/inhaler as needed  Denies sick symptoms  She does have some pain in right-sided rib area at times, but resolves with stretching and deep breathing  She is awaiting neck surgery on 2/9  The following portions of the patient's history were reviewed and updated as appropriate: allergies, current medications, past family history, past medical history, past social history, past surgical history and problem list     Review of Systems   Constitutional: Negative for chills, fatigue and fever  HENT: Negative for congestion, ear pain, postnasal drip and sore throat  Eyes: Negative for pain and visual disturbance  Respiratory: Positive for cough, chest tightness, shortness of breath (while talking) and wheezing  Cardiovascular: Negative for chest pain and palpitations  Gastrointestinal: Negative for abdominal pain, nausea and vomiting  Genitourinary: Negative for dysuria and hematuria  Musculoskeletal: Negative for arthralgias and back pain  Skin: Negative for color change and rash  Neurological: Positive for dizziness  Negative for seizures and syncope  All other systems reviewed and are negative          /72   Pulse 84   Temp (!) 97 1 °F (36 2 °C) (Tympanic)   Resp 16   Ht 5' 2" (1 575 m)   Wt 66 2 kg (146 lb)   LMP 01/02/2022   SpO2 97%   BMI 26 70 kg/m²   Social History     Socioeconomic History    Marital status: /Civil Union     Spouse name: Not on file    Number of children: Not on file    Years of education: Not on file    Highest education level: Not on file   Occupational History    Not on file   Tobacco Use    Smoking status: Never Smoker    Smokeless tobacco: Never Used   Substance and Sexual Activity    Alcohol use: No    Drug use: No    Sexual activity: Yes     Partners: Male   Other Topics Concern    Not on file   Social History Narrative    Engaged to be     Always uses seatbelts     Social Determinants of Health     Financial Resource Strain: Not on file   Food Insecurity: Not on file   Transportation Needs: Not on file   Physical Activity: Not on file   Stress: Not on file   Social Connections: Not on file   Intimate Partner Violence: Not on file   Housing Stability: Not on file     Past Medical History:   Diagnosis Date    Asthma     not as adult    History of one miscarriage     2017 10 weeks    Renal cyst     Varicella      Family History   Adopted: Yes   Problem Relation Age of Onset    Crohn's disease Mother     Hypertension Mother     Cancer Maternal Grandmother         lung    Arthritis Maternal Grandmother     Alcohol abuse Father     Drug abuse Father     Asthma Sister     Diabetes Maternal Grandfather     Hearing loss Maternal Grandfather     Heart disease Maternal Grandfather      Past Surgical History:   Procedure Laterality Date     SECTION      2004 son,    daughter,    daughter   Didier Daughters N/A 2018    Procedure:  SECTION () REPEAT;  Surgeon: Home Mauricio DO;  Location: [facility] ;  Service: Obstetrics       Current Outpatient Medications:     albuterol (2 5 mg/3 mL) 0 083 % nebulizer solution, Take 3 mL (2 5 mg total) by nebulization every 6 (six) hours as needed for wheezing or shortness of breath, Disp: 180 mL, Rfl: 5   albuterol (PROVENTIL HFA,VENTOLIN HFA) 90 mcg/act inhaler, Inhale 2 puffs every 6 (six) hours as needed for wheezing, Disp: 18 g, Rfl: 3    budesonide-formoterol (SYMBICORT) 80-4 5 MCG/ACT inhaler, Inhale 2 puffs 2 (two) times a day Rinse mouth after use , Disp: 10 2 g, Rfl: 1    ferrous sulfate 324 (65 Fe) mg, Take 1 tablet (324 mg total) by mouth 2 (two) times a day before meals, Disp: 180 tablet, Rfl: 1    Loratadine (CLARITIN) 10 MG CAPS, Take by mouth, Disp: , Rfl:     montelukast (SINGULAIR) 10 mg tablet, Take 1 tablet (10 mg total) by mouth daily at bedtime, Disp: 30 tablet, Rfl: 5    Allergies   Allergen Reactions    Iron      Gold label only    Fluticasone-Salmeterol Palpitations     "Didn't like the way it made me feel"          Lab Review   Appointment on 01/11/2022   Component Date Value    WBC 01/11/2022 9 52     RBC 01/11/2022 4 71     Hemoglobin 01/11/2022 11 3*    Hematocrit 01/11/2022 37 1     MCV 01/11/2022 79*    MCH 01/11/2022 24 0*    MCHC 01/11/2022 30 5*    RDW 01/11/2022 17 9*    MPV 01/11/2022 10 0     Platelets 66/94/0995 514*    nRBC 01/11/2022 0     Neutrophils Relative 01/11/2022 64     Immat GRANS % 01/11/2022 0     Lymphocytes Relative 01/11/2022 13*    Monocytes Relative 01/11/2022 11     Eosinophils Relative 01/11/2022 11*    Basophils Relative 01/11/2022 1     Neutrophils Absolute 01/11/2022 6 10     Immature Grans Absolute 01/11/2022 0 03     Lymphocytes Absolute 01/11/2022 1 22     Monocytes Absolute 01/11/2022 1 09     Eosinophils Absolute 01/11/2022 1 00*    Basophils Absolute 01/11/2022 0 08     Sodium 01/11/2022 136     Potassium 01/11/2022 3 9     Chloride 01/11/2022 100     CO2 01/11/2022 27     ANION GAP 01/11/2022 9     BUN 01/11/2022 9     Creatinine 01/11/2022 0 72     Glucose, Fasting 01/11/2022 115*    Calcium 01/11/2022 9 2     AST 01/11/2022 28     ALT 01/11/2022 39     Alkaline Phosphatase 01/11/2022 110     Total Protein 01/11/2022 7 8     Albumin 01/11/2022 3 6     Total Bilirubin 01/11/2022 0 33     eGFR 01/11/2022 105     Cholesterol 01/11/2022 186     Triglycerides 01/11/2022 47     HDL, Direct 01/11/2022 43*    LDL Calculated 01/11/2022 134*    Non-HDL-Chol (CHOL-HDL) 01/11/2022 143     TSH 3RD GENERATON 01/11/2022 1 011     Iron Saturation 01/11/2022 6*    TIBC 01/11/2022 420     Iron 01/11/2022 27*    Ferritin 01/11/2022 6*        Imaging: Mammo screening bilateral w 3d & cad    Result Date: 1/5/2022  Narrative: DIAGNOSIS: Encounter for screening mammogram for malignant neoplasm of breast TECHNIQUE: Digital screening mammography was performed  Computer Aided Detection (CAD) analyzed all applicable images  COMPARISONS: None available  RELEVANT HISTORY: Family Breast Cancer History: No known family history of breast cancer  Family Medical History: No known relevant family medical history  Personal History: No known relevant hormone history  No known relevant surgical history  No known relevant medical history  The patient is scheduled in a reminder system for screening mammography  8-10% of cancers will be missed on mammography  Management of a palpable abnormality must be based on clinical grounds  Patients will be notified of their results via letter from our facility  Accredited by [employer] of [employer] and FDA  RISK ASSESSMENT: 5 Year Tyrer-Cuzick: 0 44 % 10 Year Tyrer-Cuzick: 1 13 % Lifetime Tyrer-Cuzick: 9 19 % TISSUE DENSITY: The breasts are heterogeneously dense, which may obscure small masses  INDICATION: Armando Castellano is a 37 y o  female presenting for baseline screening mammography  FINDINGS: Bilateral There are no suspicious masses, grouped microcalcifications or areas of unexplained architectural distortion  The skin and nipple areolar complex are unremarkable  A few diffusely distributed calcifications are noted in each breast      Impression: No mammographic evidence of malignancy  ASSESSMENT/BI-RADS CATEGORY: Left: 2 - Benign Right: 2 - Benign Overall: 2 - Benign RECOMMENDATION:      - Routine screening mammogram in 1 year for both breasts  Workstation ID: [id number]       Objective:     Physical Exam  Constitutional:       Appearance: She is well-developed  Cardiovascular:      Rate and Rhythm: Normal rate and regular rhythm  Heart sounds: Normal heart sounds  No murmur heard  Pulmonary:      Effort: Pulmonary effort is normal  No respiratory distress  Breath sounds: Normal breath sounds  Skin:     General: Skin is warm and dry  Neurological:      Mental Status: She is alert and oriented to person, place, and time  There are no Patient Instructions on file for this visit  LENI Campuzano    Portions of the record may have been created with voice recognition software  Occasional wrong word or "sound a like" substitutions may have occurred due to the inherent limitations of voice recognition software  Read the chart carefully and recognize, using context, where substitutions have occurred

## 2022-02-10 ENCOUNTER — TELEPHONE (OUTPATIENT)
Dept: OTHER | Facility: OTHER | Age: 42
End: 2022-02-10

## 2022-02-10 NOTE — TELEPHONE ENCOUNTER
Patients  canceled appointment for 2/10/22 due to patient recovering from surgery and is requesting a call back to reschedule appointment

## 2022-02-28 ENCOUNTER — LAB (OUTPATIENT)
Dept: LAB | Facility: CLINIC | Age: 42
End: 2022-02-28
Payer: COMMERCIAL

## 2022-02-28 ENCOUNTER — CONSULT (OUTPATIENT)
Dept: PULMONOLOGY | Facility: CLINIC | Age: 42
End: 2022-02-28
Payer: COMMERCIAL

## 2022-02-28 VITALS
OXYGEN SATURATION: 98 % | DIASTOLIC BLOOD PRESSURE: 68 MMHG | BODY MASS INDEX: 25.69 KG/M2 | WEIGHT: 145 LBS | HEART RATE: 95 BPM | TEMPERATURE: 98 F | HEIGHT: 63 IN | SYSTOLIC BLOOD PRESSURE: 108 MMHG

## 2022-02-28 DIAGNOSIS — J45.50 SEVERE PERSISTENT ALLERGIC ASTHMA: ICD-10-CM

## 2022-02-28 DIAGNOSIS — D72.10 EOSINOPHILIA, UNSPECIFIED TYPE: ICD-10-CM

## 2022-02-28 DIAGNOSIS — J45.50 SEVERE PERSISTENT ASTHMA WITHOUT COMPLICATION: Primary | ICD-10-CM

## 2022-02-28 DIAGNOSIS — J45.50 SEVERE PERSISTENT ASTHMA WITHOUT COMPLICATION: ICD-10-CM

## 2022-02-28 DIAGNOSIS — D50.9 IRON DEFICIENCY ANEMIA, UNSPECIFIED IRON DEFICIENCY ANEMIA TYPE: ICD-10-CM

## 2022-02-28 PROBLEM — M50.30 DEGENERATIVE DISC DISEASE, CERVICAL: Status: ACTIVE | Noted: 2022-02-08

## 2022-02-28 PROBLEM — Z98.891 HISTORY OF 3 CESAREAN SECTIONS: Chronic | Status: ACTIVE | Noted: 2018-02-15

## 2022-02-28 PROBLEM — J45.20 MILD INTERMITTENT ASTHMA WITHOUT COMPLICATION: Chronic | Status: ACTIVE | Noted: 2018-10-10

## 2022-02-28 PROBLEM — Z98.891 S/P REPEAT LOW TRANSVERSE C-SECTION: Chronic | Status: ACTIVE | Noted: 2018-07-19

## 2022-02-28 PROBLEM — M50.222 HERNIATED NUCLEUS PULPOSUS, C5-6: Status: ACTIVE | Noted: 2022-02-08

## 2022-02-28 LAB
BASOPHILS # BLD AUTO: 0.08 THOUSANDS/ΜL (ref 0–0.1)
BASOPHILS NFR BLD AUTO: 1 % (ref 0–1)
EOSINOPHIL # BLD AUTO: 0.54 THOUSAND/ΜL (ref 0–0.61)
EOSINOPHIL NFR BLD AUTO: 9 % (ref 0–6)
ERYTHROCYTE [DISTWIDTH] IN BLOOD BY AUTOMATED COUNT: 19.6 % (ref 11.6–15.1)
FERRITIN SERPL-MCNC: 5 NG/ML (ref 8–388)
HCT VFR BLD AUTO: 36 % (ref 34.8–46.1)
HGB BLD-MCNC: 10.8 G/DL (ref 11.5–15.4)
IMM GRANULOCYTES # BLD AUTO: 0.01 THOUSAND/UL (ref 0–0.2)
IMM GRANULOCYTES NFR BLD AUTO: 0 % (ref 0–2)
IRON SATN MFR SERPL: 7 % (ref 15–50)
IRON SERPL-MCNC: 28 UG/DL (ref 50–170)
LYMPHOCYTES # BLD AUTO: 2.39 THOUSANDS/ΜL (ref 0.6–4.47)
LYMPHOCYTES NFR BLD AUTO: 38 % (ref 14–44)
MCH RBC QN AUTO: 24.6 PG (ref 26.8–34.3)
MCHC RBC AUTO-ENTMCNC: 30 G/DL (ref 31.4–37.4)
MCV RBC AUTO: 82 FL (ref 82–98)
MONOCYTES # BLD AUTO: 0.67 THOUSAND/ΜL (ref 0.17–1.22)
MONOCYTES NFR BLD AUTO: 11 % (ref 4–12)
NEUTROPHILS # BLD AUTO: 2.55 THOUSANDS/ΜL (ref 1.85–7.62)
NEUTS SEG NFR BLD AUTO: 41 % (ref 43–75)
NRBC BLD AUTO-RTO: 0 /100 WBCS
PLATELET # BLD AUTO: 442 THOUSANDS/UL (ref 149–390)
PMV BLD AUTO: 10.6 FL (ref 8.9–12.7)
RBC # BLD AUTO: 4.39 MILLION/UL (ref 3.81–5.12)
TIBC SERPL-MCNC: 416 UG/DL (ref 250–450)
WBC # BLD AUTO: 6.24 THOUSAND/UL (ref 4.31–10.16)

## 2022-02-28 PROCEDURE — 86003 ALLG SPEC IGE CRUDE XTRC EA: CPT

## 2022-02-28 PROCEDURE — 85025 COMPLETE CBC W/AUTO DIFF WBC: CPT

## 2022-02-28 PROCEDURE — 82728 ASSAY OF FERRITIN: CPT

## 2022-02-28 PROCEDURE — 3008F BODY MASS INDEX DOCD: CPT | Performed by: NURSE PRACTITIONER

## 2022-02-28 PROCEDURE — 83550 IRON BINDING TEST: CPT

## 2022-02-28 PROCEDURE — 99204 OFFICE O/P NEW MOD 45 MIN: CPT | Performed by: INTERNAL MEDICINE

## 2022-02-28 PROCEDURE — 36415 COLL VENOUS BLD VENIPUNCTURE: CPT

## 2022-02-28 PROCEDURE — 82785 ASSAY OF IGE: CPT

## 2022-02-28 PROCEDURE — 83540 ASSAY OF IRON: CPT

## 2022-02-28 RX ORDER — PREGABALIN 75 MG/1
CAPSULE ORAL
COMMUNITY
Start: 2022-02-10 | End: 2022-06-16

## 2022-02-28 RX ORDER — IBUPROFEN 800 MG/1
TABLET ORAL
COMMUNITY
Start: 2022-02-22 | End: 2022-03-14 | Stop reason: ALTCHOICE

## 2022-02-28 RX ORDER — DIAZEPAM 5 MG/1
TABLET ORAL
COMMUNITY
Start: 2022-02-25 | End: 2022-04-11

## 2022-02-28 NOTE — ASSESSMENT & PLAN NOTE
At this time the patient's asthma is poorly controled  Her inhaler technique could be improved  She was provided with a spacer for her inhaler and demonstrated proper use  Additionally will increase her inhaler from symbicort 80/4 5 to Breo 200/25  Additionally will send for [place] allergy panel to assess for specific allergens and check for IgE levels  Will consider addition of biologic infusion depending on IgE levels  Hopefully her symptoms can be resolved with proper technique and use of spacer with her inhaler

## 2022-02-28 NOTE — ASSESSMENT & PLAN NOTE
First noted as of 10/2018  Interestingly her most elevated level was in January of this year following a course of steroids which is unexpected  As such we will be checking her [place] allergy panel to assess for allergens  However, considering her iron deficiency there is concern for alternative diagnosis leading to these finding  She currently takes antihistamines with minimal improvement

## 2022-02-28 NOTE — PROGRESS NOTES
Pulmonary Initial Visit  Shon Santos 40 y o  female MRN: [id number]  @ Encounter: [id number]      Impressions/Recommendations:   Patient is a 16-year-old female with past medical history significant for asthma who presents to establish pulmonary care  The patient reports her breathing acutely worsened after the birth of her 4th child  She did have a remote history of asthma  At the current time, the patient reports her symptoms are debilitating  She is compliant with her inhalers  The patient underwent IgE testing which was significantly elevated as well as her eosinophil testing  Given her use of Laba/ics, patient would benefit from addition of biologic therapy    1  Severe persistent asthma without complication  Assessment & Plan: At this time the patient's asthma is poorly controled  Her inhaler technique could be improved  She was provided with a spacer for her inhaler and demonstrated proper use  Additionally will increase her inhaler from symbicort 80/4 5 to Breo 200/25  Additionally will send for [place] allergy panel to assess for specific allergens and check for IgE levels  Will consider addition of biologic infusion depending on IgE levels  Hopefully her symptoms can be resolved with proper technique and use of spacer with her inhaler  Orders:  -     Ambulatory Referral to Pulmonology  -     [facility] Allergy Panel, Adult; Future  -     Spacer Device for Inhaler  -     fluticasone-vilanterol (Breo Ellipta) 200-25 MCG/INH inhaler; Inhale 1 puff daily Rinse mouth after use  2  Eosinophilia, unspecified type  Assessment & Plan:  First noted as of 10/2018  Interestingly her most elevated level was in January of this year following a course of steroids which is unexpected  As such we will be checking her [place] allergy panel to assess for allergens  However, considering her iron deficiency there is concern for alternative diagnosis leading to these finding   She currently takes antihistamines with minimal improvement  History of Present Illness   HPI:  Malinda Wilkes is a 40 y o  female with pmhx sig for asthma as a child, 4 pervious pregnancies carried to term, 3 c-sections, and iron deficiency anemia presented to [facility] for evaluation for asthma  She states that she was previously diagnosed with asthma as a young girl  At the end gross she required 2 inhalers 1 of which was an albuterol inhaler  She states she cannot remember what the other hand was  But she was previously well controlled eventually was able to come off of her asthma regimen  She states she was never hospitalized nor intubated for an asthma exacerbation  She had her 1st 3 children without significant complication as far as her asthma is concerned  During her 4th pregnancy she was without complications from her asthma however following discharge from the hospital she developed severe shortness of breath and chest pressure prompting her to return to the hospital for further evaluation  She underwent extensive cardiac evaluation for postpartum cardiomyopathy however, the echocardiogram was grossly normal and she is not having any evidence of ischemic changes on EKG  She was discharged home with an asthma exacerbation  Since that time she has visited her primary care provider multiple times for persistent shortness of breath  She states that she has tried multiple inhalers as well as 2 courses of steroids  Most recent course of steroids was December 28 for 12 day course  She then had repeat blood work performed on January 13 which showed significant eosinophilia  She states that she has been shortness of breath since her daughter was born 2018  She states her symptoms mostly persistent now has been exacerbations to the point where she, 1 day needed entire rescue albuterol inhaler  She states that she feels significant relief with her nebulizer number is not too her rescue inhaler    She states that she has been compliant with her Symbicort  She demonstrated her technique for me  She reports she is persistently short of breath throughout the day and is concurrently most short of breath at night  She states she wakes from sleep due to shortness of breath  She has a cat that she has been taking care of and 2016  She moved in 2019  She does not know of any molds in her house  She works as a receiving, offloading/on  at [employer]  She states that she is symptomatic at home as well as most significantly symptomatic at work at least 1 back room  She states there was lot of dust in her work  Review of systems:  Review of systems was completed and was otherwise negative except as listed in HPI      Historical Information   Past Medical History:   Diagnosis Date    Asthma     not as adult    History of one miscarriage     2017 10 weeks    Renal cyst     Varicella      Past Surgical History:   Procedure Laterality Date     SECTION       son,    daughter,    daughter   [address] [address]      C5-7, disc repair     [place]  DELIVERY ONLY N/A 2018    Procedure:  SECTION () REPEAT;  Surgeon: Scott Mai DO;  Location: [facility] ;  Service: Obstetrics     Family History   Adopted: Yes   Problem Relation Age of Onset    Crohn's disease Mother     Hypertension Mother     Cancer Maternal Grandmother         lung    Arthritis Maternal Grandmother     Alcohol abuse Father     Drug abuse Father     Asthma Sister     Diabetes Maternal Grandfather     Hearing loss Maternal Grandfather     Heart disease Maternal Grandfather        Social History     Socioeconomic History    Marital status: /Civil Union     Spouse name: None    Number of children: None    Years of education: None    Highest education level: None   Occupational History    None   Tobacco Use    Smoking status: Never Smoker    Smokeless tobacco: Never Used   Vaping Use    Vaping Use: None   Substance and Sexual Activity    Alcohol use: No    Drug use: No    Sexual activity: Yes     Partners: Male   Other Topics Concern    None   Social History Narrative    Engaged to be     Always uses seatbelts     Social Determinants of Health     Financial Resource Strain: Not on file   Food Insecurity: Not on file   Transportation Needs: Not on file   Physical Activity: Not on file   Stress: Not on file   Social Connections: Not on file   Intimate Partner Violence: Not on file   Housing Stability: Not on file       Meds/Allergies   No current facility-administered medications for this visit  (Not in a hospital admission)    Allergies   Allergen Reactions    Fluticasone-Salmeterol Palpitations     "Didn't like the way it made me feel"    Iron Itching     Gold label only       Vitals: Blood pressure 108/68, pulse 95, temperature 98 °F (36 7 °C), height 5' 2 5" (1 588 m), weight 65 8 kg (145 lb), SpO2 98 %, currently breastfeeding , RA, Body mass index is 26 1 kg/m²  Physical Exam  General:  Pleasant, Awake alert and oriented x 3, conversant without conversational dyspnea, NAD, normal affect  HEENT:  PERRL, Sclera noninjected, nonicteric OU, Nares patent, no nasal flaring, no nasal drainage, Mucous membranes, moist, no oral lesions, normal dentition  NECK: Trachea midline, no accessory muscle use, no stridor, no cervical or supraclavicular adenopathy, JVP not elevated  CARDIAC: Reg, single s1/S2, no m/r/g  PULM:  Lungs clear to auscultation bilaterally, no wheezing, rales or rhonchi  CHEST: No gross deformities, equal chest expansion on inspiration bilaterally  ABD: Normoactive bowel sounds, soft nontender, nondistended, no rebound, no rigidity, no guarding  EXT: No cyanosis, no clubbing, no edema, normal capillary refill  SKIN:  No rashes, no lesions  NEURO: no focal neurologic deficits, AAOx3, moving all extremities appropriately    Labs:  I have personally reviewed pertinent lab results  Lab Results   Component Value Date    SODIUM 136 01/11/2022    K 3 9 01/11/2022     01/11/2022    CO2 27 01/11/2022    BUN 9 01/11/2022    CREATININE 0 72 01/11/2022    GLUC 118 10/08/2018    CALCIUM 9 2 01/11/2022     Lab Results   Component Value Date    WBC 6 24 02/28/2022    HGB 10 8 (L) 02/28/2022    HCT 36 0 02/28/2022    MCV 82 02/28/2022     (H) 02/28/2022     Imaging and other studies: I have personally reviewed pertinent reports  CXR 1/25: lungs are clear  No pneumothorax or pleural effusion  LVH 1/2022  No acute intrathoracic process  CXR 10/8/2018: lungs are clear  No pneumothorax or pleural effusion  CTA chest PE 7/24/2018: Limited evaluation due to poor contrast bolus  There is no main or lobar pulmonary embolism  The main pulmonary artery is normal in size  No right heart strain   LUNGS:  Lungs are clear  There is no tracheal or endobronchial lesion    PLEURA:  Small bilateral pleural effusions   HEART/AORTA:  Unremarkable for patient's age      MEDIASTINUM AND AMAYA:  Unremarkable  Pulmonary function testing:  I have personally reviewed pertinent reports  and I have personally reviewed pertinent films in PACS  11/2018:  Spirometry:  FEV1/FVC Ratio is 72%  FEV1 is 109% predicted  FVC is 126% predicted  No change with bronchodilators  Flow volume loop:  Normal    Echocardiogram: I have personally reviewed pertinent reports  11/5/2018:  LEFT VENTRICLE: Size was at the upper limits of normal  Systolic function was normal  Ejection fraction was estimated to be 60 %  There were no regional wall motion abnormalities  Wall thickness was normal  There was no evidence of concentric hypertrophy   DOPPLER: Left ventricular diastolic function parameters were normal   RIGHT VENTRICLE: The size was normal  Systolic function was normal  Wall thickness was normal   LEFT ATRIUM: Size was normal   RIGHT ATRIUM: Size was normal     EKG, Pathology, and Other Studies: I have personally reviewed pertinent reports  and I have personally reviewed pertinent films in PACS  10/11/2018:  Sinus rhythm w/ arrhythmia     Taty Kiran,       [address] [address]  [place] [address]    I have personally seen and examined the patient on 2/28/22  I discussed the patient with the fellow including, but not limited to, verifying findings; reviewing labs and imaging (xrays, CT scans, pulmonary function testing) and discussing treatment plan with patient or surrogate  I have reviewed the note and assessment performed by the fellow and agree with the fellow's documented findings and plan of care with the following additions/exceptions  Please see my following comments for details and adjustments       MD Dennise Maki [facility]

## 2022-03-01 LAB
A ALTERNATA IGE QN: <0.1 KUA/I
A FUMIGATUS IGE QN: <0.1 KUA/I
BERMUDA GRASS IGE QN: 0.37 KUA/I
BOXELDER IGE QN: <0.1 KUA/I
C HERBARUM IGE QN: <0.1 KUA/I
CAT DANDER IGE QN: 70.1 KUA/I
CMN PIGWEED IGE QN: <0.1 KUA/I
COMMON RAGWEED IGE QN: 5.57 KUA/I
COTTONWOOD IGE QN: <0.1 KUA/I
D FARINAE IGE QN: 8.82 KUA/I
D PTERONYSS IGE QN: 13.7 KUA/I
DOG DANDER IGE QN: 4.88 KUA/I
LONDON PLANE IGE QN: <0.1 KUA/I
MOUSE URINE PROT IGE QN: 1.4 KUA/I
MT JUNIPER IGE QN: <0.1 KUA/I
MUGWORT IGE QN: 0.3 KUA/I
P NOTATUM IGE QN: <0.1 KUA/I
ROACH IGE QN: <0.1 KUA/I
SHEEP SORREL IGE QN: <0.1 KUA/I
SILVER BIRCH IGE QN: <0.1 KUA/I
TIMOTHY IGE QN: 4.15 KUA/I
TOTAL IGE SMQN RAST: 608 KU/L (ref 0–113)
WALNUT IGE QN: <0.1 KUA/I
WHITE ASH IGE QN: <0.1 KUA/I
WHITE ELM IGE QN: <0.1 KUA/I
WHITE MULBERRY IGE QN: <0.1 KUA/I
WHITE OAK IGE QN: <0.1 KUA/I

## 2022-03-01 NOTE — RESULT ENCOUNTER NOTE
Called and discussed results of allergy testing with patient  Discussed allergies to cat dander, dog dander, grasses and dust mites  Suggested use of second generation antihistamine such as Allegra, zyrtec, etc  Advised avoidance of cat dander and possible need to remove cat from home  She states she is unwilling to do so at this time  Instructed her to return to the office at her convenience to sign paperwork regarding prescription for dupixent

## 2022-03-02 ENCOUNTER — TELEPHONE (OUTPATIENT)
Dept: HEMATOLOGY ONCOLOGY | Facility: CLINIC | Age: 42
End: 2022-03-02

## 2022-03-02 NOTE — TELEPHONE ENCOUNTER
New Patient Intake Form   Patient Details:    Elaina Beckett  1980  [id number]    Appointment Information   Who is calling to schedule? Patient   If not self, what is the caller's name? Please put name of [facility] nurse as well  Referring provider Benn Landau, CRNP   What is the diagnosis? Iron deficiency anemia, unspecified iron deficiency anemia type     Is there a confirmed tissue diagnosis? No   Is there a biopsy ordered or pending? Please specify dates   No     Is patient aware of diagnosis? Yes   Have you had any imaging or labs done? If yes, where? (If imaging done outside of [facility], please remind patient to bring a disk ) Yes     2/28/22     If imaging done at outside facility, did you instruct patient to obtain discs and bring to visit? Have you been seen by another Oncologist/Hematologist?  If so, who and where? No   Are the records in [facility] or Care Everywhere? Yes   Does the patient have records at another facility/hospital? No   If yes, Name of facility, city and state where facility is located  Did you instruct patient to have records faxed to rightx and provide rightfax number? Preferred [place]   Is the patient willing to be seen by another provider?   (This is for breast patients only)    Miscellaneous Information:  Appointment made for 3/11/22 at 9 with Austen Jimenez in [facility]

## 2022-03-08 NOTE — PROGRESS NOTES
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        SUBJECTIVE      Citlalli Carbajal is a 40 y o  here for new consultation with me today  The patient is referred by Jackeline Villeda and the reason for consultation is iron deficiency anemia  Her CBC shows normocytic anemia with reactive thrombocytosis:   7/24/2018  10/8/2018  4/19/2021  1/11/2022  2/28/2022    WBC 9 83 6 41 7 64 9 52 6 24   Hemoglobin 9 3 (L) 11 7 9 0 (L) 11 3 (L) 10 8 (L)   HCT 30 9 (L) 37 2 31 1 (L) 37 1 36 0   MCV 81 (L) 83 71 (L) 79 (L) 82   Platelet Count 745 (H) 305 484 (H) 514 (H) 442 (H)     Her iron panel shows iron deficiency:   1/11/2022 15:19 2/28/2022 15:43   Iron 27 (L) 28 (L)   Ferritin 6 (L) 5 (L)   Iron Saturation 6 (L) 7 (L)   TIBC 420 416       This Visit  she is here with her  Davee Schaumann today  she voices no acute complaints  She says that she has been anemic since the birth of her daughter in 2018  She notes fluctuation between light and normal menstrual bleeding  She notes that her periods are relatively regular  She notes that her daughter is on a gluten-free diet  She says that her grandmother had colitis, but is not sure if it was ulcerative colitis  She notes occasional nose bleeds  She also notes easy bruising  She denies history of gastric bypass       She admits to fatigue, pagophagia, increased sleepiness, increased headaches, chest tightness which she attributes to asthma, SOB at rest and BRIONES, thinning hair, brittle nails,  She also admits to occasional abdominal pain  She has been taking oral iron twice daily for the past 2 months  She also admits to dark stools  She denies fevers, chills, unintentional weight loss, night sweats, lightheadedness/dizziness, cough, palpitations, chest pain, nausea/vomiting, constipation/diarrhea, melena/hematochezia, hematuria, petechiae/purpura, or LE swelling  I have reviewed the relevant past medical, surgical, social and family history  I have also reviewed allergies and medications for this patient  Review of Systems  Review of Systems   All other systems reviewed and are negative  OBJECTIVE     Physical Exam  Vitals:    03/11/22 0847   BP: 128/76   BP Location: Left arm   Patient Position: Sitting   Pulse: 91   Resp: 14   Temp: (!) 96 8 °F (36 °C)   TempSrc: Tympanic   SpO2: 98%   Weight: 71 7 kg (158 lb)   Height: 5' 2 5" (1 588 m)       Physical Exam  Vitals reviewed  Constitutional:       General: She is not in acute distress  Appearance: Normal appearance  She is normal weight  She is not ill-appearing, toxic-appearing or diaphoretic  Comments: Pleasant 40 y o  female sitting comfortably on an exam room chair  HENT:      Head: Normocephalic and atraumatic  Eyes:      General: No scleral icterus  Extraocular Movements: Extraocular movements intact  Conjunctiva/sclera: Conjunctivae normal       Pupils: Pupils are equal, round, and reactive to light  Comments: No conjunctival pallor  Neck:      Comments: Neck brace in place given recent neck surgery  Cardiovascular:      Rate and Rhythm: Normal rate and regular rhythm  Pulses: Normal pulses  Heart sounds: Normal heart sounds  No murmur heard  No friction rub  No gallop  Pulmonary:      Effort: Pulmonary effort is normal  No respiratory distress  Breath sounds: Normal breath sounds  No wheezing or rales     Abdominal:      General: Bowel sounds are normal  There is no distension  Palpations: Abdomen is soft  There is no mass  Tenderness: There is no abdominal tenderness  There is no guarding or rebound  Musculoskeletal:         General: No swelling or tenderness  Normal range of motion  Right lower leg: No edema  Left lower leg: No edema  Skin:     General: Skin is warm and dry  Coloration: Skin is not jaundiced or pale  Findings: No bruising, erythema or rash  Neurological:      General: No focal deficit present  Mental Status: She is alert  Mental status is at baseline  Psychiatric:         Mood and Affect: Mood normal          Behavior: Behavior normal           Imaging  Relevant imaging reviewed in chart    Labs  Relevant labs reviewed in chart     ASSESSMENT & PLAN      Diagnosis ICD-10-CM Associated Orders   1  Easy bruising  R23 8 Protime-INR     APTT   2  Iron deficiency anemia, unspecified iron deficiency anemia type  D50 9 Ambulatory Referral to Hematology / Oncology     Celiac Disease Antibody Profile     Occult Blood, Fecal Immunochemical     Protime-INR     APTT     CBC and Platelet     Iron Panel (Includes Ferritin, Iron Sat%, Iron, and TIBC)       I discussed Syed Obrien's case with Dr Yolanda Potts and we formulated the plan together  40 y o  Female seen in consultation for iron deficiency anemia  Discussion   I extensively reviewed her blood work with her, which shows microcytic anemia since July 2018 and iron deficiency since January 2022  Given Syed Obrien's iron deficiency anemia, I recommend IV iron supplementation with IV Venofer  I reviewed the reasoning, process, and side effect profile of Venofer, which includes but is not limited to nausea, headache, hypotension, tattooing of the skin, and an anaphylactic reaction  The underlying etiology of Syed Obrien's iron deficiency anemia is unknown at this time   However, given that her daughter is on a gluten-free diet, I recommend evaluation for Celiac Disease  Also given her possible family history of ulcerative colitis, I recommend evaluation for fecal occult blood  Because of her easy bruising, I also recommend evaluation for any clotting factor deficiencies which would increase her risk of bleeding  I defer to her PCP to investigate the underlying cause and coordinate the appropriate management  I explained that IV iron supplementation will only temporarily alleviate her iron deficiency anemia unless the underlying etiology is managed  Thus, I strongly encouraged her to follow up with her PCP  Plan/Labs:  I ordered IV iron sucrose (Venofer) 300mg weekly x 5  Celiac antibody panel, FOBT, PT/INR and aPTT to be drawn after the visit today  I have ordered a CBC and platelet and iron panel in 3 months to confirm adequate iron supplementation and resolution of anemia  Follow-up  With Dr Crista Sharif in 3 months      All questions were answered to the patient's satisfaction during this encounter  They appreciated and thanked me for spending time with them  The patient knows the contact information for our office and know to reach out for any relevant concerns related to this encounter  For all other listed problems and medical diagnosis in his chart - they are managed by PCP and/or other specialists, which patient acknowledges        Petra Douglas PA-C  Hematology & Medical Oncology

## 2022-03-11 ENCOUNTER — APPOINTMENT (OUTPATIENT)
Dept: LAB | Facility: HOSPITAL | Age: 42
End: 2022-03-11
Payer: COMMERCIAL

## 2022-03-11 ENCOUNTER — TELEPHONE (OUTPATIENT)
Dept: HEMATOLOGY ONCOLOGY | Facility: CLINIC | Age: 42
End: 2022-03-11

## 2022-03-11 ENCOUNTER — CONSULT (OUTPATIENT)
Dept: HEMATOLOGY ONCOLOGY | Facility: CLINIC | Age: 42
End: 2022-03-11
Payer: COMMERCIAL

## 2022-03-11 VITALS
SYSTOLIC BLOOD PRESSURE: 128 MMHG | HEIGHT: 63 IN | DIASTOLIC BLOOD PRESSURE: 76 MMHG | WEIGHT: 158 LBS | RESPIRATION RATE: 14 BRPM | OXYGEN SATURATION: 98 % | BODY MASS INDEX: 28 KG/M2 | HEART RATE: 91 BPM | TEMPERATURE: 96.8 F

## 2022-03-11 DIAGNOSIS — D50.9 IRON DEFICIENCY ANEMIA, UNSPECIFIED IRON DEFICIENCY ANEMIA TYPE: ICD-10-CM

## 2022-03-11 DIAGNOSIS — R23.8 EASY BRUISING: ICD-10-CM

## 2022-03-11 DIAGNOSIS — R23.8 EASY BRUISING: Primary | ICD-10-CM

## 2022-03-11 LAB
APTT PPP: 31 SECONDS (ref 23–37)
INR PPP: 1.03 (ref 0.84–1.19)
PROTHROMBIN TIME: 13.1 SECONDS (ref 11.6–14.5)

## 2022-03-11 PROCEDURE — 85730 THROMBOPLASTIN TIME PARTIAL: CPT

## 2022-03-11 PROCEDURE — 85610 PROTHROMBIN TIME: CPT

## 2022-03-11 PROCEDURE — 99204 OFFICE O/P NEW MOD 45 MIN: CPT | Performed by: STUDENT IN AN ORGANIZED HEALTH CARE EDUCATION/TRAINING PROGRAM

## 2022-03-11 PROCEDURE — 86258 DGP ANTIBODY EACH IG CLASS: CPT

## 2022-03-11 PROCEDURE — 86231 EMA EACH IG CLASS: CPT

## 2022-03-11 PROCEDURE — 36415 COLL VENOUS BLD VENIPUNCTURE: CPT

## 2022-03-11 PROCEDURE — 86364 TISS TRNSGLTMNASE EA IG CLAS: CPT

## 2022-03-11 PROCEDURE — 82784 ASSAY IGA/IGD/IGG/IGM EACH: CPT

## 2022-03-11 RX ORDER — SODIUM CHLORIDE 9 MG/ML
20 INJECTION, SOLUTION INTRAVENOUS ONCE
Status: CANCELLED | OUTPATIENT
Start: 2022-03-18

## 2022-03-11 RX ORDER — FEXOFENADINE HCL 180 MG/1
180 TABLET ORAL DAILY
COMMUNITY

## 2022-03-11 NOTE — TELEPHONE ENCOUNTER
While we try to accommodate patient requests, our priority is to schedule treatment according to Doctor's orders and site availability  1  Does the Provider use the intake sheet or checkout note?intake  2    3  What would be a preferred day of the week that would work best for your infusion appointment?any  4    5  Do you prefer mornings or afternoons for your appointments? Any  6  We are going to try our best to schedule you at the infusion center closest to your home  In the event that we are unable to what would be your next preferred infusion site or sites? 1  [facility]  2    3      7  Do you have transportation to take you to all of your appointments? yes  8    9  Would you like the infusion center to draw labs from your port? (disregard if patient doesn't have a port or need labs for infusion appointment)

## 2022-03-14 ENCOUNTER — APPOINTMENT (OUTPATIENT)
Dept: LAB | Facility: HOSPITAL | Age: 42
End: 2022-03-14
Payer: COMMERCIAL

## 2022-03-14 ENCOUNTER — OFFICE VISIT (OUTPATIENT)
Dept: FAMILY MEDICINE CLINIC | Facility: CLINIC | Age: 42
End: 2022-03-14
Payer: COMMERCIAL

## 2022-03-14 ENCOUNTER — TELEPHONE (OUTPATIENT)
Dept: FAMILY MEDICINE CLINIC | Facility: CLINIC | Age: 42
End: 2022-03-14

## 2022-03-14 ENCOUNTER — TELEPHONE (OUTPATIENT)
Dept: PULMONOLOGY | Facility: CLINIC | Age: 42
End: 2022-03-14

## 2022-03-14 VITALS
HEART RATE: 91 BPM | SYSTOLIC BLOOD PRESSURE: 132 MMHG | TEMPERATURE: 96.6 F | HEIGHT: 63 IN | DIASTOLIC BLOOD PRESSURE: 80 MMHG | WEIGHT: 157 LBS | OXYGEN SATURATION: 98 % | BODY MASS INDEX: 27.82 KG/M2

## 2022-03-14 DIAGNOSIS — J45.50 SEVERE PERSISTENT ALLERGIC ASTHMA: ICD-10-CM

## 2022-03-14 DIAGNOSIS — D50.9 IRON DEFICIENCY ANEMIA, UNSPECIFIED IRON DEFICIENCY ANEMIA TYPE: ICD-10-CM

## 2022-03-14 DIAGNOSIS — M54.2 NECK PAIN: Primary | ICD-10-CM

## 2022-03-14 LAB
ENDOMYSIUM IGA SER QL: POSITIVE
GLIADIN PEPTIDE IGA SER-ACNC: >150 UNITS (ref 0–19)
GLIADIN PEPTIDE IGG SER-ACNC: 93 UNITS (ref 0–19)
HEMOCCULT STL QL IA: NEGATIVE
IGA SERPL-MCNC: 429 MG/DL (ref 87–352)
TTG IGA SER-ACNC: >100 U/ML (ref 0–3)
TTG IGG SER-ACNC: 18 U/ML (ref 0–5)

## 2022-03-14 PROCEDURE — 99214 OFFICE O/P EST MOD 30 MIN: CPT | Performed by: NURSE PRACTITIONER

## 2022-03-14 PROCEDURE — G0328 FECAL BLOOD SCRN IMMUNOASSAY: HCPCS

## 2022-03-14 RX ORDER — CYCLOBENZAPRINE HCL 5 MG
5 TABLET ORAL
Qty: 30 TABLET | Refills: 0 | Status: SHIPPED | OUTPATIENT
Start: 2022-03-14 | End: 2022-04-11

## 2022-03-14 RX ORDER — MELOXICAM 15 MG/1
TABLET ORAL
COMMUNITY
Start: 2022-03-11

## 2022-03-14 NOTE — ASSESSMENT & PLAN NOTE
Currently awaiting labs results  If negative, will refer for colonoscopy/EGD  She is going for IV iron infusions starting this week and she has a follow up with hematology

## 2022-03-14 NOTE — PROGRESS NOTES
Assessment/Plan:     Chronic Problems:  Severe persistent allergic asthma  Patient states asthma is under much better control  Continue care with pulm  Iron deficiency anemia  Currently awaiting labs results  If negative, will refer for colonoscopy/EGD  She is going for IV iron infusions starting this week and she has a follow up with hematology  Visit Diagnosis:  Diagnoses and all orders for this visit:    Neck pain  -     cyclobenzaprine (FLEXERIL) 5 mg tablet; Take 1 tablet (5 mg total) by mouth daily at bedtime    Iron deficiency anemia, unspecified iron deficiency anemia type    Severe persistent allergic asthma    Other orders  -     meloxicam (MOBIC) 15 mg tablet; TAKE 1 TABLET BY MOUTH EVERY DAY FOR 30 DAYS          Subjective:    Patient ID: Kayden Martínez is a 40 y o  female  Patient presents for routine follow up with her   She did see hematology and is awaiting her work up  She denies any over bleeding  Denies heavy menses  Her daughter does have a gluten intolerance  She is currently undergoing 5 weeks of IV Iron  She also saw Pulm and is doing much better with breo  Her asthma is currently well controlled  Patient underwent neck surgery on 2/8, she is healing well, still having some pain  She is concerned with her weight gain as she gained about 15 lbs since her surgery  The following portions of the patient's history were reviewed and updated as appropriate: allergies, current medications, past family history, past medical history, past social history, past surgical history and problem list     Review of Systems   Constitutional: Negative for chills and fever  HENT: Negative for ear pain and sore throat  Eyes: Negative for pain and visual disturbance  Respiratory: Positive for shortness of breath (mild) and wheezing (with sprays)  Negative for cough and chest tightness  Cardiovascular: Negative for chest pain and palpitations     Gastrointestinal: Negative for abdominal pain and vomiting  Genitourinary: Negative for dysuria and hematuria  Musculoskeletal: Positive for neck pain (4/10)  Negative for arthralgias and back pain  Skin: Negative for color change and rash  Neurological: Negative for seizures and syncope  All other systems reviewed and are negative          /80   Pulse 91   Temp (!) 96 6 °F (35 9 °C)   Ht 5' 2 5" (1 588 m)   Wt 71 2 kg (157 lb)   SpO2 98%   BMI 28 26 kg/m²   Social History     Socioeconomic History    Marital status: /Civil Union     Spouse name: Not on file    Number of children: Not on file    Years of education: Not on file    Highest education level: Not on file   Occupational History    Not on file   Tobacco Use    Smoking status: Never Smoker    Smokeless tobacco: Never Used   Vaping Use    Vaping Use: Never used   Substance and Sexual Activity    Alcohol use: No    Drug use: No    Sexual activity: Yes     Partners: Male   Other Topics Concern    Not on file   Social History Narrative    Engaged to be     Always uses seatbelts     Social Determinants of Health     Financial Resource Strain: Not on file   Food Insecurity: Not on file   Transportation Needs: Not on file   Physical Activity: Not on file   Stress: Not on file   Social Connections: Not on file   Intimate Partner Violence: Not on file   Housing Stability: Not on file     Past Medical History:   Diagnosis Date    Asthma     not as adult    History of one miscarriage     6/2017 10 weeks    Renal cyst     Varicella      Family History   Adopted: Yes   Problem Relation Age of Onset    Crohn's disease Mother     Hypertension Mother     Alcohol abuse Father     Drug abuse Father     Asthma Sister     Cancer Maternal Grandmother         lung    Arthritis Maternal Grandmother     Diabetes Maternal Grandfather     Hearing loss Maternal Grandfather     Heart disease Maternal Grandfather     No Known Problems Daughter     No Known Problems Daughter     No Known Problems Daughter     No Known Problems Son      Past Surgical History:   Procedure Laterality Date     SECTION      2004 son,    daughter,    daughter   Stefany Rhody NECK SURGERY      C5-7, disc repair     [place]  DELIVERY ONLY N/A 2018    Procedure:  SECTION () REPEAT;  Surgeon: Geovanni Canales DO;  Location: [facility];  Service: Obstetrics       Current Outpatient Medications:     albuterol (2 5 mg/3 mL) 0 083 % nebulizer solution, Take 3 mL (2 5 mg total) by nebulization every 6 (six) hours as needed for wheezing or shortness of breath, Disp: 180 mL, Rfl: 5    albuterol (PROVENTIL HFA,VENTOLIN HFA) 90 mcg/act inhaler, Inhale 2 puffs every 6 (six) hours as needed for wheezing, Disp: 18 g, Rfl: 3    diazepam (VALIUM) 5 mg tablet, , Disp: , Rfl:     dupilumab (DUPIXENT) subcutaneous injection, Inject 2 mL (300 mg total) under the skin every 14 (fourteen) days, Disp: 2 mL, Rfl: 26    ferrous sulfate 324 (65 Fe) mg, Take 1 tablet (324 mg total) by mouth 2 (two) times a day before meals, Disp: 180 tablet, Rfl: 1    fexofenadine (ALLEGRA) 180 MG tablet, Take 180 mg by mouth daily, Disp: , Rfl:     fluticasone-vilanterol (Breo Ellipta) 200-25 MCG/INH inhaler, Inhale 1 puff daily Rinse mouth after use , Disp: 180 each, Rfl: 0    meloxicam (MOBIC) 15 mg tablet, TAKE 1 TABLET BY MOUTH EVERY DAY FOR 30 DAYS, Disp: , Rfl:     montelukast (SINGULAIR) 10 mg tablet, Take 1 tablet (10 mg total) by mouth daily at bedtime, Disp: 30 tablet, Rfl: 5    pregabalin (LYRICA) 75 mg capsule, TAKE 1 CAPSULE BY MOUTH TWICE A DAY FOR 30 DAYS, Disp: , Rfl:     cyclobenzaprine (FLEXERIL) 5 mg tablet, Take 1 tablet (5 mg total) by mouth daily at bedtime, Disp: 30 tablet, Rfl: 0    Allergies   Allergen Reactions    Fluticasone-Salmeterol Palpitations     "Didn't like the way it made me feel"    Iron Itching     Gold label only          Lab Review   Appointment on 03/11/2022   Component Date Value    Protime 03/11/2022 13 1     INR 03/11/2022 1 03     PTT 03/11/2022 31    Lab on 02/28/2022   Component Date Value    WBC 02/28/2022 6 24     RBC 02/28/2022 4 39     Hemoglobin 02/28/2022 10 8*    Hematocrit 02/28/2022 36 0     MCV 02/28/2022 82     MCH 02/28/2022 24 6*    MCHC 02/28/2022 30 0*    RDW 02/28/2022 19 6*    MPV 02/28/2022 10 6     Platelets 60/61/2284 442*    nRBC 02/28/2022 0     Neutrophils Relative 02/28/2022 41*    Immat GRANS % 02/28/2022 0     Lymphocytes Relative 02/28/2022 38     Monocytes Relative 02/28/2022 11     Eosinophils Relative 02/28/2022 9*    Basophils Relative 02/28/2022 1     Neutrophils Absolute 02/28/2022 2 55     Immature Grans Absolute 02/28/2022 0 01     Lymphocytes Absolute 02/28/2022 2 39     Monocytes Absolute 02/28/2022 0 67     Eosinophils Absolute 02/28/2022 0 54     Basophils Absolute 02/28/2022 0 08     A  ALTERNATA 02/28/2022 <0 10     A  FUMIGATUS 02/28/2022 <0 10     Bermuda Grass 02/28/2022 0 37*    [place]  02/28/2022 <0 10     Cat Epithellium-Dander 02/28/2022 70 10*    C  HERBARUM 02/28/2022 <0 10     Cockroach 02/28/2022 <0 10     Common Silver El[place]ina Memory 02/28/2022 <0 10     [place] 02/28/2022 <0 10     D  farinae 02/28/2022 8 82*    D  pteronyssinus 02/28/2022 13 70*    Dog Dander 02/28/2022 4 88*    Elm IgE 02/28/2022 <0 10    [address] [facility] [address] Tree 02/28/2022 <0 10     Mugwort 02/28/2022 0 30*    [place] Tree 02/28/2022 <0 10     Oak 02/28/2022 <0 10     P CHRYSOGENUM 02/28/2022 <0 10     Rough Pigweed  IgE 02/28/2022 <0 10     Common Ragweed 02/28/2022 5 57*    Sheep Sorrel IgE 02/28/2022 <0 10     [place] Tree 02/28/2022 <0 10     Austen Grass 02/28/2022 4 15*    Nunda Tree 02/28/2022 <0 10     White Miguel A Tree 02/28/2022 <0 10     IgE 02/28/2022 608*    MOUSE URINE 02/28/2022 1 40*    Iron Saturation 02/28/2022 7*    TIBC 02/28/2022 416     Iron 02/28/2022 28*    Ferritin 02/28/2022 5*        Imaging: No results found  Objective:     Physical Exam  Constitutional:       Appearance: She is well-developed  Cardiovascular:      Rate and Rhythm: Normal rate and regular rhythm  Heart sounds: Normal heart sounds  No murmur heard  Pulmonary:      Effort: Pulmonary effort is normal  No respiratory distress  Breath sounds: Normal breath sounds  Skin:     General: Skin is warm and dry  Neurological:      Mental Status: She is alert and oriented to person, place, and time  There are no Patient Instructions on file for this visit  LENI Campuzano    Portions of the record may have been created with voice recognition software  Occasional wrong word or "sound a like" substitutions may have occurred due to the inherent limitations of voice recognition software  Read the chart carefully and recognize, using context, where substitutions have occurred

## 2022-03-15 DIAGNOSIS — D50.9 IRON DEFICIENCY ANEMIA, UNSPECIFIED IRON DEFICIENCY ANEMIA TYPE: Primary | ICD-10-CM

## 2022-03-15 DIAGNOSIS — J45.50 SEVERE PERSISTENT ALLERGIC ASTHMA: Primary | ICD-10-CM

## 2022-03-15 DIAGNOSIS — R89.4 ABNORMAL CELIAC ANTIBODY PANEL: ICD-10-CM

## 2022-03-15 RX ORDER — EPINEPHRINE 0.3 MG/.3ML
0.3 INJECTION SUBCUTANEOUS ONCE
Qty: 0.6 ML | Refills: 0 | Status: SHIPPED | OUTPATIENT
Start: 2022-03-15 | End: 2022-03-15

## 2022-03-15 NOTE — TELEPHONE ENCOUNTER
Called pt back advised her to disregard the denial on the [address], it was approved and scripts were sent to [facility] spe[facility] pharmacy  Can we send a script in for an epi pen to the patient's pharmacy?

## 2022-03-17 ENCOUNTER — HOSPITAL ENCOUNTER (OUTPATIENT)
Dept: PULMONOLOGY | Facility: HOSPITAL | Age: 42
Discharge: HOME | End: 2022-03-17
Payer: COMMERCIAL

## 2022-03-17 DIAGNOSIS — J45.20 MILD INTERMITTENT ASTHMA WITHOUT COMPLICATION: ICD-10-CM

## 2022-03-17 PROCEDURE — 94760 N-INVAS EAR/PLS OXIMETRY 1: CPT

## 2022-03-17 PROCEDURE — 94726 PLETHYSMOGRAPHY LUNG VOLUMES: CPT | Performed by: INTERNAL MEDICINE

## 2022-03-17 PROCEDURE — 94726 PLETHYSMOGRAPHY LUNG VOLUMES: CPT

## 2022-03-17 PROCEDURE — 94729 DIFFUSING CAPACITY: CPT | Performed by: INTERNAL MEDICINE

## 2022-03-17 PROCEDURE — 94060 EVALUATION OF WHEEZING: CPT

## 2022-03-17 PROCEDURE — 94060 EVALUATION OF WHEEZING: CPT | Performed by: INTERNAL MEDICINE

## 2022-03-17 PROCEDURE — 94729 DIFFUSING CAPACITY: CPT

## 2022-03-17 RX ORDER — ALBUTEROL SULFATE 2.5 MG/3ML
2.5 SOLUTION RESPIRATORY (INHALATION) ONCE
Status: COMPLETED | OUTPATIENT
Start: 2022-03-17 | End: 2022-03-17

## 2022-03-17 RX ADMIN — ALBUTEROL SULFATE 2.5 MG: 2.5 SOLUTION RESPIRATORY (INHALATION) at 09:07

## 2022-03-18 ENCOUNTER — HOSPITAL ENCOUNTER (OUTPATIENT)
Dept: INFUSION CENTER | Facility: CLINIC | Age: 42
Discharge: HOME | End: 2022-03-18
Payer: COMMERCIAL

## 2022-03-18 VITALS
SYSTOLIC BLOOD PRESSURE: 117 MMHG | TEMPERATURE: 98.2 F | DIASTOLIC BLOOD PRESSURE: 66 MMHG | HEART RATE: 79 BPM | RESPIRATION RATE: 18 BRPM

## 2022-03-18 DIAGNOSIS — D50.9 IRON DEFICIENCY ANEMIA, UNSPECIFIED IRON DEFICIENCY ANEMIA TYPE: Primary | ICD-10-CM

## 2022-03-18 PROCEDURE — 96365 THER/PROPH/DIAG IV INF INIT: CPT

## 2022-03-18 RX ORDER — SODIUM CHLORIDE 9 MG/ML
20 INJECTION, SOLUTION INTRAVENOUS ONCE
Status: CANCELLED | OUTPATIENT
Start: 2022-03-25

## 2022-03-18 RX ORDER — SODIUM CHLORIDE 9 MG/ML
20 INJECTION, SOLUTION INTRAVENOUS ONCE
Status: COMPLETED | OUTPATIENT
Start: 2022-03-18 | End: 2022-03-18

## 2022-03-18 RX ADMIN — SODIUM CHLORIDE 20 ML/HR: 9 INJECTION, SOLUTION INTRAVENOUS at 08:15

## 2022-03-18 RX ADMIN — SODIUM CHLORIDE 300 MG: 9 INJECTION, SOLUTION INTRAVENOUS at 08:15

## 2022-03-18 NOTE — PROGRESS NOTES
Pt presents for venofer infusion offering no complaints  Pt tolerated treatment without incident  PIV removed  AVS printed, next appointment reviewed

## 2022-03-21 ENCOUNTER — TELEPHONE (OUTPATIENT)
Dept: HEMATOLOGY ONCOLOGY | Facility: CLINIC | Age: 42
End: 2022-03-21

## 2022-03-21 DIAGNOSIS — J45.50 SEVERE PERSISTENT ALLERGIC ASTHMA: Primary | ICD-10-CM

## 2022-03-21 NOTE — TELEPHONE ENCOUNTER
CALL RETURN FORM   Reason for patient call? Patient referred over to Gastroenterology  According to patient Megan Sánchez stated that Gastroenterology would be contacting her to make an appointment  Patient has not heard anything back  Patient is requesting a call back  Patient's primary oncologist? Megan Sánchez    Name of person the patient was calling for? Megan Sánchez   Any additional information to add, if applicable? Also gave patient's gastroenterology number    Informed patient that the message will be forwarded to the team and someone will get back to them as soon as possible    Did you relay this information to the patient?   Yes

## 2022-03-23 RX ORDER — DUPILUMAB 300 MG/2ML
INJECTION, SOLUTION SUBCUTANEOUS
Qty: 8 ML | Refills: 4 | Status: SHIPPED | OUTPATIENT
Start: 2022-03-23

## 2022-03-25 ENCOUNTER — HOSPITAL ENCOUNTER (OUTPATIENT)
Dept: INFUSION CENTER | Facility: CLINIC | Age: 42
Discharge: HOME | End: 2022-03-25
Payer: COMMERCIAL

## 2022-03-25 VITALS
TEMPERATURE: 98.3 F | HEART RATE: 85 BPM | SYSTOLIC BLOOD PRESSURE: 124 MMHG | RESPIRATION RATE: 18 BRPM | DIASTOLIC BLOOD PRESSURE: 64 MMHG

## 2022-03-25 DIAGNOSIS — D50.9 IRON DEFICIENCY ANEMIA, UNSPECIFIED IRON DEFICIENCY ANEMIA TYPE: Primary | ICD-10-CM

## 2022-03-25 PROCEDURE — 96365 THER/PROPH/DIAG IV INF INIT: CPT

## 2022-03-25 RX ORDER — SODIUM CHLORIDE 9 MG/ML
20 INJECTION, SOLUTION INTRAVENOUS ONCE
Status: COMPLETED | OUTPATIENT
Start: 2022-03-25 | End: 2022-03-25

## 2022-03-25 RX ORDER — SODIUM CHLORIDE 9 MG/ML
20 INJECTION, SOLUTION INTRAVENOUS ONCE
Status: CANCELLED | OUTPATIENT
Start: 2022-04-01

## 2022-03-25 RX ADMIN — SODIUM CHLORIDE 20 ML/HR: 9 INJECTION, SOLUTION INTRAVENOUS at 12:10

## 2022-03-25 RX ADMIN — IRON SUCROSE 300 MG: 20 INJECTION, SOLUTION INTRAVENOUS at 12:10

## 2022-03-25 NOTE — PROGRESS NOTES
Pt presented for IV venofe, offering no complaints  Pt tolerated entire infusion without incident  PIV removed, Pt declined AVS, pt was discharged in stable condition

## 2022-03-30 ENCOUNTER — TELEPHONE (OUTPATIENT)
Dept: PULMONOLOGY | Facility: CLINIC | Age: 42
End: 2022-03-30

## 2022-03-30 ENCOUNTER — OFFICE VISIT (OUTPATIENT)
Dept: GASTROENTEROLOGY | Facility: CLINIC | Age: 42
End: 2022-03-30
Payer: COMMERCIAL

## 2022-03-30 VITALS
BODY MASS INDEX: 28.71 KG/M2 | DIASTOLIC BLOOD PRESSURE: 82 MMHG | HEART RATE: 72 BPM | SYSTOLIC BLOOD PRESSURE: 122 MMHG | HEIGHT: 62 IN | WEIGHT: 156 LBS

## 2022-03-30 DIAGNOSIS — Z83.79 FAMILY HISTORY OF CROHN'S DISEASE: Primary | ICD-10-CM

## 2022-03-30 DIAGNOSIS — R89.4 ABNORMAL CELIAC ANTIBODY PANEL: ICD-10-CM

## 2022-03-30 DIAGNOSIS — D50.9 IRON DEFICIENCY ANEMIA, UNSPECIFIED IRON DEFICIENCY ANEMIA TYPE: ICD-10-CM

## 2022-03-30 PROCEDURE — 3008F BODY MASS INDEX DOCD: CPT | Performed by: PHYSICIAN ASSISTANT

## 2022-03-30 PROCEDURE — 1036F TOBACCO NON-USER: CPT | Performed by: PHYSICIAN ASSISTANT

## 2022-03-30 PROCEDURE — 99204 OFFICE O/P NEW MOD 45 MIN: CPT | Performed by: PHYSICIAN ASSISTANT

## 2022-03-30 NOTE — TELEPHONE ENCOUNTER
Aleksandr Sargent,    Please let her know that per Dr Alysha Almanza, she can resume her injections as prescribed at the next scheduled time  Thank you      LENI Lim

## 2022-03-30 NOTE — LETTER
March 31, 2022     Sona Ventura, LENI  [address] [facility]    Patient: Lai Kurtz   YOB: 1980   Date of Visit: 3/30/2022       Dear Dr Osvaldo Powers:    Thank you for referring Lai Kurtz to me for evaluation  Below are my notes for this consultation  If you have questions, please do not hesitate to call me  I look forward to following your patient along with you  Sincerely,        Vance Oates PA-C        CC: No Recipients  Carolina William  3/30/2022 10:33 AM  Attested  [facility]  Lai Kurtz 40 y o  female MRN: [id number]       CC: New patient to establish for iron deficiency anemia, positive celiac disease antibody panel and family history of Crohn's disease    HPI:  Annabella Garcia is a 50-year-old female with history of asthma and iron deficiency anemia since 2018  Patient reports that she has been following with Hematology for management of iron deficiency anemia, that became more pronounced after her last pregnancy in 2018  She reports that she began having abdominal bloating, fatigue and weight gain  She denies persistent diarrhea or signs overt GI bleeding  Our haematology team had sent her for a celiac disease antibody panel, that was grossly positive  Current hemoglobin is in the 10s  She has never had an EGD or colonoscopy  She reports that her biological mother had Crohn's disease  Review of Systems:    CONSTITUTIONAL: Denies any fever, chills, or rigors  Good appetite, and no recent weight loss  HEENT: No earache or tinnitus  Denies hearing loss or visual disturbances  CARDIOVASCULAR: No chest pain or palpitations  RESPIRATORY: Denies any cough, hemoptysis, shortness of breath or dyspnea on exertion  GASTROINTESTINAL: As noted in the History of Present Illness  GENITOURINARY: No problems with urination  Denies any hematuria or dysuria  NEUROLOGIC: No dizziness or vertigo, denies headaches     MUSCULOSKELETAL: Denies any muscle or joint pain  SKIN: Denies skin rashes or itching  ENDOCRINE: Denies excessive thirst  Denies intolerance to heat or cold  PSYCHOSOCIAL: Denies depression or anxiety  Denies any recent memory loss  Current Outpatient Medications   Medication Sig Dispense Refill    albuterol (2 5 mg/3 mL) 0 083 % nebulizer solution Take 3 mL (2 5 mg total) by nebulization every 6 (six) hours as needed for wheezing or shortness of breath 180 mL 5    albuterol (PROVENTIL HFA,VENTOLIN HFA) 90 mcg/act inhaler Inhale 2 puffs every 6 (six) hours as needed for wheezing 18 g 3    cyclobenzaprine (FLEXERIL) 5 mg tablet Take 1 tablet (5 mg total) by mouth daily at bedtime 30 tablet 0    diazepam (VALIUM) 5 mg tablet       dupilumab (DUPIXENT) subcutaneous injection Inject 2 mL (300 mg total) under the skin every 14 (fourteen) days 2 mL 26    Dupixent 300 MG/2ML SOPN INJECT 2 PENS UNDER THE SKIN ON DAY 1, THEN INJECT 1 PEN ON DAY 15, THEN 1 PEN EVERY OTHER WEEK THEREAFTER 8 mL 4    ferrous sulfate 324 (65 Fe) mg Take 1 tablet (324 mg total) by mouth 2 (two) times a day before meals 180 tablet 1    fexofenadine (ALLEGRA) 180 MG tablet Take 180 mg by mouth daily      fluticasone-vilanterol (Breo Ellipta) 200-25 MCG/INH inhaler Inhale 1 puff daily Rinse mouth after use  180 each 0    meloxicam (MOBIC) 15 mg tablet TAKE 1 TABLET BY MOUTH EVERY DAY FOR 30 DAYS      pregabalin (LYRICA) 75 mg capsule TAKE 1 CAPSULE BY MOUTH TWICE A DAY FOR 30 DAYS      EPINEPHrine (EPIPEN) 0 3 mg/0 3 mL SOAJ Inject 0 3 mL (0 3 mg total) into a muscle once for 1 dose 0 6 mL 0    montelukast (SINGULAIR) 10 mg tablet Take 1 tablet (10 mg total) by mouth daily at bedtime (Patient not taking: Reported on 3/30/2022 ) 30 tablet 5     No current facility-administered medications for this visit       Past Medical History:   Diagnosis Date    Asthma     not as adult    Celiac disease     History of one miscarriage     6/2017 10 weeks  Renal cyst     Varicella      Past Surgical History:   Procedure Laterality Date     SECTION      2004 son,   2007 daughter,   2011 daughter   [id number] [address]      C5-7, disc repair     [place]  DELIVERY ONLY N/A 2018    Procedure:  SECTION () REPEAT;  Surgeon: Evelyn Campbell DO;  Location: [facility];  Service: Obstetrics     Social History     Socioeconomic History    Marital status: /Civil Union     Spouse name: None    Number of children: None    Years of education: None    Highest education level: None   Occupational History    None   Tobacco Use    Smoking status: Never Smoker    Smokeless tobacco: Never Used   Vaping Use    Vaping Use: Never used   Substance and Sexual Activity    Alcohol use: No    Drug use: No    Sexual activity: Yes     Partners: Male   Other Topics Concern    None   Social History Narrative    Engaged to be     Always uses seatbelts     Social Determinants of Health     Financial Resource Strain: Not on file   Food Insecurity: Not on file   Transportation Needs: Not on file   Physical Activity: Not on file   Stress: Not on file   Social Connections: Not on file   Intimate Partner Violence: Not on file   Housing Stability: Not on file     Family History   Adopted: Yes   Problem Relation Age of Onset    Crohn's disease Mother     Hypertension Mother     Alcohol abuse Father     Drug abuse Father     Asthma Sister     Cancer Maternal Grandmother         lung    Arthritis Maternal Grandmother     Diabetes Maternal Grandfather     Hearing loss Maternal Grandfather     Heart disease Maternal Grandfather     No Known Problems Daughter     No Known Problems Daughter     No Known Problems Daughter     No Known Problems Son             PHYSICAL EXAM:    Vitals:    22 0957   BP: 122/82   Pulse: 72   Weight: 70 8 kg (156 lb)   Height: 5' 2" (1 575 m)     General Appearance:   Alert and oriented x 3   Cooperative, and in no respiratory distress   HEENT:   Normocephalic, atraumatic, anicteric      Neck:  Supple, symmetrical, trachea midline   Lungs:   Clear to auscultation bilaterally    Heart[de-identified]   Regular rate and rhythm   Abdomen:   Soft, non-tender, non-distended; normal bowel sounds; no masses, no organomegaly    Genitalia:   Deferred    Rectal:   Deferred    Extremities:  No cyanosis, clubbing or edema    Pulses:  2+ and symmetric all extremities    Skin:  Skin color, texture, turgor normal, no rashes or lesions    Lymph nodes:  No palpable cervical or supraclavicular lymphadenopathy        Lab Results:   Results from last 6 Months   Lab Units 02/28/22  1543   WBC Thousand/uL 6 24   HEMOGLOBIN g/dL 10 8*   HEMATOCRIT % 36 0   PLATELETS Thousands/uL 442*   NEUTROS PCT % 41*   LYMPHS PCT % 38   MONOS PCT % 11   EOS PCT % 9*     Results from last 6 Months   Lab Units 01/11/22  1519   POTASSIUM mmol/L 3 9   CHLORIDE mmol/L 100   CO2 mmol/L 27   BUN mg/dL 9   CREATININE mg/dL 0 72   CALCIUM mg/dL 9 2   ALK PHOS U/L 110   ALT U/L 39   AST U/L 28     Results from last 6 Months   Lab Units 03/11/22  1320   INR  1 03           Imaging Studies: I have personally reviewed pertinent imaging studies  No results found  ASSESSMENT and PLAN:      1)   Iron deficiency anemia, abdominal bloating in the setting of positive celiac disease antibody panel and family history of Crohn's disease -  Patient with grossly positive celiac disease antibody panel  Patient also has family history of Crohn's disease from her mother  Patient was being treated with high dose of iron supplementation    -  Will plan for EGD and colonoscopy to investigate  -  I told her to check with her insurance to see if a nutritionist would be covered, we would refer her to 1 in our network to help manage celiac disease diet  -  She and her  of already started eating in researching gluten free foods        Follow up after procedures    Attestation signed by Elissa Echevarria Venus Saenz MD at 3/30/2022 10:33 AM:  I supervised the Advanced Practitioner  I reviewed the Advanced Practitioner note and agree      Scott Manning MD 03/30/22

## 2022-03-30 NOTE — TELEPHONE ENCOUNTER
Pt called and stated she gave herself the first injection of the Dupixent on 3/22/22 but wants aware at the time that she was supposed to give herself 2 injections  She wanted to know if she should give herself the second shot now or just wait until the next dose is due and administer the 2 injections likes she's supposed to?   Please advise: [phone]

## 2022-03-30 NOTE — H&P (VIEW-ONLY)
[facility]) [facility]  [facility] 40 y o  female MRN: [id number]       CC: New patient to establish for iron deficiency anemia, positive celiac disease antibody panel and family history of Crohn's disease    HPI:  Sheldon Moore is a 45-year-old female with history of asthma and iron deficiency anemia since 2018  Patient reports that she has been following with Hematology for management of iron deficiency anemia, that became more pronounced after her last pregnancy in 2018  She reports that she began having abdominal bloating, fatigue and weight gain  She denies persistent diarrhea or signs overt GI bleeding  Our haematology team had sent her for a celiac disease antibody panel, that was grossly positive  Current hemoglobin is in the 10s  She has never had an EGD or colonoscopy  She reports that her biological mother had Crohn's disease  Review of Systems:    CONSTITUTIONAL: Denies any fever, chills, or rigors  Good appetite, and no recent weight loss  HEENT: No earache or tinnitus  Denies hearing loss or visual disturbances  CARDIOVASCULAR: No chest pain or palpitations  RESPIRATORY: Denies any cough, hemoptysis, shortness of breath or dyspnea on exertion  GASTROINTESTINAL: As noted in the History of Present Illness  GENITOURINARY: No problems with urination  Denies any hematuria or dysuria  NEUROLOGIC: No dizziness or vertigo, denies headaches  MUSCULOSKELETAL: Denies any muscle or joint pain  SKIN: Denies skin rashes or itching  ENDOCRINE: Denies excessive thirst  Denies intolerance to heat or cold  PSYCHOSOCIAL: Denies depression or anxiety  Denies any recent memory loss         Current Outpatient Medications   Medication Sig Dispense Refill    albuterol (2 5 mg/3 mL) 0 083 % nebulizer solution Take 3 mL (2 5 mg total) by nebulization every 6 (six) hours as needed for wheezing or shortness of breath 180 mL 5    albuterol (PROVENTIL HFA,VENTOLIN HFA) 90 mcg/act inhaler Inhale 2 puffs every 6 (six) hours as needed for wheezing 18 g 3    cyclobenzaprine (FLEXERIL) 5 mg tablet Take 1 tablet (5 mg total) by mouth daily at bedtime 30 tablet 0    diazepam (VALIUM) 5 mg tablet       dupilumab (DUPIXENT) subcutaneous injection Inject 2 mL (300 mg total) under the skin every 14 (fourteen) days 2 mL 26    Dupixent 300 MG/2ML SOPN INJECT 2 PENS UNDER THE SKIN ON DAY 1, THEN INJECT 1 PEN ON DAY 15, THEN 1 PEN EVERY OTHER WEEK THEREAFTER 8 mL 4    ferrous sulfate 324 (65 Fe) mg Take 1 tablet (324 mg total) by mouth 2 (two) times a day before meals 180 tablet 1    fexofenadine (ALLEGRA) 180 MG tablet Take 180 mg by mouth daily      fluticasone-vilanterol (Breo Ellipta) 200-25 MCG/INH inhaler Inhale 1 puff daily Rinse mouth after use  180 each 0    meloxicam (MOBIC) 15 mg tablet TAKE 1 TABLET BY MOUTH EVERY DAY FOR 30 DAYS      pregabalin (LYRICA) 75 mg capsule TAKE 1 CAPSULE BY MOUTH TWICE A DAY FOR 30 DAYS      EPINEPHrine (EPIPEN) 0 3 mg/0 3 mL SOAJ Inject 0 3 mL (0 3 mg total) into a muscle once for 1 dose 0 6 mL 0    montelukast (SINGULAIR) 10 mg tablet Take 1 tablet (10 mg total) by mouth daily at bedtime (Patient not taking: Reported on 3/30/2022 ) 30 tablet 5     No current facility-administered medications for this visit       Past Medical History:   Diagnosis Date    Asthma     not as adult    Celiac disease     History of one miscarriage     2017 10 weeks    Renal cyst     Varicella      Past Surgical History:   Procedure Laterality Date     SECTION      2004 son,    daughter,    daughter   [facility] NECK SURGERY      C5-7, disc repair     CT  DELIVERY ONLY N/A 2018    Procedure:  SECTION () REPEAT;  Surgeon: Matt Jackman DO;  [facility]: [facility] ;  Service: Obstetrics     Social History     Socioeconomic History    Marital status: /Civil Union     Spouse name: None    Number of children: None    Years of education: None    Highest education level: None   Occupational History    None   Tobacco Use    Smoking status: Never Smoker    Smokeless tobacco: Never Used   Vaping Use    Vaping Use: Never used   Substance and Sexual Activity    Alcohol use: No    Drug use: No    Sexual activity: Yes     Partners: Male   Other Topics Concern    None   Social History Narrative    Engaged to be     Always uses seatbelts     Social Determinants of Health     Financial Resource Strain: Not on file   Food Insecurity: Not on file   Transportation Needs: Not on file   Physical Activity: Not on file   Stress: Not on file   Social Connections: Not on file   Intimate Partner Violence: Not on file   Housing Stability: Not on file     Family History   Adopted: Yes   Problem Relation Age of Onset    Crohn's disease Mother     Hypertension Mother     Alcohol abuse Father     Drug abuse Father     Asthma Sister     Cancer Maternal Grandmother         lung    Arthritis Maternal Grandmother     Diabetes Maternal Grandfather     Hearing loss Maternal Grandfather     Heart disease Maternal Grandfather     No Known Problems Daughter     No Known Problems Daughter     No Known Problems Daughter     No Known Problems Son             PHYSICAL EXAM:    Vitals:    03/30/22 0957   BP: 122/82   Pulse: 72   Weight: 70 8 kg (156 lb)   Height: 5' 2" (1 575 m)     General Appearance:   Alert and oriented x 3   Cooperative, and in no respiratory distress   HEENT:   Normocephalic, atraumatic, anicteric      Neck:  Supple, symmetrical, trachea midline   Lungs:   Clear to auscultation bilaterally    Heart[de-identified]   Regular rate and rhythm   Abdomen:   Soft, non-tender, non-distended; normal bowel sounds; no masses, no organomegaly    Genitalia:   Deferred    Rectal:   Deferred    Extremities:  No cyanosis, clubbing or edema    Pulses:  2+ and symmetric all extremities    Skin:  Skin color, texture, turgor normal, no rashes or lesions    Lymph nodes:  No palpable cervical or supraclavicular lymphadenopathy        Lab Results:   Results from last 6 Months   Lab Units 02/28/22  1543   WBC Thousand/uL 6 24   HEMOGLOBIN g/dL 10 8*   HEMATOCRIT % 36 0   PLATELETS Thousands/uL 442*   NEUTROS PCT % 41*   LYMPHS PCT % 38   MONOS PCT % 11   EOS PCT % 9*     Results from last 6 Months   Lab Units 01/11/22  1519   POTASSIUM mmol/L 3 9   CHLORIDE mmol/L 100   CO2 mmol/L 27   BUN mg/dL 9   CREATININE mg/dL 0 72   CALCIUM mg/dL 9 2   ALK PHOS U/L 110   ALT U/L 39   AST U/L 28     Results from last 6 Months   Lab Units 03/11/22  1320   INR  1 03           Imaging Studies: I have personally reviewed pertinent imaging studies  No results found  ASSESSMENT and PLAN:      1)   Iron deficiency anemia, abdominal bloating in the setting of positive celiac disease antibody panel and family history of Crohn's disease -  Patient with grossly positive celiac disease antibody panel  Patient also has family history of Crohn's disease from her mother  Patient was being treated with high dose of iron supplementation    -  Will plan for EGD and colonoscopy to investigate  -  I told her to check with her insurance to see if a nutritionist would be covered, we would refer her to 1 in our network to help manage celiac disease diet  -  She and her  of already started eating in researching gluten free foods        Follow up after procedures

## 2022-03-30 NOTE — PROGRESS NOTES
[address] [facility]  Caryle Shingles 40 y o  female MRN: [id number]       CC: New patient to establish for iron deficiency anemia, positive celiac disease antibody panel and family history of Crohn's disease    HPI:  Alex Melton is a 42-year-old female with history of asthma and iron deficiency anemia since 2018  Patient reports that she has been following with Hematology for management of iron deficiency anemia, that became more pronounced after her last pregnancy in 2018  She reports that she began having abdominal bloating, fatigue and weight gain  She denies persistent diarrhea or signs overt GI bleeding  Our haematology team had sent her for a celiac disease antibody panel, that was grossly positive  Current hemoglobin is in the 10s  She has never had an EGD or colonoscopy  She reports that her biological mother had Crohn's disease  Review of Systems:    CONSTITUTIONAL: Denies any fever, chills, or rigors  Good appetite, and no recent weight loss  HEENT: No earache or tinnitus  Denies hearing loss or visual disturbances  CARDIOVASCULAR: No chest pain or palpitations  RESPIRATORY: Denies any cough, hemoptysis, shortness of breath or dyspnea on exertion  GASTROINTESTINAL: As noted in the History of Present Illness  GENITOURINARY: No problems with urination  Denies any hematuria or dysuria  NEUROLOGIC: No dizziness or vertigo, denies headaches  MUSCULOSKELETAL: Denies any muscle or joint pain  SKIN: Denies skin rashes or itching  ENDOCRINE: Denies excessive thirst  Denies intolerance to heat or cold  PSYCHOSOCIAL: Denies depression or anxiety  Denies any recent memory loss         Current Outpatient Medications   Medication Sig Dispense Refill    albuterol (2 5 mg/3 mL) 0 083 % nebulizer solution Take 3 mL (2 5 mg total) by nebulization every 6 (six) hours as needed for wheezing or shortness of breath 180 mL 5    albuterol (PROVENTIL HFA,VENTOLIN HFA) 90 mcg/act inhaler Inhale 2 puffs every 6 (six) hours as needed for wheezing 18 g 3    cyclobenzaprine (FLEXERIL) 5 mg tablet Take 1 tablet (5 mg total) by mouth daily at bedtime 30 tablet 0    diazepam (VALIUM) 5 mg tablet       dupilumab (DUPIXENT) subcutaneous injection Inject 2 mL (300 mg total) under the skin every 14 (fourteen) days 2 mL 26    Dupixent 300 MG/2ML SOPN INJECT 2 PENS UNDER THE SKIN ON DAY 1, THEN INJECT 1 PEN ON DAY 15, THEN 1 PEN EVERY OTHER WEEK THEREAFTER 8 mL 4    ferrous sulfate 324 (65 Fe) mg Take 1 tablet (324 mg total) by mouth 2 (two) times a day before meals 180 tablet 1    fexofenadine (ALLEGRA) 180 MG tablet Take 180 mg by mouth daily      fluticasone-vilanterol (Breo Ellipta) 200-25 MCG/INH inhaler Inhale 1 puff daily Rinse mouth after use  180 each 0    meloxicam (MOBIC) 15 mg tablet TAKE 1 TABLET BY MOUTH EVERY DAY FOR 30 DAYS      pregabalin (LYRICA) 75 mg capsule TAKE 1 CAPSULE BY MOUTH TWICE A DAY FOR 30 DAYS      EPINEPHrine (EPIPEN) 0 3 mg/0 3 mL SOAJ Inject 0 3 mL (0 3 mg total) into a muscle once for 1 dose 0 6 mL 0    montelukast (SINGULAIR) 10 mg tablet Take 1 tablet (10 mg total) by mouth daily at bedtime (Patient not taking: Reported on 3/30/2022 ) 30 tablet 5     No current facility-administered medications for this visit       Past Medical History:   Diagnosis Date    Asthma     not as adult    Celiac disease     History of one miscarriage     2017 10 weeks    Renal cyst     Varicella      Past Surgical History:   Procedure Laterality Date     SECTION      2004 son,    daughter,    daughter   Ardeth Needs NECK SURGERY      C5-7, disc repair     DC  DELIVERY ONLY N/A 2018    Procedure:  SECTION () REPEAT;  Surgeon: Katina Mei DO;  Location: [facility] ;  Service: Obstetrics     Social History     Socioeconomic History    Marital status: /Civil Union     Spouse name: None    Number of children: None    Years of education: None    Highest education level: None   Occupational History    None   Tobacco Use    Smoking status: Never Smoker    Smokeless tobacco: Never Used   Vaping Use    Vaping Use: Never used   Substance and Sexual Activity    Alcohol use: No    Drug use: No    Sexual activity: Yes     Partners: Male   Other Topics Concern    None   Social History Narrative    Engaged to be     Always uses seatbelts     Social Determinants of Health     Financial Resource Strain: Not on file   Food Insecurity: Not on file   Transportation Needs: Not on file   Physical Activity: Not on file   Stress: Not on file   Social Connections: Not on file   Intimate Partner Violence: Not on file   Housing Stability: Not on file     Family History   Adopted: Yes   Problem Relation Age of Onset    Crohn's disease Mother     Hypertension Mother     Alcohol abuse Father     Drug abuse Father     Asthma Sister     Cancer Maternal Grandmother         lung    Arthritis Maternal Grandmother     Diabetes Maternal Grandfather     Hearing loss Maternal Grandfather     Heart disease Maternal Grandfather     No Known Problems Daughter     No Known Problems Daughter     No Known Problems Daughter     No Known Problems Son             PHYSICAL EXAM:    Vitals:    03/30/22 0957   BP: 122/82   Pulse: 72   Weight: 70 8 kg (156 lb)   Height: 5' 2" (1 575 m)     General Appearance:   Alert and oriented x 3   Cooperative, and in no respiratory distress   HEENT:   Normocephalic, atraumatic, anicteric      Neck:  Supple, symmetrical, trachea midline   Lungs:   Clear to auscultation bilaterally    Heart[de-identified]   Regular rate and rhythm   Abdomen:   Soft, non-tender, non-distended; normal bowel sounds; no masses, no organomegaly    Genitalia:   Deferred    Rectal:   Deferred    Extremities:  No cyanosis, clubbing or edema    Pulses:  2+ and symmetric all extremities    Skin:  Skin color, texture, turgor normal, no rashes or lesions    Lymph nodes:  No palpable cervical or supraclavicular lymphadenopathy        Lab Results:   Results from last 6 Months   Lab Units 02/28/22  1543   WBC Thousand/uL 6 24   HEMOGLOBIN g/dL 10 8*   HEMATOCRIT % 36 0   PLATELETS Thousands/uL 442*   NEUTROS PCT % 41*   LYMPHS PCT % 38   MONOS PCT % 11   EOS PCT % 9*     Results from last 6 Months   Lab Units 01/11/22  1519   POTASSIUM mmol/L 3 9   CHLORIDE mmol/L 100   CO2 mmol/L 27   BUN mg/dL 9   CREATININE mg/dL 0 72   CALCIUM mg/dL 9 2   ALK PHOS U/L 110   ALT U/L 39   AST U/L 28     Results from last 6 Months   Lab Units 03/11/22  1320   INR  1 03           Imaging Studies: I have personally reviewed pertinent imaging studies  No results found  ASSESSMENT and PLAN:      1)   Iron deficiency anemia, abdominal bloating in the setting of positive celiac disease antibody panel and family history of Crohn's disease -  Patient with grossly positive celiac disease antibody panel  Patient also has family history of Crohn's disease from her mother  Patient was being treated with high dose of iron supplementation    -  Will plan for EGD and colonoscopy to investigate  -  I told her to check with her insurance to see if a nutritionist would be covered, we would refer her to 1 in our network to help manage celiac disease diet  -  She and her  of already started eating in researching gluten free foods        Follow up after procedures

## 2022-03-30 NOTE — TELEPHONE ENCOUNTER
Patients  called back looking for an answer on Dupixent  Advised him of Nayeli's note and he confirmed understanding

## 2022-04-01 ENCOUNTER — HOSPITAL ENCOUNTER (OUTPATIENT)
Dept: INFUSION CENTER | Facility: CLINIC | Age: 42
Discharge: HOME | End: 2022-04-01
Payer: COMMERCIAL

## 2022-04-01 VITALS
HEART RATE: 85 BPM | TEMPERATURE: 98.7 F | RESPIRATION RATE: 18 BRPM | SYSTOLIC BLOOD PRESSURE: 118 MMHG | DIASTOLIC BLOOD PRESSURE: 90 MMHG

## 2022-04-01 DIAGNOSIS — D50.9 IRON DEFICIENCY ANEMIA, UNSPECIFIED IRON DEFICIENCY ANEMIA TYPE: Primary | ICD-10-CM

## 2022-04-01 PROCEDURE — 96365 THER/PROPH/DIAG IV INF INIT: CPT

## 2022-04-01 RX ORDER — SODIUM CHLORIDE 9 MG/ML
20 INJECTION, SOLUTION INTRAVENOUS ONCE
Status: CANCELLED | OUTPATIENT
Start: 2022-04-08

## 2022-04-01 RX ORDER — SODIUM CHLORIDE 9 MG/ML
20 INJECTION, SOLUTION INTRAVENOUS ONCE
Status: COMPLETED | OUTPATIENT
Start: 2022-04-01 | End: 2022-04-01

## 2022-04-01 RX ADMIN — SODIUM CHLORIDE 20 ML/HR: 0.9 INJECTION, SOLUTION INTRAVENOUS at 09:04

## 2022-04-01 RX ADMIN — IRON SUCROSE 300 MG: 20 INJECTION, SOLUTION INTRAVENOUS at 09:15

## 2022-04-01 NOTE — PROGRESS NOTES
Patient to infusion for venofer  She offers no complaints  Vital signs stable  Call bell within reach  Will continue to monitor

## 2022-04-08 ENCOUNTER — HOSPITAL ENCOUNTER (OUTPATIENT)
Dept: INFUSION CENTER | Facility: CLINIC | Age: 42
Discharge: HOME | End: 2022-04-08
Payer: COMMERCIAL

## 2022-04-08 VITALS
DIASTOLIC BLOOD PRESSURE: 81 MMHG | SYSTOLIC BLOOD PRESSURE: 137 MMHG | TEMPERATURE: 98.2 F | HEART RATE: 93 BPM | RESPIRATION RATE: 18 BRPM

## 2022-04-08 DIAGNOSIS — D50.9 IRON DEFICIENCY ANEMIA, UNSPECIFIED IRON DEFICIENCY ANEMIA TYPE: Primary | ICD-10-CM

## 2022-04-08 PROCEDURE — 96365 THER/PROPH/DIAG IV INF INIT: CPT

## 2022-04-08 RX ORDER — SODIUM CHLORIDE 9 MG/ML
20 INJECTION, SOLUTION INTRAVENOUS ONCE
Status: COMPLETED | OUTPATIENT
Start: 2022-04-08 | End: 2022-04-08

## 2022-04-08 RX ORDER — SODIUM CHLORIDE 9 MG/ML
20 INJECTION, SOLUTION INTRAVENOUS ONCE
Status: CANCELLED | OUTPATIENT
Start: 2022-04-15

## 2022-04-08 RX ADMIN — SODIUM CHLORIDE 20 ML/HR: 0.9 INJECTION, SOLUTION INTRAVENOUS at 14:14

## 2022-04-08 RX ADMIN — IRON SUCROSE 300 MG: 20 INJECTION, SOLUTION INTRAVENOUS at 14:16

## 2022-04-08 NOTE — PROGRESS NOTES
Pt presents for venofer infusion offering no complaints  Pt tolerated treatment without incident  PIV Removed  AVS declined, next appointment reviewed

## 2022-04-13 ENCOUNTER — HOSPITAL ENCOUNTER (OUTPATIENT)
Dept: GASTROENTEROLOGY | Facility: HOSPITAL | Age: 42
Setting detail: OUTPATIENT SURGERY
Discharge: HOME | End: 2022-04-13
Admitting: PHYSICIAN ASSISTANT
Payer: COMMERCIAL

## 2022-04-13 ENCOUNTER — ANESTHESIA (OUTPATIENT)
Dept: GASTROENTEROLOGY | Facility: HOSPITAL | Age: 42
End: 2022-04-13

## 2022-04-13 ENCOUNTER — ANESTHESIA EVENT (OUTPATIENT)
Dept: GASTROENTEROLOGY | Facility: HOSPITAL | Age: 42
End: 2022-04-13

## 2022-04-13 VITALS
DIASTOLIC BLOOD PRESSURE: 68 MMHG | OXYGEN SATURATION: 97 % | BODY MASS INDEX: 28.56 KG/M2 | TEMPERATURE: 97.3 F | WEIGHT: 155.2 LBS | HEIGHT: 62 IN | RESPIRATION RATE: 14 BRPM | SYSTOLIC BLOOD PRESSURE: 106 MMHG | HEART RATE: 77 BPM

## 2022-04-13 DIAGNOSIS — R89.4 ABNORMAL CELIAC ANTIBODY PANEL: ICD-10-CM

## 2022-04-13 DIAGNOSIS — Z83.79 FAMILY HISTORY OF CROHN'S DISEASE: ICD-10-CM

## 2022-04-13 DIAGNOSIS — D50.9 IRON DEFICIENCY ANEMIA, UNSPECIFIED IRON DEFICIENCY ANEMIA TYPE: ICD-10-CM

## 2022-04-13 LAB
EXT PREGNANCY TEST URINE: NEGATIVE
EXT. CONTROL: NORMAL

## 2022-04-13 PROCEDURE — 45330 DIAGNOSTIC SIGMOIDOSCOPY: CPT | Performed by: INTERNAL MEDICINE

## 2022-04-13 PROCEDURE — 81025 URINE PREGNANCY TEST: CPT | Performed by: INTERNAL MEDICINE

## 2022-04-13 PROCEDURE — 43239 EGD BIOPSY SINGLE/MULTIPLE: CPT | Performed by: INTERNAL MEDICINE

## 2022-04-13 PROCEDURE — 88305 TISSUE EXAM BY PATHOLOGIST: CPT | Performed by: SPECIALIST

## 2022-04-13 RX ORDER — PROPOFOL 10 MG/ML
INJECTION, EMULSION INTRAVENOUS AS NEEDED
Status: DISCONTINUED | OUTPATIENT
Start: 2022-04-13 | End: 2022-04-13

## 2022-04-13 RX ORDER — LIDOCAINE HYDROCHLORIDE 10 MG/ML
0.5 INJECTION, SOLUTION EPIDURAL; INFILTRATION; INTRACAUDAL; PERINEURAL ONCE AS NEEDED
Status: DISCONTINUED | OUTPATIENT
Start: 2022-04-13 | End: 2022-04-17 | Stop reason: HOSPADM

## 2022-04-13 RX ORDER — LIDOCAINE HYDROCHLORIDE 20 MG/ML
INJECTION, SOLUTION EPIDURAL; INFILTRATION; INTRACAUDAL; PERINEURAL AS NEEDED
Status: DISCONTINUED | OUTPATIENT
Start: 2022-04-13 | End: 2022-04-13

## 2022-04-13 RX ORDER — SODIUM CHLORIDE, SODIUM LACTATE, POTASSIUM CHLORIDE, CALCIUM CHLORIDE 600; 310; 30; 20 MG/100ML; MG/100ML; MG/100ML; MG/100ML
50 INJECTION, SOLUTION INTRAVENOUS CONTINUOUS
Status: DISCONTINUED | OUTPATIENT
Start: 2022-04-13 | End: 2022-04-17 | Stop reason: HOSPADM

## 2022-04-13 RX ADMIN — PROPOFOL 200 MG: 10 INJECTION, EMULSION INTRAVENOUS at 09:54

## 2022-04-13 RX ADMIN — SODIUM CHLORIDE, SODIUM LACTATE, POTASSIUM CHLORIDE, AND CALCIUM CHLORIDE 50 ML/HR: .6; .31; .03; .02 INJECTION, SOLUTION INTRAVENOUS at 09:36

## 2022-04-13 RX ADMIN — LIDOCAINE HYDROCHLORIDE 5 ML: 20 INJECTION, SOLUTION EPIDURAL; INFILTRATION; INTRACAUDAL; PERINEURAL at 09:54

## 2022-04-13 NOTE — ANESTHESIA PREPROCEDURE EVALUATION
Procedure:  COLONOSCOPY  EGD    Relevant Problems   HEMATOLOGY   (+) Iron deficiency anemia      MUSCULOSKELETAL   (+) Degenerative disc disease, cervical      PULMONARY   (+) Severe persistent allergic asthma      Other   (+) Eosinophilia   (+) Herniated nucleus pulposus, C5-6   (+) History of 3  sections   (+) S/P repeat low transverse         Physical Exam    Airway    Mallampati score: II  TM Distance: >3 FB  Neck ROM: full     Dental   No notable dental hx     Cardiovascular  Cardiovascular exam normal    Pulmonary  Pulmonary exam normal Breath sounds clear to auscultation,     Other Findings        Anesthesia Plan  ASA Score- 2     Anesthesia Type- IV sedation with anesthesia with ASA Monitors  Additional Monitors:   Airway Plan:           Plan Factors-    Chart reviewed  EKG reviewed  Imaging results reviewed  Existing labs reviewed  Patient summary reviewed  Patient is not a current smoker  Patient instructed to abstain from smoking on day of procedure  Patient did not smoke on day of surgery  Obstructive sleep apnea risk education given perioperatively  Induction- intravenous  Postoperative Plan-     Informed Consent- Anesthetic plan and risks discussed with patient  I personally reviewed this patient with the CRNA  Discussed and agreed on the Anesthesia Plan with the CRNA              Lab Results   Component Value Date    WBC 6 24 2022    HGB 10 8 (L) 2022    HCT 36 0 2022    MCV 82 2022     (H) 2022     Lab Results   Component Value Date    CALCIUM 9 2 2022    K 3 9 2022    CO2 27 2022     2022    BUN 9 2022    CREATININE 0 72 2022     Lab Results   Component Value Date    INR 1 03 2022    PROTIME 13 1 2022     Lab Results   Component Value Date    PTT 31 2022     Type and Screen:  O        Discussed with pt the benefits/alternatives and risks or General Anesthesia including breathing tube remaining in place if not strong enough, PONV, damage to lips and teeth, sore throat, eye injury or blindness    I, Dr Vidhi Green, the attending physician, have personally seen and evaluated the patient prior to anesthetic care  I have reviewed the pre-anesthetic record, and other medical records if appropriate to the anesthetic care  If a CRNA is involved in the case, I have reviewed the CRNA assessment, if present, and agree  The patient is in a suitable condition to proceed with my formulated anesthetic plan

## 2022-04-13 NOTE — ANESTHESIA POSTPROCEDURE EVALUATION
Post-Op Assessment Note    CV Status:  Stable    Pain management: adequate     Mental Status:  Alert and awake   Hydration Status:  Euvolemic   PONV Controlled:  Controlled   Airway Patency:  Patent      Post Op Vitals Reviewed: Yes            No complications documented      BP 99/60 (04/13/22 1007)    Temp (!) 97 3 °F (36 3 °C) (04/13/22 1007)    Pulse 80 (04/13/22 1007)   Resp 17 (04/13/22 1007)    SpO2 96 % (04/13/22 1007)

## 2022-04-13 NOTE — INTERVAL H&P NOTE
H&P reviewed  After examining the patient I find no changes in the patients condition since the H&P had been written      Vitals:    04/13/22 0927   BP: 118/69   Pulse: 81   Resp: 20   Temp: (!) 97 °F (36 1 °C)   SpO2: 96%

## 2022-04-15 ENCOUNTER — HOSPITAL ENCOUNTER (OUTPATIENT)
Dept: INFUSION CENTER | Facility: CLINIC | Age: 42
Discharge: HOME | End: 2022-04-15
Payer: COMMERCIAL

## 2022-04-15 VITALS
TEMPERATURE: 97.6 F | DIASTOLIC BLOOD PRESSURE: 65 MMHG | SYSTOLIC BLOOD PRESSURE: 118 MMHG | RESPIRATION RATE: 18 BRPM | HEART RATE: 83 BPM

## 2022-04-15 DIAGNOSIS — D50.9 IRON DEFICIENCY ANEMIA, UNSPECIFIED IRON DEFICIENCY ANEMIA TYPE: Primary | ICD-10-CM

## 2022-04-15 PROCEDURE — 96365 THER/PROPH/DIAG IV INF INIT: CPT

## 2022-04-15 RX ORDER — SODIUM CHLORIDE 9 MG/ML
20 INJECTION, SOLUTION INTRAVENOUS ONCE
Status: CANCELLED | OUTPATIENT
Start: 2022-04-15

## 2022-04-15 RX ORDER — SODIUM CHLORIDE 9 MG/ML
20 INJECTION, SOLUTION INTRAVENOUS ONCE
Status: COMPLETED | OUTPATIENT
Start: 2022-04-15 | End: 2022-04-15

## 2022-04-15 RX ADMIN — IRON SUCROSE 300 MG: 20 INJECTION, SOLUTION INTRAVENOUS at 13:47

## 2022-04-15 RX ADMIN — SODIUM CHLORIDE 20 ML/HR: 0.9 INJECTION, SOLUTION INTRAVENOUS at 13:47

## 2022-04-15 NOTE — PLAN OF CARE
Problem: HEMATOLOGIC - ADULT  Goal: Maintains hematologic stability  Description: INTERVENTIONS  - Administer labs as ordered  Outcome: Progressing

## 2022-04-18 ENCOUNTER — TELEPHONE (OUTPATIENT)
Dept: FAMILY MEDICINE CLINIC | Facility: CLINIC | Age: 42
End: 2022-04-18

## 2022-04-20 ENCOUNTER — PREP FOR PROCEDURE (OUTPATIENT)
Dept: GASTROENTEROLOGY | Facility: CLINIC | Age: 42
End: 2022-04-20

## 2022-04-20 ENCOUNTER — TELEPHONE (OUTPATIENT)
Dept: GASTROENTEROLOGY | Facility: CLINIC | Age: 42
End: 2022-04-20

## 2022-04-20 DIAGNOSIS — D50.9 IRON DEFICIENCY ANEMIA, UNSPECIFIED IRON DEFICIENCY ANEMIA TYPE: Primary | ICD-10-CM

## 2022-04-20 DIAGNOSIS — R89.4 ABNORMAL CELIAC ANTIBODY PANEL: ICD-10-CM

## 2022-04-20 DIAGNOSIS — Z83.79 FAMILY HISTORY OF CROHN'S DISEASE: ICD-10-CM

## 2022-04-20 NOTE — TELEPHONE ENCOUNTER
Spoke with patients  and he rescheduled her repeat colonoscopy for 7/14/22  Scheduled date of colonoscopy (as of today):7/14/22  Physician performing colonoscopy: Shakeel  Location of colonoscopy:[place]  Bowel prep reviewed with patient:2 day miralax prep  Instructions reviewed with patient by:Ainsley AGARWAL  Clearances: none    Prep instructions mailed to her home

## 2022-05-10 ENCOUNTER — OFFICE VISIT (OUTPATIENT)
Dept: PULMONOLOGY | Facility: CLINIC | Age: 42
End: 2022-05-10
Payer: COMMERCIAL

## 2022-05-10 VITALS
RESPIRATION RATE: 16 BRPM | WEIGHT: 161.2 LBS | DIASTOLIC BLOOD PRESSURE: 80 MMHG | OXYGEN SATURATION: 98 % | SYSTOLIC BLOOD PRESSURE: 115 MMHG | BODY MASS INDEX: 29.66 KG/M2 | TEMPERATURE: 98.6 F | HEIGHT: 62 IN | HEART RATE: 85 BPM

## 2022-05-10 DIAGNOSIS — J45.50 SEVERE PERSISTENT ASTHMA WITHOUT COMPLICATION: ICD-10-CM

## 2022-05-10 DIAGNOSIS — D72.10 EOSINOPHILIA, UNSPECIFIED TYPE: ICD-10-CM

## 2022-05-10 DIAGNOSIS — J45.50 SEVERE PERSISTENT ALLERGIC ASTHMA: Primary | ICD-10-CM

## 2022-05-10 PROCEDURE — 3008F BODY MASS INDEX DOCD: CPT | Performed by: INTERNAL MEDICINE

## 2022-05-10 PROCEDURE — 1036F TOBACCO NON-USER: CPT | Performed by: INTERNAL MEDICINE

## 2022-05-10 PROCEDURE — 99213 OFFICE O/P EST LOW 20 MIN: CPT | Performed by: INTERNAL MEDICINE

## 2022-05-10 RX ORDER — PREDNISONE 20 MG/1
40 TABLET ORAL DAILY
Qty: 10 TABLET | Refills: 0 | Status: SHIPPED | OUTPATIENT
Start: 2022-05-10

## 2022-05-10 NOTE — PROGRESS NOTES
Progress Note - Pulmonary   Parish Fuentes 40 y o  female MRN: [id number]   Encounter: [id number]      Assessment/Plan:  ***    There are no diagnoses linked to this encounter  Patient may follow up in *** months or sooner as necessary  Orders:  No orders of the defined types were placed in this encounter  Subjective:   ***    Inhaler Regimen:  ***    Remainder of review of systems negative except as described in HPI  The following portions of the patient's history were reviewed and updated as appropriate: allergies, current medications, past family history, past medical history, past social history, past surgical history and problem list      Objective:   Vitals: currently breastfeeding , ***, There is no height or weight on file to calculate BMI  Physical Exam  Gen: ***, awake, alert, oriented x 3, no acute distress  HEENT: Mucous membranes moist, no oral lesions, no thrush  NECK: No accessory muscle use, JVP not elevated  Cardiac: ***, single S1, single S2, no murmurs, no rubs, no gallops  Lungs: ***  Abdomen: normoactive bowel sounds, soft nontender, nondistended, no rebound or rigidity, no guarding, ***  Extremities: no cyanosis, no clubbing, *** edema  MSK:  Strength equal in all extremities  Derm:  No rashes/lesions noted  Neuro:  Appropriate mood/affect    Labs: {[facility] pul labs:75565}  Lab Results   Component Value Date    WBC 6 24 02/28/2022    HGB 10 8 (L) 02/28/2022     (H) 02/28/2022     Lab Results   Component Value Date    CREATININE 0 72 01/11/2022        Imaging and other studies: {Results Review Statement:64670}  ***  My interpretation:  ***    Radiology findings:  ***    Pulmonary Function Testing: {Results Review Statement:78191}  ***    Meagan Willis

## 2022-05-10 NOTE — PROGRESS NOTES
Progress Note - Pulmonary   Juan Alberto Krause 40 y o  female MRN: [id number]   Encounter: [id number]      Assessment/Plan:  Patient is a 69-year-old female with past medical history significant for severe persistent asthma who presents for routine follow-up  Since starting Dupixent, the patient notes her symptoms have significantly improved  She is continues her Laba/ics  The patient is returning to work shortly where she has significant dust exposures which will fully evaluate her current treatment plan  She was given a prescription for steroids in case her asthma over to be exacerbated by return to her work environment  1  Severe persistent asthma without complication  Assessment & Plan:  Overall patient reports significant improvement with her breathing  She notes that the cat hair no longer bothers her  She may continue her current Laba/ics  Patient was given emergency script for steroids to use an as-needed basis with strict instructions to call the office if she requires these      1  Severe persistent allergic asthma  -     predniSONE 20 mg tablet; Take 2 tablets (40 mg total) by mouth daily    2  Severe persistent asthma without complication  -     fluticasone-vilanterol (Breo Ellipta) 200-25 MCG/INH inhaler; Inhale 1 puff daily Rinse mouth after use  Patient may follow up in 4-6 months or sooner as necessary  Orders:  No orders of the defined types were placed in this encounter  Subjective: The patient notes that she is doing much better  She is tolerating dupixent for which she is getting about 5 doses   She had a mild reaction on her right arm  She is taking daily the [facility]  She is also taking singulair and claritin  She is using her albuterol about 2x/week  She returns to work on Monday  Inhaler Regimen:  Breo  Albuterol    Remainder of review of systems negative except as described in HPI        The following portions of the patient's history were reviewed and updated as appropriate: allergies, current medications, past family history, past medical history, past social history, past surgical history and problem list      Objective:   Vitals: Blood pressure 115/80, pulse 85, temperature 98 6 °F (37 °C), temperature source Tympanic, resp  rate 16, height 5' 2" (1 575 m), weight 73 1 kg (161 lb 3 2 oz), SpO2 98 %, currently breastfeeding , RA, Body mass index is 29 48 kg/m²  Physical Exam  Gen: Pleasant, awake, alert, oriented x 3, no acute distress  HEENT: Mucous membranes moist, no oral lesions, no thrush  NECK: No accessory muscle use, JVP not elevated  Cardiac: RRR, single S1, single S2, no murmurs, no rubs, no gallops  Lungs: CTA b/l  Abdomen: normoactive bowel sounds, soft nontender, nondistended, no rebound or rigidity, no guarding  Extremities: no cyanosis, no clubbing, no LE edema  MSK:  Strength equal in all extremities  Derm:  No rashes/lesions noted  Neuro:  Appropriate mood/affect    Labs: I have personally reviewed pertinent lab results  Lab Results   Component Value Date    WBC 6 24 02/28/2022    HGB 10 8 (L) 02/28/2022     (H) 02/28/2022     Lab Results   Component Value Date    CREATININE 0 72 01/11/2022     Imaging and other studies: I have personally reviewed pertinent reports  CXR 1/25/22  Radiology findings:  Cardiomediastinal silhouette appears unremarkable  The lungs are clear  No pneumothorax or pleural effusion  Osseous structures appear within normal limits for patient age  Pulmonary Function Testing: I have personally reviewed pertinent reports     and I have personally reviewed pertinent films in PACS  3/17/22:   Normal spirometry  Pre-bronchodilator results:  FEV1/FVC Ratio: 73 %  Forced Vital Capacity: 3 46 L    108 % predicted  FEV1: 2 52 L     91 % predicted     Post-bronchodilator results:  FEV1/FVC Ratio: 61%  Forced Vital Capacity: 3 98L, 125% predicted, +14% change  FEV1: 2 42L, 87% predicted, -3% change     Lung volumes by body plethysmography:   Total Lung Capacity 120 % predicted   Residual volume 183 % predicted     DLCO corrected for patients hemoglobin level: 107 %    Meagan Smith

## 2022-05-11 ENCOUNTER — CLINICAL SUPPORT (OUTPATIENT)
Dept: NUTRITION | Facility: HOSPITAL | Age: 42
End: 2022-05-11
Payer: COMMERCIAL

## 2022-05-11 DIAGNOSIS — M54.2 NECK PAIN: ICD-10-CM

## 2022-05-11 DIAGNOSIS — K90.0 CELIAC DISEASE: ICD-10-CM

## 2022-05-11 PROCEDURE — 97802 MEDICAL NUTRITION INDIV IN: CPT

## 2022-05-11 NOTE — PROGRESS NOTES
Initial Nutrition Assessment Form    Patient Name: Ean Khan    YOB: 1980    Sex: Female     Assessment Date: 5/11/2022  Start Time: 1300 Stop Time: 1400 Total Minutes: 61     Data:  Present at session: self   Parent/Patient Concerns: Patient seeking more guidance and information on Gluten-Free diet and "living Gluten Free"  Medical Dx/Reason for Referral: K90 0 (ICD-10-CM) - Celiac disease   Past Medical History:   Diagnosis Date    Asthma     not as adult    Celiac disease     History of one miscarriage     6/2017 10 weeks    Renal cyst     Varicella        Current Outpatient Medications   Medication Sig Dispense Refill    albuterol (2 5 mg/3 mL) 0 083 % nebulizer solution Take 3 mL (2 5 mg total) by nebulization every 6 (six) hours as needed for wheezing or shortness of breath 180 mL 5    albuterol (PROVENTIL HFA,VENTOLIN HFA) 90 mcg/act inhaler Inhale 2 puffs every 6 (six) hours as needed for wheezing 18 g 3    dupilumab (DUPIXENT) subcutaneous injection Inject 2 mL (300 mg total) under the skin every 14 (fourteen) days 2 mL 26    Dupixent 300 MG/2ML SOPN INJECT 2 PENS UNDER THE SKIN ON DAY 1, THEN INJECT 1 PEN ON DAY 15, THEN 1 PEN EVERY OTHER WEEK THEREAFTER 8 mL 4    EPINEPHrine (EPIPEN) 0 3 mg/0 3 mL SOAJ Inject 0 3 mL (0 3 mg total) into a muscle once for 1 dose 0 6 mL 0    ferrous sulfate 324 (65 Fe) mg Take 1 tablet (324 mg total) by mouth 2 (two) times a day before meals 180 tablet 1    fexofenadine (ALLEGRA) 180 MG tablet Take 180 mg by mouth daily      fluticasone-vilanterol (Breo Ellipta) 200-25 MCG/INH inhaler Inhale 1 puff daily Rinse mouth after use   180 each 2    meloxicam (MOBIC) 15 mg tablet TAKE 1 TABLET BY MOUTH EVERY DAY FOR 30 DAYS      methocarbamol (Robaxin-750) 750 mg tablet Take 1 tablet (750 mg total) by mouth every 6 (six) hours as needed for muscle spasms 30 tablet 0    montelukast (SINGULAIR) 10 mg tablet Take 1 tablet (10 mg total) by mouth daily at bedtime 30 tablet 5    predniSONE 20 mg tablet Take 2 tablets (40 mg total) by mouth daily 10 tablet 0    pregabalin (LYRICA) 75 mg capsule TAKE 1 CAPSULE BY MOUTH TWICE A DAY FOR 30 DAYS       No current facility-administered medications for this visit  Additional Meds/Supplements: Denied   Special Learning Needs: none   Height: HC Readings from Last 5 Encounters:   No data found for [facility]       Weight: Wt Readings from Last 10 Encounters:   05/10/22 73 1 kg (161 lb 3 2 oz)   04/13/22 70 4 kg (155 lb 3 3 oz)   03/30/22 70 8 kg (156 lb)   03/14/22 71 2 kg (157 lb)   03/11/22 71 7 kg (158 lb)   02/28/22 65 8 kg (145 lb)   01/25/22 66 2 kg (146 lb)   01/13/22 64 9 kg (143 lb)   01/03/22 64 4 kg (142 lb)   12/28/21 64 4 kg (142 lb)     Estimated body mass index is 29 48 kg/m² as calculated from the following:    Height as of 5/10/22: 5' 2" (1 575 m)  Weight as of 5/10/22: 73 1 kg (161 lb 3 2 oz)  Recent Weight Change: [x]Yes     []No  Amount: Weight gain of 6 lb in 1 month from 4/13-5/10/22 (3 8%, not significant); Weight gain of 15 lb x 3-4 months since 1/25/22 (10%, significant); Weight gain of 19 lb x 4-5 months since 12/28/21 (13 4%, significant)  Energy Needs: [address] [facility] Equation:  5789-8271 calories for maintenance (Patient desiring weight loss with BMI 29 kg/m2; recommend 1400 calorie diet to support gradual weight loss)  Allergies   Allergen Reactions    Fluticasone-Salmeterol Palpitations     "Didn't like the way it made me feel"    Iron Itching     Gold label only       Social History     Substance and Sexual Activity   Alcohol Use No       Social History     Tobacco Use   Smoking Status Never Smoker   Smokeless Tobacco Never Used       Who shops? patient   Who cooks? patient   Exercise: None   Prior Counseling?  []Yes     [x]No  When:      Why:         Diet Hx:  Enjoys Gluten Free peanut butter, Gluten Free crackers, Gluten Free bread, [place] Butter, Gluten Free protein powder to make smoothies with spinach, fresh fruit, Greek Yogurt, Gluten Free rolled oats                     Nutrition Diagnosis:   Food and nutrition related knowledge deficit  related to Lack of prior exposure to accurate nutrition related information as evidenced by [employer] inaccurate or incomplete information       Medical Nutrition Therapy Intervention:  [x]Individualized Meal Plan []Understanding Lab Values   []Basic Pathophysiology of Disease []Food/Medication Interactions   []Food Diary []Exercise   [x]Lifestyle/Behavior Modification Techniques []Medication, Mechanism of Action   [x]Label Reading []Self Blood Glucose Monitoring   []Weight/BMI Goals []Other -    Other Notes/Assessment: Patient with decent baseline knowledge and understanding of Gluten-Free diet, but expressed unaware of nuances of Gluten-Free including food ingredients that contain hidden Gluten, ingredients that inherently Gluten-Free (like Arrowroot), and had some general concerns about overall health, diet, weight  Patient discussed has been using a fitness [employer] on phone to track calories, diet, activity, weight  Reviewed Gluten-Free diet, Gluten-Free ingredients and ingredients to avoid that may contain hidden Gluten, how to read Food Labels for Gluten  Multiple online Resources also discussed, provided to help support Patient (Celiac Disease Foundation, National Celiac Association)  Written handouts also provided on Celiac Disease Nutrition Therapy, Label Reading for Celiac Disease, General Healthful Nutrition  Also recommended liquid MVI/Mineral (Centrum), and recommended follow-up with PCP  Patient declined making Follow-Up MNT Visit  RD contact information provided  Comprehension: []Excellent  [x]Very Good  []Good  []Fair   []Poor    Receptivity: [x]Excellent  []Very Good  []Good  []Fair   []Poor    Expected Compliance: [x]Excellent  []Very Good  []Good  []Fair   []Poor        Goals:  1   Patient will have improved understanding of Gluten-Free diet  2  Patient will continue to follow Gluten-Free diet  3  Patient will take liquid MVI/Mineral (Centrum), OTC  No follow-ups on file    Labs:  CMP  Lab Results   Component Value Date    K 3 9 01/11/2022     01/11/2022    CO2 27 01/11/2022    BUN 9 01/11/2022    CREATININE 0 72 01/11/2022    GLUF 115 (H) 01/11/2022    CALCIUM 9 2 01/11/2022    AST 28 01/11/2022    ALT 39 01/11/2022    ALKPHOS 110 01/11/2022    EGFR 105 01/11/2022       BMP  Lab Results   Component Value Date    CALCIUM 9 2 01/11/2022    K 3 9 01/11/2022    CO2 27 01/11/2022     01/11/2022    BUN 9 01/11/2022    CREATININE 0 72 01/11/2022       Lipids  No results found for: CHOL  Lab Results   Component Value Date    HDL 43 (L) 01/11/2022    HDL 28 (L) 04/19/2021     Lab Results   Component Value Date    LDLCALC 134 (H) 01/11/2022    LDLCALC 95 04/19/2021     Lab Results   Component Value Date    TRIG 47 01/11/2022    TRIG 88 04/19/2021     No results found for: CHOLHDL    Hemoglobin A1C  No results found for: HGBA1C    Fasting Glucose  Lab Results   Component Value Date    GLUF 115 (H) 01/11/2022       Insulin     Thyroid  No results found for: TSH, G8CGKBO, U9RJBDB, THYROIDAB    Hepatic Function Panel  Lab Results   Component Value Date    ALT 39 01/11/2022    AST 28 01/11/2022    ALKPHOS 110 01/11/2022       Celiac Disease Antibody Panel  Endomysial IgA   Date Value Ref Range Status   03/11/2022 Positive (A) Negative Final     Gliadin IgA   Date Value Ref Range Status   03/11/2022 >150 (H) 0 - 19 units Final     Comment:                        Negative                   0 - 19                     Weak Positive             20 - 30                     Moderate to Strong Positive   >30     Gliadin IgG   Date Value Ref Range Status   03/11/2022 93 (H) 0 - 19 units Final     Comment:                        Negative                   0 - 19                     Weak Positive             20 - 30 Moderate to Strong Positive   >30     IgA   Date Value Ref Range Status   03/11/2022 429 (H) 87 - 352 mg/dL Final     TISSUE TRANSGLUTAMINASE IGA   Date Value Ref Range Status   03/11/2022 >100 (H) 0 - 3 U/mL Final     Comment:                                   Negative        0 -  3                                Weak Positive   4 - 10                                Positive           >10   Tissue Transglutaminase (tTG) has been identified   as the endomysial antigen  Studies have demonstr-   ated that endomysial IgA antibodies have over 99%   specificity for gluten sensitive enteropathy        Iron  Lab Results   Component Value Date    IRON 28 (L) 02/28/2022    TIBC 416 02/28/2022    FERRITIN 5 (L) 02/28/2022            Haseeb Gaxiola MS, RD, [address] [place] [address]

## 2022-05-12 RX ORDER — METHOCARBAMOL 750 MG/1
750 TABLET, FILM COATED ORAL EVERY 6 HOURS PRN
Qty: 30 TABLET | Refills: 0 | Status: SHIPPED | OUTPATIENT
Start: 2022-05-12 | End: 2022-06-16 | Stop reason: SDUPTHER

## 2022-06-09 ENCOUNTER — TELEPHONE (OUTPATIENT)
Dept: HEMATOLOGY ONCOLOGY | Facility: CLINIC | Age: 42
End: 2022-06-09

## 2022-06-09 ENCOUNTER — APPOINTMENT (OUTPATIENT)
Dept: LAB | Facility: HOSPITAL | Age: 42
End: 2022-06-09
Payer: COMMERCIAL

## 2022-06-09 DIAGNOSIS — D50.9 IRON DEFICIENCY ANEMIA, UNSPECIFIED IRON DEFICIENCY ANEMIA TYPE: ICD-10-CM

## 2022-06-09 LAB
ERYTHROCYTE [DISTWIDTH] IN BLOOD BY AUTOMATED COUNT: 15.9 % (ref 11.6–15.1)
HCT VFR BLD AUTO: 41.7 % (ref 34.8–46.1)
HGB BLD-MCNC: 13.6 G/DL (ref 11.5–15.4)
MCH RBC QN AUTO: 29.8 PG (ref 26.8–34.3)
MCHC RBC AUTO-ENTMCNC: 32.6 G/DL (ref 31.4–37.4)
MCV RBC AUTO: 91 FL (ref 82–98)
PLATELET # BLD AUTO: 300 THOUSANDS/UL (ref 149–390)
PMV BLD AUTO: 11.2 FL (ref 8.9–12.7)
RBC # BLD AUTO: 4.57 MILLION/UL (ref 3.81–5.12)
WBC # BLD AUTO: 6.61 THOUSAND/UL (ref 4.31–10.16)

## 2022-06-09 PROCEDURE — 36415 COLL VENOUS BLD VENIPUNCTURE: CPT

## 2022-06-09 PROCEDURE — 85027 COMPLETE CBC AUTOMATED: CPT

## 2022-06-09 PROCEDURE — 82728 ASSAY OF FERRITIN: CPT

## 2022-06-09 PROCEDURE — 83540 ASSAY OF IRON: CPT

## 2022-06-09 PROCEDURE — 83550 IRON BINDING TEST: CPT

## 2022-06-10 LAB
FERRITIN SERPL-MCNC: 99 NG/ML (ref 8–388)
IRON SATN MFR SERPL: 20 % (ref 15–50)
IRON SERPL-MCNC: 62 UG/DL (ref 50–170)
TIBC SERPL-MCNC: 303 UG/DL (ref 250–450)

## 2022-06-14 ENCOUNTER — OFFICE VISIT (OUTPATIENT)
Dept: HEMATOLOGY ONCOLOGY | Facility: CLINIC | Age: 42
End: 2022-06-14
Payer: COMMERCIAL

## 2022-06-14 ENCOUNTER — TELEPHONE (OUTPATIENT)
Dept: HEMATOLOGY ONCOLOGY | Facility: CLINIC | Age: 42
End: 2022-06-14

## 2022-06-14 VITALS
RESPIRATION RATE: 16 BRPM | BODY MASS INDEX: 29.44 KG/M2 | SYSTOLIC BLOOD PRESSURE: 110 MMHG | HEIGHT: 62 IN | HEART RATE: 73 BPM | DIASTOLIC BLOOD PRESSURE: 70 MMHG | OXYGEN SATURATION: 98 % | TEMPERATURE: 97.6 F | WEIGHT: 160 LBS

## 2022-06-14 DIAGNOSIS — D50.9 IRON DEFICIENCY ANEMIA, UNSPECIFIED IRON DEFICIENCY ANEMIA TYPE: ICD-10-CM

## 2022-06-14 DIAGNOSIS — K90.0 CELIAC DISEASE: Primary | ICD-10-CM

## 2022-06-14 PROCEDURE — 99214 OFFICE O/P EST MOD 30 MIN: CPT | Performed by: INTERNAL MEDICINE

## 2022-06-14 RX ORDER — SODIUM CHLORIDE 9 MG/ML
20 INJECTION, SOLUTION INTRAVENOUS ONCE
Status: CANCELLED | OUTPATIENT
Start: 2022-06-14

## 2022-06-14 RX ORDER — PREGABALIN 100 MG/1
CAPSULE ORAL
COMMUNITY
Start: 2022-06-13

## 2022-06-14 NOTE — PROGRESS NOTES
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        SUBJECTIVE      Mahsa Verma is a 40 y o  here for new consultation with me today  The patient is referred by Nolan Villeda and the reason for consultation is iron deficiency anemia  Her CBC shows normocytic anemia with reactive thrombocytosis:   7/24/2018  10/8/2018  4/19/2021  1/11/2022  2/28/2022    WBC 9 83 6 41 7 64 9 52 6 24   Hemoglobin 9 3 (L) 11 7 9 0 (L) 11 3 (L) 10 8 (L)   HCT 30 9 (L) 37 2 31 1 (L) 37 1 36 0   MCV 81 (L) 83 71 (L) 79 (L) 82   Platelet Count 823 (H) 305 484 (H) 514 (H) 442 (H)     Her iron panel shows iron deficiency:   1/11/2022 15:19 2/28/2022 15:43   Iron 27 (L) 28 (L)   Ferritin 6 (L) 5 (L)   Iron Saturation 6 (L) 7 (L)   TIBC 420 416       This Visit    Denies any f/c/n/v/cp/ap/sob/cough  Denies diarrhea or skin rash  No blood loss  I have reviewed the relevant past medical, surgical, social and family history  I have also reviewed allergies and medications for this patient  Review of Systems  Review of Systems   All other systems reviewed and are negative  OBJECTIVE     Physical Exam  Vitals:    06/14/22 1449   BP: 110/70   BP Location: Left arm   Patient Position: Sitting   Cuff Size: Adult   Pulse: 73   Resp: 16   Temp: 97 6 °F (36 4 °C)   SpO2: 98%   Weight: 72 6 kg (160 lb)   Height: 5' 2" (1 575 m)       Physical Exam  Vitals and nursing note reviewed     Constitutional:       General: She is not in acute distress  Appearance: She is well-developed  HENT:      Head: Normocephalic and atraumatic  Eyes:      Conjunctiva/sclera: Conjunctivae normal    Cardiovascular:      Rate and Rhythm: Normal rate and regular rhythm  Heart sounds: No murmur heard  Pulmonary:      Effort: Pulmonary effort is normal  No respiratory distress  Breath sounds: Normal breath sounds  Abdominal:      Palpations: Abdomen is soft  Tenderness: There is no abdominal tenderness  Musculoskeletal:      Cervical back: Neck supple  Skin:     General: Skin is warm and dry  Neurological:      Mental Status: She is alert  Imaging  Relevant imaging reviewed in chart    Labs  Relevant labs reviewed in chart     ASSESSMENT & PLAN      Diagnosis ICD-10-CM Associated Orders   1  Celiac disease  K90 0 CBC     Iron Panel (Includes Ferritin, Iron Sat%, Iron, and TIBC)   2  Iron deficiency anemia, unspecified iron deficiency anemia type  D50 9 CBC     Iron Panel (Includes Ferritin, Iron Sat%, Iron, and TIBC)         40 y o  Female seen in consultation for iron deficiency anemia  Discussion/Plan/Labs:  · I reviewed her labs with her  They show resolved state of low iron  After discussion about methods to help keep her away from low iron, moving ahead with q28d venofer  · She will stop oral iron  Follow-up  4 months      All questions were answered to the patient's satisfaction during this encounter  They appreciated and thanked me for spending time with them  The patient knows the contact information for our office and know to reach out for any relevant concerns related to this encounter  For all other listed problems and medical diagnosis in his chart - they are managed by PCP and/or other specialists, which patient acknowledges          Hematology & Medical Oncology

## 2022-06-15 DIAGNOSIS — K90.0 CELIAC DISEASE: Primary | ICD-10-CM

## 2022-06-15 DIAGNOSIS — D50.9 IRON DEFICIENCY ANEMIA, UNSPECIFIED IRON DEFICIENCY ANEMIA TYPE: ICD-10-CM

## 2022-06-15 RX ORDER — SODIUM CHLORIDE 9 MG/ML
20 INJECTION, SOLUTION INTRAVENOUS ONCE
Status: CANCELLED | OUTPATIENT
Start: 2022-06-21

## 2022-06-15 NOTE — TELEPHONE ENCOUNTER
Spoke with patient to go over new infusion schedule  Patient aware and okay with all dates  Kristin, please change start date to 6/21  - Thank you!

## 2022-06-16 ENCOUNTER — OFFICE VISIT (OUTPATIENT)
Dept: FAMILY MEDICINE CLINIC | Facility: CLINIC | Age: 42
End: 2022-06-16
Payer: COMMERCIAL

## 2022-06-16 VITALS
HEIGHT: 62 IN | WEIGHT: 165.4 LBS | SYSTOLIC BLOOD PRESSURE: 120 MMHG | HEART RATE: 75 BPM | DIASTOLIC BLOOD PRESSURE: 82 MMHG | OXYGEN SATURATION: 96 % | BODY MASS INDEX: 30.44 KG/M2 | TEMPERATURE: 97 F

## 2022-06-16 DIAGNOSIS — E66.09 CLASS 1 OBESITY DUE TO EXCESS CALORIES WITHOUT SERIOUS COMORBIDITY WITH BODY MASS INDEX (BMI) OF 30.0 TO 30.9 IN ADULT: ICD-10-CM

## 2022-06-16 DIAGNOSIS — D50.9 IRON DEFICIENCY ANEMIA, UNSPECIFIED IRON DEFICIENCY ANEMIA TYPE: ICD-10-CM

## 2022-06-16 DIAGNOSIS — K90.0 CELIAC DISEASE: Primary | ICD-10-CM

## 2022-06-16 DIAGNOSIS — J45.50 SEVERE PERSISTENT ALLERGIC ASTHMA: ICD-10-CM

## 2022-06-16 DIAGNOSIS — M54.2 NECK PAIN: ICD-10-CM

## 2022-06-16 PROBLEM — E66.811 CLASS 1 OBESITY DUE TO EXCESS CALORIES WITHOUT SERIOUS COMORBIDITY WITH BODY MASS INDEX (BMI) OF 30.0 TO 30.9 IN ADULT: Status: ACTIVE | Noted: 2022-06-16

## 2022-06-16 PROCEDURE — 3008F BODY MASS INDEX DOCD: CPT | Performed by: NURSE PRACTITIONER

## 2022-06-16 PROCEDURE — 99214 OFFICE O/P EST MOD 30 MIN: CPT | Performed by: NURSE PRACTITIONER

## 2022-06-16 PROCEDURE — 1036F TOBACCO NON-USER: CPT | Performed by: NURSE PRACTITIONER

## 2022-06-16 RX ORDER — DOCUSATE SODIUM 50 MG/5ML
50 LIQUID ORAL DAILY
Qty: 100 ML | Refills: 0 | Status: SHIPPED | OUTPATIENT
Start: 2022-06-16 | End: 2022-08-03

## 2022-06-16 RX ORDER — METHOCARBAMOL 750 MG/1
750 TABLET, FILM COATED ORAL EVERY 6 HOURS PRN
Qty: 90 TABLET | Refills: 0 | Status: SHIPPED | OUTPATIENT
Start: 2022-06-16 | End: 2022-07-20 | Stop reason: SDUPTHER

## 2022-06-16 NOTE — PROGRESS NOTES
Assessment/Plan:     Chronic Problems:  Celiac disease  Following with GI and following Gluten free diet  Severe persistent allergic asthma  Much better controlled with Dupixent, seeing Pulm  Rarely using albuterol inhaler  Iron deficiency anemia  Due to celiac disease  Receiving iron infusions  Following with GI and Hem  Class 1 obesity due to excess calories without serious comorbidity with body mass index (BMI) of 30 0 to 30 9 in adult  Keeping food diary and sticking to <1300 calories daily  Limited for exercise due to injuries  Visit Diagnosis:  Diagnoses and all orders for this visit:    Celiac disease  -     docusate Sodium (COLACE) 150 MG/15ML syrup; Take 5 mL (50 mg total) by mouth daily  -     TSH, 3rd generation with Free T4 reflex; Future    Neck pain  -     methocarbamol (Robaxin-750) 750 mg tablet; Take 1 tablet (750 mg total) by mouth every 6 (six) hours as needed for muscle spasms    Severe persistent allergic asthma    Iron deficiency anemia, unspecified iron deficiency anemia type    Class 1 obesity due to excess calories without serious comorbidity with body mass index (BMI) of 30 0 to 30 9 in adult          Subjective:    Patient ID: Nikki Herman is a 40 y o  female  Patient presents for routine follow up  She is Going to PT for s/p spinal fusion on 2/8/22  She states she is having lower back and hip pain--- L5 is "crushed"  Going for MRI lumbar spine  Neck pain depends on what she is doing  If she is lifting, 5/10  If she is resting, she does not have pain  Patient recently diagnosed with Celiac disease  Following gluten free diet, but very much struggling as she has been gaining weight  She does use [employer] and keeps calories <1300 daily  Breathing is much better with Dupixent  Weight keeps going up, but pants still fit  She is seeing a dietician regularly  She continues seeing Dr Garcia Rosario and continues iron infusions   She is scheduled for colonoscopy in July as she was unable to have hers done with EGD due to incomplete prep  The following portions of the patient's history were reviewed and updated as appropriate: allergies, current medications, past family history, past medical history, past social history, past surgical history and problem list     Review of Systems   Constitutional: Negative for chills and fever  HENT: Negative for ear pain and sore throat  Eyes: Negative for pain and visual disturbance  Respiratory: Negative for cough, chest tightness, shortness of breath and wheezing  Cardiovascular: Negative for chest pain and palpitations  Gastrointestinal: Negative for abdominal pain and vomiting         BM's every other day   Genitourinary: Negative for dysuria and hematuria  Musculoskeletal: Positive for arthralgias and back pain  Skin: Negative for color change and rash  Neurological: Negative for dizziness, light-headedness and headaches  All other systems reviewed and are negative           /82 (BP Location: Left arm, Patient Position: Sitting)   Pulse 75   Temp (!) 97 °F (36 1 °C) (Tympanic)   Ht 5' 2" (1 575 m)   Wt 75 kg (165 lb 6 4 oz)   SpO2 96%   BMI 30 25 kg/m²   Social History     Socioeconomic History    Marital status: /Civil Union     Spouse name: Not on file    Number of children: Not on file    Years of education: Not on file    Highest education level: Not on file   Occupational History    Not on file   Tobacco Use    Smoking status: Never Smoker    Smokeless tobacco: Never Used   Vaping Use    Vaping Use: Never used   Substance and Sexual Activity    Alcohol use: No    Drug use: No    Sexual activity: Yes     Partners: Male   Other Topics Concern    Not on file   Social History Narrative    Engaged to be     Always uses seatbelts     Social Determinants of Health     Financial Resource Strain: Not on file   Food Insecurity: Not on file   Transportation Needs: Not on file Physical Activity: Not on file   Stress: Not on file   Social Connections: Not on file   Intimate Partner Violence: Not on file   Housing Stability: Not on file     Past Medical History:   Diagnosis Date    Asthma     not as adult    Celiac disease     History of one miscarriage     2017 10 weeks    Renal cyst     Varicella      Family History   Adopted: Yes   Problem Relation Age of Onset    Crohn's disease Mother     Hypertension Mother     Alcohol abuse Father     Drug abuse Father     Asthma Sister     Cancer Maternal Grandmother         lung    Arthritis Maternal Grandmother     Diabetes Maternal Grandfather     Hearing loss Maternal Grandfather     Heart disease Maternal Grandfather     No Known Problems Daughter     No Known Problems Daughter     No Known Problems Daughter     No Known Problems Son      Past Surgical History:   Procedure Laterality Date     SECTION       son,    daughter,    daughter   Maurice Link      C5-7, disc repair     [place]  DELIVERY ONLY N/A 2018    Procedure:  SECTION () REPEAT;  Surgeon: Ti Garner, DO;  Location: [facility];  Service: Obstetrics       Current Outpatient Medications:     albuterol (2 5 mg/3 mL) 0 083 % nebulizer solution, Take 3 mL (2 5 mg total) by nebulization every 6 (six) hours as needed for wheezing or shortness of breath, Disp: 180 mL, Rfl: 5    albuterol (PROVENTIL HFA,VENTOLIN HFA) 90 mcg/act inhaler, Inhale 2 puffs every 6 (six) hours as needed for wheezing, Disp: 18 g, Rfl: 3    docusate Sodium (COLACE) 150 MG/15ML syrup, Take 5 mL (50 mg total) by mouth daily, Disp: 100 mL, Rfl: 0    dupilumab (DUPIXENT) subcutaneous injection, Inject 2 mL (300 mg total) under the skin every 14 (fourteen) days, Disp: 2 mL, Rfl: 26    Dupixent 300 MG/2ML SOPN, INJECT 2 PENS UNDER THE SKIN ON DAY 1, THEN INJECT 1 PEN ON DAY 15, THEN 1 PEN EVERY OTHER WEEK THEREAFTER, Disp: 8 mL, Rfl: 4   fexofenadine (ALLEGRA) 180 MG tablet, Take 180 mg by mouth daily, Disp: , Rfl:     fluticasone-vilanterol (Breo Ellipta) 200-25 MCG/INH inhaler, Inhale 1 puff daily Rinse mouth after use , Disp: 180 each, Rfl: 2    meloxicam (MOBIC) 15 mg tablet, TAKE 1 TABLET BY MOUTH EVERY DAY FOR 30 DAYS, Disp: , Rfl:     methocarbamol (Robaxin-750) 750 mg tablet, Take 1 tablet (750 mg total) by mouth every 6 (six) hours as needed for muscle spasms, Disp: 90 tablet, Rfl: 0    montelukast (SINGULAIR) 10 mg tablet, Take 1 tablet (10 mg total) by mouth daily at bedtime, Disp: 30 tablet, Rfl: 5    pregabalin (LYRICA) 100 mg capsule, , Disp: , Rfl:     EPINEPHrine (EPIPEN) 0 3 mg/0 3 mL SOAJ, Inject 0 3 mL (0 3 mg total) into a muscle once for 1 dose, Disp: 0 6 mL, Rfl: 0    predniSONE 20 mg tablet, Take 2 tablets (40 mg total) by mouth daily, Disp: 10 tablet, Rfl: 0    Allergies   Allergen Reactions    Fluticasone-Salmeterol Palpitations     "Didn't like the way it made me feel"    Iron Itching     Gold label only          Lab Review   Appointment on 06/09/2022   Component Date Value    WBC 06/09/2022 6 61     RBC 06/09/2022 4 57     Hemoglobin 06/09/2022 13 6     Hematocrit 06/09/2022 41 7     MCV 06/09/2022 91     MCH 06/09/2022 29 8     MCHC 06/09/2022 32 6     RDW 06/09/2022 15 9 (A)    Platelets 32/68/6467 300     MPV 06/09/2022 11 2     Iron Saturation 06/09/2022 20     TIBC 06/09/2022 303     Iron 06/09/2022 62     Ferritin 06/09/2022 99         Imaging: No results found  Objective:     Physical Exam  Constitutional:       Appearance: She is well-developed  Cardiovascular:      Rate and Rhythm: Normal rate and regular rhythm  Heart sounds: Normal heart sounds  No murmur heard  Pulmonary:      Effort: Pulmonary effort is normal  No respiratory distress  Breath sounds: Normal breath sounds  Skin:     General: Skin is warm and dry     Neurological:      Mental Status: She is alert and oriented to person, place, and time  Psychiatric:         Mood and Affect: Mood normal            There are no Patient Instructions on file for this visit  LENI Campuzano    Portions of the record may have been created with voice recognition software  Occasional wrong word or "sound a like" substitutions may have occurred due to the inherent limitations of voice recognition software  Read the chart carefully and recognize, using context, where substitutions have occurred

## 2022-06-21 ENCOUNTER — HOSPITAL ENCOUNTER (OUTPATIENT)
Dept: INFUSION CENTER | Facility: CLINIC | Age: 42
Discharge: HOME | End: 2022-06-21
Payer: COMMERCIAL

## 2022-06-21 VITALS
SYSTOLIC BLOOD PRESSURE: 118 MMHG | DIASTOLIC BLOOD PRESSURE: 72 MMHG | HEART RATE: 71 BPM | TEMPERATURE: 98 F | RESPIRATION RATE: 16 BRPM

## 2022-06-21 DIAGNOSIS — D50.9 IRON DEFICIENCY ANEMIA, UNSPECIFIED IRON DEFICIENCY ANEMIA TYPE: ICD-10-CM

## 2022-06-21 DIAGNOSIS — K90.0 CELIAC DISEASE: Primary | ICD-10-CM

## 2022-06-21 PROCEDURE — 96374 THER/PROPH/DIAG INJ IV PUSH: CPT

## 2022-06-21 RX ORDER — SODIUM CHLORIDE 9 MG/ML
20 INJECTION, SOLUTION INTRAVENOUS ONCE
Status: COMPLETED | OUTPATIENT
Start: 2022-06-21 | End: 2022-06-21

## 2022-06-21 RX ORDER — SODIUM CHLORIDE 9 MG/ML
20 INJECTION, SOLUTION INTRAVENOUS ONCE
Status: CANCELLED | OUTPATIENT
Start: 2022-07-19

## 2022-06-21 RX ADMIN — SODIUM CHLORIDE 20 ML/HR: 0.9 INJECTION, SOLUTION INTRAVENOUS at 15:15

## 2022-06-21 NOTE — PROGRESS NOTES
Pt here venofer, offering no complaints  Right hand IV placed with positive blood return noted  Tolerated infusion without incident  PIV removed  AVS declined  Aware of future appts  Walked out in stable condition

## 2022-07-05 DIAGNOSIS — J45.20 MILD INTERMITTENT ASTHMA WITHOUT COMPLICATION: ICD-10-CM

## 2022-07-05 RX ORDER — MONTELUKAST SODIUM 10 MG/1
TABLET ORAL
Qty: 90 TABLET | Refills: 1 | Status: SHIPPED | OUTPATIENT
Start: 2022-07-05

## 2022-07-14 ENCOUNTER — ANESTHESIA (OUTPATIENT)
Dept: GASTROENTEROLOGY | Facility: HOSPITAL | Age: 42
End: 2022-07-14

## 2022-07-14 ENCOUNTER — HOSPITAL ENCOUNTER (OUTPATIENT)
Dept: GASTROENTEROLOGY | Facility: HOSPITAL | Age: 42
Setting detail: OUTPATIENT SURGERY
Discharge: HOME | End: 2022-07-14
Attending: INTERNAL MEDICINE | Admitting: INTERNAL MEDICINE
Payer: COMMERCIAL

## 2022-07-14 ENCOUNTER — ANESTHESIA EVENT (OUTPATIENT)
Dept: GASTROENTEROLOGY | Facility: HOSPITAL | Age: 42
End: 2022-07-14

## 2022-07-14 VITALS
SYSTOLIC BLOOD PRESSURE: 96 MMHG | WEIGHT: 159.61 LBS | BODY MASS INDEX: 29.37 KG/M2 | HEART RATE: 63 BPM | OXYGEN SATURATION: 95 % | DIASTOLIC BLOOD PRESSURE: 58 MMHG | RESPIRATION RATE: 14 BRPM | TEMPERATURE: 97.2 F | HEIGHT: 62 IN

## 2022-07-14 DIAGNOSIS — R89.4 ABNORMAL CELIAC ANTIBODY PANEL: ICD-10-CM

## 2022-07-14 DIAGNOSIS — D50.9 IRON DEFICIENCY ANEMIA, UNSPECIFIED IRON DEFICIENCY ANEMIA TYPE: ICD-10-CM

## 2022-07-14 DIAGNOSIS — Z83.79 FAMILY HISTORY OF CROHN'S DISEASE: ICD-10-CM

## 2022-07-14 LAB
EXT PREGNANCY TEST URINE: NEGATIVE
EXT. CONTROL: NORMAL

## 2022-07-14 PROCEDURE — 45380 COLONOSCOPY AND BIOPSY: CPT | Performed by: INTERNAL MEDICINE

## 2022-07-14 PROCEDURE — 81025 URINE PREGNANCY TEST: CPT | Performed by: ANESTHESIOLOGY

## 2022-07-14 PROCEDURE — 88305 TISSUE EXAM BY PATHOLOGIST: CPT | Performed by: PATHOLOGY

## 2022-07-14 RX ORDER — LIDOCAINE HYDROCHLORIDE 20 MG/ML
INJECTION, SOLUTION EPIDURAL; INFILTRATION; INTRACAUDAL; PERINEURAL AS NEEDED
Status: DISCONTINUED | OUTPATIENT
Start: 2022-07-14 | End: 2022-07-14

## 2022-07-14 RX ORDER — SODIUM CHLORIDE, SODIUM LACTATE, POTASSIUM CHLORIDE, CALCIUM CHLORIDE 600; 310; 30; 20 MG/100ML; MG/100ML; MG/100ML; MG/100ML
125 INJECTION, SOLUTION INTRAVENOUS CONTINUOUS
Status: CANCELLED | OUTPATIENT
Start: 2022-07-14

## 2022-07-14 RX ORDER — PROPOFOL 10 MG/ML
INJECTION, EMULSION INTRAVENOUS AS NEEDED
Status: DISCONTINUED | OUTPATIENT
Start: 2022-07-14 | End: 2022-07-14

## 2022-07-14 RX ORDER — SODIUM CHLORIDE, SODIUM LACTATE, POTASSIUM CHLORIDE, CALCIUM CHLORIDE 600; 310; 30; 20 MG/100ML; MG/100ML; MG/100ML; MG/100ML
125 INJECTION, SOLUTION INTRAVENOUS CONTINUOUS
Status: DISCONTINUED | OUTPATIENT
Start: 2022-07-14 | End: 2022-07-18 | Stop reason: HOSPADM

## 2022-07-14 RX ADMIN — PROPOFOL 40 MG: 10 INJECTION, EMULSION INTRAVENOUS at 09:13

## 2022-07-14 RX ADMIN — LIDOCAINE HYDROCHLORIDE 80 MG: 20 INJECTION, SOLUTION EPIDURAL; INFILTRATION; INTRACAUDAL; PERINEURAL at 09:09

## 2022-07-14 RX ADMIN — PROPOFOL 120 MG: 10 INJECTION, EMULSION INTRAVENOUS at 09:10

## 2022-07-14 RX ADMIN — PROPOFOL 30 MG: 10 INJECTION, EMULSION INTRAVENOUS at 09:19

## 2022-07-14 RX ADMIN — PROPOFOL 40 MG: 10 INJECTION, EMULSION INTRAVENOUS at 09:16

## 2022-07-14 NOTE — H&P
History and Physical - [facility]  Lai Kurtz 40 y o  female MRN: [id number]                  HPI: Lai Kurtz is a 40y o  year old female who presents for colonoscopy for iron deficiency anemia and family history of Crohn's disease      REVIEW OF SYSTEMS: Per the HPI, and otherwise unremarkable  Historical Information   Past Medical History:   Diagnosis Date    Asthma     not as adult    Celiac disease     History of one miscarriage     2017 10 weeks    Renal cyst     Varicella      Past Surgical History:   Procedure Laterality Date     SECTION      2004 son,    daughter,    daughter   [address] [address]      C5-7, disc repair     [place]  DELIVERY ONLY N/A 2018    Procedure:  SECTION () REPEAT;  Surgeon: Joon Skaggs DO;  Location: [facility] ;  Service: Obstetrics     Social History   Social History     Substance and Sexual Activity   Alcohol Use No     Social History     Substance and Sexual Activity   Drug Use No     Social History     Tobacco Use   Smoking Status Never Smoker   Smokeless Tobacco Never Used     Family History   Adopted: Yes   Problem Relation Age of Onset    Crohn's disease Mother     Hypertension Mother     Alcohol abuse Father     Drug abuse Father     Asthma Sister     Cancer Maternal Grandmother         lung    Arthritis Maternal Grandmother     Diabetes Maternal Grandfather     Hearing loss Maternal Grandfather     Heart disease Maternal Grandfather     No Known Problems Daughter     No Known Problems Daughter     No Known Problems Daughter     No Known Problems Son        Meds/Allergies     (Not in a hospital admission)      Allergies   Allergen Reactions    Fluticasone-Salmeterol Palpitations     "Didn't like the way it made me feel"    Iron Itching     Gold label only       Objective     Blood pressure 107/63, pulse 66, temperature 97 6 °F (36 4 °C), temperature source Temporal, resp   rate 18, height 5' 2" (1 575 m), weight 72 4 kg (159 lb 9 8 oz), last menstrual period 07/13/2022, SpO2 96 %, currently breastfeeding  PHYSICAL EXAM    /63   Pulse 66   Temp 97 6 °F (36 4 °C) (Temporal)   Resp 18   Ht 5' 2" (1 575 m)   Wt 72 4 kg (159 lb 9 8 oz)   LMP 07/13/2022   SpO2 96%   BMI 29 19 kg/m²       Gen: NAD  CV: RRR  CHEST: Clear  ABD: soft, NT/ND  EXT: no edema      ASSESSMENT/PLAN:  This is a 40y o  year old female here for colonoscopy, and she is stable and optimized for her procedure

## 2022-07-14 NOTE — ANESTHESIA PREPROCEDURE EVALUATION
Procedure:  COLONOSCOPY    Relevant Problems   HEMATOLOGY   (+) Iron deficiency anemia      MUSCULOSKELETAL   (+) Degenerative disc disease, cervical      PULMONARY   (+) Severe persistent allergic asthma        Physical Exam    Airway    Mallampati score: II  TM Distance: >3 FB  Neck ROM: limited     Dental   Comment: Denies loose teeth,     Cardiovascular  Cardiovascular exam normal    Pulmonary  Pulmonary exam normal     Other Findings  Portions of exam deferred due to low yield and/or unknown COVID status      Anesthesia Plan  ASA Score- 2     Anesthesia Type- IV sedation with anesthesia with ASA Monitors  Additional Monitors:   Airway Plan:           Plan Factors-Exercise tolerance (METS): >4 METS  Chart reviewed  Existing labs reviewed  Patient summary reviewed  Patient is not a current smoker  Induction- intravenous  Postoperative Plan-     Informed Consent- Anesthetic plan and risks discussed with patient  I personally reviewed this patient with the CRNA  Discussed and agreed on the Anesthesia Plan with the CRNA  Brandon Maynard

## 2022-07-14 NOTE — ANESTHESIA POSTPROCEDURE EVALUATION
Post-Op Assessment Note    CV Status:  Stable    Pain management: adequate     Mental Status:  Arousable and sleepy   Hydration Status:  Euvolemic   PONV Controlled:  Controlled   Airway Patency:  Patent      Post Op Vitals Reviewed: Yes      Staff: CRNA         No complications documented      BP   97/53   Temp      Pulse  65   Resp   14   SpO2   98

## 2022-07-19 ENCOUNTER — HOSPITAL ENCOUNTER (OUTPATIENT)
Dept: INFUSION CENTER | Facility: CLINIC | Age: 42
Discharge: HOME | End: 2022-07-19
Payer: COMMERCIAL

## 2022-07-19 VITALS
HEART RATE: 67 BPM | RESPIRATION RATE: 18 BRPM | TEMPERATURE: 97.8 F | SYSTOLIC BLOOD PRESSURE: 128 MMHG | DIASTOLIC BLOOD PRESSURE: 62 MMHG

## 2022-07-19 DIAGNOSIS — K90.0 CELIAC DISEASE: Primary | ICD-10-CM

## 2022-07-19 DIAGNOSIS — D50.9 IRON DEFICIENCY ANEMIA, UNSPECIFIED IRON DEFICIENCY ANEMIA TYPE: ICD-10-CM

## 2022-07-19 PROCEDURE — 96374 THER/PROPH/DIAG INJ IV PUSH: CPT

## 2022-07-19 RX ORDER — SODIUM CHLORIDE 9 MG/ML
20 INJECTION, SOLUTION INTRAVENOUS ONCE
Status: COMPLETED | OUTPATIENT
Start: 2022-07-19 | End: 2022-07-19

## 2022-07-19 RX ORDER — SODIUM CHLORIDE 9 MG/ML
20 INJECTION, SOLUTION INTRAVENOUS ONCE
Status: CANCELLED | OUTPATIENT
Start: 2022-08-16

## 2022-07-19 RX ADMIN — SODIUM CHLORIDE 20 ML/HR: 0.9 INJECTION, SOLUTION INTRAVENOUS at 15:31

## 2022-07-19 NOTE — PROGRESS NOTES
Pt tolerated venofer infusion well without incident  Discharged in stable condition and pt aware of next infusion appointment  AVS declined

## 2022-07-20 DIAGNOSIS — M54.2 NECK PAIN: ICD-10-CM

## 2022-07-21 RX ORDER — METHOCARBAMOL 750 MG/1
750 TABLET, FILM COATED ORAL EVERY 6 HOURS PRN
Qty: 90 TABLET | Refills: 0 | Status: SHIPPED | OUTPATIENT
Start: 2022-07-21 | End: 2022-09-02 | Stop reason: SDUPTHER

## 2022-07-27 ENCOUNTER — TELEPHONE (OUTPATIENT)
Dept: GASTROENTEROLOGY | Facility: CLINIC | Age: 42
End: 2022-07-27

## 2022-07-27 NOTE — TELEPHONE ENCOUNTER
----- Message from Darlene Vo PA-C sent at 7/27/2022  3:18 PM EDT -----  Please let patient know that the polyp Dr Manolo Rosas removed was benign  She will be due for another colonoscopy in 5 years    Thank you

## 2022-08-03 DIAGNOSIS — K90.0 CELIAC DISEASE: ICD-10-CM

## 2022-08-03 RX ORDER — DOCUSATE SODIUM 10 MG/ML
LIQUID ORAL
Qty: 100 ML | Refills: 0 | Status: SHIPPED | OUTPATIENT
Start: 2022-08-03

## 2022-08-08 ENCOUNTER — OFFICE VISIT (OUTPATIENT)
Dept: FAMILY MEDICINE CLINIC | Facility: CLINIC | Age: 42
End: 2022-08-08
Payer: COMMERCIAL

## 2022-08-08 VITALS
BODY MASS INDEX: 29.4 KG/M2 | OXYGEN SATURATION: 97 % | TEMPERATURE: 96.9 F | WEIGHT: 159.8 LBS | HEIGHT: 62 IN | DIASTOLIC BLOOD PRESSURE: 78 MMHG | SYSTOLIC BLOOD PRESSURE: 130 MMHG | HEART RATE: 78 BPM

## 2022-08-08 DIAGNOSIS — Z01.818 PREOP GENERAL PHYSICAL EXAM: Primary | ICD-10-CM

## 2022-08-08 PROCEDURE — 99214 OFFICE O/P EST MOD 30 MIN: CPT | Performed by: NURSE PRACTITIONER

## 2022-08-08 PROCEDURE — 93000 ELECTROCARDIOGRAM COMPLETE: CPT | Performed by: NURSE PRACTITIONER

## 2022-08-08 RX ORDER — TRAMADOL HYDROCHLORIDE 50 MG/1
50 TABLET ORAL EVERY 8 HOURS PRN
COMMUNITY
Start: 2022-08-03 | End: 2022-09-09

## 2022-08-08 NOTE — PROGRESS NOTES
[address]    NAME: Maribel Chapa  AGE: 40 y o  SEX: female  : 1980     DATE: 2022    Internal Medicine Pre-Operative Evaluation      Chief Complaint: Pre-operative Evaluation     Surgery: Left L5-S1 hemilaminectomy and discectomy  Anticipated Date of Surgery: 8/15/22  Referring Provider: No ref  provider found       History of Present Illness:     Maribel Chapa is a 40 y o  female who presents to the office today for a preoperative consultation at the request of surgeon Dr Ashia Cabrales who plans on performing Left L5-S1 hemilaminectomy and discectomy on 8/15/22  Planned anesthesia is general  Patient has a bleeding risk of: no recent abnormal bleeding, no remote history of abnormal bleeding and no use of Ca-channel blockers  Patient does not have objections to receiving blood products if needed  Current anti-platelet/anti-coagulation medications that the patient is prescribed includes: none     Assessment of Chronic Conditions:   - Asthma: well controlled     Assessment of Cardiac Risk:  · Denies unstable or severe angina or MI in the last 6 weeks or history of stent placement in the last year   · Denies decompensated heart failure (e g  New onset heart failure, NYHA functional class IV heart failure, or worsening existing heart failure)  · Denies significant arrhythmias such as high grade AV block, symptomatic ventricular arrhythmia, newly recognized ventricular tachycardia, supraventricular tachycardia with resting heart rate >100, or symptomatic bradycardia  · Denies severe heart valve disease including aortic stenosis or symptomatic mitral stenosis     Exercise Capacity:  · Able to walk 4 blocks without symptoms?: Yes  · Able to walk 2 flights without symptoms?: Yes    Prior Anesthesia Reactions: No     Personal history of venous thromboembolic disease? No    History of steroid use for >2 weeks within last year?  No    STOP-BANG Sleep Apnea Screening Questionnaire:         Review of Systems:     Review of Systems   Constitutional: Negative for chills and fever  HENT: Negative for ear pain and sore throat  Eyes: Negative for pain and visual disturbance  Respiratory: Negative for cough and shortness of breath  Cardiovascular: Negative for chest pain and palpitations  Gastrointestinal: Negative for abdominal pain and vomiting  Genitourinary: Positive for menstrual problem (irregular)  Negative for dysuria and hematuria  Lactating     Musculoskeletal: Negative for arthralgias and back pain  Skin: Negative for color change and rash  Neurological: Negative for dizziness, seizures, syncope and light-headedness  All other systems reviewed and are negative  Current Problem List:     Patient Active Problem List   Diagnosis    History of 3  sections    S/P repeat low transverse     Severe persistent allergic asthma    Iron deficiency anemia    Eosinophilia    Degenerative disc disease, cervical    Herniated nucleus pulposus, C5-6    Celiac disease    Class 1 obesity due to excess calories without serious comorbidity with body mass index (BMI) of 30 0 to 30 9 in adult       Allergies:      Allergies   Allergen Reactions    Fluticasone-Salmeterol Palpitations     "Didn't like the way it made me feel"    Iron Itching     Gold label only       Physical Exam:       Current Outpatient Medications:     albuterol (2 5 mg/3 mL) 0 083 % nebulizer solution, Take 3 mL (2 5 mg total) by nebulization every 6 (six) hours as needed for wheezing or shortness of breath, Disp: 180 mL, Rfl: 5    albuterol (PROVENTIL HFA,VENTOLIN HFA) 90 mcg/act inhaler, Inhale 2 puffs every 6 (six) hours as needed for wheezing, Disp: 18 g, Rfl: 3    dupilumab (DUPIXENT) subcutaneous injection, Inject 2 mL (300 mg total) under the skin every 14 (fourteen) days, Disp: 2 mL, Rfl: 26    Dupixent 300 MG/2ML SOPN, INJECT 2 PENS UNDER THE SKIN ON DAY 1, THEN INJECT 1 PEN ON DAY 15, THEN 1 PEN EVERY OTHER WEEK THEREAFTER, Disp: 8 mL, Rfl: 4    fexofenadine (ALLEGRA) 180 MG tablet, Take 180 mg by mouth daily, Disp: , Rfl:     fluticasone-vilanterol (Breo Ellipta) 200-25 MCG/INH inhaler, Inhale 1 puff daily Rinse mouth after use , Disp: 180 each, Rfl: 2    meloxicam (MOBIC) 15 mg tablet, TAKE 1 TABLET BY MOUTH EVERY DAY FOR 30 DAYS, Disp: , Rfl:     methocarbamol (Robaxin-750) 750 mg tablet, Take 1 tablet (750 mg total) by mouth every 6 (six) hours as needed for muscle spasms, Disp: 90 tablet, Rfl: 0    montelukast (SINGULAIR) 10 mg tablet, TAKE 1 TABLET BY MOUTH DAILY AT BEDTIME, Disp: 90 tablet, Rfl: 1    predniSONE 20 mg tablet, Take 2 tablets (40 mg total) by mouth daily, Disp: 10 tablet, Rfl: 0    pregabalin (LYRICA) 100 mg capsule, , Disp: , Rfl:     Silace 150 MG/15ML syrup, TAKE 5 ML (50 MG TOTAL) BY MOUTH DAILY, Disp: 100 mL, Rfl: 0    traMADol (ULTRAM) 50 mg tablet, Take 50 mg by mouth every 8 (eight) hours as needed, Disp: , Rfl:     EPINEPHrine (EPIPEN) 0 3 mg/0 3 mL SOAJ, Inject 0 3 mL (0 3 mg total) into a muscle once for 1 dose, Disp: 0 6 mL, Rfl: 0    Past Medical History:       Past Medical History:   Diagnosis Date    Asthma     not as adult    Celiac disease     History of one miscarriage     2017 10 weeks    Renal cyst     Varicella         Past Surgical History:   Procedure Laterality Date     SECTION       son,    daughter,    daughter   [facility] NECK SURGERY      C5-7, disc repair     [place]  DELIVERY ONLY N/A 2018    Procedure:  SECTION () REPEAT;  Surgeon: Kody Mendez DO;  [facility]: [facility] ;  Service: Obstetrics        Family History   Adopted: Yes   Problem Relation Age of Onset    Crohn's disease Mother     Hypertension Mother     Alcohol abuse Father     Drug abuse Father     Asthma Sister     Cancer Maternal Grandmother         lung    Arthritis Maternal Grandmother     Diabetes Maternal Grandfather     Hearing loss Maternal Grandfather     Heart disease Maternal Grandfather     No Known Problems Daughter     No Known Problems Daughter     No Known Problems Daughter     No Known Problems Son         Social History     Socioeconomic History    Marital status: /Civil Union     Spouse name: Not on file    Number of children: Not on file    Years of education: Not on file    Highest education level: Not on file   Occupational History    Not on file   Tobacco Use    Smoking status: Never Smoker    Smokeless tobacco: Never Used   Vaping Use    Vaping Use: Never used   Substance and Sexual Activity    Alcohol use: No    Drug use: No    Sexual activity: Yes     Partners: Male   Other Topics Concern    Not on file   Social History Narrative    Engaged to be     Always uses seatbelts     Social Determinants of Health     Financial Resource Strain: Not on file   Food Insecurity: Not on file   Transportation Needs: Not on file   Physical Activity: Not on file   Stress: Not on file   Social Connections: Not on file   Intimate Partner Violence: Not on file   Housing Stability: Not on file        Physical Exam:     Physical Exam  Constitutional:       Appearance: She is well-developed  Cardiovascular:      Rate and Rhythm: Normal rate and regular rhythm  Heart sounds: Normal heart sounds  No murmur heard  Pulmonary:      Effort: Pulmonary effort is normal  No respiratory distress  Breath sounds: Normal breath sounds  Skin:     General: Skin is warm and dry  Neurological:      Mental Status: She is alert and oriented to person, place, and time            Data:     Pre-operative work-up  CBC:   Results from last 6 Months   Lab Units 06/09/22  1632 02/28/22  1543   WBC Thousand/uL 6 61 6 24   RBC Million/uL 4 57 4 39   HEMOGLOBIN g/dL 13 6 10 8*   HEMATOCRIT % 41 7 36 0   MCV fL 91 82   MCH pg 29 8 24 6*   MCHC g/dL 32 6 30 0* RDW % 15 9* 19 6*   MPV fL 11 2 10 6   PLATELETS Thousands/uL 300 442*   NRBC AUTO /100 WBCs  --  0   NEUTROS PCT %  --  41*   LYMPHS PCT %  --  38   MONOS PCT %  --  11   EOS PCT %  --  9*   BASOS PCT %  --  1   NEUTROS ABS Thousands/µL  --  2 55   LYMPHS ABS Thousands/µL  --  2 39   MONOS ABS Thousand/µL  --  0 67   EOS ABS Thousand/µL  --  0 54     Chemistry Profile:       Invalid input(s): [id number], ALBUMIN  Coagulation Studies:   Results from last 6 Months   Lab Units 22  1320   PROTIME seconds 13 1   INR  1 03   PTT seconds 31     Endocrine Studies:       Invalid input(s): [id number]    EKG: EKG: normal EKG, normal sinus rhythm, unchanged from previous tracings  Chest x-ray: N/A    Previous cardiopulmonary studies within the past year:  · Echocardiogram: N/A  · Cardiac Catheterization: N/a  · Stress Test: N/A  · Pulmonary Function Testin2022, known asthmatic, well controlled      Assessment & Recommendations:     1  Preop general physical exam      Cleared for surgery  Continue meds as prescribed through to surgery  Pre-Op Evaluation Assessment  40 y o  female with planned surgery: Left L5-S1 hemilaminectomy and discectomy  Known risk factors for perioperative complications: None  Cardiac Risk Estimation: per the Revised Cardiac Risk Index (Circ  100:1043, 1999), the patient's risk factors for cardiac complications include none, putting her in: RCI RISK CLASS I (0 risk factors, risk of major cardiac compl  appr  0 5%)  Current medications which may produce withdrawal symptoms if withheld perioperatively: none    Pre-Op Evaluation Plan  1  Further preoperative workup as follows:   - None; no further preoperative work-up is required    2  Change in medication regimen before surgery:   - None, continue medication regimen including morning of surgery, with sip of water    3  Prophylaxis for cardiac events with perioperative beta-blockers: not indicated      4  Patient requires further consultation with: None    Clearance  Patient is CLEARED for surgery without any additional cardiac testing       [address] [address]  Phone#  [phone]  Fax#  [phone]

## 2022-08-15 ENCOUNTER — TELEPHONE (OUTPATIENT)
Dept: FAMILY MEDICINE CLINIC | Facility: CLINIC | Age: 42
End: 2022-08-15

## 2022-08-15 NOTE — TELEPHONE ENCOUNTER
Pt's  called ,  Wanted to know if they could have copy of pt's covid test results  [phone], lab     As per Per lab results can only be sent ordering provider  Dr Ainsley Ham  Advised for pt call ordering providers office  Pt informed  Stated she is going to go down to [facility] to get a copy of the results

## 2022-08-18 DIAGNOSIS — D50.9 IRON DEFICIENCY ANEMIA, UNSPECIFIED IRON DEFICIENCY ANEMIA TYPE: ICD-10-CM

## 2022-08-18 DIAGNOSIS — K90.0 CELIAC DISEASE: Primary | ICD-10-CM

## 2022-08-18 RX ORDER — SODIUM CHLORIDE 9 MG/ML
20 INJECTION, SOLUTION INTRAVENOUS ONCE
Status: CANCELLED | OUTPATIENT
Start: 2022-08-22

## 2022-08-22 ENCOUNTER — HOSPITAL ENCOUNTER (OUTPATIENT)
Dept: INFUSION CENTER | Facility: CLINIC | Age: 42
Discharge: HOME | End: 2022-08-22
Payer: COMMERCIAL

## 2022-08-22 VITALS
TEMPERATURE: 98 F | HEART RATE: 70 BPM | SYSTOLIC BLOOD PRESSURE: 113 MMHG | RESPIRATION RATE: 18 BRPM | DIASTOLIC BLOOD PRESSURE: 56 MMHG

## 2022-08-22 DIAGNOSIS — D50.9 IRON DEFICIENCY ANEMIA, UNSPECIFIED IRON DEFICIENCY ANEMIA TYPE: ICD-10-CM

## 2022-08-22 DIAGNOSIS — K90.0 CELIAC DISEASE: Primary | ICD-10-CM

## 2022-08-22 PROCEDURE — 96374 THER/PROPH/DIAG INJ IV PUSH: CPT

## 2022-08-22 RX ORDER — SODIUM CHLORIDE 9 MG/ML
20 INJECTION, SOLUTION INTRAVENOUS ONCE
Status: COMPLETED | OUTPATIENT
Start: 2022-08-22 | End: 2022-08-22

## 2022-08-22 RX ORDER — SODIUM CHLORIDE 9 MG/ML
20 INJECTION, SOLUTION INTRAVENOUS ONCE
Status: CANCELLED | OUTPATIENT
Start: 2022-09-19

## 2022-08-22 RX ADMIN — SODIUM CHLORIDE 20 ML/HR: 9 INJECTION, SOLUTION INTRAVENOUS at 15:16

## 2022-08-22 NOTE — PROGRESS NOTES
Pt presents for venofer infusion, offers no complaints, tolerated infusion, AVS provided, pt d/c stable from unit

## 2022-08-26 ENCOUNTER — LAB REQUISITION (OUTPATIENT)
Dept: LAB | Facility: HOSPITAL | Age: 42
End: 2022-08-26
Payer: COMMERCIAL

## 2022-08-26 DIAGNOSIS — M48.07 SPINAL STENOSIS, LUMBOSACRAL REGION: ICD-10-CM

## 2022-08-26 PROCEDURE — 88304 TISSUE EXAM BY PATHOLOGIST: CPT | Performed by: PATHOLOGY

## 2022-08-29 PROCEDURE — 88304 TISSUE EXAM BY PATHOLOGIST: CPT | Performed by: PATHOLOGY

## 2022-09-02 DIAGNOSIS — M54.2 NECK PAIN: ICD-10-CM

## 2022-09-02 RX ORDER — METHOCARBAMOL 750 MG/1
750 TABLET, FILM COATED ORAL EVERY 6 HOURS PRN
Qty: 90 TABLET | Refills: 0 | Status: SHIPPED | OUTPATIENT
Start: 2022-09-02

## 2022-09-07 PROBLEM — Z98.891 S/P REPEAT LOW TRANSVERSE C-SECTION: Chronic | Status: RESOLVED | Noted: 2018-07-19 | Resolved: 2022-09-07

## 2022-09-07 RX ORDER — NALOXONE HYDROCHLORIDE 4 MG/.1ML
SPRAY NASAL
COMMUNITY
Start: 2022-08-13

## 2022-09-07 RX ORDER — OXYCODONE HYDROCHLORIDE 5 MG/1
TABLET ORAL
COMMUNITY
Start: 2022-08-13 | End: 2022-09-09

## 2022-09-07 RX ORDER — DIAZEPAM 5 MG/1
5 TABLET ORAL EVERY 8 HOURS PRN
COMMUNITY
Start: 2022-08-13

## 2022-09-07 NOTE — PATIENT INSTRUCTIONS
Breast Self Exam for Women   AMBULATORY CARE:   A breast self-exam (BSE)  is a way to check your breasts for lumps and other changes  Regular BSEs can help you know how your breasts normally look and feel  Most breast lumps or changes are not cancer, but you should always have them checked by a healthcare provider  Why you should do a BSE:  Breast cancer is the most common type of cancer in women  Even if you have mammograms, you may still want to do a BSE regularly  If you know how your breasts normally feel and look, it may help you know when to contact your healthcare provider  Mammograms can miss some cancers  You may find a lump during a BSE that did not show up on a mammogram   When you should do a BSE:  If you have periods, you may want to do your BSE 1 week after your period ends  This is the time when your breasts may be the least swollen, lumpy, or tender  You can do regular BSEs even if you are breastfeeding or have breast implants  Call your doctor if:   You find any lumps or changes in your breasts  You have breast pain or fluid coming from your nipples  You have questions or concerns about your condition or care  How to do a BSE:       Look at your breasts in a mirror  Look at the size and shape of each breast and nipple  Check for swelling, lumps, dimpling, scaly skin, or other skin changes  Look for nipple changes, such as a nipple that is painful or beginning to pull inward  Gently squeeze both nipples and check to see if fluid (that is not breast milk) comes out of them  If you find any of these or other breast changes, contact your healthcare provider  Check your breasts while you sit or  the following 3 positions:    [place] your arms down at your sides  Raise your hands and join them behind your head  Put firm pressure with your hands on your hips  Bend slightly forward while you look at your breasts in the mirror  Lie down and feel your breasts    When you lie down, your breast tissue spreads out evenly over your chest  This makes it easier for you to feel for lumps and anything that may not be normal for your breasts  Do a BSE on one breast at a time  Place a small pillow or towel under your left shoulder  Put your left arm behind your head  Use the 3 middle fingers of your right hand  Use your fingertip pads, on the top of your fingers  Your fingertip pad is the most sensitive part of your finger  Use small circles to feel your breast tissue  Use your fingertip pads to make dime-sized, overlapping circles on your breast and armpits  Use light, medium, and firm pressure  First, press lightly  Second, press with medium pressure to feel a little deeper into the breast  Last, use firm pressure to feel deep within your breast     Examine your entire breast area  Examine the breast area from above the breast to below the breast where you feel only ribs  Make small circles with your fingertips, starting in the middle of your armpit  Make circles going up and down the breast area  Continue toward your breast and all the way across it  Examine the area from your armpit all the way over to the middle of your chest (breastbone)  Stop at the middle of your chest     Move the pillow or towel to your right shoulder, and put your right arm behind your head  Use the 3 fingertip pads of your left hand, and repeat the above steps to do a BSE on your right breast     What else you can do to check for breast problems or cancer:  Talk to your healthcare provider about mammograms  A mammogram is an x-ray of your breasts to screen for breast cancer or other problems  Your provider can tell you the benefits and risks of mammograms  The first mammogram is usually at age 39 or 48  Your provider may recommend you start at 36 or younger if your risk for breast cancer is high  Mammograms usually continue every 1 to 2 years until age 76         Follow up with your doctor as directed:  Write down your questions so you remember to ask them during your visits  © Copyright [employer] 2022 Information is for End User's use only and may not be sold, redistributed or otherwise used for commercial purposes  All illustrations and images included in CareNotes® are the copyrighted property of A D A M [employer] Inc  or Carlos Eduardo Babin  The above information is an  only  It is not intended as medical advice for individual conditions or treatments  Talk to your doctor, nurse or pharmacist before following any medical regimen to see if it is safe and effective for you  Wellness Visit for Adults   AMBULATORY CARE:   A wellness visit  is when you see your healthcare provider to get screened for health problems  Your healthcare provider will also give you advice on how to stay healthy  Write down your questions so you remember to ask them  Ask your healthcare provider how often you should have a wellness visit  What happens at a wellness visit:  Your healthcare provider will ask about your health, and your family history of health problems  This includes high blood pressure, heart disease, and cancer  He or she will ask if you have symptoms that concern you, if you smoke, and about your mood  You may also be asked about your intake of medicines, supplements, food, and alcohol  Any of the following may be done: Your weight  will be checked  Your height may also be checked so your body mass index (BMI) can be calculated  Your BMI shows if you are at a healthy weight  Your blood pressure  and heart rate will be checked  Your temperature may also be checked  Blood and urine tests  may be done  Blood tests may be done to check your cholesterol levels  Abnormal cholesterol levels increase your risk for heart disease and stroke  You may also need a blood or urine test to check for diabetes if you are at increased risk  Urine tests may be done to look for signs of an infection or kidney disease      A physical exam  includes checking your heartbeat and lungs with a stethoscope  Your healthcare provider may also check your skin to look for sun damage  Screening tests  may be recommended  A screening test is done to check for diseases that may not cause symptoms  The screening tests you may need depend on your age, gender, family history, and lifestyle habits  For example, colorectal screening may be recommended if you are 48years old or older  Screening tests you need if you are a woman:   A Pap smear  is used to screen for cervical cancer  Pap smears are usually done every 3 to 5 years depending on your age  You may need them more often if you have had abnormal Pap smear test results in the past  Ask your healthcare provider how often you should have a Pap smear  A mammogram  is an x-ray of your breasts to screen for breast cancer  Experts recommend mammograms every 2 years starting at age 48 years  You may need a mammogram at age 52 years or younger if you have an increased risk for breast cancer  Talk to your healthcare provider about when you should start having mammograms and how often you need them  Vaccines you may need:   Get an influenza vaccine  every year  The influenza vaccine protects you from the flu  Several types of viruses cause the flu  The viruses change over time, so new vaccines are made each year  Get a tetanus-diphtheria (Td) booster vaccine  every 10 years  This vaccine protects you against tetanus and diphtheria  Tetanus is a severe infection that may cause painful muscle spasms and lockjaw  Diphtheria is a severe bacterial infection that causes a thick covering in the back of your mouth and throat  Get a human papillomavirus (HPV) vaccine  if you are female and aged 23 to 32 or male 23 to 24 and never received it  This vaccine protects you from HPV infection  HPV is the most common infection spread by sexual contact   HPV may also cause vaginal, penile, and anal cancers  Get a pneumococcal vaccine  if you are aged 72 years or older  The pneumococcal vaccine is an injection given to protect you from pneumococcal disease  Pneumococcal disease is an infection caused by pneumococcal bacteria  The infection may cause pneumonia, meningitis, or an ear infection  Get a shingles vaccine  if you are 60 or older, even if you have had shingles before  The shingles vaccine is an injection to protect you from the varicella-zoster virus  This is the same virus that causes chickenpox  Shingles is a painful rash that develops in people who had chickenpox or have been exposed to the virus  How to eat healthy:  My Plate is a model for planning healthy meals  It shows the types and amounts of foods that should go on your plate  Fruits and vegetables make up about half of your plate, and grains and protein make up the other half  A serving of dairy is included on the side of your plate  The amount of calories and serving sizes you need depends on your age, gender, weight, and height  Examples of healthy foods are listed below:  Eat a variety of vegetables  such as dark green, red, and orange vegetables  You can also include canned vegetables low in sodium (salt) and frozen vegetables without added butter or sauces  Eat a variety of fresh fruits , canned fruit in 100% juice, frozen fruit, and dried fruit  Include whole grains  At least half of the grains you eat should be whole grains  Examples include whole-wheat bread, wheat pasta, brown rice, and whole-grain cereals such as oatmeal     Eat a variety of protein foods such as seafood (fish and shellfish), lean meat, and poultry without skin (turkey and chicken)  Examples of lean meats include pork leg, shoulder, or tenderloin, and beef round, sirloin, tenderloin, and extra lean ground beef  Other protein foods include eggs and egg substitutes, beans, peas, soy products, nuts, and seeds      Choose low-fat dairy products such as skim or 1% milk or low-fat yogurt, cheese, and cottage cheese  Limit unhealthy fats  such as butter, hard margarine, and shortening  Exercise:  Exercise at least 30 minutes per day on most days of the week  Some examples of exercise include walking, biking, dancing, and swimming  You can also fit in more physical activity by taking the stairs instead of the elevator or parking farther away from stores  Include muscle strengthening activities 2 days each week  Regular exercise provides many health benefits  It helps you manage your weight, and decreases your risk for type 2 diabetes, heart disease, stroke, and high blood pressure  Exercise can also help improve your mood  Ask your healthcare provider about the best exercise plan for you  General health and safety guidelines:   Do not smoke  Nicotine and other chemicals in cigarettes and cigars can cause lung damage  Ask your healthcare provider for information if you currently smoke and need help to quit  E-cigarettes or smokeless tobacco still contain nicotine  Talk to your healthcare provider before you use these products  Limit alcohol  A drink of alcohol is 12 ounces of beer, 5 ounces of wine, or 1½ ounces of liquor  Lose weight, if needed  Being overweight increases your risk of certain health conditions  These include heart disease, high blood pressure, type 2 diabetes, and certain types of cancer  Protect your skin  Do not sunbathe or use tanning beds  Use sunscreen with a SPF 15 or higher  Apply sunscreen at least 15 minutes before you go outside  Reapply sunscreen every 2 hours  Wear protective clothing, hats, and sunglasses when you are outside  Drive safely  Always wear your seatbelt  Make sure everyone in your car wears a seatbelt  A seatbelt can save your life if you are in an accident  Do not use your cell phone when you are driving  This could distract you and cause an accident   Pull over if you need to make a call or send a text message  Practice safe sex  Use latex condoms if are sexually active and have more than one partner  Your healthcare provider may recommend screening tests for sexually transmitted infections (STIs)  Wear helmets, lifejackets, and protective gear  Always wear a helmet when you ride a bike or motorcycle, go skiing, or play sports that could cause a head injury  Wear protective equipment when you play sports  Wear a lifejacket when you are on a boat or doing water sports  © Copyright [employer] 2022 Information is for End User's use only and may not be sold, redistributed or otherwise used for commercial purposes  All illustrations and images included in CareNotes® are the copyrighted property of A D A M , Inc  or Carlos Eduardo Treadwell   The above information is an  only  It is not intended as medical advice for individual conditions or treatments  Talk to your doctor, nurse or pharmacist before following any medical regimen to see if it is safe and effective for you  HPV (Human Papillomavirus) Vaccine for Adults   AMBULATORY CARE:   The human papillomavirus (HPV) vaccine  is an injection given to females and males to protect against human papillomavirus infection  HPV is most commonly spread through sexual activity  It can also be spread from a mother to her baby during delivery  The HPV vaccine is most effective if given before sexual activity begins  This allows your body to build almost complete protection against HPV before you have contact with the virus  The HPV vaccine is still effective after sexual activity has begun  How the vaccine is given:  The HPV vaccine can be given with other vaccines  The vaccine is given in 2 or 3 doses through age 32: The first dose  is given at any time  The second dose  is given 1 to 2 months after the first dose  The third dose, if needed,  is given 6 months after the first dose      Reasons you should not get the HPV vaccine, or should wait to get it: You had a severe allergic reaction to a dose of the vaccine  You are pregnant  Your healthcare provider will tell you when you can get the vaccine  You are sick or have a fever  You may need to wait to get the vaccine until symptoms go away  Risks of the HPV vaccine: You may have pain, redness, or swelling where the shot was given  You may have a fever or headache  You may have an allergic reaction to the vaccine  This can be life-threatening  Call your local emergency number (911 in the [address]) if:   You have signs of a severe allergic reaction, such as trouble breathing, hives, or wheezing  Seek care immediately if:   You have a high fever or behavior changes that concern you  Call your doctor if:   You have questions or concerns about the HPV vaccine  Apply a warm compress  to the area to relieve swelling and pain  Follow up with your doctor as directed:  Write down your questions so you remember to ask them during your visits  © Copyright [employer] 2022 Information is for End User's use only and may not be sold, redistributed or otherwise used for commercial purposes  All illustrations and images included in CareNotes® are the copyrighted property of A D A M , Inc  or [employer]   The above information is an  only  It is not intended as medical advice for individual conditions or treatments  Talk to your doctor, nurse or pharmacist before following any medical regimen to see if it is safe and effective for you  Perineal Hygiene     No soaps or feminine wash to the vulva  Use only water to cleanse, or water with [employer] or [employer] if necessary  No lotion to the area  Use only coconut oil for moisture if needed   No douching     [place]otton underware, loose fitting clothing  Only perfume-free, dye-free laundry detergent, use a second rinse cycle   Avoid fabric softeners/dryer sheets        Coconut oil as a lubricant (if not using condoms) or another scent-free lubricant (Astroglide, Uberlube) if needed  Partner to avoid the same products as well  Over the counter probiotic to restore vaginal margot may be helpful as well     You may also look into Boric Acid vaginal suppositories to restore vaginal PH balance for up to 2 weeks as directed on the box  You may not use these if you are pregnant Kegel Exercises for Women   AMBULATORY CARE:   Kegel exercises  help strengthen your pelvic muscles  Pelvic muscles hold your pelvic organs, such as your bladder and uterus, in place  Kegel exercises help prevent or control problems with urine incontinence (leakage)  Incontinence may be caused by pregnancy, childbirth, or menopause  Contact your healthcare provider if:   You cannot feel your muscles tighten or relax  You continue to leak urine  You have questions or concerns about your condition or care  Use the correct muscles:  Pelvic muscles are the muscles you use to control urine flow  To target these muscles, stop and start the flow of urine several times  This will help you become familiar with how it feels to tighten and relax these muscles  How to do Kegel exercises:   Empty your bladder  You may lie down, stand up, or sit down to do these exercises  When you first try to do these exercises, it may be easier if you lie down  Tighten or squeeze your pelvic muscles slowly  It may feel like you are trying to hold back urine or gas  Hold this position for 3 seconds  Relax for 3 seconds  Repeat this cycle 10 times  Do 10 sets of Kegel exercises, at least 3 times a day  Do not hold your breath when you do Kegel exercises  Keep your stomach, back, and leg muscles relaxed  As your muscles get stronger, you will be able to hold the squeeze longer  Your healthcare provider may ask that you increase your pelvic muscle squeeze to 10 seconds  After you squeeze for 10 seconds, relax for 10 seconds      What else you should know:   Once you know how to do Kegel exercises, use different positions  You can do these exercises while you lie on the floor, sit at your desk or watch TV, and while you stand  You may notice improved bladder control within about 6 weeks  Tighten your pelvic muscles before you sneeze, cough, or lift to prevent urine leakage  Follow up with your doctor as directed:  Write down your questions so you remember to ask them during your visits  © Copyright [employer] 2022 Information is for End User's use only and may not be sold, redistributed or otherwise used for commercial purposes  All illustrations and images included in CareNotes® are the copyrighted property of A D A M , Inc  or Carlos Eduardo Sotelo Fresviikaci   The above information is an  only  It is not intended as medical advice for individual conditions or treatments  Talk to your doctor, nurse or pharmacist before following any medical regimen to see if it is safe and effective for you  Intrauterine Device   AMBULATORY CARE:   An IUD  is a type of birth control that is inserted into your uterus  It is a small, flexible piece of plastic with a string on the end  It is inserted and removed by your healthcare provider  IUDs prevent sperm from reaching or fertilizing an egg  IUDs also prevent a fertilized egg from attaching to the uterus and developing into a fetus  Common types of IUDs:  Your healthcare provider will recommend the type of IUD that is right for you  This is based on your age and if you have had a child  If you have not had a child, a smaller IUD will be used  A copper IUD  slowly releases a small amount of copper into your uterus  This IUD can remain in place for up to 10 years  A hormone-releasing IUD  slowly releases a small amount of progesterone into your uterus  Progesterone is a hormone that is made by your body to help control your periods  This IUD can remain in place for 3 to 5 years      Seek care immediately if:   You have severe pain or bleeding during your period  You have a fever and severe abdominal pain  Call your doctor or gynecologist if:   You think you are pregnant  The IUD has come out  You have bleeding from your vagina after you have sex, and it is not your period  You have pain during sex  You cannot feel the IUD string, the string feels longer, or you feel the plastic of the IUD itself  You have vaginal discharge that is green, yellow, or has a foul odor  You have questions or concerns about your condition or care  Advantages of an IUD:   An IUD is 98% to 99% effective in preventing pregnancy  The IUD can be removed by your healthcare provider if you decide to have a baby  You may be able to get pregnant as soon as the IUD is removed  An IUD protects you from pregnancy right after it is inserted  You do not have to stop sexual activity to insert it  You do not have to remember to take your birth control pill  Copper IUDs are safer for some women than oral birth control pills  Examples include women who smoke or have a history of blood clots  Hormone-releasing IUDs may decrease certain health problems  Examples include bleeding and cramping that happen with your monthly period  Disadvantages of an IUD:   There is a small chance that you could get pregnant  Sometimes the IUD cannot be removed after you get pregnant  This increases your risk of a miscarriage or an ectopic pregnancy  Ectopic pregnancy is when the fertilized egg starts to grow somewhere other than your uterus  An IUD does not protect you from sexually transmitted infections  You may have cramps during the first weeks after you get the IUD  A copper IUD may cause your monthly period to be heavier or more painful  This is more common within the first 3 months after you get the IUD  You may need to have your IUD removed if your bleeding or pain becomes severe   You may have spotting between periods  There is a small risk of an infection within the first 20 days after the IUD is placed  Infection can lead to pelvic inflammatory disease  This can cause infertility  Your uterus may tear when the IUD is inserted  The IUD may slip part or all of the way out of your uterus  Self-care:   NSAIDs , such as ibuprofen, help decrease swelling, pain, and fever  This medicine is available with or without a doctor's order  NSAIDs can cause stomach bleeding or kidney problems in certain people  If you take blood thinner medicine, always ask if NSAIDs are safe for you  Always read the medicine label and follow directions  Apply heat to relieve pain and cramping  Use a heating pad set on low  Apply heat to your lower abdomen for 20 minutes every hour, or as directed  Return to activities as directed  Your healthcare provider will tell you when it is okay to return to work, school, or other activities  Do not use a tampon or have sex  until your provider says it is okay  Make sure your IUD is in place: An IUD has a string that is made of plastic thread  One to 2 inches of this string hangs into your vagina  You cannot see this string, and it should not cause problems when you have sex  Check your IUD string every 3 days for the first 3 months that you have your IUD  After that, check the string after each monthly period  Do the following to check the placement of your IUD:  Wash your hands with soap and warm water  Dry them with a clean towel  Bend your knees and squat low to the ground  Gently put your index finger inside your vagina  The cervix is at the top of the vagina and feels like the tip of your nose  Feel for the IUD string  Do not pull on the string  You should not be able to feel the firm plastic of the IUD itself  Wash your hands after you check your IUD string      For more information:   [facility] [address] [address]  Phone: 1- 754 - [phone]  Web Address: https://[employer]  org    Follow up with your doctor as directed:  Write down your questions so you remember to ask them during your visits  © Copyright [employer] 2022 Information is for End User's use only and may not be sold, redistributed or otherwise used for commercial purposes  All illustrations and images included in CareNotes® are the copyrighted property of [employer] M [employer] Inc  or Carlos Eduardo Treadwell   The above information is an  only  It is not intended as medical advice for individual conditions or treatments  Talk to your doctor, nurse or pharmacist before following any medical regimen to see if it is safe and effective for you  Levonorgestrel (Into the uterus)   Levonorgestrel ([id number])  Prevents pregnancy and treats heavy menstrual bleeding  This is an intrauterine device (IUD), which is a reversible form of birth control  This IUD slowly releases levonorgestrel, a hormone  Brand Name(s): Sandrajim Mayorga, Mirena, [place]   There may be other brand names for this medicine  When This Medicine Should Not Be Used: This device is not right for everyone  Do not use it if you had an allergic reaction to levonorgestrel, silicone, polyethylene, silica, barium sulfate or iron oxide, or if you are pregnant  How to Use This Medicine:   Device  A nurse or other trained health professional will give you this medicine  The IUD is usually inserted by your doctor during your monthly period  You will need to see your doctor 4 to 6 weeks after the IUD is placed and then once a year  Your IUD has a string or "tail" that is made of plastic thread  About one or two inches of this string hangs into your vagina  You cannot see this string, and it will not cause problems when you have sex  Check your IUD after each monthly period  You may not be protected against pregnancy if you cannot feel the string or if you feel plastic   Do the following to check the placement of your IUD:  Wash your hands with soap and warm water  Dry them with a clean towel  Bend your knees and squat low to the ground  Gently put your index finger high inside your vagina  The cervix is at the top of the vagina  Find the IUD string coming from your cervix  Never pull on the string  You should not be able to feel the plastic of the IUD itself  Wash your hands after you are done checking your IUD string  Your doctor will need to remove your IUD after 3 years for Maurizio, after 5 years for [facility], after 6 years for WIEDEN, or after 7 years for Mirena®  You will also need to have it replaced if it comes out of your uterus  If you are using Mirena® or Liletta® and want to stop, your doctor can remove it at any time  However, you may become pregnant as soon as Jose Antonio Palm, Mirena®, or Maurizio is removed or if you have intercourse the week before WIEDEN is removed  Use another form of birth control or have a new IUD inserted to keep from getting pregnant  Drugs and Foods to Avoid:   Ask your doctor or pharmacist before using any other medicine, including over-the-counter medicines, vitamins, and herbal products  Some medicines can affect how this device works  Tell your doctor if you are using a blood thinner (including warfarin)  Warnings While Using This Medicine:   Tell your doctor if you have had any problems, infections, or other conditions that affected your reproductive system  There are many problems that could make an IUD a bad choice for you, including if you have fibroids, unexplained bleeding, a uterus that has an unusual shape, a recent infection, a history of pelvic inflammatory disease, an abnormal Pap test, ectopic pregnancy, cancer or suspected cancer, or an existing IUD  Tell your doctor if you are breastfeeding, or if you had a baby, miscarriage, or  in the past 3 months   Tell your doctor if you have liver disease (including tumor or cancer), heart disease, breast cancer, heart or blood circulation problems, migraine, high blood pressure, or a history of heart valve problems, blood clotting problems, stroke, or heart attack  Tell your doctor if you have problems with your immune system or have had surgery on your female organs (especially fallopian tubes)  There is a small chance that you could get pregnant when using an IUD, just as there is with any birth control  If you get pregnant, your doctor may remove your IUD to lower the risk of miscarriage or other problems  This medicine may cause the following problems:  Increased risk of ectopic pregnancy (pregnancy outside the uterus)  Increased risk of serious infections, including sepsis  Increased risk of pelvic inflammatory disease (PID) or endometritis  Perforation (hole in the wall of your uterus), which can damage other organs  Increased risk for ovarian cysts  Increased risk of cancer of the breast, uterus, or cervix  Increased risk of high blood pressure, stroke, heart attack, or clotting problems  Jaundice (yellow skin or eyes)  You might have some spotting and cramping during the first weeks after the IUD has been inserted  These symptoms should decrease or go away within a few weeks up to 6 months  You could have less bleeding or even stop having periods by the end of the first year  Call your doctor if you have a change from your regular bleeding pattern after you have had your IUD for awhile, including more bleeding or if you miss a period (and you were having periods even with your IUD)  An IUD can slip partly or all the way out of your uterus  If this happens, use condoms or another form of birth control, and call your doctor right away  This IUD will not protect you from HIV/AIDS or other sexually transmitted diseases    If you have the [address] [place] [address] or [facility], tell your doctor before you have an MRI test   Your doctor will check your progress and the effects of this medicine at regular visits  Keep all appointments  Possible Side Effects While Using This Medicine:   Call your doctor right away if you notice any of these side effects: Allergic reaction: Itching or hives, swelling in your face or hands, swelling or tingling in your mouth or throat, chest tightness, trouble breathing  Bloating, stomach or pelvic pain, spasm, tenderness, or cramping that is sudden or severe  Chest pain, problems with speech or walking, numbness or weakness in your arm or leg or on one side of your body  Heavy bleeding from your vagina  Pain during sex, or if your partner feels the hard plastic of the IUD during sex  Severe headache, vision changes  Unusual bleeding, bruising, or weakness  Vaginal discharge that has a bad smell, fever, chills, sores on your genitals  Yellow skin or eyes  If you notice these less serious side effects, talk with your doctor:   Acne, dandruff, oily skin or other skin changes  Breast pain or discomfort  Change in bleeding pattern after the first few months  Dizziness or lightheadedness after IUD is placed  Mild itching around your vagina and genitals  Weight gain  If you notice other side effects that you think are caused by this medicine, tell your doctor  Call your doctor for medical advice about side effects  You may report side effects to FDA at 3-019-FDA-1083    © Copyright [address] 2022 Information is for End User's use only and may not be sold, redistributed or otherwise used for commercial purposes  The above information is an  only  It is not intended as medical advice for individual conditions or treatments  Talk to your doctor, nurse or pharmacist before following any medical regimen to see if it is safe and effective for you

## 2022-09-07 NOTE — PROGRESS NOTES
Diagnoses and all orders for this visit:    Encounter for gynecological examination without abnormal finding  -     Liquid-based pap, screening    Encounter for screening mammogram for malignant neoplasm of breast  -     Mammo screening bilateral w 3d & cad; Future    Galactorrhea  -     Prolactin; Future    Screening for STDs (sexually transmitted diseases)  -     Chlamydia/GC amplified DNA by PCR    Encounter for other general counseling and advice on contraception     Perineal hygiene reviewed   Weight bearing exercises minium of 150 mins/weekly advised  Kegel exercises recommended  SBE encouraged, ASCCP guidelines reviewed  Condoms encouraged with all sexual activity to prevent STI's  Gardisil vaccines recommended up to age 39  Calcium/ Vit D dietary requirements discussed,   Advised to call with any issues,  all concerns & questions addressed  See provided information in your after visit summary   Reviewed all birth control method choices  Reviewed Mirena IUD benefits, risks, side effects  Advised patient to take 600 mg of ibuprofen 1 hour prior to our appointment for insertion  Have prolactin level drawn  F/U Annually and PRN      Health Maintenance:    Last PAP: 01/18/2018 Neg/Neg  Next PAP Due:collected today     Last Mammogram: 01/03/2022    Life time Reynaldo Cochran % 9 19, Density C heterogeneously dense , Bi-Rads 2 Benign  Next Mammogram: order given    Last Colonoscopy: 07/14/2022   RTO 5 years     Gardisil: unsure, will check records and get back to us   Gardasil 9 was advised for prevention of HPV-related disease  We discussed risks/benefits, SE's/AE's at length and all questions were answered  Written info was provided for review  The patient agrees to consider and is aware she may call to schedule injection #1 at any time     Low risk only has had 2 partners in her lifetime,  declines      Subjective    CC: Yearly Exam      Citlalli Carbajal is a 40 y o  female here for an annual exam  [id number]  GYN hx includes: For C-sections, 1  loss at 16 weeks,  No personal Hx of breast, cervical, ovarian or colon CA  Family hx of:  No GYN cancers  Medically stable, reports no changes in medical Hx, follows with PMD    Patient's last menstrual period was 2022 (exact date)  Her menstrual cycles are irregular  Will skip months and have closer q 2 weeks cycles at times  She has gone through 2 surgical procedures recently and does admit to increased stress  Reports that her cycles are heavier than normal with small clots   Denies history of abnormal pap smear  Reports occasional milky nipple discharge, bilateral and random in occurrence  Denies breast tenderness, bloody discharge or any other concerns  Stopped breast feeding approx 3 years ago  She denies  abnormal vaginal discharge, vaginal itching, odor, irritation, bowel/bladder dysfunction, urinary symptoms, pelvic pain, or dyspareunia today  She is sexually active  Monogamous relationship  Her current method of contraception includes condoms  She would like to discuss methods of birth control   Previously tried the pill, had headaches, stop taking  Is more interested in a long-term contraception  We discussed all methods available and patient has decided on the Mirena IUD, will make an appointment for placement   She does want STD testing today    Denies intimate partner violence    Past Medical History:   Diagnosis Date    Asthma     not as adult    Celiac disease     History of one miscarriage     2017 10 weeks    Renal cyst     Varicella      Past Surgical History:   Procedure Laterality Date    BACK SURGERY  2022     SECTION      2004 son,    daughter,    daughter    NECK SURGERY      C5-7, disc repair     [place]  DELIVERY ONLY N/A 2018    Procedure:  SECTION () REPEAT;  Surgeon: Kandy Jensen DO;  Location: [facility] ;  Service: Obstetrics       Immunization History Administered Date(s) Administered    COVID-19 J&J (Philtro) vaccine 0 5 mL 04/11/2021    COVID-19 PFIZER VACCINE 0 3 ML IM 11/27/2021    INFLUENZA 10/05/2013, 08/23/2017, 08/31/2018, 11/27/2021, 09/07/2022    Influenza, injectable, quadrivalent, preservative free 0 5 mL 08/31/2018, 12/28/2020    Influenza, seasonal, injectable 10/05/2013    Tdap 03/05/2011, 05/30/2018       Family History   Adopted: Yes   Problem Relation Age of Onset    Crohn's disease Mother     Hypertension Mother     Alcohol abuse Father     Drug abuse Father     Asthma Sister     No Known Problems Daughter     No Known Problems Daughter     No Known Problems Daughter     No Known Problems Son     Cancer Maternal Grandmother         lung    Arthritis Maternal Grandmother     Diabetes Maternal Grandfather     Hearing loss Maternal Grandfather     Heart disease Maternal Grandfather      Social History     Tobacco Use    Smoking status: Never Smoker    Smokeless tobacco: Never Used   Vaping Use    Vaping Use: Never used   Substance Use Topics    Alcohol use: No    Drug use: No       Current Outpatient Medications:     albuterol (2 5 mg/3 mL) 0 083 % nebulizer solution, Take 3 mL (2 5 mg total) by nebulization every 6 (six) hours as needed for wheezing or shortness of breath, Disp: 180 mL, Rfl: 5    albuterol (PROVENTIL HFA,VENTOLIN HFA) 90 mcg/act inhaler, Inhale 2 puffs every 6 (six) hours as needed for wheezing, Disp: 18 g, Rfl: 3    diazepam (VALIUM) 5 mg tablet, Take 5 mg by mouth every 8 (eight) hours as needed, Disp: , Rfl:     dupilumab (DUPIXENT) subcutaneous injection, Inject 2 mL (300 mg total) under the skin every 14 (fourteen) days, Disp: 2 mL, Rfl: 26    Dupixent 300 MG/2ML SOPN, INJECT 2 PENS UNDER THE SKIN ON DAY 1, THEN INJECT 1 PEN ON DAY 15, THEN 1 PEN EVERY OTHER WEEK THEREAFTER, Disp: 8 mL, Rfl: 4    fexofenadine (ALLEGRA) 180 MG tablet, Take 180 mg by mouth daily, Disp: , Rfl:     meloxicam (MOBIC) 15 mg tablet, TAKE 1 TABLET BY MOUTH EVERY DAY FOR 30 DAYS, Disp: , Rfl:     methocarbamol (Robaxin-750) 750 mg tablet, Take 1 tablet (750 mg total) by mouth every 6 (six) hours as needed for muscle spasms, Disp: 90 tablet, Rfl: 0    montelukast (SINGULAIR) 10 mg tablet, TAKE 1 TABLET BY MOUTH DAILY AT BEDTIME, Disp: 90 tablet, Rfl: 1    naloxone (NARCAN) 4 mg/0 1 mL nasal spray, ADMINISTER 1 SPRAY INTO ONE NOSTRIL  CALL 911   REPEAT AFTER 2-3 MIN IF NO OR MINIMAL RESPONSE, Disp: , Rfl:     predniSONE 20 mg tablet, Take 2 tablets (40 mg total) by mouth daily, Disp: 10 tablet, Rfl: 0    pregabalin (LYRICA) 100 mg capsule, , Disp: , Rfl:     promethazine-codeine (PHENERGAN WITH CODEINE) 6 25-10 mg/5 mL syrup, Take 5 mL by mouth every 4 (four) hours as needed for cough, Disp: 473 mL, Rfl: 0    Silace 150 MG/15ML syrup, TAKE 5 ML (50 MG TOTAL) BY MOUTH DAILY, Disp: 100 mL, Rfl: 0    EPINEPHrine (EPIPEN) 0 3 mg/0 3 mL SOAJ, Inject 0 3 mL (0 3 mg total) into a muscle once for 1 dose, Disp: 0 6 mL, Rfl: 0    fluticasone-vilanterol (Breo Ellipta) 200-25 MCG/INH inhaler, Inhale 1 puff daily Rinse mouth after use , Disp: 180 each, Rfl: 2  Patient Active Problem List    Diagnosis Date Noted    Class 1 obesity due to excess calories without serious comorbidity with body mass index (BMI) of 30 0 to 30 9 in adult 2022    Celiac disease     Eosinophilia 2022    Degenerative disc disease, cervical 2022    Herniated nucleus pulposus, C5-6 2022    Iron deficiency anemia 2022    Severe persistent allergic asthma 10/10/2018    History of 3  sections 02/15/2018       Allergies   Allergen Reactions    Other Vomiting and Abdominal Pain     Gluten     Fluticasone-Salmeterol Palpitations     "Didn't like the way it made me feel"    Iron Itching     Gold label only       OB History    Para Term  AB Living   5 4 4 0 1 4   SAB IAB Ectopic Multiple Live Births   1 0 0 0 4      # Outcome Date GA Lbr Jani/2nd Weight Sex Delivery Anes PTL Lv   5 Term 07/19/18 39w0d  3340 g (7 lb 5 8 oz) F CS-LTranv Spinal N KONSTANTIN   4 Term 02/04/11 38w0d  3175 g (7 lb) M CS-Unspec Spinal  KONSTANTIN   3 Term 09/21/07 38w0d  3175 g (7 lb) F CS-Unspec Spinal  KONSTANTIN   2 Term 11/09/04 39w5d  3175 g (7 lb) F CS-Unspec EPI  KONSTANTIN      Complications: Failure to Progress in Second Stage   1 SAB                Vitals:    09/13/22 1250   BP: 126/74   BP Location: Left arm   Patient Position: Sitting   Cuff Size: Large   Weight: 75 3 kg (166 lb)   Height: 5' 2" (1 575 m)     Body mass index is 30 36 kg/m²  Review of Systems     Constitutional: Negative for chills, fatigue, fever, headaches, visual disturbances, and unexpected weight change  Respiratory: Negative for cough, & shortness of breath  Cardiovascular: Negative for chest pain       Gastrointestinal: Negative for Abd pain, nausea & vomiting, constipation and diarrhea  Genitourinary: Negative for difficulty urinating, dysuria, hematuria, dyspareunia, unusual vaginal bleeding or discharge  Skin: Negative skin changes    Physical Exam     Constitutional: Alert & Oriented x3, well-developed and well-nourished  No distress  HENT: Atraumatic, Normocephalic, Conjunctivae clear  Neck: Normal range of motion  Neck supple  No thyromegaly, mass, nodules or tenderness  Pulmonary: Effort normal    Abdominal: Soft  No tenderness or masses  Musculoskeletal: Normal ROM  Skin: Warm & Dry  Psychological: Normal mood, thought content, behavior & judgement     Breasts:   Right: tissue soft without masses, tenderness, skin changes or nipple discharge  No areas of erythema or pain  No subclavicular, axillary, pectoral adenopathy  Left:  tissue soft without masses, tenderness, skin changes or nipple discharge  No areas of erythema or pain  No subclavicular, axillary, pectoral adenopathy    Pelvic exam was performed with patient supine, lithotomy position        Labia: Negative rash, tenderness, lesion or injury on the right labia  Negative rash, tenderness, lesion or injury on the left labia  Urethral meatus:  Negative for  tenderness, inflammation or discharge  Uterus: not deviated, enlarged, fixed or tender  Cervix: No CMT, no discharge or friability  Right adnexa: no mass, no tenderness and no fullness  Left adnexa: no mass, no tenderness and no fullness  Vagina: No erythema, tenderness, masses, or foreign body in the vagina  No signs of injury around the vagina  No unusual vaginal discharge   Perineum without lesions, signs of injury, erythema or swelling  Inguinal Canal:        Right: No inguinal adenopathy or hernia present  Left: No inguinal adenopathy or hernia present

## 2022-09-09 ENCOUNTER — OFFICE VISIT (OUTPATIENT)
Dept: FAMILY MEDICINE CLINIC | Facility: CLINIC | Age: 42
End: 2022-09-09
Payer: COMMERCIAL

## 2022-09-09 VITALS
HEART RATE: 93 BPM | WEIGHT: 162.4 LBS | TEMPERATURE: 97 F | SYSTOLIC BLOOD PRESSURE: 110 MMHG | BODY MASS INDEX: 29.88 KG/M2 | DIASTOLIC BLOOD PRESSURE: 70 MMHG | HEIGHT: 62 IN | OXYGEN SATURATION: 97 %

## 2022-09-09 DIAGNOSIS — R05.9 COUGH: ICD-10-CM

## 2022-09-09 DIAGNOSIS — R82.90 URINE ABNORMALITY: ICD-10-CM

## 2022-09-09 DIAGNOSIS — K90.0 CELIAC DISEASE: ICD-10-CM

## 2022-09-09 DIAGNOSIS — D50.9 IRON DEFICIENCY ANEMIA, UNSPECIFIED IRON DEFICIENCY ANEMIA TYPE: ICD-10-CM

## 2022-09-09 DIAGNOSIS — J45.50 SEVERE PERSISTENT ALLERGIC ASTHMA: Primary | ICD-10-CM

## 2022-09-09 LAB
BACTERIA UR QL AUTO: NORMAL /HPF
BILIRUB UR QL STRIP: NEGATIVE
CLARITY UR: CLEAR
COLOR UR: ABNORMAL
GLUCOSE UR STRIP-MCNC: NEGATIVE MG/DL
HGB UR QL STRIP.AUTO: ABNORMAL
KETONES UR STRIP-MCNC: NEGATIVE MG/DL
LEUKOCYTE ESTERASE UR QL STRIP: NEGATIVE
NITRITE UR QL STRIP: NEGATIVE
NON-SQ EPI CELLS URNS QL MICRO: NORMAL /HPF
PH UR STRIP.AUTO: 6.5 [PH]
PROT UR STRIP-MCNC: NEGATIVE MG/DL
RBC #/AREA URNS AUTO: NORMAL /HPF
SP GR UR STRIP.AUTO: 1.01 (ref 1–1.03)
UROBILINOGEN UR STRIP-ACNC: <2 MG/DL
WBC #/AREA URNS AUTO: NORMAL /HPF

## 2022-09-09 PROCEDURE — 99214 OFFICE O/P EST MOD 30 MIN: CPT | Performed by: NURSE PRACTITIONER

## 2022-09-09 PROCEDURE — 81001 URINALYSIS AUTO W/SCOPE: CPT | Performed by: NURSE PRACTITIONER

## 2022-09-09 PROCEDURE — 3725F SCREEN DEPRESSION PERFORMED: CPT | Performed by: NURSE PRACTITIONER

## 2022-09-09 PROCEDURE — 87086 URINE CULTURE/COLONY COUNT: CPT | Performed by: NURSE PRACTITIONER

## 2022-09-09 RX ORDER — PROMETHAZINE HYDROCHLORIDE AND CODEINE PHOSPHATE 6.25; 1 MG/5ML; MG/5ML
5 SYRUP ORAL EVERY 4 HOURS PRN
Qty: 473 ML | Refills: 0 | Status: SHIPPED | OUTPATIENT
Start: 2022-09-09 | End: 2022-09-09 | Stop reason: SDUPTHER

## 2022-09-09 RX ORDER — PROMETHAZINE HYDROCHLORIDE AND CODEINE PHOSPHATE 6.25; 1 MG/5ML; MG/5ML
5 SYRUP ORAL EVERY 4 HOURS PRN
Qty: 473 ML | Refills: 0 | Status: SHIPPED | OUTPATIENT
Start: 2022-09-09

## 2022-09-09 RX ORDER — PREDNISONE 20 MG/1
40 TABLET ORAL DAILY
Qty: 10 TABLET | Refills: 0
Start: 2022-09-09

## 2022-09-09 NOTE — PROGRESS NOTES
Assessment/Plan:     Chronic Problems:  Celiac disease  Continue gluten free diet  Severe persistent allergic asthma  Much better controlled with dupixent  Iron deficiency anemia  Following with hem, stable  Visit Diagnosis:  Diagnoses and all orders for this visit:    Severe persistent allergic asthma  -     predniSONE 20 mg tablet; Take 2 tablets (40 mg total) by mouth daily    Cough  -     Discontinue: promethazine-codeine (PHENERGAN WITH CODEINE) 6 25-10 mg/5 mL syrup; Take 5 mL by mouth every 4 (four) hours as needed for cough  -     promethazine-codeine (PHENERGAN WITH CODEINE) 6 25-10 mg/5 mL syrup; Take 5 mL by mouth every 4 (four) hours as needed for cough    Celiac disease    Iron deficiency anemia, unspecified iron deficiency anemia type    Urine abnormality          Subjective:    Patient ID: Keaton Polo is a 40 y o  female  Patient presents for follow up  Patient recently had hemilaminectomy and discectomy  She continues with pain, 5/10  Continues with numbness, mostly in hands  Patient continues with constipation  Following gluten free diet  She does have a cough and trouble sleeping at night  The following portions of the patient's history were reviewed and updated as appropriate: allergies, current medications, past family history, past medical history, past social history, past surgical history and problem list     Review of Systems   Constitutional: Negative for chills and fever  HENT: Negative for ear pain and sore throat  Eyes: Negative for pain and visual disturbance  Respiratory: Negative for cough and shortness of breath  Cardiovascular: Negative for chest pain and palpitations  Gastrointestinal: Negative for abdominal pain and vomiting  Genitourinary: Negative for dysuria and hematuria  Musculoskeletal: Positive for back pain (5/10)  Negative for arthralgias  Skin: Negative for color change and rash  Neurological: Positive for numbness (hands)  Negative for seizures and syncope  Psychiatric/Behavioral: Positive for sleep disturbance  All other systems reviewed and are negative          /70   Pulse 93   Temp (!) 97 °F (36 1 °C) (Tympanic)   Ht 5' 2" (1 575 m)   Wt 73 7 kg (162 lb 6 4 oz)   SpO2 97%   BMI 29 70 kg/m²   Social History     Socioeconomic History    Marital status: /Civil Union     Spouse name: Not on file    Number of children: Not on file    Years of education: Not on file    Highest education level: Not on file   Occupational History    Not on file   Tobacco Use    Smoking status: Never Smoker    Smokeless tobacco: Never Used   Vaping Use    Vaping Use: Never used   Substance and Sexual Activity    Alcohol use: No    Drug use: No    Sexual activity: Yes     Partners: Male   Other Topics Concern    Not on file   Social History Narrative    Engaged to be     Always uses seatbelts     Social Determinants of Health     Financial Resource Strain: Not on file   Food Insecurity: Not on file   Transportation Needs: Not on file   Physical Activity: Not on file   Stress: Not on file   Social Connections: Not on file   Intimate Partner Violence: Not on file   Housing Stability: Not on file     Past Medical History:   Diagnosis Date    Asthma     not as adult    Celiac disease     History of one miscarriage     6/2017 10 weeks    Renal cyst     Varicella      Family History   Adopted: Yes   Problem Relation Age of Onset    Crohn's disease Mother     Hypertension Mother     Alcohol abuse Father     Drug abuse Father     Asthma Sister     Cancer Maternal Grandmother         lung    Arthritis Maternal Grandmother     Diabetes Maternal Grandfather     Hearing loss Maternal Grandfather     Heart disease Maternal Grandfather     No Known Problems Daughter     No Known Problems Daughter     No Known Problems Daughter     No Known Problems Son      Past Surgical History:   Procedure Laterality Date     SECTION      2004 son,    daughter,    daughter   [facility] NECK SURGERY      C5-7, disc repair     ME  DELIVERY ONLY N/A 2018    Procedure:  SECTION () REPEAT;  Surgeon: Miriam Benedr DO;  Location: [facility] ;  Service: Obstetrics       Current Outpatient Medications:     albuterol (2 5 mg/3 mL) 0 083 % nebulizer solution, Take 3 mL (2 5 mg total) by nebulization every 6 (six) hours as needed for wheezing or shortness of breath, Disp: 180 mL, Rfl: 5    albuterol (PROVENTIL HFA,VENTOLIN HFA) 90 mcg/act inhaler, Inhale 2 puffs every 6 (six) hours as needed for wheezing, Disp: 18 g, Rfl: 3    diazepam (VALIUM) 5 mg tablet, Take 5 mg by mouth every 8 (eight) hours as needed, Disp: , Rfl:     dupilumab (DUPIXENT) subcutaneous injection, Inject 2 mL (300 mg total) under the skin every 14 (fourteen) days, Disp: 2 mL, Rfl: 26    Dupixent 300 MG/2ML SOPN, INJECT 2 PENS UNDER THE SKIN ON DAY 1, THEN INJECT 1 PEN ON DAY 15, THEN 1 PEN EVERY OTHER WEEK THEREAFTER, Disp: 8 mL, Rfl: 4    EPINEPHrine (EPIPEN) 0 3 mg/0 3 mL SOAJ, Inject 0 3 mL (0 3 mg total) into a muscle once for 1 dose, Disp: 0 6 mL, Rfl: 0    fexofenadine (ALLEGRA) 180 MG tablet, Take 180 mg by mouth daily, Disp: , Rfl:     fluticasone-vilanterol (Breo Ellipta) 200-25 MCG/INH inhaler, Inhale 1 puff daily Rinse mouth after use , Disp: 180 each, Rfl: 2    meloxicam (MOBIC) 15 mg tablet, TAKE 1 TABLET BY MOUTH EVERY DAY FOR 30 DAYS, Disp: , Rfl:     methocarbamol (Robaxin-750) 750 mg tablet, Take 1 tablet (750 mg total) by mouth every 6 (six) hours as needed for muscle spasms, Disp: 90 tablet, Rfl: 0    montelukast (SINGULAIR) 10 mg tablet, TAKE 1 TABLET BY MOUTH DAILY AT BEDTIME, Disp: 90 tablet, Rfl: 1    naloxone (NARCAN) 4 mg/0 1 mL nasal spray, ADMINISTER 1 SPRAY INTO ONE NOSTRIL  CALL 911   REPEAT AFTER 2-3 MIN IF NO OR MINIMAL RESPONSE, Disp: , Rfl:     predniSONE 20 mg tablet, Take 2 tablets (40 mg total) by mouth daily, Disp: 10 tablet, Rfl: 0    pregabalin (LYRICA) 100 mg capsule, , Disp: , Rfl:     promethazine-codeine (PHENERGAN WITH CODEINE) 6 25-10 mg/5 mL syrup, Take 5 mL by mouth every 4 (four) hours as needed for cough, Disp: 473 mL, Rfl: 0    Silace 150 MG/15ML syrup, TAKE 5 ML (50 MG TOTAL) BY MOUTH DAILY, Disp: 100 mL, Rfl: 0    Allergies   Allergen Reactions    Other Vomiting and Abdominal Pain     Gluten     Fluticasone-Salmeterol Palpitations     "Didn't like the way it made me feel"    Iron Itching     Gold label only          Lab Review   Lab Requisition on 08/26/2022   Component Date Value    Case Report 08/26/2022                      Value:Surgical Pathology Report                         Case: [id number]                                   Authorizing Provider:  Leisa Cuellar MD      Collected:           08/26/2022 1000              Ordering Location:     [address] [place] [address]      Received:            08/26/2022 [facility]                                                                                  Laboratory                                                                   Pathologist:           Tomasa Edward MD                                                       Specimen:    Intervertebral Disc, left L5-S disc                                                        Final Diagnosis 08/26/2022                      Value: This result contains rich text formatting which cannot be displayed here   Note 08/26/2022                      Value: This result contains rich text formatting which cannot be displayed here   Additional Information 08/26/2022                      Value: This result contains rich text formatting which cannot be displayed here  Maria Luz Jessica Description 08/26/2022                      Value: This result contains rich text formatting which cannot be displayed here     Hospital Outpatient Visit on 07/14/2022 Component Date Value    EXT Preg Test, Ur 07/14/2022 Negative     Control 07/14/2022 Valid     Case Report 07/14/2022                      Value:Surgical Pathology Report                         Case: [id number]                                   Authorizing Provider:  Lilibeth Cuenca MD   Collected:           07/14/2022 8926              Ordering Location:      [facility]       Received:            07/14/2022 [facility]                                                             Pathologist:           Deanna Limon MD                                                    Specimen:    Large Intestine, Left/Descending Colon, descending                                         Final Diagnosis 07/14/2022                      Value: This result contains rich text formatting which cannot be displayed here   Additional Information 07/14/2022                      Value: This result contains rich text formatting which cannot be displayed here   Synoptic Checklist 07/14/2022                      Value:                            COLON/RECTUM POLYP FORM - GI - All Specimens                                                                                     :    Other      Gross Description 07/14/2022                      Value: This result contains rich text formatting which cannot be displayed here   Clinical Information 07/14/2022                      Value:Cold bx polypectomy        Imaging: No results found  Objective:     Physical Exam  Constitutional:       Appearance: She is well-developed  Cardiovascular:      Rate and Rhythm: Normal rate and regular rhythm  Heart sounds: Normal heart sounds  No murmur heard  Pulmonary:      Effort: Pulmonary effort is normal  No respiratory distress  Breath sounds: Normal breath sounds  Skin:     General: Skin is warm and dry     Neurological:      Mental Status: She is alert and oriented to person, place, and time  Psychiatric:         Mood and Affect: Mood normal            There are no Patient Instructions on file for this visit  LENI Campuzano    Portions of the record may have been created with voice recognition software  Occasional wrong word or "sound a like" substitutions may have occurred due to the inherent limitations of voice recognition software  Read the chart carefully and recognize, using context, where substitutions have occurred

## 2022-09-11 LAB — BACTERIA UR CULT: NORMAL

## 2022-09-13 ENCOUNTER — OFFICE VISIT (OUTPATIENT)
Dept: OBGYN CLINIC | Facility: CLINIC | Age: 42
End: 2022-09-13
Payer: COMMERCIAL

## 2022-09-13 VITALS
WEIGHT: 166 LBS | DIASTOLIC BLOOD PRESSURE: 74 MMHG | BODY MASS INDEX: 30.55 KG/M2 | HEIGHT: 62 IN | SYSTOLIC BLOOD PRESSURE: 126 MMHG

## 2022-09-13 DIAGNOSIS — N64.3 GALACTORRHEA: ICD-10-CM

## 2022-09-13 DIAGNOSIS — Z12.31 ENCOUNTER FOR SCREENING MAMMOGRAM FOR MALIGNANT NEOPLASM OF BREAST: ICD-10-CM

## 2022-09-13 DIAGNOSIS — Z01.419 ENCOUNTER FOR GYNECOLOGICAL EXAMINATION WITHOUT ABNORMAL FINDING: Primary | ICD-10-CM

## 2022-09-13 DIAGNOSIS — Z30.09 ENCOUNTER FOR OTHER GENERAL COUNSELING AND ADVICE ON CONTRACEPTION: ICD-10-CM

## 2022-09-13 DIAGNOSIS — Z11.3 SCREENING FOR STDS (SEXUALLY TRANSMITTED DISEASES): ICD-10-CM

## 2022-09-13 PROCEDURE — 0503F POSTPARTUM CARE VISIT: CPT | Performed by: OBSTETRICS & GYNECOLOGY

## 2022-09-13 PROCEDURE — G0145 SCR C/V CYTO,THINLAYER,RESCR: HCPCS | Performed by: OBSTETRICS & GYNECOLOGY

## 2022-09-13 PROCEDURE — G0476 HPV COMBO ASSAY CA SCREEN: HCPCS | Performed by: OBSTETRICS & GYNECOLOGY

## 2022-09-13 PROCEDURE — 99386 PREV VISIT NEW AGE 40-64: CPT | Performed by: OBSTETRICS & GYNECOLOGY

## 2022-09-13 PROCEDURE — 87591 N.GONORRHOEAE DNA AMP PROB: CPT | Performed by: OBSTETRICS & GYNECOLOGY

## 2022-09-13 PROCEDURE — 87491 CHLMYD TRACH DNA AMP PROBE: CPT | Performed by: OBSTETRICS & GYNECOLOGY

## 2022-09-13 NOTE — PROGRESS NOTES
[id number] all C- Section   LMP:   PMB:  SA: Not currently   HPV: Not on file   Birth control: NONE   Last pap: 01/18/2018  Negative HPV negative   Last mammo: 01/03/2022: Negative  Last colonoscopy: 07/14/2022 repeat in 5 years   Last Dexa: Not on file  Family History:  MGM - Lung cancer   Maternal Aunt recent passed away from cancer   Father   Lung cancer

## 2022-09-14 LAB
HPV HR 12 DNA CVX QL NAA+PROBE: NEGATIVE
HPV16 DNA CVX QL NAA+PROBE: NEGATIVE
HPV18 DNA CVX QL NAA+PROBE: NEGATIVE

## 2022-09-15 LAB
C TRACH DNA SPEC QL NAA+PROBE: NEGATIVE
N GONORRHOEA DNA SPEC QL NAA+PROBE: NEGATIVE

## 2022-09-16 LAB
LAB AP GYN PRIMARY INTERPRETATION: NORMAL
Lab: NORMAL

## 2022-09-19 ENCOUNTER — HOSPITAL ENCOUNTER (OUTPATIENT)
Dept: INFUSION CENTER | Facility: CLINIC | Age: 42
Discharge: HOME | End: 2022-09-19
Payer: COMMERCIAL

## 2022-09-19 VITALS
TEMPERATURE: 97.2 F | HEART RATE: 70 BPM | RESPIRATION RATE: 18 BRPM | SYSTOLIC BLOOD PRESSURE: 116 MMHG | DIASTOLIC BLOOD PRESSURE: 61 MMHG

## 2022-09-19 DIAGNOSIS — K90.0 CELIAC DISEASE: Primary | ICD-10-CM

## 2022-09-19 DIAGNOSIS — D50.9 IRON DEFICIENCY ANEMIA, UNSPECIFIED IRON DEFICIENCY ANEMIA TYPE: ICD-10-CM

## 2022-09-19 PROCEDURE — 96374 THER/PROPH/DIAG INJ IV PUSH: CPT

## 2022-09-19 RX ORDER — SODIUM CHLORIDE 9 MG/ML
20 INJECTION, SOLUTION INTRAVENOUS ONCE
Status: CANCELLED | OUTPATIENT
Start: 2022-10-17

## 2022-09-19 RX ORDER — SODIUM CHLORIDE 9 MG/ML
20 INJECTION, SOLUTION INTRAVENOUS ONCE
Status: COMPLETED | OUTPATIENT
Start: 2022-09-19 | End: 2022-09-19

## 2022-09-19 RX ADMIN — SODIUM CHLORIDE 20 ML/HR: 9 INJECTION, SOLUTION INTRAVENOUS at 15:03

## 2022-09-19 NOTE — PLAN OF CARE
Problem: HEMATOLOGIC - ADULT  Goal: Maintains hematologic stability  Description: INTERVENTIONS  - Monitor labs  - Administer Venofer as ordered  Outcome: Progressing

## 2022-09-19 NOTE — PROGRESS NOTES
IV Venofer tolerated well without complications  No complaints offered  AVS declined  Left unit in stable condition

## 2022-10-05 ENCOUNTER — TELEPHONE (OUTPATIENT)
Dept: HEMATOLOGY ONCOLOGY | Facility: CLINIC | Age: 42
End: 2022-10-05

## 2022-10-05 NOTE — TELEPHONE ENCOUNTER
Patient calling in indicating she missed a call from our office  After looking in the patient's chart, there was no notation in the chart of who called the patient  Patient states her voicemail is full and the best way to contact her is through patient messaging on [employer] if unable to leave a voicemail

## 2022-10-07 ENCOUNTER — PROCEDURE VISIT (OUTPATIENT)
Dept: OBGYN CLINIC | Facility: CLINIC | Age: 42
End: 2022-10-07
Payer: COMMERCIAL

## 2022-10-07 VITALS
BODY MASS INDEX: 30.73 KG/M2 | DIASTOLIC BLOOD PRESSURE: 80 MMHG | SYSTOLIC BLOOD PRESSURE: 124 MMHG | HEIGHT: 62 IN | WEIGHT: 167 LBS

## 2022-10-07 DIAGNOSIS — Z30.430 ENCOUNTER FOR INSERTION OF MIRENA IUD: Primary | ICD-10-CM

## 2022-10-07 DIAGNOSIS — Z30.430 ENCOUNTER FOR IUD INSERTION: ICD-10-CM

## 2022-10-07 PROCEDURE — 58300 INSERT INTRAUTERINE DEVICE: CPT | Performed by: OBSTETRICS & GYNECOLOGY

## 2022-10-07 NOTE — PROGRESS NOTES
Iud insertions    Date/Time: 10/7/2022 9:20 AM  Performed by: LENI Hays  Authorized by: LENI Hays   Universal Protocol:  Procedure performed by:  Consent: Verbal consent obtained  Written consent not obtained  Risks and benefits: risks, benefits and alternatives were discussed  Consent given by: patient  Time out: Immediately prior to procedure a "time out" was called to verify the correct patient, procedure, equipment, support staff and site/side marked as required  Patient understanding: patient states understanding of the procedure being performed  Patient consent: the patient's understanding of the procedure matches consent given  Procedure consent: procedure consent matches procedure scheduled  Relevant documents: relevant documents present and verified  Test results: test results not available  Site marked: the operative site was not marked  Radiology Images displayed and confirmed  If images not available, report reviewed: imaging studies not available  Patient identity confirmed: verbally with patient        Procedure:     Pelvic exam performed: yes      Negative GC/chlamydia test: yes      Negative urine pregnancy test: yes      Negative serum pregnancy test: no      Cervix cleaned and prepped: yes      Speculum placed in vagina: yes      Tenaculum applied to cervix: yes      Uterus sounded: yes      Uterus sound depth (cm):  7 5    IUD inserted with no complications: yes      IUD type:  Mirena    Strings trimmed: yes    Post-procedure:     Patient tolerated procedure well: yes      Patient will follow up after next period: yes    Comments:      Procedure explained to patient, benefits, risks, side effects reviewed    Consent obtained  IUD placed without difficulty  Hemostasis appreciated post insertion  Patient advised nothing in the vagina for 1 week, advised to monitor for signs of infection such as abnormal discharge or odor, fevers or chills or pelvic pain  Return to the office for string check in 6-8 weeks

## 2022-10-11 ENCOUNTER — OFFICE VISIT (OUTPATIENT)
Dept: FAMILY MEDICINE CLINIC | Facility: CLINIC | Age: 42
End: 2022-10-11
Payer: COMMERCIAL

## 2022-10-11 VITALS
HEIGHT: 62 IN | WEIGHT: 162 LBS | TEMPERATURE: 97.8 F | HEART RATE: 103 BPM | OXYGEN SATURATION: 97 % | SYSTOLIC BLOOD PRESSURE: 122 MMHG | DIASTOLIC BLOOD PRESSURE: 74 MMHG | BODY MASS INDEX: 29.81 KG/M2

## 2022-10-11 DIAGNOSIS — J02.9 SORE THROAT: ICD-10-CM

## 2022-10-11 DIAGNOSIS — H65.112 ACUTE MUCOID OTITIS MEDIA OF LEFT EAR: Primary | ICD-10-CM

## 2022-10-11 LAB — S PYO AG THROAT QL: NEGATIVE

## 2022-10-11 PROCEDURE — 87070 CULTURE OTHR SPECIMN AEROBIC: CPT

## 2022-10-11 PROCEDURE — 87880 STREP A ASSAY W/OPTIC: CPT

## 2022-10-11 PROCEDURE — 99214 OFFICE O/P EST MOD 30 MIN: CPT

## 2022-10-11 RX ORDER — CIPROFLOXACIN AND DEXAMETHASONE 3; 1 MG/ML; MG/ML
4 SUSPENSION/ DROPS AURICULAR (OTIC) 2 TIMES DAILY
Qty: 7.5 ML | Refills: 0 | Status: SHIPPED | OUTPATIENT
Start: 2022-10-11

## 2022-10-11 RX ORDER — AMOXICILLIN AND CLAVULANATE POTASSIUM 875; 125 MG/1; MG/1
1 TABLET, FILM COATED ORAL EVERY 12 HOURS SCHEDULED
Qty: 20 TABLET | Refills: 0 | Status: SHIPPED | OUTPATIENT
Start: 2022-10-11 | End: 2022-10-21

## 2022-10-11 RX ORDER — AZELASTINE 1 MG/ML
1 SPRAY, METERED NASAL 2 TIMES DAILY
Qty: 1 ML | Refills: 3 | Status: SHIPPED | OUTPATIENT
Start: 2022-10-11

## 2022-10-11 NOTE — PROGRESS NOTES
Assessment/Plan:         Problem List Items Addressed This Visit    None     Visit Diagnoses     Acute mucoid otitis media of left ear    -  Primary    Augmentin twice daily for 10 days, flonase daily ciprodex twice daily  Relevant Medications    amoxicillin-clavulanate (Augmentin) 875-125 mg per tablet    azelastine (ASTELIN) 0 1 % nasal spray    ciprofloxacin-dexamethasone (CIPRODEX) otic suspension            Subjective:      Patient ID: Rhea Momin is a 40 y o  female  Sharri Shaw started with congestion on   She had to take her inhaler  Now her left ear is hurting  The following portions of the patient's history were reviewed and updated as appropriate:   Past Medical History:  She has a past medical history of Asthma, Celiac disease, History of one miscarriage, Renal cyst, and Varicella  ,  _______________________________________________________________________  Medical Problems:  does not have any pertinent problems on file ,  _______________________________________________________________________  Past Surgical History:   has a past surgical history that includes  section; pr  delivery only (N/A, 2018); Neck surgery; and Back surgery (2022)  ,  _______________________________________________________________________  Family History:  family history includes Alcohol abuse in her father; Arthritis in her maternal grandmother; Asthma in her sister; Cancer in her maternal grandmother; Crohn's disease in her mother; Diabetes in her maternal grandfather; Drug abuse in her father; Hearing loss in her maternal grandfather; Heart disease in her maternal grandfather; Hypertension in her mother; No Known Problems in her daughter, daughter, daughter, and son  She was adopted  ,  _______________________________________________________________________  Social History:   reports that she has never smoked   She has never used smokeless tobacco  She reports that she does not drink alcohol and does not use drugs  ,  _______________________________________________________________________  Allergies:  is allergic to other, fluticasone-salmeterol, and iron     _______________________________________________________________________  Current Outpatient Medications   Medication Sig Dispense Refill   • amoxicillin-clavulanate (Augmentin) 875-125 mg per tablet Take 1 tablet by mouth every 12 (twelve) hours for 10 days 20 tablet 0   • azelastine (ASTELIN) 0 1 % nasal spray 1 spray into each nostril 2 (two) times a day Use in each nostril as directed 1 mL 3   • ciprofloxacin-dexamethasone (CIPRODEX) otic suspension Administer 4 drops into the left ear 2 (two) times a day 7 5 mL 0   • albuterol (2 5 mg/3 mL) 0 083 % nebulizer solution Take 3 mL (2 5 mg total) by nebulization every 6 (six) hours as needed for wheezing or shortness of breath 180 mL 5   • albuterol (PROVENTIL HFA,VENTOLIN HFA) 90 mcg/act inhaler Inhale 2 puffs every 6 (six) hours as needed for wheezing 18 g 3   • diazepam (VALIUM) 5 mg tablet Take 5 mg by mouth every 8 (eight) hours as needed     • dupilumab (DUPIXENT) subcutaneous injection Inject 2 mL (300 mg total) under the skin every 14 (fourteen) days 2 mL 26   • Dupixent 300 MG/2ML SOPN INJECT 2 PENS UNDER THE SKIN ON DAY 1, THEN INJECT 1 PEN ON DAY 15, THEN 1 PEN EVERY OTHER WEEK THEREAFTER 8 mL 4   • EPINEPHrine (EPIPEN) 0 3 mg/0 3 mL SOAJ Inject 0 3 mL (0 3 mg total) into a muscle once for 1 dose 0 6 mL 0   • fexofenadine (ALLEGRA) 180 MG tablet Take 180 mg by mouth daily     • fluticasone-vilanterol (Breo Ellipta) 200-25 MCG/INH inhaler Inhale 1 puff daily Rinse mouth after use   180 each 2   • meloxicam (MOBIC) 15 mg tablet TAKE 1 TABLET BY MOUTH EVERY DAY FOR 30 DAYS     • methocarbamol (Robaxin-750) 750 mg tablet Take 1 tablet (750 mg total) by mouth every 6 (six) hours as needed for muscle spasms 90 tablet 0   • montelukast (SINGULAIR) 10 mg tablet TAKE 1 TABLET BY MOUTH DAILY AT BEDTIME 90 tablet 1   • naloxone (NARCAN) 4 mg/0 1 mL nasal spray ADMINISTER 1 SPRAY INTO ONE NOSTRIL  CALL 911  REPEAT AFTER 2-3 MIN IF NO OR MINIMAL RESPONSE     • predniSONE 20 mg tablet Take 2 tablets (40 mg total) by mouth daily 10 tablet 0   • pregabalin (LYRICA) 100 mg capsule      • promethazine-codeine (PHENERGAN WITH CODEINE) 6 25-10 mg/5 mL syrup Take 5 mL by mouth every 4 (four) hours as needed for cough 473 mL 0   • Silace 150 MG/15ML syrup TAKE 5 ML (50 MG TOTAL) BY MOUTH DAILY 100 mL 0     No current facility-administered medications for this visit      _______________________________________________________________________  Review of Systems   Constitutional: Positive for fatigue  Negative for chills, diaphoresis and fever  HENT: Positive for congestion, ear pain, sinus pressure and sinus pain  Negative for sore throat  Eyes: Negative for pain and visual disturbance  Respiratory: Positive for chest tightness and wheezing  Negative for cough and shortness of breath  Cardiovascular: Negative for chest pain and palpitations  Gastrointestinal: Negative for abdominal pain, diarrhea, nausea and vomiting  Genitourinary: Negative for dysuria, frequency, hematuria and urgency  Musculoskeletal: Negative for arthralgias, back pain and myalgias  Skin: Negative for color change and rash  Neurological: Positive for headaches  Negative for dizziness, seizures, syncope and light-headedness  All other systems reviewed and are negative  Objective:  Vitals:    10/11/22 1102   BP: 122/74   Pulse: 103   Temp: 97 8 °F (36 6 °C)   SpO2: 97%   Weight: 73 5 kg (162 lb)   Height: 5' 2" (1 575 m)     Body mass index is 29 63 kg/m²  Physical Exam  Vitals and nursing note reviewed  Constitutional:       Appearance: Normal appearance  She is not ill-appearing  HENT:      Head: Normocephalic  Right Ear: Tympanic membrane, ear canal and external ear normal  There is no impacted cerumen  Ears:      Comments: Red TM on the left     Nose: Congestion present  Mouth/Throat:      Mouth: Mucous membranes are moist       Pharynx: Posterior oropharyngeal erythema present  Eyes:      Extraocular Movements: Extraocular movements intact  Cardiovascular:      Rate and Rhythm: Normal rate and regular rhythm  Heart sounds: Normal heart sounds  No murmur heard  Pulmonary:      Effort: Pulmonary effort is normal       Breath sounds: Normal breath sounds  No wheezing  Abdominal:      Palpations: Abdomen is soft  Tenderness: There is no abdominal tenderness  Musculoskeletal:         General: Normal range of motion  Cervical back: Normal range of motion  Right lower leg: No edema  Left lower leg: No edema  Skin:     General: Skin is warm and dry  Neurological:      General: No focal deficit present  Mental Status: She is alert     Psychiatric:         Mood and Affect: Mood normal          Behavior: Behavior normal

## 2022-10-12 ENCOUNTER — TELEPHONE (OUTPATIENT)
Dept: HEMATOLOGY ONCOLOGY | Facility: CLINIC | Age: 42
End: 2022-10-12

## 2022-10-12 NOTE — TELEPHONE ENCOUNTER
Spoke with patient in regards to schedule change due to labs not being completed in time for appointment  Appointment rescheduled, patient aware to have labs closer to rescheduled appointment

## 2022-10-14 LAB — BACTERIA THROAT CULT: NORMAL

## 2022-10-17 ENCOUNTER — HOSPITAL ENCOUNTER (OUTPATIENT)
Dept: INFUSION CENTER | Facility: CLINIC | Age: 42
Discharge: HOME | End: 2022-10-17
Payer: COMMERCIAL

## 2022-10-17 VITALS
DIASTOLIC BLOOD PRESSURE: 56 MMHG | TEMPERATURE: 97.3 F | SYSTOLIC BLOOD PRESSURE: 127 MMHG | HEART RATE: 77 BPM | RESPIRATION RATE: 14 BRPM

## 2022-10-17 DIAGNOSIS — D50.9 IRON DEFICIENCY ANEMIA, UNSPECIFIED IRON DEFICIENCY ANEMIA TYPE: ICD-10-CM

## 2022-10-17 DIAGNOSIS — K90.0 CELIAC DISEASE: Primary | ICD-10-CM

## 2022-10-17 PROCEDURE — 96374 THER/PROPH/DIAG INJ IV PUSH: CPT

## 2022-10-17 RX ORDER — SODIUM CHLORIDE 9 MG/ML
20 INJECTION, SOLUTION INTRAVENOUS ONCE
OUTPATIENT
Start: 2022-11-14

## 2022-10-17 RX ORDER — SODIUM CHLORIDE 9 MG/ML
20 INJECTION, SOLUTION INTRAVENOUS ONCE
Status: COMPLETED | OUTPATIENT
Start: 2022-10-17 | End: 2022-10-17

## 2022-10-17 RX ADMIN — SODIUM CHLORIDE 20 ML/HR: 0.9 INJECTION, SOLUTION INTRAVENOUS at 08:52

## 2022-10-20 ENCOUNTER — OFFICE VISIT (OUTPATIENT)
Dept: FAMILY MEDICINE CLINIC | Facility: CLINIC | Age: 42
End: 2022-10-20
Payer: COMMERCIAL

## 2022-10-20 VITALS
DIASTOLIC BLOOD PRESSURE: 70 MMHG | HEART RATE: 72 BPM | HEIGHT: 62 IN | TEMPERATURE: 98.4 F | WEIGHT: 163.2 LBS | OXYGEN SATURATION: 98 % | SYSTOLIC BLOOD PRESSURE: 110 MMHG | BODY MASS INDEX: 30.03 KG/M2

## 2022-10-20 DIAGNOSIS — J45.50 SEVERE PERSISTENT ALLERGIC ASTHMA: ICD-10-CM

## 2022-10-20 DIAGNOSIS — K90.0 CELIAC DISEASE: Primary | ICD-10-CM

## 2022-10-20 DIAGNOSIS — D50.9 IRON DEFICIENCY ANEMIA, UNSPECIFIED IRON DEFICIENCY ANEMIA TYPE: ICD-10-CM

## 2022-10-20 DIAGNOSIS — R73.01 IFG (IMPAIRED FASTING GLUCOSE): ICD-10-CM

## 2022-10-20 PROCEDURE — 99214 OFFICE O/P EST MOD 30 MIN: CPT | Performed by: NURSE PRACTITIONER

## 2022-10-20 NOTE — PROGRESS NOTES
Assessment/Plan:     Chronic Problems:  Celiac disease  Continue care with GI and continue gluten free diet  Severe persistent allergic asthma  Very well controlled with dupixent, breo and albuterol prn  Iron deficiency anemia  Continues care with Hematology and iron infusions  Visit Diagnosis:  Diagnoses and all orders for this visit:    Celiac disease    IFG (impaired fasting glucose)  -     HEMOGLOBIN A1C W/ EAG ESTIMATION; Future    Severe persistent allergic asthma    Iron deficiency anemia, unspecified iron deficiency anemia type          Subjective:    Patient ID: Mike Vitale is a 40 y o  female  Patient presents for routine follow up  Going back to work after her surgery next week  Had hemilaminectomy and discectomy in August  Back is ok  Neck is hurting, seems like positioning with pillow  Breathing is ok, much better with dupixent  Follows with Hematology next week for iron deficiency  Continues to receive iron infusions  The following portions of the patient's history were reviewed and updated as appropriate: allergies, current medications, past family history, past medical history, past social history, past surgical history and problem list     Review of Systems   Constitutional: Positive for fatigue  Negative for chills and fever  HENT: Positive for congestion and ear pain  Negative for rhinorrhea, sinus pressure, sinus pain and sore throat  Eyes: Negative for pain and visual disturbance  Respiratory: Negative for cough and shortness of breath  Cardiovascular: Negative for chest pain and palpitations  Gastrointestinal: Positive for diarrhea (with celiac)  Negative for abdominal pain and vomiting  Genitourinary: Negative for dysuria and hematuria  Musculoskeletal: Positive for back pain and neck pain  Negative for arthralgias  Skin: Negative for color change and rash  Neurological: Negative for seizures and syncope     All other systems reviewed and are negative          /70 (BP Location: Left arm, Patient Position: Sitting)   Pulse 72   Temp 98 4 °F (36 9 °C) (Tympanic)   Ht 5' 2" (1 575 m)   Wt 74 kg (163 lb 3 2 oz)   LMP 10/07/2022 (Exact Date)   SpO2 98%   BMI 29 85 kg/m²   Social History     Socioeconomic History   • Marital status: /Civil Union     Spouse name: Not on file   • Number of children: Not on file   • Years of education: Not on file   • Highest education level: Not on file   Occupational History   • Not on file   Tobacco Use   • Smoking status: Never Smoker   • Smokeless tobacco: Never Used   Vaping Use   • Vaping Use: Never used   Substance and Sexual Activity   • Alcohol use: No   • Drug use: No   • Sexual activity: Not Currently     Partners: Male   Other Topics Concern   • Not on file   Social History Narrative    Engaged to be     Always uses seatbelts     Social Determinants of Health     Financial Resource Strain: Not on file   Food Insecurity: Not on file   Transportation Needs: Not on file   Physical Activity: Not on file   Stress: Not on file   Social Connections: Not on file   Intimate Partner Violence: Not on file   Housing Stability: Not on file     Past Medical History:   Diagnosis Date   • Asthma     not as adult   • Celiac disease    • History of one miscarriage     2017 10 weeks   • Renal cyst    • Varicella      Family History   Adopted: Yes   Problem Relation Age of Onset   • Crohn's disease Mother    • Hypertension Mother    • Alcohol abuse Father    • Drug abuse Father    • Asthma Sister    • No Known Problems Daughter    • No Known Problems Daughter    • No Known Problems Daughter    • No Known Problems Son    • Cancer Maternal Grandmother         lung   • Arthritis Maternal Grandmother    • Diabetes Maternal Grandfather    • Hearing loss Maternal Grandfather    • Heart disease Maternal Grandfather      Past Surgical History:   Procedure Laterality Date   • BACK SURGERY  2022   •  SECTION      2004 son,    daughter,    daughter   • NECK SURGERY      C5-7, disc repair    • OR  DELIVERY ONLY N/A 2018    Procedure:  SECTION () REPEAT;  Surgeon: Katina Mei DO;  Location: [facility] ;  Service: Obstetrics       Current Outpatient Medications:   •  albuterol (2 5 mg/3 mL) 0 083 % nebulizer solution, Take 3 mL (2 5 mg total) by nebulization every 6 (six) hours as needed for wheezing or shortness of breath, Disp: 180 mL, Rfl: 5  •  albuterol (PROVENTIL HFA,VENTOLIN HFA) 90 mcg/act inhaler, Inhale 2 puffs every 6 (six) hours as needed for wheezing, Disp: 18 g, Rfl: 3  •  amoxicillin-clavulanate (Augmentin) 875-125 mg per tablet, Take 1 tablet by mouth every 12 (twelve) hours for 10 days, Disp: 20 tablet, Rfl: 0  •  azelastine (ASTELIN) 0 1 % nasal spray, 1 spray into each nostril 2 (two) times a day Use in each nostril as directed, Disp: 1 mL, Rfl: 3  •  ciprofloxacin-dexamethasone (CIPRODEX) otic suspension, Administer 4 drops into the left ear 2 (two) times a day, Disp: 7 5 mL, Rfl: 0  •  diazepam (VALIUM) 5 mg tablet, Take 5 mg by mouth every 8 (eight) hours as needed, Disp: , Rfl:   •  dupilumab (DUPIXENT) subcutaneous injection, Inject 2 mL (300 mg total) under the skin every 14 (fourteen) days, Disp: 2 mL, Rfl: 26  •  Dupixent 300 MG/2ML SOPN, INJECT 2 PENS UNDER THE SKIN ON DAY 1, THEN INJECT 1 PEN ON DAY 15, THEN 1 PEN EVERY OTHER WEEK THEREAFTER, Disp: 8 mL, Rfl: 4  •  fexofenadine (ALLEGRA) 180 MG tablet, Take 180 mg by mouth daily, Disp: , Rfl:   •  meloxicam (MOBIC) 15 mg tablet, TAKE 1 TABLET BY MOUTH EVERY DAY FOR 30 DAYS, Disp: , Rfl:   •  methocarbamol (Robaxin-750) 750 mg tablet, Take 1 tablet (750 mg total) by mouth every 6 (six) hours as needed for muscle spasms, Disp: 90 tablet, Rfl: 0  •  montelukast (SINGULAIR) 10 mg tablet, TAKE 1 TABLET BY MOUTH DAILY AT BEDTIME, Disp: 90 tablet, Rfl: 1  •  naloxone (NARCAN) 4 mg/0 1 mL nasal spray, ADMINISTER 1 SPRAY INTO ONE NOSTRIL  CALL 911   REPEAT AFTER 2-3 MIN IF NO OR MINIMAL RESPONSE, Disp: , Rfl:   •  predniSONE 20 mg tablet, Take 2 tablets (40 mg total) by mouth daily, Disp: 10 tablet, Rfl: 0  •  promethazine-codeine (PHENERGAN WITH CODEINE) 6 25-10 mg/5 mL syrup, Take 5 mL by mouth every 4 (four) hours as needed for cough, Disp: 473 mL, Rfl: 0  •  Silace 150 MG/15ML syrup, TAKE 5 ML (50 MG TOTAL) BY MOUTH DAILY, Disp: 100 mL, Rfl: 0  •  EPINEPHrine (EPIPEN) 0 3 mg/0 3 mL SOAJ, Inject 0 3 mL (0 3 mg total) into a muscle once for 1 dose, Disp: 0 6 mL, Rfl: 0  •  fluticasone-vilanterol (Breo Ellipta) 200-25 MCG/INH inhaler, Inhale 1 puff daily Rinse mouth after use , Disp: 180 each, Rfl: 2    Allergies   Allergen Reactions   • Gluten Meal - Food Allergy Abdominal Pain and GI Intolerance   • Other Vomiting and Abdominal Pain     Gluten    • Fluticasone-Salmeterol Palpitations     "Didn't like the way it made me feel"   • Iron Itching     Gold label only          Lab Review   Office Visit on 10/11/2022   Component Date Value   • Throat Culture 10/11/2022 Negative for beta-hemolytic Streptococcus    •  RAPID STREP A 10/11/2022 Negative    Office Visit on 09/13/2022   Component Date Value   • Case Report 09/13/2022                      Value:Gynecologic Cytology Report                       Case: [id number]                                  Authorizing Provider:  Jesu Vidales,   Collected:           09/13/2022 Jenny FARRAR                                                                         Ordering Location:     [facility]     Received:            09/13/2022 [facility]                                     [facility]                                                                               [place]                                                                       First Screen:          Aleta Sanders, [address] [employer] Specimen:    LIQUID-BASED PAP, SCREENING, Cervix                                                       • Primary Interpretation 09/13/2022 Negative for intraepithelial lesion or malignancy    • Specimen Adequacy 09/13/2022 Satisfactory for evaluation  Endocervical/transformation zone component present  • Additional Information 09/13/2022                      Value: This result contains rich text formatting which cannot be displayed here     • N gonorrhoeae, DNA Probe 09/13/2022 Negative    • Chlamydia trachomatis, D* 09/13/2022 Negative    • HPV Other HR 09/13/2022 Negative    • HPV16 09/13/2022 Negative    • HPV18 09/13/2022 Negative    Office Visit on 09/09/2022   Component Date Value   • Color, UA 09/09/2022 Light Yellow    • Clarity, UA 09/09/2022 Clear    • Specific Gravity, UA 09/09/2022 1 012    • pH, UA 09/09/2022 6 5    • Leukocytes, UA 09/09/2022 Negative    • Nitrite, UA 09/09/2022 Negative    • Protein, UA 09/09/2022 Negative    • Glucose, UA 09/09/2022 Negative    • Ketones, UA 09/09/2022 Negative    • Urobilinogen, UA 09/09/2022 <2 0    • Bilirubin, UA 09/09/2022 Negative    • Occult Blood, UA 09/09/2022 Small (A)   • Urine Culture 09/09/2022 <10,000 cfu/ml     • RBC, UA 09/09/2022 1-2    • WBC, UA 09/09/2022 1-2    • Epithelial Cells 09/09/2022 Occasional    • Bacteria, UA 09/09/2022 Occasional    Lab Requisition on 08/26/2022   Component Date Value   • Case Report 08/26/2022                      Value:Surgical Pathology Report                         Case: [id number]                                   Authorizing Provider:  Kyle Diaz MD      Collected:           08/26/2022 1000              Ordering Location:     [address]      Received:            08/26/2022 [facility]                                                                                  Laboratory Pathologist:           Domenico Neil MD                                                       Specimen:    Intervertebral Disc, left L5-S disc                                                       • Final Diagnosis 08/26/2022                      Value: This result contains rich text formatting which cannot be displayed here  • Note 08/26/2022                      Value: This result contains rich text formatting which cannot be displayed here  • Additional Information 08/26/2022                      Value: This result contains rich text formatting which cannot be displayed here  • Gross Description 08/26/2022                      Value: This result contains rich text formatting which cannot be displayed here  Imaging: No results found  Objective:     Physical Exam  Constitutional:       Appearance: She is well-developed  Cardiovascular:      Rate and Rhythm: Normal rate and regular rhythm  Heart sounds: Normal heart sounds  No murmur heard  Pulmonary:      Effort: Pulmonary effort is normal  No respiratory distress  Breath sounds: Normal breath sounds  Skin:     General: Skin is warm and dry  Neurological:      Mental Status: She is alert and oriented to person, place, and time  There are no Patient Instructions on file for this visit  LENI Campuzano    Portions of the record may have been created with voice recognition software  Occasional wrong word or "sound a like" substitutions may have occurred due to the inherent limitations of voice recognition software  Read the chart carefully and recognize, using context, where substitutions have occurred

## 2022-11-02 DIAGNOSIS — H65.112 ACUTE MUCOID OTITIS MEDIA OF LEFT EAR: ICD-10-CM

## 2022-11-03 RX ORDER — AZELASTINE HYDROCHLORIDE 137 UG/1
SPRAY, METERED NASAL
Qty: 30 ML | Refills: 2 | Status: SHIPPED | OUTPATIENT
Start: 2022-11-03

## 2022-11-09 NOTE — PATIENT INSTRUCTIONS
Scheduled date of EGD/colonoscopy (as of today):4/13/22  Physician performing EGD/colonoscopy: Shakeel  Location of EGD/colonoscopy:[place]  Desired bowel prep reviewed with patient:miralax/dulcolax  Instructions reviewed with patient by:Ainsley AGARWAL  Clearances:  none
on the discharge service for the patient. I have reviewed and made amendments to the documentation where necessary.

## 2022-11-14 ENCOUNTER — HOSPITAL ENCOUNTER (OUTPATIENT)
Dept: INFUSION CENTER | Facility: CLINIC | Age: 42
Discharge: HOME | End: 2022-11-14

## 2022-11-14 VITALS
DIASTOLIC BLOOD PRESSURE: 65 MMHG | HEART RATE: 70 BPM | SYSTOLIC BLOOD PRESSURE: 116 MMHG | RESPIRATION RATE: 16 BRPM | TEMPERATURE: 98.1 F

## 2022-11-14 DIAGNOSIS — D50.9 IRON DEFICIENCY ANEMIA, UNSPECIFIED IRON DEFICIENCY ANEMIA TYPE: ICD-10-CM

## 2022-11-14 DIAGNOSIS — K90.0 CELIAC DISEASE: Primary | ICD-10-CM

## 2022-11-14 RX ORDER — SODIUM CHLORIDE 9 MG/ML
20 INJECTION, SOLUTION INTRAVENOUS ONCE
OUTPATIENT
Start: 2022-12-12

## 2022-11-14 RX ORDER — SODIUM CHLORIDE 9 MG/ML
20 INJECTION, SOLUTION INTRAVENOUS ONCE
Status: COMPLETED | OUTPATIENT
Start: 2022-11-14 | End: 2022-11-14

## 2022-11-14 RX ADMIN — SODIUM CHLORIDE 20 ML/HR: 9 INJECTION, SOLUTION INTRAVENOUS at 15:10

## 2022-11-18 ENCOUNTER — APPOINTMENT (OUTPATIENT)
Dept: LAB | Facility: HOSPITAL | Age: 42
End: 2022-11-18

## 2022-11-18 DIAGNOSIS — K90.0 CELIAC DISEASE: ICD-10-CM

## 2022-11-18 DIAGNOSIS — D50.9 IRON DEFICIENCY ANEMIA, UNSPECIFIED IRON DEFICIENCY ANEMIA TYPE: ICD-10-CM

## 2022-11-18 DIAGNOSIS — N64.3 GALACTORRHEA: ICD-10-CM

## 2022-11-18 DIAGNOSIS — R73.01 IFG (IMPAIRED FASTING GLUCOSE): ICD-10-CM

## 2022-11-18 LAB
ERYTHROCYTE [DISTWIDTH] IN BLOOD BY AUTOMATED COUNT: 13.2 % (ref 11.6–15.1)
FERRITIN SERPL-MCNC: 384 NG/ML (ref 8–388)
HCT VFR BLD AUTO: 44.7 % (ref 34.8–46.1)
HGB BLD-MCNC: 14.6 G/DL (ref 11.5–15.4)
IRON SATN MFR SERPL: 57 % (ref 15–50)
IRON SERPL-MCNC: 124 UG/DL (ref 50–170)
MCH RBC QN AUTO: 30.5 PG (ref 26.8–34.3)
MCHC RBC AUTO-ENTMCNC: 32.7 G/DL (ref 31.4–37.4)
MCV RBC AUTO: 94 FL (ref 82–98)
PLATELET # BLD AUTO: 348 THOUSANDS/UL (ref 149–390)
PMV BLD AUTO: 10.8 FL (ref 8.9–12.7)
PROLACTIN SERPL-MCNC: 4.4 NG/ML
RBC # BLD AUTO: 4.78 MILLION/UL (ref 3.81–5.12)
TIBC SERPL-MCNC: 218 UG/DL (ref 250–450)
TSH SERPL DL<=0.05 MIU/L-ACNC: 0.87 UIU/ML (ref 0.45–4.5)
WBC # BLD AUTO: 6.75 THOUSAND/UL (ref 4.31–10.16)

## 2022-11-19 LAB
EST. AVERAGE GLUCOSE BLD GHB EST-MCNC: 105 MG/DL
HBA1C MFR BLD: 5.3 %

## 2022-11-21 ENCOUNTER — TELEPHONE (OUTPATIENT)
Dept: FAMILY MEDICINE CLINIC | Facility: CLINIC | Age: 42
End: 2022-11-21

## 2022-11-21 NOTE — TELEPHONE ENCOUNTER
Called pt to go over lab results and spoke with , Anjali Maldonado  He did ask if there was lab work for celiac as she had just been diagnosed with it  I told him that it was usually a separate lab work but it wasn't listed on her labs, wasn't sure if it had gotten done but would take another day or two to come back or it never had gotten done?

## 2022-11-30 ENCOUNTER — TELEPHONE (OUTPATIENT)
Dept: HEMATOLOGY ONCOLOGY | Facility: CLINIC | Age: 42
End: 2022-11-30

## 2022-11-30 NOTE — TELEPHONE ENCOUNTER
Spoke with patient, due to an emergency with a patient, Dr Diana Farah was not able to participate in a virtual visit with patient  Patient rescheduled for 12/19 at 1:40pm   Patient works third shift so she is agreeable to this time  Informed patient that today was a unique situation, which was not anticipated and profusely apologized  Patient voiced understanding

## 2022-11-30 NOTE — TELEPHONE ENCOUNTER
CALL TRANSFER   Reason for patient call? Patient had virtual and no one called  Sent message to [facility] and she took the call  Patient's primary physician? Sonido Melvin RN call was transferred to and time it was transferred? Kristin 3:18pm    Informed patient that the message will be forwarded to the team and someone will get back to them as soon as possible    Did you relay this information to the patient?  Yes

## 2022-12-03 DIAGNOSIS — H65.112 ACUTE MUCOID OTITIS MEDIA OF LEFT EAR: ICD-10-CM

## 2022-12-03 RX ORDER — AZELASTINE HYDROCHLORIDE 137 UG/1
SPRAY, METERED NASAL
Qty: 90 ML | Refills: 1 | Status: SHIPPED | OUTPATIENT
Start: 2022-12-03

## 2022-12-12 ENCOUNTER — HOSPITAL ENCOUNTER (OUTPATIENT)
Dept: INFUSION CENTER | Facility: CLINIC | Age: 42
Discharge: HOME | End: 2022-12-12

## 2022-12-12 VITALS
DIASTOLIC BLOOD PRESSURE: 58 MMHG | TEMPERATURE: 97.1 F | RESPIRATION RATE: 18 BRPM | SYSTOLIC BLOOD PRESSURE: 98 MMHG | HEART RATE: 75 BPM

## 2022-12-12 DIAGNOSIS — K90.0 CELIAC DISEASE: Primary | ICD-10-CM

## 2022-12-12 DIAGNOSIS — D50.9 IRON DEFICIENCY ANEMIA, UNSPECIFIED IRON DEFICIENCY ANEMIA TYPE: ICD-10-CM

## 2022-12-12 RX ORDER — SODIUM CHLORIDE 9 MG/ML
20 INJECTION, SOLUTION INTRAVENOUS ONCE
Status: CANCELLED | OUTPATIENT
Start: 2023-01-09

## 2022-12-12 RX ORDER — SODIUM CHLORIDE 9 MG/ML
20 INJECTION, SOLUTION INTRAVENOUS ONCE
Status: COMPLETED | OUTPATIENT
Start: 2022-12-12 | End: 2022-12-12

## 2022-12-12 RX ADMIN — SODIUM CHLORIDE 20 ML/HR: 0.9 INJECTION, SOLUTION INTRAVENOUS at 09:28

## 2022-12-14 ENCOUNTER — OFFICE VISIT (OUTPATIENT)
Dept: OBGYN CLINIC | Facility: CLINIC | Age: 42
End: 2022-12-14

## 2022-12-14 VITALS
WEIGHT: 162 LBS | SYSTOLIC BLOOD PRESSURE: 122 MMHG | DIASTOLIC BLOOD PRESSURE: 70 MMHG | BODY MASS INDEX: 29.81 KG/M2 | HEIGHT: 62 IN

## 2022-12-14 DIAGNOSIS — Z30.431 IUD CHECK UP: Primary | ICD-10-CM

## 2022-12-14 NOTE — PROGRESS NOTES
Assessment/Plan:    No problem-specific Assessment & Plan notes found for this encounter  Diagnoses and all orders for this visit:    IUD check up          Subjective:      Patient ID: Komal Guadalupe is a 40 y o  female  4year-old female presents for IUD string check IUD placed 10/7/22  Reports intermittent bleeding, cramping has diminished, happy with IUD so far  Denies pelvic pain, unusual vaginal discharge or odor  The following portions of the patient's history were reviewed and updated as appropriate: allergies, current medications, past family history, past medical history, past social history, past surgical history and problem list     Review of Systems   Constitutional: Negative for chills, fatigue and fever  Eyes: Negative for visual disturbance  Respiratory: Negative for cough and shortness of breath  Cardiovascular: Negative for chest pain  Gastrointestinal: Negative for abdominal pain  Genitourinary: Negative for vaginal bleeding and vaginal discharge  Objective:      /70 (BP Location: Left arm, Patient Position: Sitting, Cuff Size: Large)   Ht 5' 2" (1 575 m)   Wt 73 5 kg (162 lb)   LMP 11/30/2022   BMI 29 63 kg/m²          Physical Exam  Vitals and nursing note reviewed  Constitutional:       Appearance: Normal appearance  She is normal weight  HENT:      Head: Normocephalic and atraumatic  Eyes:      Conjunctiva/sclera: Conjunctivae normal    Cardiovascular:      Rate and Rhythm: Normal rate  Pulmonary:      Effort: Pulmonary effort is normal    Genitourinary:     General: Normal vulva  Exam position: Lithotomy position  Dariel stage (genital): 5  Labia:         Right: No rash, tenderness, lesion or injury  Left: No rash, tenderness, lesion or injury         Vagina: Normal       Cervix: Normal       Uterus: Normal        Adnexa: Right adnexa normal and left adnexa normal       Comments: IUD strings visualized  Musculoskeletal: General: Normal range of motion  Cervical back: Normal range of motion  Lymphadenopathy:      Lower Body: No right inguinal adenopathy  No left inguinal adenopathy  Skin:     General: Skin is warm and dry  Neurological:      Mental Status: She is alert  Psychiatric:         Mood and Affect: Mood normal          Behavior: Behavior normal          Thought Content:  Thought content normal          Judgment: Judgment normal

## 2022-12-19 ENCOUNTER — TELEMEDICINE (OUTPATIENT)
Dept: HEMATOLOGY ONCOLOGY | Facility: CLINIC | Age: 42
End: 2022-12-19

## 2022-12-19 ENCOUNTER — TELEPHONE (OUTPATIENT)
Dept: SURGICAL ONCOLOGY | Facility: CLINIC | Age: 42
End: 2022-12-19

## 2022-12-19 DIAGNOSIS — D50.9 IRON DEFICIENCY ANEMIA, UNSPECIFIED IRON DEFICIENCY ANEMIA TYPE: Primary | ICD-10-CM

## 2022-12-19 DIAGNOSIS — K90.0 CELIAC DISEASE: ICD-10-CM

## 2022-12-19 NOTE — PROGRESS NOTES
[facility] - Hematology & Medical Oncology  Outpatient Visit Encounter Note      Mary Olivera 40 y o  female 1980 [id number] Date:  12/19/2022      Telemedicine consent    Patient: Mary Olivera  Provider: Carlito Sommer MD  Provider located at [address] [place] [address]  [address] [id number]    The patient was identified by name and date of birth  Mary Olivera was informed that this is a telemedicine visit and that the visit is being conducted through the [address] [place] [address] Now platform  She agrees to proceed     My office door was closed  No one else was in the room  She acknowledged consent and understanding of privacy and security of the video platform  The patient has agreed to participate and understands they can discontinue the visit at any time  Patient is aware this is a billable service  I spent 5 minutes with the patient during this visit  HEMATOLOGICAL HISTORY        Clotting History Denies   Bleeding History Denies   Cancer History Denies   Family Cancer History Maternal grandmother with lung cancer   H/O COVID Infection Denies   H/O COVID Vaccination J&J, [employer] for booster   H/O Blood/Plt Transfusion Denies   Tobacco Use Denies   Alcohol Use Occasionally   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        SUBJECTIVE      Mary Olivera is a 40 y o  here for new consultation with me today  The patient is referred by Dileep Villeda and the reason for consultation is iron deficiency anemia      Her CBC shows normocytic anemia with reactive thrombocytosis:   7/24/2018  10/8/2018  4/19/2021  1/11/2022  2/28/2022    WBC 9 83 6 41 7 64 9 52 6 24   Hemoglobin 9 3 (L) 11 7 9 0 (L) 11 3 (L) 10 8 (L)   HCT 30 9 (L) 37 2 31 1 (L) 37 1 36 0   MCV 81 (L) 83 71 (L) 79 (L) 82   Platelet Count 869 (H) 305 484 (H) 514 (H) 442 (H)     Her iron panel shows iron deficiency:   1/11/2022 15:19 2/28/2022 15:43 Iron 27 (L) 28 (L)   Ferritin 6 (L) 5 (L)   Iron Saturation 6 (L) 7 (L)   TIBC 420 416       This Visit    Denies any f/c/n/v/cp/ap/sob/cough  No GI bleeding  Working with GI for celiac  I have reviewed the relevant past medical, surgical, social and family history  I have also reviewed allergies and medications for this patient  Review of Systems  Review of Systems   All other systems reviewed and are negative  OBJECTIVE     Physical Exam    1  no sign of distress  2  no signs of respiratory distress  3  speaking full sentences  4  alert and oriented  5  organized thought pattern  6  no coughing on the phone  7  no wheezes audible  8  voice is clear, speech not slurred      Imaging  Relevant imaging reviewed in chart    Labs  Relevant labs reviewed in chart     ASSESSMENT & PLAN      Diagnosis ICD-10-CM Associated Orders   1  Iron deficiency anemia, unspecified iron deficiency anemia type  D50 9 CBC     Iron Panel (Includes Ferritin, Iron Sat%, Iron, and TIBC)      2  Celiac disease  K90 0 CBC     Iron Panel (Includes Ferritin, Iron Sat%, Iron, and TIBC)            40 y o  Female seen in consultation for iron deficiency anemia  Discussion/Plan/Labs:  · At this time, in review of her labs, recommendation is for discontinuation of intravenous iron therapy  Since there is adequate storage and mild increase in saturation, recommendation is to stop this  She will come back with labs done before hand  All questions were answered to the patient's satisfaction during this encounter  They appreciated and thanked me for spending time with them  The patient knows the contact information for our office and know to reach out for any relevant concerns related to this encounter  For all other listed problems and medical diagnosis in his chart - they are managed by PCP and/or other specialists, which patient acknowledges          Hematology & Medical Oncology

## 2022-12-19 NOTE — TELEPHONE ENCOUNTER
Patient's voicemail box is full  Mailed AVS and lab scripts to patient with 3 month follow up appointment

## 2022-12-27 DIAGNOSIS — H65.112 ACUTE MUCOID OTITIS MEDIA OF LEFT EAR: ICD-10-CM

## 2022-12-27 RX ORDER — AZELASTINE HYDROCHLORIDE 137 UG/1
SPRAY, METERED NASAL
Qty: 90 ML | Refills: 2 | Status: SHIPPED | OUTPATIENT
Start: 2022-12-27

## 2023-01-02 DIAGNOSIS — J45.20 MILD INTERMITTENT ASTHMA WITHOUT COMPLICATION: ICD-10-CM

## 2023-01-02 RX ORDER — MONTELUKAST SODIUM 10 MG/1
TABLET ORAL
Qty: 90 TABLET | Refills: 1 | Status: SHIPPED | OUTPATIENT
Start: 2023-01-02

## 2023-01-04 DIAGNOSIS — J45.50 SEVERE PERSISTENT ALLERGIC ASTHMA: ICD-10-CM

## 2023-01-04 RX ORDER — DUPILUMAB 300 MG/2ML
INJECTION, SOLUTION SUBCUTANEOUS
Qty: 16 ML | Refills: 3 | Status: SHIPPED | OUTPATIENT
Start: 2023-01-04

## 2023-01-17 ENCOUNTER — OFFICE VISIT (OUTPATIENT)
Dept: FAMILY MEDICINE CLINIC | Facility: CLINIC | Age: 43
End: 2023-01-17

## 2023-01-17 VITALS
BODY MASS INDEX: 29.55 KG/M2 | HEIGHT: 62 IN | OXYGEN SATURATION: 97 % | WEIGHT: 160.6 LBS | DIASTOLIC BLOOD PRESSURE: 80 MMHG | SYSTOLIC BLOOD PRESSURE: 110 MMHG | TEMPERATURE: 97.5 F | HEART RATE: 74 BPM

## 2023-01-17 DIAGNOSIS — K90.0 CELIAC DISEASE: Primary | ICD-10-CM

## 2023-01-17 DIAGNOSIS — Z13.29 SCREENING FOR THYROID DISORDER: ICD-10-CM

## 2023-01-17 DIAGNOSIS — Z13.21 ENCOUNTER FOR VITAMIN DEFICIENCY SCREENING: ICD-10-CM

## 2023-01-17 DIAGNOSIS — J45.50 SEVERE PERSISTENT ALLERGIC ASTHMA: ICD-10-CM

## 2023-01-17 DIAGNOSIS — Z00.00 ANNUAL PHYSICAL EXAM: ICD-10-CM

## 2023-01-17 DIAGNOSIS — Z13.6 ENCOUNTER FOR SCREENING FOR CARDIOVASCULAR DISORDERS: ICD-10-CM

## 2023-01-17 DIAGNOSIS — Z13.1 SCREENING FOR DIABETES MELLITUS: ICD-10-CM

## 2023-01-17 NOTE — PROGRESS NOTES
[facility] [address] [facility]    NAME: Amanda Mora  AGE: 40 y o  SEX: female  : 1980     DATE: 2023     Assessment and Plan:     Problem List Items Addressed This Visit        Digestive    Celiac disease - Primary     Stable  Educated patient on healthier gluten-free options  Respiratory    Severe persistent allergic asthma     Denies recent exacerbations  Improved since starting Dupixent  To continue current medications and follow-up with pulmonology as needed  Other Visit Diagnoses     Encounter for screening for cardiovascular disorders        Relevant Orders    Lipid panel    Screening for thyroid disorder        Relevant Orders    TSH, 3rd generation with Free T4 reflex    Encounter for vitamin deficiency screening        Relevant Orders    Vitamin D 25 hydroxy    Screening for diabetes mellitus        Relevant Orders    Hemoglobin A1C    Comprehensive metabolic panel    Annual physical exam        Has mammo scheduled, up-to-date on other screenings  Blood work ordered          Immunizations and preventive care screenings were discussed with patient today  Appropriate education was printed on patient's after visit summary  Counseling:  Alcohol/drug use: discussed moderation in alcohol intake, the recommendations for healthy alcohol use, and avoidance of illicit drug use  Dental Health: discussed importance of regular tooth brushing, flossing, and dental visits  Injury prevention: discussed safety/seat belts, safety helmets, smoke detectors, carbon dioxide detectors, and smoking near bedding or upholstery  Sexual health: discussed sexually transmitted diseases, partner selection, use of condoms, avoidance of unintended pregnancy, and contraceptive alternatives  · Exercise: the importance of regular exercise/physical activity was discussed   Recommend exercise 3-5 times per week for at least 30 minutes  BMI Counseling: Body mass index is 29 37 kg/m²  The BMI is above normal  Nutrition recommendations include decreasing portion sizes, decreasing fast food intake, consuming healthier snacks, limiting drinks that contain sugar, moderation in carbohydrate intake, increasing intake of lean protein, reducing intake of saturated and trans fat and reducing intake of cholesterol  Exercise recommendations include exercising 3-5 times per week and strength training exercises  No pharmacotherapy was ordered  Rationale for BMI follow-up plan is due to patient being overweight or obese  Return in about 6 months (around 7/17/2023) for Recheck  Chief Complaint:     Chief Complaint   Patient presents with   • Physical Exam   • Medication Refill      History of Present Illness:     Adult Annual Physical   Patient here for a comprehensive physical exam  The patient reports no problems  Diet and Physical Activity  · Diet/Nutrition: poor diet  · Exercise: walking  Depression Screening  PHQ-2/9 Depression Screening         General Health  · Sleep: sleeps poorly and gets 1-3 hours of sleep on average  · Hearing: normal - bilateral   · Vision: vision problems: stigmatism, most recent eye exam >1 year ago and wears glasses  · Dental: regular dental visits, brushes teeth twice daily and flosses teeth occasionally  /GYN Health  · Patient is: premenopausal  · Last menstrual period: 2-3 months ago  · Contraceptive method: IUD placement  Review of Systems:     Review of Systems   Constitutional: Negative for activity change, appetite change and unexpected weight change  HENT: Negative for congestion, ear pain, sore throat and trouble swallowing  Eyes: Negative for photophobia and visual disturbance  Respiratory: Negative for cough, chest tightness and shortness of breath  Cardiovascular: Negative for chest pain, palpitations and leg swelling     Gastrointestinal: Negative for abdominal pain, blood in stool, diarrhea, nausea and vomiting  Endocrine: Negative for polydipsia, polyphagia and polyuria  Genitourinary: Negative for decreased urine volume, dysuria, flank pain, frequency, hematuria and urgency  Musculoskeletal: Negative for arthralgias, back pain and myalgias  Skin: Negative for color change and rash  Allergic/Immunologic: Positive for food allergies  Neurological: Negative for dizziness, syncope, weakness, light-headedness and headaches  Hematological: Negative for adenopathy  Does not bruise/bleed easily  Psychiatric/Behavioral: Positive for sleep disturbance  Negative for dysphoric mood  The patient is not nervous/anxious         Past Medical History:     Past Medical History:   Diagnosis Date   • Allergic    • Anemia    • Anxiety    • Asthma     not as adult   • Celiac disease    • History of one miscarriage     2017 10 weeks   • Renal cyst    • Varicella       Past Surgical History:     Past Surgical History:   Procedure Laterality Date   • BACK SURGERY  2022   •  SECTION      2004 son,    daughter,    daughter   • NECK SURGERY      C5-7, disc repair    • SC  DELIVERY ONLY N/A 2018    Procedure:  SECTION () REPEAT;  Surgeon: Ara Santamaria DO;  Location: [facility];  Service: Obstetrics      Social History:     Social History     Socioeconomic History   • Marital status: /Civil Union     Spouse name: None   • Number of children: None   • Years of education: None   • Highest education level: None   Occupational History   • None   Tobacco Use   • Smoking status: Never   • Smokeless tobacco: Never   Vaping Use   • Vaping Use: Never used   Substance and Sexual Activity   • Alcohol use: No   • Drug use: No   • Sexual activity: Yes     Partners: Male     Birth control/protection: Condom Male   Other Topics Concern   • None   Social History Narrative    Engaged to be     Always uses seatbelts     Social Determinants of Health     Financial Resource Strain: Not on file   Food Insecurity: Not on file   Transportation Needs: Not on file   Physical Activity: Not on file   Stress: Not on file   Social Connections: Not on file   Intimate Partner Violence: Not on file   Housing Stability: Not on file      Family History:     Family History   Adopted: Yes   Problem Relation Age of Onset   • Crohn's disease Mother    • Hypertension Mother    • Alcohol abuse Father    • Drug abuse Father    • Asthma Sister    • No Known Problems Daughter    • No Known Problems Daughter    • No Known Problems Daughter    • No Known Problems Son    • Cancer Maternal Grandmother         lung   • Arthritis Maternal Grandmother    • Diabetes Maternal Grandfather    • Hearing loss Maternal Grandfather    • Heart disease Maternal Grandfather       Current Medications:     Current Outpatient Medications   Medication Sig Dispense Refill   • albuterol (2 5 mg/3 mL) 0 083 % nebulizer solution Take 3 mL (2 5 mg total) by nebulization every 6 (six) hours as needed for wheezing or shortness of breath 180 mL 5   • albuterol (PROVENTIL HFA,VENTOLIN HFA) 90 mcg/act inhaler Inhale 2 puffs every 6 (six) hours as needed for wheezing 18 g 3   • Azelastine HCl 137 MCG/SPRAY SOLN SPRAY 1 SPRAY INTO EACH NOSTRIL 2 (TWO) TIMES A DAY USE IN EACH NOSTRIL AS DIRECTED 90 mL 2   • dupilumab (DUPIXENT) subcutaneous injection Inject 2 mL (300 mg total) under the skin every 14 (fourteen) days 2 mL 26   • fexofenadine (ALLEGRA) 180 MG tablet Take 180 mg by mouth daily     • fluticasone-vilanterol (Breo Ellipta) 200-25 MCG/INH inhaler Inhale 1 puff daily Rinse mouth after use   180 each 2   • meloxicam (MOBIC) 15 mg tablet TAKE 1 TABLET BY MOUTH EVERY DAY FOR 30 DAYS     • methocarbamol (Robaxin-750) 750 mg tablet Take 1 tablet (750 mg total) by mouth every 6 (six) hours as needed for muscle spasms 90 tablet 0   • montelukast (SINGULAIR) 10 mg tablet TAKE 1 TABLET BY MOUTH EVERYDAY AT BEDTIME 90 tablet 1   • naloxone (NARCAN) 4 mg/0 1 mL nasal spray ADMINISTER 1 SPRAY INTO ONE NOSTRIL  CALL 911  REPEAT AFTER 2-3 MIN IF NO OR MINIMAL RESPONSE     • predniSONE 20 mg tablet Take 2 tablets (40 mg total) by mouth daily 10 tablet 0   • EPINEPHrine (EPIPEN) 0 3 mg/0 3 mL SOAJ Inject 0 3 mL (0 3 mg total) into a muscle once for 1 dose 0 6 mL 0     No current facility-administered medications for this visit  Allergies: Allergies   Allergen Reactions   • Gluten Meal - Food Allergy Abdominal Pain and GI Intolerance   • Other Vomiting and Abdominal Pain     Gluten    • Fluticasone-Salmeterol Palpitations     "Didn't like the way it made me feel"   • Iron Itching     Gold label only      Physical Exam:     /80 (BP Location: Left arm, Patient Position: Sitting, Cuff Size: Standard)   Pulse 74   Temp 97 5 °F (36 4 °C) (Tympanic)   Ht 5' 2" (1 575 m)   Wt 72 8 kg (160 lb 9 6 oz)   SpO2 97%   BMI 29 37 kg/m²     Physical Exam  Vitals reviewed  Constitutional:       General: She is not in acute distress  Appearance: Normal appearance  She is well-developed  She is not ill-appearing  HENT:      Head: Normocephalic and atraumatic  Right Ear: Tympanic membrane, ear canal and external ear normal       Left Ear: Tympanic membrane, ear canal and external ear normal       Nose: Nose normal       Mouth/Throat:      Mouth: Mucous membranes are moist       Pharynx: Oropharynx is clear  Eyes:      Extraocular Movements: Extraocular movements intact  Conjunctiva/sclera: Conjunctivae normal       Pupils: Pupils are equal, round, and reactive to light  Cardiovascular:      Rate and Rhythm: Normal rate and regular rhythm  Pulses: Normal pulses  Heart sounds: Normal heart sounds  No murmur heard  Pulmonary:      Effort: Pulmonary effort is normal  No respiratory distress  Breath sounds: Normal breath sounds     Abdominal:      General: Bowel sounds are normal       Palpations: Abdomen is soft  There is no mass  Tenderness: There is no abdominal tenderness  There is no right CVA tenderness or left CVA tenderness  Musculoskeletal:         General: No swelling  Normal range of motion  Cervical back: Normal range of motion and neck supple  Right lower leg: No edema  Left lower leg: No edema  Lymphadenopathy:      Cervical: No cervical adenopathy  Skin:     General: Skin is warm and dry  Capillary Refill: Capillary refill takes less than 2 seconds  Neurological:      General: No focal deficit present  Mental Status: She is alert and oriented to person, place, and time     Psychiatric:         Mood and Affect: Mood normal          Behavior: Behavior normal           Maryellen Murcia, [address] [facility]

## 2023-01-17 NOTE — ASSESSMENT & PLAN NOTE
Denies recent exacerbations  Improved since starting Dupixent  To continue current medications and follow-up with pulmonology as needed

## 2023-01-17 NOTE — PATIENT INSTRUCTIONS

## 2023-01-21 ENCOUNTER — APPOINTMENT (OUTPATIENT)
Dept: LAB | Facility: HOSPITAL | Age: 43
End: 2023-01-21

## 2023-01-21 DIAGNOSIS — Z13.1 SCREENING FOR DIABETES MELLITUS: ICD-10-CM

## 2023-01-21 DIAGNOSIS — Z13.29 SCREENING FOR THYROID DISORDER: ICD-10-CM

## 2023-01-21 DIAGNOSIS — Z13.21 ENCOUNTER FOR VITAMIN DEFICIENCY SCREENING: ICD-10-CM

## 2023-01-21 DIAGNOSIS — Z13.6 ENCOUNTER FOR SCREENING FOR CARDIOVASCULAR DISORDERS: ICD-10-CM

## 2023-01-21 LAB
25(OH)D3 SERPL-MCNC: 13.2 NG/ML (ref 30–100)
ALBUMIN SERPL BCP-MCNC: 3.6 G/DL (ref 3.5–5)
ALP SERPL-CCNC: 99 U/L (ref 46–116)
ALT SERPL W P-5'-P-CCNC: 28 U/L (ref 12–78)
ANION GAP SERPL CALCULATED.3IONS-SCNC: 10 MMOL/L (ref 4–13)
AST SERPL W P-5'-P-CCNC: 23 U/L (ref 5–45)
BILIRUB SERPL-MCNC: 0.63 MG/DL (ref 0.2–1)
BUN SERPL-MCNC: 12 MG/DL (ref 5–25)
CALCIUM SERPL-MCNC: 8.6 MG/DL (ref 8.3–10.1)
CHLORIDE SERPL-SCNC: 106 MMOL/L (ref 96–108)
CHOLEST SERPL-MCNC: 143 MG/DL
CO2 SERPL-SCNC: 24 MMOL/L (ref 21–32)
CREAT SERPL-MCNC: 0.6 MG/DL (ref 0.6–1.3)
EST. AVERAGE GLUCOSE BLD GHB EST-MCNC: 111 MG/DL
GFR SERPL CREATININE-BSD FRML MDRD: 112 ML/MIN/1.73SQ M
GLUCOSE P FAST SERPL-MCNC: 96 MG/DL (ref 65–99)
HBA1C MFR BLD: 5.5 %
HDLC SERPL-MCNC: 45 MG/DL
NONHDLC SERPL-MCNC: 98 MG/DL
POTASSIUM SERPL-SCNC: 3.8 MMOL/L (ref 3.5–5.3)
PROT SERPL-MCNC: 7 G/DL (ref 6.4–8.4)
SODIUM SERPL-SCNC: 140 MMOL/L (ref 135–147)
TRIGL SERPL-MCNC: <15 MG/DL
TSH SERPL DL<=0.05 MIU/L-ACNC: 0.92 UIU/ML (ref 0.45–4.5)

## 2023-01-23 ENCOUNTER — HOSPITAL ENCOUNTER (OUTPATIENT)
Dept: MAMMOGRAPHY | Facility: CLINIC | Age: 43
Discharge: HOME | End: 2023-01-23

## 2023-01-23 VITALS — HEIGHT: 62 IN | WEIGHT: 160 LBS | BODY MASS INDEX: 29.44 KG/M2

## 2023-01-23 DIAGNOSIS — E55.9 VITAMIN D DEFICIENCY: Primary | ICD-10-CM

## 2023-01-23 DIAGNOSIS — Z12.31 ENCOUNTER FOR SCREENING MAMMOGRAM FOR MALIGNANT NEOPLASM OF BREAST: ICD-10-CM

## 2023-01-23 RX ORDER — ERGOCALCIFEROL 1.25 MG/1
50000 CAPSULE ORAL WEEKLY
Qty: 8 CAPSULE | Refills: 0 | Status: SHIPPED | OUTPATIENT
Start: 2023-01-23 | End: 2023-01-23 | Stop reason: CLARIF

## 2023-02-02 ENCOUNTER — TELEPHONE (OUTPATIENT)
Dept: PHYSICAL THERAPY | Facility: OTHER | Age: 43
End: 2023-02-02

## 2023-02-02 NOTE — TELEPHONE ENCOUNTER
Attempted to call patient per [facility] online appointment request that was filled out  VMB was full and unable to LVM      Will wait for a call back to explain or program

## 2023-02-14 ENCOUNTER — DOCUMENTATION (OUTPATIENT)
Dept: PULMONOLOGY | Facility: CLINIC | Age: 43
End: 2023-02-14

## 2023-02-14 DIAGNOSIS — H65.112 ACUTE MUCOID OTITIS MEDIA OF LEFT EAR: ICD-10-CM

## 2023-02-14 RX ORDER — AZELASTINE HYDROCHLORIDE 137 UG/1
SPRAY, METERED NASAL
Qty: 90 ML | Refills: 3 | Status: SHIPPED | OUTPATIENT
Start: 2023-02-14

## 2023-02-14 NOTE — PROGRESS NOTES
Patient has been re approved for Dupixent     02/13/2023-02/13/2024      PA # [id number] Esa Leiva  [phone] [employer]

## 2023-02-21 DIAGNOSIS — E55.9 VITAMIN D DEFICIENCY: Primary | ICD-10-CM

## 2023-02-21 RX ORDER — ERGOCALCIFEROL 1.25 MG/1
50000 CAPSULE ORAL WEEKLY
Qty: 8 CAPSULE | Refills: 0 | Status: SHIPPED | OUTPATIENT
Start: 2023-02-21 | End: 2023-03-16

## 2023-03-08 ENCOUNTER — TELEPHONE (OUTPATIENT)
Dept: HEMATOLOGY ONCOLOGY | Facility: CLINIC | Age: 43
End: 2023-03-08

## 2023-03-08 NOTE — TELEPHONE ENCOUNTER
Advised patient of Dr Post Leaver departure from the practice as of 3/9/2023  Existing appointment on 3/22/2023 has been rescheduled to 3/30/2023 @ 8:00 AM with Meryle Bran, PA-C for transfer of care  Labs to be completed 1-2 weeks prior to office visit, lab orders on file  Patient verbalized  understanding and agreement

## 2023-03-16 DIAGNOSIS — E55.9 VITAMIN D DEFICIENCY: ICD-10-CM

## 2023-03-16 RX ORDER — ERGOCALCIFEROL 1.25 MG/1
CAPSULE ORAL
Qty: 12 CAPSULE | Refills: 1 | Status: SHIPPED | OUTPATIENT
Start: 2023-03-16

## 2023-03-17 ENCOUNTER — APPOINTMENT (OUTPATIENT)
Dept: LAB | Facility: HOSPITAL | Age: 43
End: 2023-03-17

## 2023-03-17 DIAGNOSIS — D50.9 IRON DEFICIENCY ANEMIA, UNSPECIFIED IRON DEFICIENCY ANEMIA TYPE: ICD-10-CM

## 2023-03-17 DIAGNOSIS — K90.0 CELIAC DISEASE: ICD-10-CM

## 2023-03-17 LAB
ERYTHROCYTE [DISTWIDTH] IN BLOOD BY AUTOMATED COUNT: 13.1 % (ref 11.6–15.1)
FERRITIN SERPL-MCNC: 276 NG/ML (ref 8–388)
HCT VFR BLD AUTO: 41.6 % (ref 34.8–46.1)
HGB BLD-MCNC: 13.6 G/DL (ref 11.5–15.4)
IRON SATN MFR SERPL: 42 % (ref 15–50)
IRON SERPL-MCNC: 104 UG/DL (ref 50–170)
MCH RBC QN AUTO: 29.5 PG (ref 26.8–34.3)
MCHC RBC AUTO-ENTMCNC: 32.7 G/DL (ref 31.4–37.4)
MCV RBC AUTO: 90 FL (ref 82–98)
PLATELET # BLD AUTO: 320 THOUSANDS/UL (ref 149–390)
PMV BLD AUTO: 10.5 FL (ref 8.9–12.7)
RBC # BLD AUTO: 4.61 MILLION/UL (ref 3.81–5.12)
TIBC SERPL-MCNC: 246 UG/DL (ref 250–450)
WBC # BLD AUTO: 7.7 THOUSAND/UL (ref 4.31–10.16)

## 2023-03-29 NOTE — PROGRESS NOTES
HEMATOLOGY CLINIC NOTE    Primary Care Provider: Marivel German  Referring Provider:    MRN: [id number]  : 1980    Assessment / Plan:   1  Iron deficiency anemia, unspecified iron deficiency anemia type  2  Celiac disease  This is a 69-year-old female with a history of celiac's disease causing iron deficiency anemia  She is status post multiple Venofer treatments last year with last one 2022  Her most recent labs are as follows --> 3/2023: Hemoglobin 13 6, MCV 90, ferritin 276, iron 104, TIBC 246, iron sat 42%  Patient does have some fatigue but this is likely attributed to vitamin D deficiency recently found  She is taking vitamin D supplements  Patient will have labs Q3m  F/U in 6m  Will provide IV iron as needed  She continues to maintain gluten free diet/lifestyle  - CBC and differential; Standing  - Iron Panel (Includes Ferritin, Iron Sat%, Iron, and TIBC); Standing    · Discussion of decision making    I personally reviewed the following lab results, the image studies, pathology, other specialty/physicians consult notes and recommendations, and outside medical records from [facility]  I had a lengthy discussion with the patient and shared the work-up findings  I spent 15 minutes reviewing the records (labs, clinician notes, outside records, medical history, ordering medicine/tests/procedures, interpreting the imaging/labs previously done) and coordination of care as well as direct time with the patient today, of which greater than 50% of the time was spent in counseling and coordination of care with the patient/family  · Plan/Labs  · CBC-D, iron panel Q3m  IV iron as needed  · Continue F/U with GI  · Continue gluten free diet       Follow Up: 6 months     All questions were answered to the patient's satisfaction during this encounter  The patient knows the contact information for our office and knows to reach out for any relevant concerns related to this encounter  They are to call for any temperature 100 4 or higher, new symptoms including but not restricted to shaking chills, decreased appetite, nausea, vomiting, diarrhea, increased fatigue, shortness of breath or chest pain, confusion, and not feeling the strength to come to the clinic  For all other listed problems and medical diagnosis in their chart - they are managed by PCP and/or other specialists, which the patient acknowledges  Thank you very much for your consultation and making us a part of this patient's care  We are continuing to follow closely with you  Please do not hesitate to reach out to me with any additional questions or concerns  Reason for visit:     No chief complaint on file  History of Hematology Illness:     Aura Jose is a 44 y o  female who came in for follow up/GENET  1  Iron Deficiency Anemia secondary to Celiac Disease  - anemia present since at least 7/2018    - Hgb initially was 8 4g/dL  She attempted oral iron and failed this despite being on high dose oral iron  - EGD: Patient had testing showing duodenal mucosa with intraepithelial lymphocytosis and mild villous blunting  She had positive tissue transglutaminase testing result and these findings were consistent with celiac's disease   - 2022: throughout 2022 had multiple Venofer treatments  Last treatment was 12/12/2022  - 3/2023: Hemoglobin 13 6, MCV 90, ferritin 276, iron 104, TIBC 246, iron sat 42%  Interval History:   03/29/23: This is a 52-year-old female with past medical history of asthma, ARNOL, eosinophilia, herniated disc celiac disease and more presenting for follow-up  Patient overall has some fatigue  However, this is likely related to her vitamin D deficiency  She tolerated IV iron treatments well as above  She is here to review recent lab work  She does not smoke or drink  She unloads trucks at [employer]  She has never had cancer personally    Her father had throat cancer, grandmother with lung cancer, "secondhand smoking likely  Patient is adopted and does not know in detail family history  Problem list:     Patient Active Problem List   Diagnosis   • History of 3  sections   • Severe persistent allergic asthma   • Iron deficiency anemia   • Eosinophilia   • Degenerative disc disease, cervical   • Herniated nucleus pulposus, C5-6   • Celiac disease   • Class 1 obesity due to excess calories without serious comorbidity with body mass index (BMI) of 30 0 to 30 9 in adult       REVIEW OF SYMPTOMS:   Review of Systems   Constitutional: Positive for fatigue  Negative for activity change, appetite change, chills, diaphoresis, fever and unexpected weight change  HENT: Negative for nosebleeds  Eyes: Negative for visual disturbance  Respiratory: Negative for cough and shortness of breath  Cardiovascular: Negative for chest pain, palpitations and leg swelling  Gastrointestinal: Negative for abdominal pain, anal bleeding, blood in stool, constipation, diarrhea, nausea and vomiting  Endocrine: Negative for cold intolerance  Genitourinary: Negative for hematuria, menstrual problem and vaginal bleeding  Musculoskeletal: Negative for arthralgias  Skin: Negative for color change, pallor and rash  Neurological: Negative for dizziness, syncope, light-headedness and headaches  Hematological: Negative for adenopathy  Does not bruise/bleed easily  Psychiatric/Behavioral: Negative for sleep disturbance  PHYSICAL EXAMINATION:     Vital Signs: There were no vitals taken for this visit  There is no height or weight on file to calculate BSA     Ht Readings from Last 8 Encounters:   23 5' 2\" (1 575 m)   23 5' 2\" (1 575 m)   22 5' 2\" (1 575 m)   10/20/22 5' 2\" (1 575 m)   10/11/22 5' 2\" (1 575 m)   10/07/22 5' 2\" (1 575 m)   22 5' 2\" (1 575 m)   22 5' 2\" (1 575 m)       Wt Readings from Last 8 Encounters:   23 72 6 kg (160 lb)   23 72 8 kg (160 lb 9 6 oz) " 12/14/22 73 5 kg (162 lb)   10/20/22 74 kg (163 lb 3 2 oz)   10/11/22 73 5 kg (162 lb)   10/07/22 75 8 kg (167 lb)   09/13/22 75 3 kg (166 lb)   09/09/22 73 7 kg (162 lb 6 4 oz)          Physical Exam  Constitutional:       General: She is not in acute distress  Appearance: Normal appearance  She is not ill-appearing, toxic-appearing or diaphoretic  HENT:      Head: Normocephalic and atraumatic  Eyes:      General: No scleral icterus  Extraocular Movements: Extraocular movements intact  Conjunctiva/sclera: Conjunctivae normal       Pupils: Pupils are equal, round, and reactive to light  Cardiovascular:      Rate and Rhythm: Normal rate and regular rhythm  Pulmonary:      Effort: Pulmonary effort is normal  No respiratory distress  Abdominal:      Palpations: Abdomen is soft  Tenderness: There is no abdominal tenderness  Musculoskeletal:         General: No tenderness  Normal range of motion  Cervical back: Normal range of motion and neck supple  Right lower leg: No edema  Left lower leg: No edema  Skin:     General: Skin is warm and dry  Coloration: Skin is not jaundiced or pale  Findings: No bruising, erythema, lesion or rash  Neurological:      General: No focal deficit present  Mental Status: She is alert and oriented to person, place, and time  Mental status is at baseline  Motor: No weakness  Psychiatric:         Mood and Affect: Mood normal          Behavior: Behavior normal          Thought Content: Thought content normal          Judgment: Judgment normal        Reviewed historical information        PAST MEDICAL HISTORY:  Past Medical History:   Diagnosis Date   • Allergic    • Anemia    • Anxiety    • Asthma     not as adult   • Celiac disease    • History of one miscarriage     6/2017 10 weeks   • Renal cyst    • Varicella        PAST SURGICAL HISTORY:  Past Surgical History:   Procedure Laterality Date   • BACK SURGERY  08/26/2022 •  SECTION      2004 son,    daughter,    daughter   • NECK SURGERY      C5-7, disc repair    • [place]  DELIVERY ONLY N/A 2018    Procedure:  SECTION () REPEAT;  Surgeon: Asa Lawrence DO;  Location: [facility] ;  Service: Obstetrics         CURRENT MEDICATIONS:     Current Outpatient Medications:   •  albuterol (2 5 mg/3 mL) 0 083 % nebulizer solution, Take 3 mL (2 5 mg total) by nebulization every 6 (six) hours as needed for wheezing or shortness of breath, Disp: 180 mL, Rfl: 5  •  albuterol (PROVENTIL HFA,VENTOLIN HFA) 90 mcg/act inhaler, Inhale 2 puffs every 6 (six) hours as needed for wheezing, Disp: 18 g, Rfl: 3  •  Azelastine HCl 137 MCG/SPRAY SOLN, SPRAY 1 SPRAY INTO EACH NOSTRIL 2 (TWO) TIMES A DAY USE IN EACH NOSTRIL AS DIRECTED, Disp: 90 mL, Rfl: 1  •  dupilumab (DUPIXENT) subcutaneous injection, Inject 2 mL (300 mg total) under the skin every 14 (fourteen) days, Disp: 2 mL, Rfl: 26  •  EPINEPHrine (EPIPEN) 0 3 mg/0 3 mL SOAJ, Inject 0 3 mL (0 3 mg total) into a muscle once for 1 dose, Disp: 0 6 mL, Rfl: 0  •  ergocalciferol (VITAMIN D2) 50,000 units, TAKE 1 CAPSULE BY MOUTH ONE TIME PER WEEK FOR 8 DOSES, Disp: 12 capsule, Rfl: 1  •  fexofenadine (ALLEGRA) 180 MG tablet, Take 180 mg by mouth daily, Disp: , Rfl:   •  fluticasone-vilanterol (Breo Ellipta) 200-25 MCG/INH inhaler, Inhale 1 puff daily Rinse mouth after use , Disp: 180 each, Rfl: 2  •  meloxicam (MOBIC) 15 mg tablet, TAKE 1 TABLET BY MOUTH EVERY DAY FOR 30 DAYS, Disp: , Rfl:   •  methocarbamol (Robaxin-750) 750 mg tablet, Take 1 tablet (750 mg total) by mouth every 6 (six) hours as needed for muscle spasms, Disp: 90 tablet, Rfl: 0  •  montelukast (SINGULAIR) 10 mg tablet, TAKE 1 TABLET BY MOUTH EVERYDAY AT BEDTIME, Disp: 90 tablet, Rfl: 1  •  naloxone (NARCAN) 4 mg/0 1 mL nasal spray, ADMINISTER 1 SPRAY INTO ONE NOSTRIL  CALL 911   REPEAT AFTER 2-3 MIN IF NO OR MINIMAL RESPONSE, Disp: , Rfl:   • "predniSONE 20 mg tablet, Take 2 tablets (40 mg total) by mouth daily, Disp: 10 tablet, Rfl: 0    SOCIAL HISTORY:  Social History     Tobacco Use   • Smoking status: Never   • Smokeless tobacco: Never   Vaping Use   • Vaping Use: Never used   Substance Use Topics   • Alcohol use: No   • Drug use: No       FAMILY HISTORY:  Family History   Adopted: Yes   Problem Relation Age of Onset   • Crohn's disease Mother    • Hypertension Mother    • Alcohol abuse Father    • Drug abuse Father    • Asthma Sister    • No Known Problems Daughter    • No Known Problems Daughter    • No Known Problems Daughter    • Cancer Maternal Grandmother         lung   • Arthritis Maternal Grandmother    • Diabetes Maternal Grandfather    • Hearing loss Maternal Grandfather    • Heart disease Maternal Grandfather    • No Known Problems Son        ALLERGIES:  Allergies   Allergen Reactions   • Gluten Meal - Food Allergy Abdominal Pain and GI Intolerance   • Other Vomiting and Abdominal Pain     Gluten    • Fluticasone-Salmeterol Palpitations     \"Didn't like the way it made me feel\"   • Iron Itching     Gold label only         LAB:  Lab Results   Component Value Date    WBC 7 70 03/17/2023    HGB 13 6 03/17/2023    HCT 41 6 03/17/2023    MCV 90 03/17/2023     03/17/2023     Lab Results   Component Value Date    SODIUM 140 01/21/2023    K 3 8 01/21/2023     01/21/2023    CO2 24 01/21/2023    AGAP 10 01/21/2023    BUN 12 01/21/2023    CREATININE 0 60 01/21/2023    GLUC 118 10/08/2018    GLUF 96 01/21/2023    CALCIUM 8 6 01/21/2023    AST 23 01/21/2023    ALT 28 01/21/2023    ALKPHOS 99 01/21/2023    TP 7 0 01/21/2023    TBILI 0 63 01/21/2023    EGFR 112 01/21/2023       IMAGING:  Mammo screening bilateral w 3d & cad  Narrative: DIAGNOSIS: Encounter for screening mammogram for malignant neoplasm of   breast     TECHNIQUE:  Digital screening mammography was performed  Computer Aided Detection   (CAD) analyzed all applicable images   " COMPARISONS: Prior breast imaging dated: 01/03/2022    RELEVANT HISTORY:   Family Breast Cancer History: No known family history of breast cancer  Family Medical History: No known relevant family medical history  Personal History: No known relevant hormone history  No known relevant   surgical history  No known relevant medical history  The patient is scheduled in a reminder system for screening mammography  8-10% of cancers will be missed on mammography  Management of a palpable   abnormality must be based on clinical grounds  Patients will be notified   of their results via letter from our facility  Accredited by [employer] and [facility]  RISK ASSESSMENT:   5 Year Tyrer-Cuzick: 0 59 %  10 Year Tyrer-Cuzick: 1 45 %  Lifetime Tyrer-Cuzick: 9 9 %    TISSUE DENSITY:   The breasts are heterogeneously dense, which may obscure small masses  INDICATION: Camila Coley is a 44 y o  female presenting for screening   mammography  FINDINGS:   Bilateral  There are no suspicious masses, grouped microcalcifications or areas of   unexplained architectural distortion  The skin and nipple areolar complex   are unremarkable  Impression: No mammographic evidence of malignancy  ASSESSMENT/BI-RADS CATEGORY:  Left: 2 - Benign  Right: 2 - Benign  Overall: 2 - Benign    RECOMMENDATION:       - Routine screening mammogram in 1 year for both breasts      Workstation ID: [id number]

## 2023-03-30 ENCOUNTER — OFFICE VISIT (OUTPATIENT)
Dept: HEMATOLOGY ONCOLOGY | Facility: CLINIC | Age: 43
End: 2023-03-30

## 2023-03-30 VITALS
SYSTOLIC BLOOD PRESSURE: 120 MMHG | RESPIRATION RATE: 17 BRPM | BODY MASS INDEX: 31.38 KG/M2 | TEMPERATURE: 98 F | HEIGHT: 62 IN | OXYGEN SATURATION: 96 % | WEIGHT: 170.5 LBS | DIASTOLIC BLOOD PRESSURE: 80 MMHG | HEART RATE: 81 BPM

## 2023-03-30 DIAGNOSIS — D50.9 IRON DEFICIENCY ANEMIA, UNSPECIFIED IRON DEFICIENCY ANEMIA TYPE: Primary | ICD-10-CM

## 2023-03-30 DIAGNOSIS — K90.0 CELIAC DISEASE: ICD-10-CM

## 2023-04-20 PROBLEM — E55.9 VITAMIN D DEFICIENCY: Status: ACTIVE | Noted: 2023-04-20

## 2023-04-20 PROBLEM — R63.5 WEIGHT GAIN: Status: ACTIVE | Noted: 2023-04-20

## 2023-04-28 ENCOUNTER — APPOINTMENT (OUTPATIENT)
Dept: LAB | Facility: HOSPITAL | Age: 43
End: 2023-04-28

## 2023-04-28 DIAGNOSIS — E55.9 VITAMIN D DEFICIENCY: ICD-10-CM

## 2023-04-28 LAB — 25(OH)D3 SERPL-MCNC: 54.8 NG/ML (ref 30–100)

## 2023-05-16 ENCOUNTER — TELEPHONE (OUTPATIENT)
Dept: PULMONOLOGY | Facility: CLINIC | Age: 43
End: 2023-05-16

## 2023-05-18 ENCOUNTER — OFFICE VISIT (OUTPATIENT)
Dept: FAMILY MEDICINE CLINIC | Facility: CLINIC | Age: 43
End: 2023-05-18

## 2023-05-18 VITALS
TEMPERATURE: 97.5 F | SYSTOLIC BLOOD PRESSURE: 130 MMHG | WEIGHT: 162.6 LBS | OXYGEN SATURATION: 98 % | HEART RATE: 85 BPM | BODY MASS INDEX: 29.92 KG/M2 | HEIGHT: 62 IN | DIASTOLIC BLOOD PRESSURE: 90 MMHG

## 2023-05-18 DIAGNOSIS — K59.00 CONSTIPATION, UNSPECIFIED CONSTIPATION TYPE: ICD-10-CM

## 2023-05-18 DIAGNOSIS — R14.0 ABDOMINAL DISTENSION: ICD-10-CM

## 2023-05-18 DIAGNOSIS — E66.3 OVERWEIGHT: ICD-10-CM

## 2023-05-18 DIAGNOSIS — E55.9 VITAMIN D DEFICIENCY: ICD-10-CM

## 2023-05-18 DIAGNOSIS — K62.5 ANAL BLEEDING: Primary | ICD-10-CM

## 2023-05-18 DIAGNOSIS — R63.5 WEIGHT GAIN: ICD-10-CM

## 2023-05-18 RX ORDER — ERGOCALCIFEROL 1.25 MG/1
50000 CAPSULE ORAL
Qty: 12 CAPSULE | Refills: 1 | Status: SHIPPED | OUTPATIENT
Start: 2023-05-18

## 2023-05-18 RX ORDER — PHENTERMINE HYDROCHLORIDE 15 MG/1
15 CAPSULE ORAL EVERY MORNING
Qty: 30 CAPSULE | Refills: 0 | Status: SHIPPED | OUTPATIENT
Start: 2023-05-18

## 2023-05-18 NOTE — PROGRESS NOTES
Assessment/Plan:     Chronic Problems:  Overweight  Great job with 7 pound weight loss in the last month  Continue phentermine 15 mg daily and return in 1 month for weight check  Advised to call with any issues  Weight gain  Great job with weight loss  Visit Diagnosis:  Diagnoses and all orders for this visit:    Anal bleeding  -     Ambulatory Referral to Gastroenterology; Future    Abdominal distension  -     Ambulatory Referral to Gastroenterology; Future    Weight gain  -     phentermine 15 MG capsule; Take 1 capsule (15 mg total) by mouth every morning    Vitamin D deficiency  -     ergocalciferol (VITAMIN D2) 50,000 units; Take 1 capsule (50,000 Units total) by mouth every 14 (fourteen) days    Overweight    Constipation, unspecified constipation type  Comments:  Advised Colace twice daily, MiraLAX daily until regular  Advised to increase hydration  Advised to follow-up with GI  Subjective:    Patient ID: Dieudonne Kang is a 44 y o  female  Patient presents for follow up on phentermine  She lost 7lbs in the past month  She is using a fitness kaylie  She is very constipated  Going once every 3 weeks  Concerned with anal bleeding for about 1 5 months  She is very bloated  The following portions of the patient's history were reviewed and updated as appropriate: allergies, current medications, past family history, past medical history, past social history, past surgical history and problem list     Review of Systems   Constitutional: Positive for fatigue  Negative for chills, diaphoresis and fever  Respiratory: Positive for cough and shortness of breath (asthma)  Cardiovascular: Negative for chest pain and palpitations  Gastrointestinal: Positive for abdominal distention, abdominal pain, anal bleeding, blood in stool (in bowl), constipation and nausea  Negative for diarrhea, rectal pain and vomiting  Genitourinary: Negative for dysuria, frequency, hematuria and urgency  "  Neurological: Negative for dizziness and light-headedness           /90 (BP Location: Left arm, Patient Position: Sitting, Cuff Size: Standard)   Pulse 85   Temp 97 5 °F (36 4 °C)   Ht 5' 2\" (1 575 m)   Wt 73 8 kg (162 lb 9 6 oz)   SpO2 98%   BMI 29 74 kg/m²   Social History     Socioeconomic History   • Marital status: /Civil Union     Spouse name: Not on file   • Number of children: Not on file   • Years of education: Not on file   • Highest education level: Not on file   Occupational History   • Not on file   Tobacco Use   • Smoking status: Never   • Smokeless tobacco: Never   Vaping Use   • Vaping Use: Never used   Substance and Sexual Activity   • Alcohol use: No   • Drug use: No   • Sexual activity: Yes     Partners: Male     Birth control/protection: Condom Male   Other Topics Concern   • Not on file   Social History Narrative    Engaged to be     Always uses seatbelts     Social Determinants of Health     Financial Resource Strain: Not on file   Food Insecurity: Not on file   Transportation Needs: Not on file   Physical Activity: Not on file   Stress: Not on file   Social Connections: Not on file   Intimate Partner Violence: Not on file   Housing Stability: Not on file     Past Medical History:   Diagnosis Date   • Allergic    • Anemia    • Anxiety    • Asthma     not as adult   • Celiac disease    • History of one miscarriage     6/2017 10 weeks   • Renal cyst    • Varicella      Family History   Adopted: Yes   Problem Relation Age of Onset   • Crohn's disease Mother    • Hypertension Mother    • Alcohol abuse Father    • Drug abuse Father    • Asthma Sister    • No Known Problems Daughter    • No Known Problems Daughter    • No Known Problems Daughter    • Cancer Maternal Grandmother         lung   • Arthritis Maternal Grandmother    • Diabetes Maternal Grandfather    • Hearing loss Maternal Grandfather    • Heart disease Maternal Grandfather    • No Known Problems Son      Past " Surgical History:   Procedure Laterality Date   • BACK SURGERY  2022   •  SECTION      2004 son,    daughter,    daughter   • NECK SURGERY      C5-7, disc repair    • [place]  DELIVERY ONLY N/A 2018    Procedure:  SECTION () REPEAT;  Surgeon: Fidelia Galaviz DO;  Location: [facility] ;  Service: Obstetrics       Current Outpatient Medications:   •  albuterol (2 5 mg/3 mL) 0 083 % nebulizer solution, Take 3 mL (2 5 mg total) by nebulization every 6 (six) hours as needed for wheezing or shortness of breath, Disp: 180 mL, Rfl: 5  •  albuterol (PROVENTIL HFA,VENTOLIN HFA) 90 mcg/act inhaler, Inhale 2 puffs every 6 (six) hours as needed for wheezing, Disp: 18 g, Rfl: 3  •  Azelastine HCl 137 MCG/SPRAY SOLN, SPRAY 1 SPRAY INTO EACH NOSTRIL 2 (TWO) TIMES A DAY USE IN EACH NOSTRIL AS DIRECTED, Disp: 90 mL, Rfl: 1  •  [employer] Covid-19 At Home Test Kit KIT, Use as directed, Disp: , Rfl:   •  dupilumab (DUPIXENT) subcutaneous injection, Inject 2 mL (300 mg total) under the skin every 14 (fourteen) days, Disp: 2 mL, Rfl: 26  •  ergocalciferol (VITAMIN D2) 50,000 units, Take 1 capsule (50,000 Units total) by mouth every 14 (fourteen) days, Disp: 12 capsule, Rfl: 1  •  fexofenadine (ALLEGRA) 180 MG tablet, Take 180 mg by mouth daily, Disp: , Rfl:   •  meloxicam (MOBIC) 15 mg tablet, TAKE 1 TABLET BY MOUTH EVERY DAY FOR 30 DAYS, Disp: , Rfl:   •  montelukast (SINGULAIR) 10 mg tablet, TAKE 1 TABLET BY MOUTH EVERYDAY AT BEDTIME, Disp: 90 tablet, Rfl: 1  •  naloxone (NARCAN) 4 mg/0 1 mL nasal spray, ADMINISTER 1 SPRAY INTO ONE NOSTRIL  CALL 911   REPEAT AFTER 2-3 MIN IF NO OR MINIMAL RESPONSE, Disp: , Rfl:   •  phentermine 15 MG capsule, Take 1 capsule (15 mg total) by mouth every morning, Disp: 30 capsule, Rfl: 0  •  predniSONE 20 mg tablet, Take 2 tablets (40 mg total) by mouth daily, Disp: 10 tablet, Rfl: 0  •  Sodium Fluoride 5000 PPM 1 1 % PSTE, Use as directed, Disp: , Rfl:   •  EPINEPHrine "(EPIPEN) 0 3 mg/0 3 mL SOAJ, Inject 0 3 mL (0 3 mg total) into a muscle once for 1 dose, Disp: 0 6 mL, Rfl: 0  •  fluticasone-vilanterol (Breo Ellipta) 200-25 MCG/INH inhaler, Inhale 1 puff daily Rinse mouth after use , Disp: 180 each, Rfl: 2    Allergies   Allergen Reactions   • Gluten Meal - Food Allergy Abdominal Pain and GI Intolerance   • Other Vomiting and Abdominal Pain     Gluten    • Fluticasone-Salmeterol Palpitations     \"Didn't like the way it made me feel\"   • Iron Itching     Gold label only          Lab Review   Appointment on 04/28/2023   Component Date Value   • Vit D, 25-Hydroxy 04/28/2023 54 8         Imaging: No results found  Objective:     Physical Exam  Constitutional:       Appearance: She is well-developed  Cardiovascular:      Rate and Rhythm: Normal rate and regular rhythm  Heart sounds: Normal heart sounds  No murmur heard  Pulmonary:      Effort: Pulmonary effort is normal  No respiratory distress  Breath sounds: Normal breath sounds  Skin:     General: Skin is warm and dry  Neurological:      Mental Status: She is alert and oriented to person, place, and time  Psychiatric:         Mood and Affect: Mood normal            Patient Instructions   Start on colace otc twice daily  Start Miralax daily  LENI Campuzano    Portions of the record may have been created with voice recognition software  Occasional wrong word or \"sound a like\" substitutions may have occurred due to the inherent limitations of voice recognition software  Read the chart carefully and recognize, using context, where substitutions have occurred    "

## 2023-05-18 NOTE — ASSESSMENT & PLAN NOTE
Great job with 7 pound weight loss in the last month  Continue phentermine 15 mg daily and return in 1 month for weight check  Advised to call with any issues

## 2023-05-19 DIAGNOSIS — J45.50 SEVERE PERSISTENT ASTHMA WITHOUT COMPLICATION: ICD-10-CM

## 2023-05-19 DIAGNOSIS — D72.10 EOSINOPHILIA, UNSPECIFIED TYPE: ICD-10-CM

## 2023-05-19 RX ORDER — FLUTICASONE FUROATE AND VILANTEROL 100; 25 UG/1; UG/1
1 POWDER RESPIRATORY (INHALATION) DAILY
Qty: 180 BLISTER | Refills: 3 | Status: SHIPPED | OUTPATIENT
Start: 2023-05-19 | End: 2024-05-13

## 2023-05-19 NOTE — PROGRESS NOTES
Received message from patient's PCP about her depiction  Called patient and discussed that she notes that she switched insurances in April  Her new insurance is not covering her previous prescription of [address]  The patient has noted significant worsening of her symptoms while off the Dupixent  She has been using her albuterol more frequently about 3 times in the last month which is significantly higher than she did before  She may continue the Breo 200/25  She has an emergency supply of prednisone if necessary  I have reordered the [address] and will work with our prior authorization specialist to get this initiated

## 2023-05-22 ENCOUNTER — TELEPHONE (OUTPATIENT)
Dept: OTHER | Facility: OTHER | Age: 43
End: 2023-05-22

## 2023-05-22 NOTE — TELEPHONE ENCOUNTER
[facility] specialty pharmacy called in requesting clarification on recently prescribed  Dupixent syringe  Please call them back

## 2023-05-25 ENCOUNTER — OFFICE VISIT (OUTPATIENT)
Dept: GASTROENTEROLOGY | Facility: CLINIC | Age: 43
End: 2023-05-25

## 2023-05-25 VITALS
DIASTOLIC BLOOD PRESSURE: 83 MMHG | SYSTOLIC BLOOD PRESSURE: 135 MMHG | OXYGEN SATURATION: 97 % | HEIGHT: 62 IN | HEART RATE: 84 BPM | WEIGHT: 163 LBS | BODY MASS INDEX: 30 KG/M2

## 2023-05-25 DIAGNOSIS — K90.0 CELIAC DISEASE: ICD-10-CM

## 2023-05-25 DIAGNOSIS — K62.5 ANAL BLEEDING: ICD-10-CM

## 2023-05-25 DIAGNOSIS — D50.9 IRON DEFICIENCY ANEMIA, UNSPECIFIED IRON DEFICIENCY ANEMIA TYPE: Primary | ICD-10-CM

## 2023-05-25 DIAGNOSIS — K59.04 CHRONIC IDIOPATHIC CONSTIPATION: ICD-10-CM

## 2023-05-25 NOTE — PROGRESS NOTES
[facility] - Outpatient Follow-up Note  Genny Morales 44 y o  female MRN: [id number]  Encounter: [id number]          ASSESSMENT AND PLAN:      1  Anal bleeding  Hemorrhoidal  Will monitor    2  Celiac disease  She is eating GF  She is cautious with her cosmetics and avoid cross contamination  Will repeat antibody profile    3  Iron deficiency anemia, unspecified iron deficiency anemia type  Last infusion series ended in January  She follows closely with hematology    4  Chronic idiopathic constipation  Failed fiber, colace and miralax  Will trial Linzess 72mcg daily  Advised minimum of 64oz water daily    ______________________________________________________________________    SUBJECTIVE: 30-year-old female with a history of celiac disease and iron deficiency anemia who presents for follow-up of ongoing gastrointestinal complaints  She reports that she has chronic constipation  Reports that at present she is having a small bowel movement about once a week  The last time she had a bowel movement was today but she notes that it was black and tarry like  She denies feeling empty after this bowel movement  She reports excessive bloating and abdominal distention  She is frustrated as she has gained a significant amount of weight recently but does not eat much  She was diagnosed with celiac disease last year  This was diagnosed on the basis of an antibody panel and a duodenal biopsy  She has been very strict with her gluten-free diet since that time  She investigates all of her foods as well as cosmetics  She is careful to avoid cross-contamination at home as her family cooks with separate pants  She works at [employer] and so does not have a significant gluten exposure at work  Her last series of iron infusions ended in January  She continues to follow closely with hematology and has labs checked every few weeks  She has an upcoming appointment with them next month    She is not on any at present  She recently had an episode of bright red blood per rectum  She notes that it was 3 separate episodes  Each time she reports it was a significant amount of blood in the toilet water  She had a colonoscopy in July of last year with a tubular adenoma removed  REVIEW OF SYSTEMS IS OTHERWISE NEGATIVE        Historical Information   Past Medical History:   Diagnosis Date   • Allergic    • Anemia    • Anxiety    • Asthma     not as adult   • Celiac disease    • History of one miscarriage     2017 10 weeks   • Renal cyst    • Varicella      Past Surgical History:   Procedure Laterality Date   • BACK SURGERY  2022   •  SECTION       son,    daughter,    daughter   • NECK SURGERY      C5-7, disc repair    • [place]  DELIVERY ONLY N/A 2018    Procedure:  SECTION () REPEAT;  Surgeon: Diaz Lehman DO;  Location: [facility];  Service: Obstetrics     Social History   Social History     Substance and Sexual Activity   Alcohol Use No     Social History     Substance and Sexual Activity   Drug Use No     Social History     Tobacco Use   Smoking Status Never   Smokeless Tobacco Never     Family History   Adopted: Yes   Problem Relation Age of Onset   • Crohn's disease Mother    • Hypertension Mother    • Alcohol abuse Father    • Drug abuse Father    • Asthma Sister    • No Known Problems Daughter    • No Known Problems Daughter    • No Known Problems Daughter    • Cancer Maternal Grandmother         lung   • Arthritis Maternal Grandmother    • Diabetes Maternal Grandfather    • Hearing loss Maternal Grandfather    • Heart disease Maternal Grandfather    • No Known Problems Son        Meds/Allergies       Current Outpatient Medications:   •  albuterol (2 5 mg/3 mL) 0 083 % nebulizer solution  •  albuterol (PROVENTIL HFA,VENTOLIN HFA) 90 mcg/act inhaler  •  Azelastine HCl 137 MCG/SPRAY SOLN  •  [employer] Covid-19 At Home Test Kit KIT  •  dupilumab ([place]) "subcutaneous injection  •  EPINEPHrine (EPIPEN) 0 3 mg/0 3 mL SOAJ  •  ergocalciferol (VITAMIN D2) 50,000 units  •  fexofenadine (ALLEGRA) 180 MG tablet  •  Fluticasone Furoate-Vilanterol (Breo Ellipta) 100-25 mcg/actuation inhaler  •  meloxicam (MOBIC) 15 mg tablet  •  montelukast (SINGULAIR) 10 mg tablet  •  naloxone (NARCAN) 4 mg/0 1 mL nasal spray  •  phentermine 15 MG capsule  •  predniSONE 20 mg tablet  •  Sodium Fluoride 5000 PPM 1 1 % PSTE    Allergies   Allergen Reactions   • Gluten Meal - Food Allergy Abdominal Pain and GI Intolerance   • Other Vomiting and Abdominal Pain     Gluten    • Fluticasone-Salmeterol Palpitations     \"Didn't like the way it made me feel\"   • Iron Itching     Gold label only           Objective     Blood pressure 135/83, pulse 84, height 5' 2\" (1 575 m), weight 73 9 kg (163 lb), SpO2 97 %, currently breastfeeding  Body mass index is 29 81 kg/m²  PHYSICAL EXAM:      General Appearance:   Alert, cooperative, no distress   HEENT:   Normocephalic, atraumatic, anicteric      Neck:  Supple, symmetrical, trachea midline   Lungs:   Clear to auscultation bilaterally; no rales, rhonchi or wheezing; respirations unlabored    Heart[de-identified]   Regular rate and rhythm; no murmur, rub, or gallop  Abdomen:   Soft, non-tender, non-distended; normal bowel sounds; no masses, no organomegaly    Genitalia:   Deferred    Rectal:   Deferred    Extremities:  No cyanosis, clubbing or edema    Pulses:  2+ and symmetric    Skin:  No jaundice, rashes, or lesions    Lymph nodes:  No palpable cervical lymphadenopathy        Lab Results:   No visits with results within 1 Day(s) from this visit  Latest known visit with results is:   Appointment on 04/28/2023   Component Date Value   • Vit D, 25-Hydroxy 04/28/2023 54 8          Radiology Results:   No results found    "

## 2023-06-15 ENCOUNTER — OFFICE VISIT (OUTPATIENT)
Dept: FAMILY MEDICINE CLINIC | Facility: CLINIC | Age: 43
End: 2023-06-15
Payer: COMMERCIAL

## 2023-06-15 VITALS
RESPIRATION RATE: 16 BRPM | OXYGEN SATURATION: 98 % | DIASTOLIC BLOOD PRESSURE: 88 MMHG | WEIGHT: 158.6 LBS | HEART RATE: 100 BPM | HEIGHT: 62 IN | BODY MASS INDEX: 29.19 KG/M2 | TEMPERATURE: 97.8 F | SYSTOLIC BLOOD PRESSURE: 120 MMHG

## 2023-06-15 DIAGNOSIS — R63.5 WEIGHT GAIN: Primary | ICD-10-CM

## 2023-06-15 DIAGNOSIS — K59.00 CONSTIPATION, UNSPECIFIED CONSTIPATION TYPE: ICD-10-CM

## 2023-06-15 PROBLEM — E66.3 OVERWEIGHT: Status: RESOLVED | Noted: 2022-06-16 | Resolved: 2023-06-15

## 2023-06-15 PROCEDURE — 99214 OFFICE O/P EST MOD 30 MIN: CPT | Performed by: NURSE PRACTITIONER

## 2023-06-15 RX ORDER — PHENTERMINE HYDROCHLORIDE 15 MG/1
15 CAPSULE ORAL EVERY MORNING
Qty: 30 CAPSULE | Refills: 1 | Status: SHIPPED | OUTPATIENT
Start: 2023-06-15

## 2023-06-15 NOTE — PROGRESS NOTES
"   Assessment/Plan:     Chronic Problems:  Weight gain  Great job with another 4 pound weight loss  Continue phentermine daily  Return in 1 month  Constipation  Doing much better with Linzess daily  Visit Diagnosis:  Diagnoses and all orders for this visit:    Weight gain  -     phentermine 15 MG capsule; Take 1 capsule (15 mg total) by mouth every morning    Constipation, unspecified constipation type          Subjective:    Patient ID: Cassy Solano is a 44 y o  female  Patient presents for follow up on phentermine  She lost another 4 lbs in the past month  She did see GI and started on Linzess  She is having regular bowel movements now  The following portions of the patient's history were reviewed and updated as appropriate: allergies, current medications, past family history, past medical history, past social history, past surgical history and problem list     Review of Systems   Constitutional: Negative for chills, diaphoresis and fever  HENT: Negative for ear pain and sore throat  Eyes: Negative for pain and visual disturbance  Respiratory: Negative for cough and shortness of breath  Cardiovascular: Negative for chest pain and palpitations  Gastrointestinal: Positive for diarrhea  Negative for abdominal pain, constipation, nausea and vomiting  Genitourinary: Negative for dysuria and hematuria  Musculoskeletal: Negative for arthralgias and back pain  Skin: Negative for color change and rash  Neurological: Negative for seizures and syncope  All other systems reviewed and are negative          /88   Pulse 100   Temp 97 8 °F (36 6 °C)   Resp 16   Ht 5' 2\" (1 575 m)   Wt 71 9 kg (158 lb 9 6 oz)   SpO2 98%   BMI 29 01 kg/m²   Social History     Socioeconomic History   • Marital status: /Civil Union     Spouse name: Not on file   • Number of children: Not on file   • Years of education: Not on file   • Highest education level: Not on file   Occupational " History   • Not on file   Tobacco Use   • Smoking status: Never   • Smokeless tobacco: Never   Vaping Use   • Vaping Use: Never used   Substance and Sexual Activity   • Alcohol use: No   • Drug use: No   • Sexual activity: Yes     Partners: Male     Birth control/protection: Condom Male   Other Topics Concern   • Not on file   Social History Narrative    Engaged to be     Always uses seatbelts     Social Determinants of Health     Financial Resource Strain: Not on file   Food Insecurity: Not on file   Transportation Needs: Not on file   Physical Activity: Not on file   Stress: Not on file   Social Connections: Not on file   Intimate Partner Violence: Not on file   Housing Stability: Not on file     Past Medical History:   Diagnosis Date   • Allergic    • Anemia    • Anxiety    • Asthma     not as adult   • Celiac disease    • History of one miscarriage     2017 10 weeks   • Renal cyst    • Varicella      Family History   Adopted: Yes   Problem Relation Age of Onset   • Crohn's disease Mother    • Hypertension Mother    • Alcohol abuse Father    • Drug abuse Father    • Asthma Sister    • No Known Problems Daughter    • No Known Problems Daughter    • No Known Problems Daughter    • Cancer Maternal Grandmother         lung   • Arthritis Maternal Grandmother    • Diabetes Maternal Grandfather    • Hearing loss Maternal Grandfather    • Heart disease Maternal Grandfather    • No Known Problems Son      Past Surgical History:   Procedure Laterality Date   • BACK SURGERY  2022   •  SECTION      2004 son,    daughter,   2011 daughter   • NECK SURGERY      C5-7, disc repair    • [place]  DELIVERY ONLY N/A 2018    Procedure:  SECTION () REPEAT;  Surgeon: Mando Latham DO;  Location: [facility];  Service: Obstetrics       Current Outpatient Medications:   •  albuterol (2 5 mg/3 mL) 0 083 % nebulizer solution, Take 3 mL (2 5 mg total) by nebulization every 6 (six) hours as "needed for wheezing or shortness of breath, Disp: 180 mL, Rfl: 5  •  albuterol (PROVENTIL HFA,VENTOLIN HFA) 90 mcg/act inhaler, Inhale 2 puffs every 6 (six) hours as needed for wheezing, Disp: 18 g, Rfl: 3  •  Azelastine HCl 137 MCG/SPRAY SOLN, SPRAY 1 SPRAY INTO EACH NOSTRIL 2 (TWO) TIMES A DAY USE IN EACH NOSTRIL AS DIRECTED, Disp: 90 mL, Rfl: 1  •  dupilumab (DUPIXENT) subcutaneous injection, Inject 2 mL (300 mg total) under the skin every 14 (fourteen) days, Disp: 2 mL, Rfl: 26  •  ergocalciferol (VITAMIN D2) 50,000 units, Take 1 capsule (50,000 Units total) by mouth every 14 (fourteen) days, Disp: 12 capsule, Rfl: 1  •  fexofenadine (ALLEGRA) 180 MG tablet, Take 180 mg by mouth daily, Disp: , Rfl:   •  Fluticasone Furoate-Vilanterol (Breo Ellipta) 100-25 mcg/actuation inhaler, Inhale 1 puff daily Rinse mouth after use , Disp: 180 blister, Rfl: 3  •  meloxicam (MOBIC) 15 mg tablet, TAKE 1 TABLET BY MOUTH EVERY DAY FOR 30 DAYS, Disp: , Rfl:   •  montelukast (SINGULAIR) 10 mg tablet, TAKE 1 TABLET BY MOUTH EVERYDAY AT BEDTIME, Disp: 90 tablet, Rfl: 1  •  phentermine 15 MG capsule, Take 1 capsule (15 mg total) by mouth every morning, Disp: 30 capsule, Rfl: 1  •  CVS Covid-19 At Home Test Kit KIT, Use as directed, Disp: , Rfl:   •  EPINEPHrine (EPIPEN) 0 3 mg/0 3 mL SOAJ, Inject 0 3 mL (0 3 mg total) into a muscle once for 1 dose, Disp: 0 6 mL, Rfl: 0  •  naloxone (NARCAN) 4 mg/0 1 mL nasal spray, ADMINISTER 1 SPRAY INTO ONE NOSTRIL  CALL 911   REPEAT AFTER 2-3 MIN IF NO OR MINIMAL RESPONSE, Disp: , Rfl:   •  predniSONE 20 mg tablet, Take 2 tablets (40 mg total) by mouth daily, Disp: 10 tablet, Rfl: 0  •  Sodium Fluoride 5000 PPM 1 1 % PSTE, Use as directed, Disp: , Rfl:     Allergies   Allergen Reactions   • Gluten Meal - Food Allergy Abdominal Pain and GI Intolerance   • Other Vomiting and Abdominal Pain     Gluten    • Fluticasone-Salmeterol Palpitations     \"Didn't like the way it made me feel\"   • Iron Itching " "    Gold label only          Lab Review   Appointment on 04/28/2023   Component Date Value   • Vit D, 25-Hydroxy 04/28/2023 54 8         Imaging: No results found  Objective:     Physical Exam  Constitutional:       Appearance: She is well-developed  Cardiovascular:      Rate and Rhythm: Normal rate and regular rhythm  Heart sounds: Normal heart sounds  No murmur heard  Pulmonary:      Effort: Pulmonary effort is normal  No respiratory distress  Breath sounds: Normal breath sounds  Skin:     General: Skin is warm and dry  Neurological:      Mental Status: She is alert and oriented to person, place, and time  Psychiatric:         Mood and Affect: Mood normal            There are no Patient Instructions on file for this visit  LENI Campuzano    Portions of the record may have been created with voice recognition software  Occasional wrong word or \"sound a like\" substitutions may have occurred due to the inherent limitations of voice recognition software  Read the chart carefully and recognize, using context, where substitutions have occurred    "

## 2023-06-26 ENCOUNTER — TELEPHONE (OUTPATIENT)
Age: 43
End: 2023-06-26

## 2023-06-26 DIAGNOSIS — K59.04 CHRONIC IDIOPATHIC CONSTIPATION: Primary | ICD-10-CM

## 2023-06-26 RX ORDER — LINACLOTIDE 72 UG/1
1 CAPSULE, GELATIN COATED ORAL
Qty: 30 CAPSULE | Refills: 5 | Status: SHIPPED | OUTPATIENT
Start: 2023-06-26

## 2023-06-26 NOTE — TELEPHONE ENCOUNTER
Patients GI provider:  Devonte Maldonado    Number to return call: [phone]    Reason for call: Pt's  calling because pt has finished her magnesium citrate and would like a prescription sent for the [address] [place] [address]      Scheduled procedure/appointment date if applicable: Appt 9/22/54

## 2023-06-26 NOTE — TELEPHONE ENCOUNTER
"Last OV in May with PA states  \"Will trial Linzess 72mcg daily\"    Please send script to patients pharmacy, thank you    "

## 2023-07-09 DIAGNOSIS — J45.20 MILD INTERMITTENT ASTHMA WITHOUT COMPLICATION: ICD-10-CM

## 2023-07-09 RX ORDER — MONTELUKAST SODIUM 10 MG/1
TABLET ORAL
Qty: 90 TABLET | Refills: 1 | Status: SHIPPED | OUTPATIENT
Start: 2023-07-09

## 2023-07-11 ENCOUNTER — TELEPHONE (OUTPATIENT)
Dept: PULMONOLOGY | Facility: CLINIC | Age: 43
End: 2023-07-11

## 2023-07-11 NOTE — TELEPHONE ENCOUNTER
----- Message from Silvestre Verdin MD sent at 7/11/2023  8:40 AM EDT -----  Do you mind touching base with her to close the loop.   ----- Message -----  From: Bia Walden  Sent: 7/11/2023   8:37 AM EDT  To: Meryl Anderson; Silvestre Verdin MD    I sent her a mychart message as to what to do but she never followed up with me.   ----- Message -----  From: Silvestre Verdin MD  Sent: 7/11/2023   6:91 AM EDT  To: Bia Walden; LENI Gonzalez    Any updates?  ----- Message -----  From: LENI Olivares  Sent: 6/16/2023  11:01 AM EDT  To: MD Rubin Johnson Dr. is still having issues getting her Dupixent. Are you able to have your staff reach out to her for assistance? Thank you! Have a great weekend.   Emy Cotton

## 2023-07-11 NOTE — TELEPHONE ENCOUNTER
Attempted to contact patient her voicemail is full. I am not able to leave a message.  I was calling to follow up on my message I sent in June about the co-pay assistance,

## 2023-07-14 NOTE — TELEPHONE ENCOUNTER
Patient returned call. She said that she was on vacation, but did sort out the co-pay assistance and she did get back her dupixent injections.

## 2023-07-17 ENCOUNTER — OFFICE VISIT (OUTPATIENT)
Dept: FAMILY MEDICINE CLINIC | Facility: CLINIC | Age: 43
End: 2023-07-17
Payer: COMMERCIAL

## 2023-07-17 VITALS
WEIGHT: 158 LBS | HEART RATE: 91 BPM | SYSTOLIC BLOOD PRESSURE: 122 MMHG | DIASTOLIC BLOOD PRESSURE: 74 MMHG | HEIGHT: 62 IN | TEMPERATURE: 98.2 F | OXYGEN SATURATION: 98 % | BODY MASS INDEX: 29.08 KG/M2

## 2023-07-17 DIAGNOSIS — R63.5 WEIGHT GAIN: ICD-10-CM

## 2023-07-17 DIAGNOSIS — D50.9 IRON DEFICIENCY ANEMIA, UNSPECIFIED IRON DEFICIENCY ANEMIA TYPE: Primary | ICD-10-CM

## 2023-07-17 DIAGNOSIS — J45.50 SEVERE PERSISTENT ALLERGIC ASTHMA: ICD-10-CM

## 2023-07-17 PROCEDURE — 99214 OFFICE O/P EST MOD 30 MIN: CPT | Performed by: NURSE PRACTITIONER

## 2023-07-17 RX ORDER — DUPILUMAB 300 MG/2ML
INJECTION, SOLUTION SUBCUTANEOUS
COMMUNITY
Start: 2023-06-26

## 2023-07-17 RX ORDER — PHENTERMINE HYDROCHLORIDE 30 MG/1
30 CAPSULE ORAL EVERY MORNING
Qty: 30 CAPSULE | Refills: 0 | Status: SHIPPED | OUTPATIENT
Start: 2023-07-17

## 2023-07-17 NOTE — ASSESSMENT & PLAN NOTE
Continue Dupixent every 2 weeks. Feels breathing is stable currently. She does have nebulizer and inhaler as needed. Breo daily. Continue Singulair.

## 2023-07-17 NOTE — PROGRESS NOTES
Assessment/Plan:     Chronic Problems:  Weight gain  We will increase phentermine to 30 mg daily. Advised food diary. We will follow-up in 1 month. Increase hydration. Iron deficiency anemia  Patient also with hematology. Advised to obtain current CBC and iron panel. Severe persistent allergic asthma  Continue Dupixent every 2 weeks. Feels breathing is stable currently. She does have nebulizer and inhaler as needed. Breo daily. Continue Singulair. Visit Diagnosis:  Diagnoses and all orders for this visit:    Iron deficiency anemia, unspecified iron deficiency anemia type    Weight gain  -     phentermine 30 MG capsule; Take 1 capsule (30 mg total) by mouth every morning    Severe persistent allergic asthma    Other orders  -     Dupixent 300 MG/2ML SOPN          Subjective:    Patient ID: Akhil Koo is a 44 y.o. female. Patient presents for routine follow up. She did not lose any weight in the last month. She has been on vacation for the past week. She feels very bloated, despite having BM's. She would like to increase her Linzess. She did get her Dupixent and restarted the med. The following portions of the patient's history were reviewed and updated as appropriate: allergies, current medications, past family history, past medical history, past social history, past surgical history and problem list.    Review of Systems   Constitutional: Negative for chills and fever. HENT: Negative for ear pain and sore throat. Eyes: Negative for pain and visual disturbance. Respiratory: Negative for cough and shortness of breath. Cardiovascular: Negative for chest pain and palpitations. Gastrointestinal: Positive for constipation. Negative for abdominal pain, diarrhea, nausea and vomiting. Bloating     Genitourinary: Negative for dysuria and hematuria. Musculoskeletal: Negative for arthralgias and back pain. Skin: Negative for color change and rash.    Neurological: Negative for dizziness, light-headedness and headaches. All other systems reviewed and are negative.         /74 (BP Location: Left arm, Patient Position: Sitting, Cuff Size: Standard)   Pulse 91   Temp 98.2 °F (36.8 °C)   Ht 5' 2" (1.575 m)   Wt 71.7 kg (158 lb)   SpO2 98%   BMI 28.90 kg/m²   Social History     Socioeconomic History   • Marital status: /Civil Union     Spouse name: Not on file   • Number of children: Not on file   • Years of education: Not on file   • Highest education level: Not on file   Occupational History   • Not on file   Tobacco Use   • Smoking status: Never   • Smokeless tobacco: Never   Vaping Use   • Vaping Use: Never used   Substance and Sexual Activity   • Alcohol use: No   • Drug use: No   • Sexual activity: Yes     Partners: Male     Birth control/protection: Condom Male   Other Topics Concern   • Not on file   Social History Narrative    Engaged to be     Always uses seatbelts     Social Determinants of Health     Financial Resource Strain: Not on file   Food Insecurity: Not on file   Transportation Needs: Not on file   Physical Activity: Not on file   Stress: Not on file   Social Connections: Not on file   Intimate Partner Violence: Not on file   Housing Stability: Not on file     Past Medical History:   Diagnosis Date   • Allergic    • Anemia    • Anxiety    • Asthma     not as adult   • Celiac disease    • History of one miscarriage     6/2017 10 weeks   • Renal cyst    • Varicella      Family History   Adopted: Yes   Problem Relation Age of Onset   • Crohn's disease Mother    • Hypertension Mother    • Alcohol abuse Father    • Drug abuse Father    • Asthma Sister    • No Known Problems Daughter    • No Known Problems Daughter    • No Known Problems Daughter    • Cancer Maternal Grandmother         lung   • Arthritis Maternal Grandmother    • Diabetes Maternal Grandfather    • Hearing loss Maternal Grandfather    • Heart disease Maternal Grandfather • No Known Problems Son      Past Surgical History:   Procedure Laterality Date   • BACK SURGERY  2022   •  SECTION      2004 son,   2007 daughter,   2011 daughter   • NECK SURGERY      C5-7, disc repair    • [place]  DELIVERY ONLY N/A 2018    Procedure:  SECTION () REPEAT;  Surgeon: Shannon Brooks DO;  Location: [facility] ;  Service: Obstetrics       Current Outpatient Medications:   •  albuterol (2.5 mg/3 mL) 0.083 % nebulizer solution, Take 3 mL (2.5 mg total) by nebulization every 6 (six) hours as needed for wheezing or shortness of breath, Disp: 180 mL, Rfl: 5  •  albuterol (PROVENTIL HFA,VENTOLIN HFA) 90 mcg/act inhaler, Inhale 2 puffs every 6 (six) hours as needed for wheezing, Disp: 18 g, Rfl: 3  •  Azelastine HCl 137 MCG/SPRAY SOLN, SPRAY 1 SPRAY INTO EACH NOSTRIL 2 (TWO) TIMES A DAY USE IN EACH NOSTRIL AS DIRECTED, Disp: 90 mL, Rfl: 1  •  CVS Covid-19 At Home Test Kit KIT, Use as directed, Disp: , Rfl:   •  dupilumab (DUPIXENT) subcutaneous injection, Inject 2 mL (300 mg total) under the skin every 14 (fourteen) days, Disp: 2 mL, Rfl: 26  •  EPINEPHrine (EPIPEN) 0.3 mg/0.3 mL SOAJ, Inject 0.3 mL (0.3 mg total) into a muscle once for 1 dose, Disp: 0.6 mL, Rfl: 0  •  ergocalciferol (VITAMIN D2) 50,000 units, Take 1 capsule (50,000 Units total) by mouth every 14 (fourteen) days, Disp: 12 capsule, Rfl: 1  •  fexofenadine (ALLEGRA) 180 MG tablet, Take 180 mg by mouth daily, Disp: , Rfl:   •  Fluticasone Furoate-Vilanterol (Breo Ellipta) 100-25 mcg/actuation inhaler, Inhale 1 puff daily Rinse mouth after use., Disp: 180 blister, Rfl: 3  •  linaCLOtide (Linzess) 72 MCG CAPS, Take 1 capsule by mouth daily before breakfast, Disp: 30 capsule, Rfl: 5  •  meloxicam (MOBIC) 15 mg tablet, TAKE 1 TABLET BY MOUTH EVERY DAY FOR 30 DAYS, Disp: , Rfl:   •  montelukast (SINGULAIR) 10 mg tablet, TAKE 1 TABLET BY MOUTH EVERYDAY AT BEDTIME, Disp: 90 tablet, Rfl: 1  •  naloxone (NARCAN) 4 mg/0.1 mL nasal spray, ADMINISTER 1 SPRAY INTO ONE NOSTRIL. CALL 911. REPEAT AFTER 2-3 MIN IF NO OR MINIMAL RESPONSE, Disp: , Rfl:   •  phentermine 30 MG capsule, Take 1 capsule (30 mg total) by mouth every morning, Disp: 30 capsule, Rfl: 0  •  predniSONE 20 mg tablet, Take 2 tablets (40 mg total) by mouth daily, Disp: 10 tablet, Rfl: 0  •  Sodium Fluoride 5000 PPM 1.1 % PSTE, Use as directed, Disp: , Rfl:   •  Dupixent 300 MG/2ML SOPN, , Disp: , Rfl:     Allergies   Allergen Reactions   • Gluten Meal - Food Allergy Abdominal Pain and GI Intolerance   • Other Vomiting and Abdominal Pain     Gluten    • Fluticasone-Salmeterol Palpitations     "Didn't like the way it made me feel"   • Iron Itching     Gold label only          Lab Review   No visits with results within 2 Month(s) from this visit. Latest known visit with results is:   Appointment on 04/28/2023   Component Date Value   • Vit D, 25-Hydroxy 04/28/2023 54.8         Imaging: No results found. Objective:     Physical Exam  Constitutional:       Appearance: She is well-developed. Cardiovascular:      Rate and Rhythm: Normal rate and regular rhythm. Heart sounds: Normal heart sounds. No murmur heard. Pulmonary:      Effort: Pulmonary effort is normal. No respiratory distress. Breath sounds: Normal breath sounds. Skin:     General: Skin is warm and dry. Neurological:      Mental Status: She is alert and oriented to person, place, and time. There are no Patient Instructions on file for this visit. LENI Campuzano    Portions of the record may have been created with voice recognition software. Occasional wrong word or "sound a like" substitutions may have occurred due to the inherent limitations of voice recognition software. Read the chart carefully and recognize, using context, where substitutions have occurred.

## 2023-07-17 NOTE — ASSESSMENT & PLAN NOTE
We will increase phentermine to 30 mg daily. Advised food diary. We will follow-up in 1 month. Increase hydration.

## 2023-07-21 ENCOUNTER — APPOINTMENT (OUTPATIENT)
Dept: LAB | Facility: HOSPITAL | Age: 43
End: 2023-07-21
Payer: COMMERCIAL

## 2023-07-21 DIAGNOSIS — K90.0 CELIAC DISEASE: ICD-10-CM

## 2023-07-21 LAB
BASOPHILS # BLD AUTO: 0.05 THOUSANDS/ÂΜL (ref 0–0.1)
BASOPHILS NFR BLD AUTO: 1 % (ref 0–1)
EOSINOPHIL # BLD AUTO: 0.2 THOUSAND/ÂΜL (ref 0–0.61)
EOSINOPHIL NFR BLD AUTO: 3 % (ref 0–6)
ERYTHROCYTE [DISTWIDTH] IN BLOOD BY AUTOMATED COUNT: 13 % (ref 11.6–15.1)
HCT VFR BLD AUTO: 42.2 % (ref 34.8–46.1)
HGB BLD-MCNC: 14.1 G/DL (ref 11.5–15.4)
IMM GRANULOCYTES # BLD AUTO: 0.02 THOUSAND/UL (ref 0–0.2)
IMM GRANULOCYTES NFR BLD AUTO: 0 % (ref 0–2)
LYMPHOCYTES # BLD AUTO: 1.79 THOUSANDS/ÂΜL (ref 0.6–4.47)
LYMPHOCYTES NFR BLD AUTO: 25 % (ref 14–44)
MCH RBC QN AUTO: 30.2 PG (ref 26.8–34.3)
MCHC RBC AUTO-ENTMCNC: 33.4 G/DL (ref 31.4–37.4)
MCV RBC AUTO: 90 FL (ref 82–98)
MONOCYTES # BLD AUTO: 0.49 THOUSAND/ÂΜL (ref 0.17–1.22)
MONOCYTES NFR BLD AUTO: 7 % (ref 4–12)
NEUTROPHILS # BLD AUTO: 4.6 THOUSANDS/ÂΜL (ref 1.85–7.62)
NEUTS SEG NFR BLD AUTO: 64 % (ref 43–75)
NRBC BLD AUTO-RTO: 0 /100 WBCS
PLATELET # BLD AUTO: 343 THOUSANDS/UL (ref 149–390)
PMV BLD AUTO: 10.8 FL (ref 8.9–12.7)
RBC # BLD AUTO: 4.67 MILLION/UL (ref 3.81–5.12)
WBC # BLD AUTO: 7.15 THOUSAND/UL (ref 4.31–10.16)

## 2023-07-21 PROCEDURE — 36415 COLL VENOUS BLD VENIPUNCTURE: CPT

## 2023-07-21 PROCEDURE — 82784 ASSAY IGA/IGD/IGG/IGM EACH: CPT

## 2023-07-21 PROCEDURE — 86231 EMA EACH IG CLASS: CPT

## 2023-07-21 PROCEDURE — 86364 TISS TRNSGLTMNASE EA IG CLAS: CPT

## 2023-07-21 PROCEDURE — 86258 DGP ANTIBODY EACH IG CLASS: CPT

## 2023-07-24 LAB
ENDOMYSIUM IGA SER QL: POSITIVE
GLIADIN PEPTIDE IGA SER-ACNC: 19 UNITS (ref 0–19)
GLIADIN PEPTIDE IGG SER-ACNC: 26 UNITS (ref 0–19)
IGA SERPL-MCNC: 179 MG/DL (ref 87–352)
TTG IGA SER-ACNC: 12 U/ML (ref 0–3)
TTG IGG SER-ACNC: 10 U/ML (ref 0–5)

## 2023-07-26 ENCOUNTER — OFFICE VISIT (OUTPATIENT)
Dept: GASTROENTEROLOGY | Facility: CLINIC | Age: 43
End: 2023-07-26
Payer: COMMERCIAL

## 2023-07-26 VITALS
OXYGEN SATURATION: 97 % | SYSTOLIC BLOOD PRESSURE: 113 MMHG | HEART RATE: 105 BPM | WEIGHT: 154 LBS | DIASTOLIC BLOOD PRESSURE: 75 MMHG | BODY MASS INDEX: 28.34 KG/M2 | HEIGHT: 62 IN

## 2023-07-26 DIAGNOSIS — K59.04 CHRONIC IDIOPATHIC CONSTIPATION: Primary | ICD-10-CM

## 2023-07-26 DIAGNOSIS — K90.0 CELIAC DISEASE: ICD-10-CM

## 2023-07-26 PROCEDURE — 99213 OFFICE O/P EST LOW 20 MIN: CPT | Performed by: PHYSICIAN ASSISTANT

## 2023-07-26 NOTE — PROGRESS NOTES
[facility] - Outpatient Follow-up Note  Checo Sharma 44 y.o. female MRN: [id number]  Encounter: [id number]          ASSESSMENT AND PLAN:      1. Chronic idiopathic constipation  Linzess 72mcg daily allows daily BMs but she notes a new bloating symptom  Will stop and trial Ibsrela 50mg once daily    2. Celiac disease  GFD  Labs show marked improvement  Iron levels remain normal s/p infusions    ______________________________________________________________________    SUBJECTIVE: 71-year-old female with chronic constipation and celiac disease who presents for routine follow-up. She remains on a gluten-free diet. Her celiac markers have improved significantly over the past year. She is not struggling to follow this. At her last appointment we discussed ongoing constipation. We started Linzess 72 mcg daily. This has allowed her to achieve more regular bowel movements however she notes a new symptom of bloating. She was not bloated prior. There is no pain. She is not having diarrhea. She has no rectal bleeding. REVIEW OF SYSTEMS IS OTHERWISE NEGATIVE.       Historical Information   Past Medical History:   Diagnosis Date   • Allergic    • Anemia    • Anxiety    • Asthma     not as adult   • Celiac disease    • History of one miscarriage     2017 10 weeks   • Renal cyst    • Varicella      Past Surgical History:   Procedure Laterality Date   • BACK SURGERY  2022   •  SECTION      2004 son,    daughter,    daughter   • NECK SURGERY      C5-7, disc repair    • [place]  DELIVERY ONLY N/A 2018    Procedure:  SECTION () REPEAT;  Surgeon: Najma Zaidi DO;  Location: [facility] ;  Service: Obstetrics     Social History   Social History     Substance and Sexual Activity   Alcohol Use No     Social History     Substance and Sexual Activity   Drug Use No     Social History     Tobacco Use   Smoking Status Never   Smokeless Tobacco Never Family History   Adopted: Yes   Problem Relation Age of Onset   • Crohn's disease Mother    • Hypertension Mother    • Alcohol abuse Father    • Drug abuse Father    • Asthma Sister    • No Known Problems Daughter    • No Known Problems Daughter    • No Known Problems Daughter    • Cancer Maternal Grandmother         lung   • Arthritis Maternal Grandmother    • Diabetes Maternal Grandfather    • Hearing loss Maternal Grandfather    • Heart disease Maternal Grandfather    • No Known Problems Son        Meds/Allergies       Current Outpatient Medications:   •  albuterol (2.5 mg/3 mL) 0.083 % nebulizer solution  •  albuterol (PROVENTIL HFA,VENTOLIN HFA) 90 mcg/act inhaler  •  Azelastine HCl 137 MCG/SPRAY SOLN  •  [employer] Covid-19 At Home Test Kit KIT  •  dupilumab (DUPIXENT) subcutaneous injection  •  Dupixent 300 MG/2ML SOPN  •  EPINEPHrine (EPIPEN) 0.3 mg/0.3 mL SOAJ  •  ergocalciferol (VITAMIN D2) 50,000 units  •  fexofenadine (ALLEGRA) 180 MG tablet  •  Fluticasone Furoate-Vilanterol (Breo Ellipta) 100-25 mcg/actuation inhaler  •  linaCLOtide (Linzess) 72 MCG CAPS  •  meloxicam (MOBIC) 15 mg tablet  •  montelukast (SINGULAIR) 10 mg tablet  •  naloxone (NARCAN) 4 mg/0.1 mL nasal spray  •  phentermine 30 MG capsule  •  predniSONE 20 mg tablet  •  Sodium Fluoride 5000 PPM 1.1 % PSTE    Allergies   Allergen Reactions   • Gluten Meal - Food Allergy Abdominal Pain and GI Intolerance   • Other Vomiting and Abdominal Pain     Gluten    • Fluticasone-Salmeterol Palpitations     "Didn't like the way it made me feel"   • Iron Itching     Gold label only           Objective     Blood pressure 113/75, pulse 105, height 5' 2" (1.575 m), weight 69.9 kg (154 lb), SpO2 97 %, currently breastfeeding. Body mass index is 28.17 kg/m².       PHYSICAL EXAM:      General Appearance:   Alert, cooperative, no distress   HEENT:   Normocephalic, atraumatic, anicteric.     Neck:  Supple, symmetrical, trachea midline   Lungs:   Clear to auscultation bilaterally; no rales, rhonchi or wheezing; respirations unlabored    Heart[de-identified]   Regular rate and rhythm; no murmur, rub, or gallop. Abdomen:   Soft, non-tender, non-distended; normal bowel sounds; no masses, no organomegaly    Genitalia:   Deferred    Rectal:   Deferred    Extremities:  No cyanosis, clubbing or edema    Pulses:  2+ and symmetric    Skin:  No jaundice, rashes, or lesions    Lymph nodes:  No palpable cervical lymphadenopathy        Lab Results:   No visits with results within 1 Day(s) from this visit. Latest known visit with results is:   Lab on 07/21/2023   Component Date Value   • IgA 07/21/2023 179    • Gliadin IgA 07/21/2023 19    • Gliadin IgG 07/21/2023 26 (H)    • Tissue Transglut Ab IGG 07/21/2023 10 (H)    • TISSUE TRANSGLUTAMINASE * 07/21/2023 12 (H)    • Endomysial IgA 07/21/2023 Positive (A)          Radiology Results:   No results found. Answers for HPI/ROS submitted by the patient on 7/26/2023  Progression since onset: gradually improving  Pain location: periumbilical region  Pain - numeric: 3/10  Pain quality: a sensation of fullness  Radiates to: does not radiate  anorexia: No  arthralgias: No  belching: No  constipation: Yes  dysuria: No  fever: No  flatus: No  frequency: No  headaches: Yes  hematochezia: No  hematuria: No  melena: No  myalgias: No  nausea:  No  weight loss: No  vomiting: No

## 2023-08-01 ENCOUNTER — OFFICE VISIT (OUTPATIENT)
Dept: PHYSICAL THERAPY | Age: 43
End: 2023-08-01
Payer: COMMERCIAL

## 2023-08-01 DIAGNOSIS — M54.16 LUMBAR RADICULOPATHY: Primary | ICD-10-CM

## 2023-08-01 PROCEDURE — 97140 MANUAL THERAPY 1/> REGIONS: CPT | Performed by: PHYSICAL THERAPIST

## 2023-08-01 PROCEDURE — 97162 PT EVAL MOD COMPLEX 30 MIN: CPT | Performed by: PHYSICAL THERAPIST

## 2023-08-01 NOTE — PROGRESS NOTES
PT Evaluation     Today's date: 2023  Patient name: Nora Donahue  : 1980  MRN: [id number]  Referring provider: India Najjar, PT  Dx:   Encounter Diagnosis     ICD-10-CM    1. Lumbar radiculopathy  M54.16           Start Time:   Stop Time: 214  Total time in clinic (min): 60 minutes    Assessment  Assessment details: Problem List:  1) central sensitization  2) fear avoidance tendencies  3) limited lumbar mobility in extension    Nora Donahue is a pleasant 44 y.o. female who presents via Direct Access with chronic low back pain and LE radicular symptoms for several months. She has limited lumbar AROM with fear moving into extension, LE weakness in non-specific myotomal patterns with hypersensitivity in right L4 anterior shin, brisk LE reflexes with (-) Kinsey's/Babinski reflexes, no noted imbalance and demonstrates symptoms progressing towards central sensitization  resulting in the pain she is experiencing, concern at no signs of improvement and fear of not being able to keep active. The patient has been referred by the orthopedic spine surgeon for an MRI and possible EMG. Pt may also benefit from behavioral health consult due to high pain levels, start back score  and fear of movement . Due to high symptom irritability and slight reduction in symptoms with long leg distraction, pt may benefit form aquatic therapy with a trail of DWT. Positive prognostic indicators include pt is a willing participant. Negative prognostic indicators include chronicity of symptoms, anxiety, high symptom irritability, central sensitization and poor surgical outcomes.       Comparable signs:  1) LE pain/numbness    Impairments: abnormal or restricted ROM, activity intolerance, impaired physical strength, lacks appropriate home exercise program and pain with function  Functional limitations: lifitng, pushing pulling, carrying child, sitting, sleeping, modified workUnderstanding of Dx/Px/POC: fair   Prognosis: fair    Goals  1. Promote centralization of LE symptoms. 2. Pt will progress to tolerate up to 45 min of movement in aquatic environment. 3. Patient will be independent with home exercise program.   4. Patient will be able to manage symptoms independently. Plan  Patient would benefit from: skilled physical therapy  Planned modality interventions: traction  Planned therapy interventions: aquatic therapy, postural training, patient education, therapeutic activities, therapeutic exercise, graded exercise, functional ROM exercises and home exercise program  Frequency: 2x week  Duration in visits: 12  Plan of Care beginning date: 8/1/2023  Plan of Care expiration date: 11/1/2023        Subjective Evaluation    History of Present Illness  Mechanism of injury: Pt presents via direct access and reports new onset of low back pain and radiating pain down left leg to the foot for several months that is constant with intermittent right leg pain. She reports paresthesias and pain in the left leg and also notes that at times her legs both go numb. She also c/o paresthesias in her hands. She has difficulty getting out of bed in the morning but she is able to walk as she moves. Sitting for long periods and laying down provoke increased pain. She has difficulty bending and lifting. She has to change position frequently. 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 She is not permitted to lift, push pull and not work > 8 hours, 5 days per week. She has a 5# lift restriction. Pt had surgery 1 year ago for low back. She had discectomy and laminectomy. L5-S1. She had prior surgery for cervical spine 2022 with fusion of levels C5-7. She reports the recovery time took longer for both than what the doctor originally stated. She is scheduled for MRI of low back and possible EMG. .She was prescribed prednisone and muscle relaxer and tramadol.      The patient's greatest concern is the pain is not going away and she may need more surgery. Patient Goals  Patient goals for therapy: decreased pain    Pain  Current pain ratin  At worst pain ratin  Quality: sharp and radiating  Relieving factors: change in position  Aggravating factors: sitting and lifting (bending )    Social Support  Lives with: spouse and young children    Treatments  Current treatment: medication        Objective     Concurrent Complaints  Positive for disturbed sleep and stomach problem (celiac). Negative for bladder dysfunction, bowel dysfunction and saddle (S4) numbness    Tenderness     Left Hip   Tenderness in the PSIS. Right Hip   Tenderness in the PSIS.      Neurological Testing     Sensation     Lumbar   Left   Intact: light touch and pin prick    Right   Intact: light touch  Hypersensation: pin prick    Comments   Right pin prick: L4 distribution    Reflexes   Left   Biceps (C5/C6): normal (2+)  Brachioradialis (C6): normal (2+)  Triceps (C7): normal (2+)  Patellar (L4): brisk (3+)  Achilles (S1): normal (2+)  Malone's reflex: negative  Babinski sign: negative  Clonus sign: negative    Right   Biceps (C5/C6): normal (2+)  Brachioradialis (C6): normal (2+)  Triceps (C7): normal (2+)  Patellar (L4): brisk (3+)  Achilles (S1): normal (2+)  Malone's reflex: negative  Babinski sign: negative  Clonus sign: negative    Active Range of Motion     Lumbar   Flexion:  Restriction level: minimal  Extension:  with pain Restriction level: maximal  Left lateral flexion:  Restriction level: moderate  Right lateral flexion:  Restriction level: moderate  Left rotation:  Restriction level: moderate  Right rotation:  Restriction level: moderate    Strength/Myotome Testing     Left Hip   Planes of Motion   Flexion: 4    Right Hip   Planes of Motion   Flexion: 4    Left Knee   Flexion: 4-  Extension: 3+    Right Knee   Flexion: 4-  Extension: 4    Left Ankle/Foot   Dorsiflexion: 3  Plantar flexion: 4  Great toe extension: 0    Right Ankle/Foot   Dorsiflexion: 3  Plantar flexion: 4  Great toe extension: 3    Additional Strength Details  Pt able to heel and toe walk    Tests     Lumbar     Left   Negative crossed SLR, passive SLR and slump test.     Right   Negative crossed SLR, passive SLR and slump test.     Left Hip   Negative RAYMOND and FADIR. Right Hip   Negative RAYMOND and FADIR. Additional Tests Details  Mild stretch discomfort with FADIR B    General Comments:      Lumbar Comments  Unable to perfrom mechanical assessment due to high pain level and pt fear.  Pt did lay prone on (1) pillow but noted B LE numbness   Numbness abolished with B LE traction which increased her LBP             Precautions: cervical fusion C5-7; lumbar laminectomy/discectomy L5-S1; iron deficiency    Manuals 8/1            Manual long leg traction 10'                                                   Neuro Re-Ed                                                                                                        Ther Ex                                                                                                                     Ther Activity                                       Gait Training                                       Modalities

## 2023-08-03 ENCOUNTER — OFFICE VISIT (OUTPATIENT)
Dept: PHYSICAL THERAPY | Age: 43
End: 2023-08-03
Payer: COMMERCIAL

## 2023-08-03 DIAGNOSIS — M54.16 LUMBAR RADICULOPATHY: Primary | ICD-10-CM

## 2023-08-03 PROCEDURE — 97113 AQUATIC THERAPY/EXERCISES: CPT

## 2023-08-03 NOTE — PROGRESS NOTES
Daily Note     Today's date: 8/3/2023  Patient name: Annelise Newell  : 1980  MRN: [id number]  Referring provider: Adriano Way, PT  Dx:   Encounter Diagnosis     ICD-10-CM    1. Lumbar radiculopathy  M54.16           Start Time: 59  Stop Time: 1630  Total time in clinic (min): 45 minutes    Subjective: pt notes some decrease in pain in the water today. She did c/o L LB and LE radicular pain w/ ex's. Objective: See treatment diary below  Pt seen 1:1 for 30 minutes and group therapy for remainder    Assessment: Tolerated treatment fair. Initial session for aquatic PT included pool safety rules. Started session w/ DWTX f/b DW ex's. Pt did tolerate the DW better than shallow water ex's. Some relief in DW but L radicular pain noted w/ water walking. Patient would benefit from continued PT      Plan: Continue per plan of care.       Precautions: cervical fusion C5-7; lumbar laminectomy/discectomy L5-S1; iron deficiency  Precautions:  Daily Treatment Diary     Date 8-3          Patient education 10          Water Walk (FW, BW, side) x10’  5 F only          LE work at wall               Hip FF/ext swing              Toe/heel              Hip ABD/ADD              March             Knee flexion & extension             Squats           UE noodle x3             Push/pull  1            Rotate  1            Row forward & back              OH side bend            UE/Core (AROM, paddles, MB)              ABD/ADD              Horizontal Pec Fly              Alt. arm swing (shld flex/ext)              Push pull              Single paddle rotation           SLS (EO, EC, ball toss)            Aqua Step (FW, lat, eccentric)            Core work:              MF press (red, blue, blk)             Kickboard press (blue, red)              Row/ext w/ tubing (red, blue, blk)           Seated on bench             ankle DF/PF  1            March              ABD/ADD              Knee flexion/extension  2 DW toe/heels 1          DW TX - hang in the deep water              DW ABD/ADD  1            DW Bicycle  3            DW Scissor hip flexion/extension  1            DW SL alli 1          Stretches              HS at step              Wall stretches              Calf stretch at wall              Other:            Hot Tub - 10 minutes only  10 nojets

## 2023-08-08 ENCOUNTER — APPOINTMENT (OUTPATIENT)
Dept: PHYSICAL THERAPY | Age: 43
End: 2023-08-08
Payer: COMMERCIAL

## 2023-08-10 ENCOUNTER — OFFICE VISIT (OUTPATIENT)
Dept: PHYSICAL THERAPY | Age: 43
End: 2023-08-10
Payer: COMMERCIAL

## 2023-08-10 DIAGNOSIS — M54.16 LUMBAR RADICULOPATHY: Primary | ICD-10-CM

## 2023-08-10 PROCEDURE — 97113 AQUATIC THERAPY/EXERCISES: CPT

## 2023-08-10 NOTE — PROGRESS NOTES
Daily Note     Today's date: 8/10/2023  Patient name: Ankita Sanon  : 1980  MRN: [id number]  Referring provider: Dunia Root PT  Dx:   Encounter Diagnosis     ICD-10-CM    1. Lumbar radiculopathy  M54.16           Start Time: 1600  Stop Time: 5348  Total time in clinic (min): 55 minutes    Subjective: pt notes she was achy after her first visit but no c/o increased pain. She saw MD and states she will have injections in 2 weeks and then sx by next yr. Objective: See treatment diary below      Assessment: Tolerated treatment fairly. Slow and steady w/ movements in the water. Most ex's performed in 7ft section to decompress the spine. Water walking in 4 ft section w/ noodle is slow and guarded. Pain in her ankles and feet during walking. Pt was able to tolerate UE AROM ex's today to focus on stabilization. Patient would benefit from continued PT      Plan: Continue per plan of care. Precautions: cervical fusion C5-7; lumbar laminectomy/discectomy L5-S1; iron deficiency  Precautions:  Daily Treatment Diary     Date 8-3 8/10         Patient education 10 6'         Water Walk (FW, BW, side) x10’  5 F only 5' F only w/ N         LE work at wall               Hip FF/ext swing              Toe/heel              Hip ABD/ADD              March             Knee flexion & extension             Squats           UE noodle x3             Push/pull  1 1           Rotate  1            Row forward & back   2           OH side bend            UE/Core (AROM, paddles, MB)   AROM           ABD/ADD   1           Horizontal Pec Fly   1           Alt.  arm swing (shld flex/ext)   1           Push pull   1           Single paddle rotation           SLS (EO, EC, ball toss)            Aqua Step (FW, lat, eccentric)            Core work:              MF press (red, blue, blk)             Kickboard press (blue, red)              Row/ext w/ tubing (red, blue, blk)           Seated on bench             ankle DF/PF  1 2 SL March              ABD/ADD              Knee flexion/extension  2 1                               DW HS curls  1         DW toe/heels 1 1         DW TX - hang in the deep water  10 8,6           DW ABD/ADD  1 1           DW Bicycle  3 3           DW Scissor hip flexion/extension  1 1           DW SL marches 1 1         Stretches              HS at step              Wall stretches              Calf stretch at wall              Other:            Hot Tub - 10 minutes only  10 nojets 10' no jets

## 2023-08-15 ENCOUNTER — APPOINTMENT (OUTPATIENT)
Dept: PHYSICAL THERAPY | Age: 43
End: 2023-08-15
Payer: COMMERCIAL

## 2023-08-16 ENCOUNTER — OFFICE VISIT (OUTPATIENT)
Dept: PHYSICAL THERAPY | Age: 43
End: 2023-08-16
Payer: COMMERCIAL

## 2023-08-16 DIAGNOSIS — M54.16 LUMBAR RADICULOPATHY: Primary | ICD-10-CM

## 2023-08-16 PROCEDURE — 97113 AQUATIC THERAPY/EXERCISES: CPT

## 2023-08-16 NOTE — PROGRESS NOTES
Daily Note     Today's date: 2023  Patient name: Rickey Mortimer  : 1980  MRN: [id number]  Referring provider: Raymond Mccann, PT  Dx:   Encounter Diagnosis     ICD-10-CM    1. Lumbar radiculopathy  M54.16                      Subjective: Patient reports increased soreness this morning. C/o 6-7/10 LBP, notes she just got off of work. Objective: See treatment diary below      Assessment: Tolerated treatment fair, slow guarded movements in the water, kept patient in the 7' section with treatment, patient notes some discomfort in her R high today, usually it's her L. . Tolerated water walking in 4.5' section with noodle but guarded. Patient demonstrated fatigue post treatment and would benefit from continued PT      Plan: Continue per plan of care. Progress treatment as tolerated. Patient seen 1:1 for 30 minutes and rest of time group setting. Precautions: cervical fusion C5-7; lumbar laminectomy/discectomy L5-S1; iron deficiency  Precautions:  Daily Treatment Diary     Date 8-3 8/10 8/16        Patient education 10 6'         Water Walk (FW, BW, side) x10’  5 F only 5' F only w/ N 6' F w/ noodle        LE work at wall               Hip FF/ext swing              Toe/heel              Hip ABD/ADD              March             Knee flexion & extension             Squats           UE noodle x3             Push/pull  1 1 1          Rotate  1            Row forward & back   2 2          OH side bend            UE/Core (AROM, paddles, MB)   AROM AROM          ABD/ADD   1 1          Horizontal Pec Fly   1 1          Alt.  arm swing (shld flex/ext)   1 1          Push pull   1 1          Single paddle rotation           SLS (EO, EC, ball toss)            Aqua Step (FW, lat, eccentric)            Core work:              MF press (red, blue, blk)             Kickboard press (blue, red)              Row/ext w/ tubing (red, blue, blk)           Seated on bench             ankle DF/PF  1 2 SL 2 March              ABD/ADD              Knee flexion/extension  2 1 1                              DW HS curls  1 1        DW toe/heels 1 1 1        DW TX - hang in the deep water  10 8,6 8' 8'          DW ABD/ADD  1 1 1          DW Bicycle  3 3 4          DW Scissor hip flexion/extension  1 1 1          DW SL marches 1 1 1        Stretches            DKTC    10X          Wall stretches              Calf stretch at wall              Other:            Hot Tub - 10 minutes only  10 nojets 10' no jets 10'

## 2023-08-17 ENCOUNTER — OFFICE VISIT (OUTPATIENT)
Dept: PHYSICAL THERAPY | Age: 43
End: 2023-08-17
Payer: COMMERCIAL

## 2023-08-17 ENCOUNTER — OFFICE VISIT (OUTPATIENT)
Dept: FAMILY MEDICINE CLINIC | Facility: CLINIC | Age: 43
End: 2023-08-17
Payer: COMMERCIAL

## 2023-08-17 VITALS
BODY MASS INDEX: 28.34 KG/M2 | DIASTOLIC BLOOD PRESSURE: 70 MMHG | HEIGHT: 62 IN | OXYGEN SATURATION: 97 % | SYSTOLIC BLOOD PRESSURE: 100 MMHG | WEIGHT: 154 LBS | HEART RATE: 116 BPM | RESPIRATION RATE: 16 BRPM

## 2023-08-17 DIAGNOSIS — K59.00 CONSTIPATION, UNSPECIFIED CONSTIPATION TYPE: ICD-10-CM

## 2023-08-17 DIAGNOSIS — J45.50 SEVERE PERSISTENT ALLERGIC ASTHMA: Primary | ICD-10-CM

## 2023-08-17 DIAGNOSIS — M54.16 LUMBAR RADICULOPATHY: Primary | ICD-10-CM

## 2023-08-17 DIAGNOSIS — R63.5 WEIGHT GAIN: ICD-10-CM

## 2023-08-17 PROCEDURE — 97113 AQUATIC THERAPY/EXERCISES: CPT

## 2023-08-17 PROCEDURE — 99214 OFFICE O/P EST MOD 30 MIN: CPT | Performed by: NURSE PRACTITIONER

## 2023-08-17 RX ORDER — PHENTERMINE HYDROCHLORIDE 30 MG/1
30 CAPSULE ORAL EVERY MORNING
Qty: 30 CAPSULE | Refills: 0 | Status: SHIPPED | OUTPATIENT
Start: 2023-08-17 | End: 2023-08-18 | Stop reason: SDUPTHER

## 2023-08-17 RX ORDER — TRAMADOL HYDROCHLORIDE 50 MG/1
TABLET ORAL
COMMUNITY
Start: 2023-08-09

## 2023-08-17 RX ORDER — GABAPENTIN 300 MG/1
300 CAPSULE ORAL 3 TIMES DAILY
COMMUNITY
Start: 2023-08-09

## 2023-08-17 RX ORDER — ACETAMINOPHEN AND CODEINE PHOSPHATE 300; 30 MG/1; MG/1
1 TABLET ORAL EVERY 6 HOURS
COMMUNITY
Start: 2023-07-26

## 2023-08-17 NOTE — PROGRESS NOTES
Assessment/Plan:     Chronic Problems:  Weight gain  Great job with another 4 pound weight loss over the last month. Continue phentermine 30 mg daily. ,  Food diary and increase hydration. Will return in 1 month. Constipation  Doing great with Ibsrela. Visit Diagnosis:  Diagnoses and all orders for this visit:    Severe persistent allergic asthma    Weight gain  -     phentermine 30 MG capsule; Take 1 capsule (30 mg total) by mouth every morning    Constipation, unspecified constipation type    Other orders  -     gabapentin (NEURONTIN) 300 mg capsule; Take 300 mg by mouth 3 (three) times a day  -     traMADol (ULTRAM) 50 mg tablet; TAKE 1 TABLET EVERY 8 HOURS BY ORAL ROUTE FOR 6 DAYS. -     acetaminophen-codeine (TYLENOL with CODEINE #3) 300-30 MG per tablet; Take 1 tablet by mouth every 6 (six) hours          Subjective:    Patient ID: Kayce Dodd is a 44 y.o. female. Patient presents for follow up on phentermine. She lost 4 lbs in the past month. Her constipation is much improved with her new medication that she was given by GI. She is overall feeling well and has no other concerns today. Breathing is stable with current meds. The following portions of the patient's history were reviewed and updated as appropriate: allergies, current medications, past family history, past medical history, past social history, past surgical history and problem list.    Review of Systems   Constitutional: Negative for chills and fever. HENT: Negative for ear pain and sore throat. Eyes: Negative for pain and visual disturbance. Respiratory: Negative for cough and shortness of breath. Cardiovascular: Negative for chest pain and palpitations. Gastrointestinal: Negative for abdominal pain and vomiting. Genitourinary: Negative for dysuria and hematuria. Musculoskeletal: Positive for back pain. Negative for arthralgias. Skin: Negative for color change and rash.    Neurological: Negative for dizziness, seizures, syncope, light-headedness and headaches. All other systems reviewed and are negative.         /70   Pulse (!) 116   Resp 16   Ht 5' 2" (1.575 m)   Wt 69.9 kg (154 lb)   SpO2 97%   BMI 28.17 kg/m²   Social History     Socioeconomic History   • Marital status: /Civil Union     Spouse name: Not on file   • Number of children: Not on file   • Years of education: Not on file   • Highest education level: Not on file   Occupational History   • Not on file   Tobacco Use   • Smoking status: Never   • Smokeless tobacco: Never   Vaping Use   • Vaping Use: Never used   Substance and Sexual Activity   • Alcohol use: No   • Drug use: No   • Sexual activity: Yes     Partners: Male     Birth control/protection: Condom Male   Other Topics Concern   • Not on file   Social History Narrative    Engaged to be     Always uses seatbelts     Social Determinants of Health     Financial Resource Strain: Not on file   Food Insecurity: Not on file   Transportation Needs: Not on file   Physical Activity: Not on file   Stress: Not on file   Social Connections: Not on file   Intimate Partner Violence: Not on file   Housing Stability: Not on file     Past Medical History:   Diagnosis Date   • Allergic    • Anemia    • Anxiety    • Asthma     not as adult   • Celiac disease    • History of one miscarriage     6/2017 10 weeks   • Renal cyst    • Varicella      Family History   Adopted: Yes   Problem Relation Age of Onset   • Crohn's disease Mother    • Hypertension Mother    • Alcohol abuse Father    • Drug abuse Father    • Asthma Sister    • No Known Problems Daughter    • No Known Problems Daughter    • No Known Problems Daughter    • Cancer Maternal Grandmother         lung   • Arthritis Maternal Grandmother    • Diabetes Maternal Grandfather    • Hearing loss Maternal Grandfather    • Heart disease Maternal Grandfather    • No Known Problems Son      Past Surgical History:   Procedure Laterality Date   • BACK SURGERY  2022   •  SECTION      2004 son,    daughter,   2011 daughter   • NECK SURGERY      C5-7, disc repair    • ME  DELIVERY ONLY N/A 2018    Procedure:  SECTION () REPEAT;  Surgeon: Anh Ricci DO;  Location: [facility] ;  Service: Obstetrics       Current Outpatient Medications:   •  phentermine 30 MG capsule, Take 1 capsule (30 mg total) by mouth every morning, Disp: 30 capsule, Rfl: 0  •  acetaminophen-codeine (TYLENOL with CODEINE #3) 300-30 MG per tablet, Take 1 tablet by mouth every 6 (six) hours, Disp: , Rfl:   •  albuterol (2.5 mg/3 mL) 0.083 % nebulizer solution, Take 3 mL (2.5 mg total) by nebulization every 6 (six) hours as needed for wheezing or shortness of breath, Disp: 180 mL, Rfl: 5  •  albuterol (PROVENTIL HFA,VENTOLIN HFA) 90 mcg/act inhaler, Inhale 2 puffs every 6 (six) hours as needed for wheezing, Disp: 18 g, Rfl: 3  •  Azelastine HCl 137 MCG/SPRAY SOLN, SPRAY 1 SPRAY INTO EACH NOSTRIL 2 (TWO) TIMES A DAY USE IN EACH NOSTRIL AS DIRECTED, Disp: 90 mL, Rfl: 1  •  [employer] Covid-19 At Home Test Kit KIT, Use as directed, Disp: , Rfl:   •  dupilumab (DUPIXENT) subcutaneous injection, Inject 2 mL (300 mg total) under the skin every 14 (fourteen) days, Disp: 2 mL, Rfl: 26  •  Dupixent 300 MG/2ML SOPN, , Disp: , Rfl:   •  EPINEPHrine (EPIPEN) 0.3 mg/0.3 mL SOAJ, Inject 0.3 mL (0.3 mg total) into a muscle once for 1 dose, Disp: 0.6 mL, Rfl: 0  •  ergocalciferol (VITAMIN D2) 50,000 units, Take 1 capsule (50,000 Units total) by mouth every 14 (fourteen) days, Disp: 12 capsule, Rfl: 1  •  fexofenadine (ALLEGRA) 180 MG tablet, Take 180 mg by mouth daily, Disp: , Rfl:   •  Fluticasone Furoate-Vilanterol (Breo Ellipta) 100-25 mcg/actuation inhaler, Inhale 1 puff daily Rinse mouth after use., Disp: 180 blister, Rfl: 3  •  gabapentin (NEURONTIN) 300 mg capsule, Take 300 mg by mouth 3 (three) times a day, Disp: , Rfl:   •  linaCLOtide (Linzess) 72 MCG CAPS, Take 1 capsule by mouth daily before breakfast, Disp: 30 capsule, Rfl: 5  •  meloxicam (MOBIC) 15 mg tablet, TAKE 1 TABLET BY MOUTH EVERY DAY FOR 30 DAYS, Disp: , Rfl:   •  montelukast (SINGULAIR) 10 mg tablet, TAKE 1 TABLET BY MOUTH EVERYDAY AT BEDTIME, Disp: 90 tablet, Rfl: 1  •  naloxone (NARCAN) 4 mg/0.1 mL nasal spray, ADMINISTER 1 SPRAY INTO ONE NOSTRIL. CALL 911. REPEAT AFTER 2-3 MIN IF NO OR MINIMAL RESPONSE, Disp: , Rfl:   •  predniSONE 20 mg tablet, Take 2 tablets (40 mg total) by mouth daily, Disp: 10 tablet, Rfl: 0  •  Sodium Fluoride 5000 PPM 1.1 % PSTE, Use as directed, Disp: , Rfl:   •  traMADol (ULTRAM) 50 mg tablet, TAKE 1 TABLET EVERY 8 HOURS BY ORAL ROUTE FOR 6 DAYS., Disp: , Rfl:     Allergies   Allergen Reactions   • Gluten Meal - Food Allergy Abdominal Pain and GI Intolerance   • Other Vomiting and Abdominal Pain     Gluten    • Fluticasone-Salmeterol Palpitations     "Didn't like the way it made me feel"   • Iron Itching     Gold label only          Lab Review   Lab on 07/21/2023   Component Date Value   • IgA 07/21/2023 179    • Gliadin IgA 07/21/2023 19    • Gliadin IgG 07/21/2023 26 (H)    • Tissue Transglut Ab IGG 07/21/2023 10 (H)    • TISSUE TRANSGLUTAMINASE * 07/21/2023 12 (H)    • Endomysial IgA 07/21/2023 Positive (A)         Imaging: No results found. Objective:     Physical Exam  Constitutional:       Appearance: She is well-developed. Cardiovascular:      Rate and Rhythm: Normal rate and regular rhythm. Heart sounds: Normal heart sounds. No murmur heard. Pulmonary:      Effort: Pulmonary effort is normal. No respiratory distress. Breath sounds: Normal breath sounds. Skin:     General: Skin is warm and dry. Neurological:      Mental Status: She is alert and oriented to person, place, and time. There are no Patient Instructions on file for this visit.     LENI Campuzano    Portions of the record may have been created with voice recognition software. Occasional wrong word or "sound a like" substitutions may have occurred due to the inherent limitations of voice recognition software. Read the chart carefully and recognize, using context, where substitutions have occurred.

## 2023-08-17 NOTE — PROGRESS NOTES
Daily Note     Today's date: 2023  Patient name: Lian Contreras  : 1980  MRN: [id number]  Referring provider: Gricel Ruiz, PT  Dx:   Encounter Diagnosis     ICD-10-CM    1. Lumbar radiculopathy  M54.16           Start Time: 1600  Stop Time: 1700  Total time in clinic (min): 60 minutes    Subjective: Patient reports 5/10 L knee pain today. Patient reports she was able to relax better yesterday after her pool session. Objective: See treatment diary below      Assessment: Tolerated treatment fairly well, stiff guarded slow movements in the water, continue to focus on DW exercising and functional mobility throughout session. Min relief noted post session today, kept program same due to min relief noted and increased pain today. Patient demonstrated fatigue post treatment, exhibited good technique with therapeutic exercises and would benefit from continued PT      Plan: Continue per plan of care. Progress treatment as tolerated. Precautions: cervical fusion C5-7; lumbar laminectomy/discectomy L5-S1; iron deficiency  Precautions:  Daily Treatment Diary     Date 8-3 8/10 8/16 8/17       Patient education 10 6'         Water Walk (FW, BW, side) x10’  5 F only 5' F only w/ N 6' F w/ noodle 6' F w/noodle       LE work at wall               Hip FF/ext swing              Toe/heel              Hip ABD/ADD              March             Knee flexion & extension             Squats           UE noodle x3             Push/pull  1 1 1 1         Rotate  1            Row forward & back   2 2 2         OH side bend            UE/Core (AROM, paddles, MB)   AROM AROM AROM         ABD/ADD   1 1 1         Horizontal Pec Fly   1 1 1         Alt.  arm swing (shld flex/ext)   1 1 1         Push pull   1 1 1         Single paddle rotation           SLS (EO, EC, ball toss)            Aqua Step (FW, lat, eccentric)            Core work:              MF press (red, blue, blk)             Kickboard press (blue, red) Row/ext w/ tubing (red, blue, blk)           Seated on bench             ankle DF/PF  1 2 SL 2 2         March              ABD/ADD              Knee flexion/extension  2 1 1 1                             DW HS curls  1 1 1       DW toe/heels 1 1 1 1       DW TX - hang in the deep water  10 8,6 8' 8' 8' 8'         DW ABD/ADD  1 1 1 1         DW Bicycle  3 3 4 4         DW Scissor hip flexion/extension  1 1 1 1         DW SL marches 1 1 1 1       Stretches            DKTC    10X 10X         Wall stretches              Calf stretch at wall              Other: piriformis stretch    10"x3       Hot Tub - 10 minutes only  10 nojets 10' no jets 10' 10'

## 2023-08-17 NOTE — ASSESSMENT & PLAN NOTE
Great job with another 4 pound weight loss over the last month. Continue phentermine 30 mg daily. ,  Food diary and increase hydration. Will return in 1 month.

## 2023-08-18 DIAGNOSIS — R63.5 WEIGHT GAIN: ICD-10-CM

## 2023-08-18 RX ORDER — PHENTERMINE HYDROCHLORIDE 30 MG/1
30 CAPSULE ORAL EVERY MORNING
Qty: 30 CAPSULE | Refills: 0 | Status: SHIPPED | OUTPATIENT
Start: 2023-08-18

## 2023-08-18 NOTE — TELEPHONE ENCOUNTER
Patient's spouse called in regards to patient's medication Phentermine 30 mg capsule. Pharmacy stated she needs a prior auth.   Thanks

## 2023-08-21 ENCOUNTER — OFFICE VISIT (OUTPATIENT)
Dept: PHYSICAL THERAPY | Age: 43
End: 2023-08-21
Payer: COMMERCIAL

## 2023-08-21 DIAGNOSIS — M54.16 LUMBAR RADICULOPATHY: Primary | ICD-10-CM

## 2023-08-21 PROCEDURE — 97113 AQUATIC THERAPY/EXERCISES: CPT

## 2023-08-21 NOTE — PROGRESS NOTES
Daily Note     Today's date: 2023  Patient name: Naila Mcmahon  : 1980  MRN: [id number]  Referring provider: Julene Jeans, PT  Dx:   Encounter Diagnosis     ICD-10-CM    1. Lumbar radiculopathy  M54.16           Start Time: 1500  Stop Time: 1600  Total time in clinic (min): 60 minutes    Subjective: patient notes sore and stiff from work today, notes she did try her stretches this weekend and feeling a little better, able to sleep some. Objective: See treatment diary below      Assessment: Tolerated treatment fairly well, still stiff and guarded throughout session, able to ambulate and exercise at wall first. Cues for ex technique. Patient demonstrated fatigue post treatment and would benefit from continued PT    Patient seen 1:1 for 30 minutes and rest of time group setting. Plan: Continue per plan of care. Precautions: cervical fusion C5-7; lumbar laminectomy/discectomy L5-S1; iron deficiency  Precautions:  Daily Treatment Diary     Date 8-3 8/10 8/16 8/17 8/21      Patient education 10 6'         Water Walk (FW, BW, side) x10’  5 F only 5' F only w/ N 6' F w/ noodle 6' F w/noodle 6'      LE work at wall               Hip FF/ext swing              Toe/heel      1        Hip ABD/ADD      2                Knee flexion & extension     1        Squats     1      UE noodle x3             Push/pull  1 1 1 1 1        Rotate  1            Row forward & back   2 2 2 2        OH side bend            UE/Core (AROM, paddles, MB)   AROM AROM AROM AROM        ABD/ADD   1 1 1 1        Horizontal Pec Fly   1 1 1 1        Alt.  arm swing (shld flex/ext)   1 1 1 1        Push pull   1 1 1 1        Single paddle rotation           SLS (EO, EC, ball toss)            Aqua Step (FW, lat, eccentric)            Core work:              MF press (red, blue, blk)             Kickboard press (blue, red)              Row/ext w/ tubing (red, blue, blk)           Seated on bench             ankle DF/PF  1 2 SL 2 2 2        March              ABD/ADD              Knee flexion/extension  2 1 1 1 2                            DW HS curls  1 1 1       DW toe/heels 1 1 1 1       DW TX - hang in the deep water  10 8,6 8' 8' 8' 8' 10'        DW ABD/ADD  1 1 1 1 1        DW Bicycle  3 3 4 4 5        DW Scissor hip flexion/extension  1 1 1 1 1        DW SL marches 1 1 1 1       Stretches            DKTC    10X 10X 10x        Wall stretches              Calf stretch at wall              Other: piriformis stretch    10"x3 10"x3      Hot Tub - 10 minutes only  10 nojets 10' no jets 10' 10' 10'

## 2023-08-22 ENCOUNTER — TELEPHONE (OUTPATIENT)
Dept: HEMATOLOGY ONCOLOGY | Facility: CLINIC | Age: 43
End: 2023-08-22

## 2023-08-22 NOTE — TELEPHONE ENCOUNTER
GENET  DR luh KHAN   Who are you speaking with? Patient   If it is not the patient, are they listed on an active communication consent form? N/A   Is this a GENET or DR luh KHAN GENET   Which provider is patient currently scheduled or established with? Una Diaz   What is the original appointment date and time? 10/2/23 8:30AM   At which location is the appointment scheduled to take place? [facility]   Which provider is the patient transitioning care to? Dr. Hill Long   What is the new appointment date and time? 10/2/23 1:20PM   At which location is the new appointment scheduled to take place? [facility]   What is the reason for this change?  Provider

## 2023-08-24 ENCOUNTER — OFFICE VISIT (OUTPATIENT)
Dept: PHYSICAL THERAPY | Age: 43
End: 2023-08-24
Payer: COMMERCIAL

## 2023-08-24 DIAGNOSIS — M54.16 LUMBAR RADICULOPATHY: Primary | ICD-10-CM

## 2023-08-24 PROCEDURE — 97113 AQUATIC THERAPY/EXERCISES: CPT

## 2023-08-24 NOTE — PROGRESS NOTES
Daily Note     Today's date: 2023  Patient name: Charisse Franks  : 1980  MRN: [id number]  Referring provider: Hortencia Burkett, PT  Dx:   Encounter Diagnosis     ICD-10-CM    1. Lumbar radiculopathy  M54.16           Start Time: 1600  Stop Time:   Total time in clinic (min): 55 minutes    Subjective: pt notes she had an injection on Tuesday. She states the R side hit a nerve and she had a lot of pain with it. She notes she can't bend forward w/o sig increase in LBP.  " I sleep better after Pt session."      Objective: See treatment diary below      Assessment: Tolerated treatment poor. Pt is very slow moving in the water. Limited ROM w/ all ex's. Difficulty w/ any bending forward. Pt does the best she can w/ everything we ask of her. forward flexed position w/ walking after session. Some decrease in pain after session. Patient would benefit from continued PT      Plan: Continue per plan of care. Precautions: cervical fusion C5-7; lumbar laminectomy/discectomy L5-S1; iron deficiency  Precautions:  Daily Treatment Diary     Date 8-3 8/10 8/16 8/17 8/21 8/24     Patient education 10 6'         Water Walk (FW, BW, side) x10’  5 F only 5' F only w/ N 6' F w/ noodle 6' F w/noodle 6' 6     LE work at wall               Hip FF/ext swing              Toe/heel      1 1       Hip ABD/ADD      2 2        1       Knee flexion & extension     1 1       Squats     1 1     UE noodle x3             Push/pull  1 1 1 1 1 1       Rotate  1            Row forward & back   2 2 2 2        OH side bend            UE/Core (AROM, paddles, MB)   AROM AROM AROM AROM        ABD/ADD   1 1 1 1 1       Horizontal Pec Fly   1 1 1 1 1       Alt.  arm swing (shld flex/ext)   1 1 1 1 1       Push pull   1 1 1 1 1       Single paddle rotation           SLS (EO, EC, ball toss)            Aqua Step (FW, lat, eccentric)            Core work:              MF press (red, blue, blk)             Kickboard press (blue, red) Row/ext w/ tubing (red, blue, blk)           Seated on bench             ankle DF/PF  1 2 SL 2 2 2 2       March              ABD/ADD              Knee flexion/extension  2 1 1 1 2 2                           DW HS curls  1 1 1  1     DW toe/heels 1 1 1 1  1     DW TX - hang in the deep water  10 8,6 8' 8' 8' 8' 10' 10       DW ABD/ADD  1 1 1 1 1 1       DW Bicycle  3 3 4 4 5 5       DW Scissor hip flexion/extension  1 1 1 1 1 1       DW SL marches 1 1 1 1       Stretches            DKTC    10X 10X 10x        Wall stretches              Calf stretch at wall              Other: piriformis stretch    10"x3 10"x3      Hot Tub - 10 minutes only  10 nojets 10' no jets 10' 10' 10' 10

## 2023-08-28 ENCOUNTER — APPOINTMENT (OUTPATIENT)
Dept: PHYSICAL THERAPY | Age: 43
End: 2023-08-28
Payer: COMMERCIAL

## 2023-08-31 ENCOUNTER — OFFICE VISIT (OUTPATIENT)
Dept: PHYSICAL THERAPY | Age: 43
End: 2023-08-31
Payer: COMMERCIAL

## 2023-08-31 DIAGNOSIS — M54.16 LUMBAR RADICULOPATHY: Primary | ICD-10-CM

## 2023-08-31 PROCEDURE — 97113 AQUATIC THERAPY/EXERCISES: CPT

## 2023-08-31 NOTE — PROGRESS NOTES
Daily Note     Today's date: 2023  Patient name: Marguerite Beal  : 1980  MRN: [id number]  Referring provider: Glendy Darden, PT  Dx:   Encounter Diagnosis     ICD-10-CM    1. Lumbar radiculopathy  M54.16           Start Time: 1600  Stop Time: 8391  Total time in clinic (min): 45 minutes    Subjective: Patient states she had a couple of weeks where she did not have her pain medication. States she just started taking them again on Monday and they make her very nauseous. Reports being even more nauseated post her 45 min car ride here, but is willing to try the exercises. States her R side still bothers her since her injection on Tuesday. Objective: See treatment diary below      Assessment: Patient was able to complete a majority of her program today despite elevated pain and c/o nausea. Did hold deep water hanging along with hip abd/add/scissor secondary to it causing her more nausea. Slow, guarded movement in the water. Limited ROM noted with all TE in the water. Patient would benefit from continued PT. Plan: Continue per plan of care. Precautions: cervical fusion C5-7; lumbar laminectomy/discectomy L5-S1; iron deficiency  Daily Treatment Diary     Date 8-3 8/10 8/16 8/17 8/21 8/24 8/31    Patient education 10 6'         Water Walk (FW, BW, side) x10’  5 F only 5' F only w/ N 6' F w/ noodle 6' F w/noodle 6' 6 5' FW w/ noodle    LE work at wall               Hip FF/ext swing              Toe/heel      1 1 1'      Hip ABD/ADD      2 2 2'      March     1 1 1'      Knee flexion & extension     1 1 1'      Squats     1 1 1'    UE noodle x3             Push/pull  1 1 1 1 1 1 1'      Rotate  1            Row forward & back   2 2 2 2  2'      OH side bend            UE/Core (AROM, paddles, MB)   AROM AROM AROM AROM  AROM      ABD/ADD   1 1 1 1 1 1'      Horizontal Pec Fly   1 1 1 1 1 1'      Alt.  arm swing (shld flex/ext)   1 1 1 1 1 1'      Push pull   1 1 1 1 1 1'      Single paddle rotation SLS (EO, EC, ball toss)            Aqua Step (FW, lat, eccentric)            Core work:              MF press (red, blue, blk)             Kickboard press (blue, red)              Row/ext w/ tubing (red, blue, blk)           Seated on bench             ankle DF/PF  1 2 SL 2 2 2 2 10x ea nerve glide      March              ABD/ADD              Knee flexion/extension  2 1 1 1 2 2 NP                          DW HS curls  1 1 1  1     DW toe/heels 1 1 1 1  1     DW TX - hang in the deep water  10 8,6 8' 8' 8' 8' 10' 10 10'      DW ABD/ADD  1 1 1 1 1 1 NP      DW Bicycle  3 3 4 4 5 5 5'      DW Scissor hip flexion/extension  1 1 1 1 1 1 NP      DW SL marches 1 1 1 1       Stretches            DKTC    10X 10X 10x        Wall stretches              Calf stretch at wall              Other: piriformis stretch    10"x3 10"x3      Hot Tub - 10 minutes only  10 nojets 10' no jets 10' 10' 10' 10 10'

## 2023-09-06 ENCOUNTER — APPOINTMENT (OUTPATIENT)
Dept: PHYSICAL THERAPY | Age: 43
End: 2023-09-06
Payer: COMMERCIAL

## 2023-09-07 ENCOUNTER — OFFICE VISIT (OUTPATIENT)
Dept: PHYSICAL THERAPY | Age: 43
End: 2023-09-07
Payer: COMMERCIAL

## 2023-09-07 DIAGNOSIS — M54.16 LUMBAR RADICULOPATHY: Primary | ICD-10-CM

## 2023-09-07 PROCEDURE — 97113 AQUATIC THERAPY/EXERCISES: CPT

## 2023-09-07 NOTE — PROGRESS NOTES
Daily Note     Today's date: 2023  Patient name: Kamlesh Adhikari  : 1980  MRN: [id number]  Referring provider: Abbi Rivas, PT  Dx:   Encounter Diagnosis     ICD-10-CM    1. Lumbar radiculopathy  M54.16           Start Time: 1600  Stop Time: 1700  Total time in clinic (min): 60 minutes    Subjective: patient reports she is still feeling nauseous since last week still, notes she is willing to try the pool today and exercises but not do the DW exercises. Objective: See treatment diary below      Assessment: Tolerated treatment fair, Patient was able to complete a majority of her program today despite c/o nausea. Did hold deep water biing and hanging along with hip abd/add/scissor secondary to it causing her more nausea. Slow, guarded movement in the water. Limited ROM noted with all TE in the water. Patient demonstrated fatigue post treatment and would benefit from continued PT      Plan: Continue per plan of care. Precautions: cervical fusion C5-7; lumbar laminectomy/discectomy L5-S1; iron deficiency  Daily Treatment Diary     Date 8-3 8/10 8/16 8/17 8/21 8/24 8/31 9/7   Patient education 10 6'         Water Walk (FW, BW, side) x10’  5 F only 5' F only w/ N 6' F w/ noodle 6' F w/noodle 6' 6 5' FW w/ noodle 5' w/ ns   LE work at wall          5' at end     Hip FF/ext swing              Toe/heel      1 1 1' 1     Hip ABD/ADD      2 2 2' 2     ' 1     Knee flexion & extension     1 1 1' 1     Squats     1 1 1' 1   UE noodle x3             Push/pull  1 1 1 1 1 1 ' 1     Rotate  1            Row forward & back   2 2 2 2  2' 2     OH side bend            UE/Core (AROM, paddles, MB)   AROM AROM AROM AROM  AROM AROM     ABD/ADD   1 1 1 1 1 1' 1     Horizontal Pec Fly   1 1 1 1 1 1' 1     Alt.  arm swing (shld flex/ext)   1 1 1 1 1 1' 1     Push pull   1 1 1 1 1 1' 1     Single paddle rotation           SLS (EO, EC, ball toss)            Aqua Step (FW, lat, eccentric)         2'Fwd Core work:              MF press (red, blue, blk)             Kickboard press (blue, red)              Row/ext w/ tubing (red, blue, blk)           Seated on bench             ankle DF/PF  1 2 SL 2 2 2 2 10x ea nerve glide 10x     March              ABD/ADD              Knee flexion/extension  2 1 1 1 2 2 NP 2                         DW HS curls  1 1 1  1     DW toe/heels 1 1 1 1  1     DW TX - hang in the deep water  10 8,6 8' 8' 8' 8' 10' 10 10' nt     DW ABD/ADD  1 1 1 1 1 1 NP nt     DW Bicycle  3 3 4 4 5 5 5' nt     DW Scissor hip flexion/extension  1 1 1 1 1 1 NP nt     DW SL marches 1 1 1 1       Stretches            DKTC    10X 10X 10x        Wall stretches         Calf 2x10"     Calf stretch at wall              Other: piriformis stretch    10"x3 10"x3   10"x3   Hot Tub - 10 minutes only  10 nojets 10' no jets 10' 10' 10' 10 10' nt

## 2023-09-13 ENCOUNTER — EVALUATION (OUTPATIENT)
Dept: PHYSICAL THERAPY | Age: 43
End: 2023-09-13
Payer: COMMERCIAL

## 2023-09-13 DIAGNOSIS — M54.16 LUMBAR RADICULOPATHY: Primary | ICD-10-CM

## 2023-09-13 PROCEDURE — 97110 THERAPEUTIC EXERCISES: CPT | Performed by: PHYSICAL THERAPIST

## 2023-09-13 PROCEDURE — 97164 PT RE-EVAL EST PLAN CARE: CPT | Performed by: PHYSICAL THERAPIST

## 2023-09-13 NOTE — PROGRESS NOTES
PT Re-Evaluation     Today's date: 2023  Patient name: Sandra Watson  : 1980  MRN: [id number]  Referring provider: Mele Mckeon PT  Dx:   Encounter Diagnosis     ICD-10-CM    1. Lumbar radiculopathy  M54.16                      Assessment  Assessment details: Problem List:  1) central sensitization  2) fear avoidance tendencies  3) limited lumbar mobility in extension    Sandra Wtason is a pleasant 44 y.o. female who presents via Direct Access with chronic low back pain and LE radicular symptoms for several months. She has limited lumbar AROM with fear moving into extension, LE weakness in non-specific myotomal patterns with hypersensitivity in right L4 anterior shin, brisk LE reflexes with (-) Kinsey's/Babinski reflexes, no noted imbalance and demonstrates symptoms progressing towards central sensitization  resulting in the pain she is experiencing, concern at no signs of improvement and fear of not being able to keep active. The patient has been referred by the orthopedic spine surgeon for an MRI and possible EMG. Pt may also benefit from behavioral health consult due to high pain levels, start back score  and fear of movement . Due to high symptom irritability and slight reduction in symptoms with long leg distraction, pt may benefit form aquatic therapy with a trail of DWT. Positive prognostic indicators include pt is a willing participant. Negative prognostic indicators include chronicity of symptoms, anxiety, high symptom irritability, central sensitization and poor surgical outcomes. Comparable signs:  1) LE pain/numbness      2023  Pt continues to note pain radiating from low back into the anterior thigh. Pain and pain pattern out of proportional and global not in specific dermatomal pattern. LE Limb movement and test of PA mobility provoked UE symptoms and HA. Symptoms likely related to central sensitization. Pt has made minimal progress in aquatic therapy.  She has poor tolerance as of late due to nausea. Pt agreed to trail of graded land-based exercise and will add PNE. Impairments: abnormal or restricted ROM, activity intolerance, impaired physical strength, lacks appropriate home exercise program and pain with function  Functional limitations: lifitng, pushing pulling, carrying child, sitting, sleeping, modified workUnderstanding of Dx/Px/POC: fair   Prognosis: fair    Goals  1. Promote centralization of LE symptoms. 2. Pt will progress to tolerate up to 45 min of movement in aquatic environment. 3. Patient will be independent with home exercise program.   4. Patient will be able to manage symptoms independently. Plan  Plan details: Pt will tansition ot land based program for graded exercises with a addtion of PNE. Patient would benefit from: skilled physical therapy  Planned modality interventions: traction  Planned therapy interventions: aquatic therapy, postural training, patient education, therapeutic activities, therapeutic exercise, graded exercise, functional ROM exercises and home exercise program  Other planned therapy interventions: PNE  Frequency: 2x week  Duration in visits: 8  Plan of Care beginning date: 9/13/2023  Plan of Care expiration date: 11/1/2023  Treatment plan discussed with: patient        Subjective Evaluation    History of Present Illness  Mechanism of injury: Pt presents via direct access and reports new onset of low back pain and radiating pain down left leg to the foot for several months that is constant with intermittent right leg pain. She reports paresthesias and pain in the left leg and also notes that at times her legs both go numb. She also c/o paresthesias in her hands. She has difficulty getting out of bed in the morning but she is able to walk as she moves. Sitting for long periods and laying down provoke increased pain. She has difficulty bending and lifting. She has to change position frequently.   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 She is not permitted to lift, push pull and not work > 8 hours, 5 days per week. She has a 5# lift restriction. Pt had surgery 1 year ago for low back. She had discectomy and laminectomy. L5-S1. She had prior surgery for cervical spine  with fusion of levels C5-7. She reports the recovery time took longer for both than what the doctor originally stated. She is scheduled for MRI of low back and possible EMG. .She was prescribed prednisone and muscle relaxer and tramadol. The patient's greatest concern is the pain is not going away and she may need more surgery. 2023  Pt notes she is sleeping a little better on days where she is doing the pool. She is still having pain in her leg dpwn the anterior thigh to the knee. She notes her legs feel heavy in the water. Recently she was having nausea in the pool which she relates to taking tramadol. At work she has to either sit on the floor or stand. She still has pain that varies and nature. She received another injection yesterday. Pain occurs during the night and she has difficulty sleeping. She does continue to work at [employer] which requires physical activity. Patient Goals  Patient goals for therapy: decreased pain    Pain  Current pain ratin  At worst pain ratin  Quality: sharp and radiating  Relieving factors: change in position  Aggravating factors: sitting and lifting (bending )    Social Support  Lives with: spouse and young children    Treatments  Current treatment: medication      Objective     Concurrent Complaints  Positive for disturbed sleep and stomach problem (celiac). Negative for bladder dysfunction, bowel dysfunction and saddle (S4) numbness    Tenderness     Left Hip   Tenderness in the PSIS. Right Hip   Tenderness in the PSIS.      Neurological Testing     Sensation     Lumbar   Left   Intact: light touch and pin prick    Right   Intact: light touch  Hypersensation: pin prick    Comments   Right pin prick: L4 distribution    Reflexes   Left   Biceps (C5/C6): normal (2+)  Brachioradialis (C6): normal (2+)  Triceps (C7): normal (2+)  Patellar (L4): brisk (3+)  Achilles (S1): normal (2+)  Malone's reflex: negative  Babinski sign: negative  Clonus sign: negative    Right   Biceps (C5/C6): normal (2+)  Brachioradialis (C6): normal (2+)  Triceps (C7): normal (2+)  Patellar (L4): brisk (3+)  Achilles (S1): normal (2+)  Malone's reflex: negative  Babinski sign: negative  Clonus sign: negative    Active Range of Motion     Lumbar   Flexion:  Restriction level: minimal  Extension:  with pain Restriction level: maximal  Left lateral flexion:  Restriction level: moderate  Right lateral flexion:  Restriction level: moderate  Left rotation:  Restriction level: moderate  Right rotation:  Restriction level: moderate  Mechanical Assessment    Cervical      Thoracic    Seated right rotation:   EPIC#[id number]^NOTE][    Lumbar      Strength/Myotome Testing     Left Hip   Planes of Motion   Flexion: 4    Right Hip   Planes of Motion   Flexion: 4    Left Knee   Flexion: 4-  Extension: 3+    Right Knee   Flexion: 4-  Extension: 4    Left Ankle/Foot   Dorsiflexion: 3  Plantar flexion: 4  Great toe extension: 0    Right Ankle/Foot   Dorsiflexion: 3  Plantar flexion: 4  Great toe extension: 3    Additional Strength Details  Pt able to heel and toe walk    Tests     Lumbar     Left   Negative crossed SLR, passive SLR and slump test.     Right   Negative crossed SLR, passive SLR and slump test.     Left Hip   Negative RAYMOND and FADIR. Right Hip   Negative RAYMOND and FADIR. Additional Tests Details  Mild stretch discomfort with FADIR B    General Comments:      Lumbar Comments  Unable to perfrom mechanical assessment due to high pain level and pt fear.  Pt did lay prone on (1) pillow but noted B LE numbness   Numbness abolished with B LE traction which increased her LBP             Precautions: central sensitization      TherEx 9/13            PNE education 5'            Nustep 7'            pulleys             UBE             Cane FF                          marching             Heel raises                                                                                                                                                                                                    Ther Activity                                       Gait Training                                       Modalities

## 2023-09-14 RX ORDER — CYCLOBENZAPRINE HCL 10 MG
TABLET ORAL
COMMUNITY
Start: 2023-08-31

## 2023-09-14 NOTE — PROGRESS NOTES
Diagnoses and all orders for this visit:    Encounter for gynecological examination without abnormal finding    BV (bacterial vaginosis)  -     POCT wet mount  -     metroNIDAZOLE (FLAGYL) 500 mg tablet; Take 1 tablet (500 mg total) by mouth every 12 (twelve) hours for 7 days  -     fluconazole (DIFLUCAN) 150 mg tablet; Take one today and 1 in 3 days    Encounter for screening mammogram for malignant neoplasm of breast  -     Mammo screening bilateral w 3d & cad; Future          Results for orders placed or performed in visit on 09/15/23   POCT wet mount   Result Value Ref Range    Yeast, Wet Prep Neg     pH 5.5     Whiff Test Pos     Clue Cells Pos     Trich, Wet Prep Neg      Reviewed medications for BV usage instructions. Return to office if symptoms have not resolved    Perineal hygiene reviewed   Weight bearing exercises minium of 150 mins/weekly advised. Kegel exercises recommended. SBE encouraged, ASCCP guidelines reviewed. Condoms encouraged with all sexual activity to prevent STI's. Gardisil vaccines recommended up to age 39  Calcium/ Vit D dietary requirements discussed,   Advised to call with any issues,  all concerns & questions addressed. See provided information in your after visit summary     F/U Annually and PRN      Health Maintenance:    Last PAP: 2022 Neg/Neg  Next PAP Due:     Last Mammogram: 2023    Life time Reynaldo Cochran % 9.9, Density C heterogeneously dense , Bi-Rads 2 Benign  Next Mammogram: order given    Last Colonoscopy: 2022   RTO age 40     Gardisil: Completed    Subjective    CC: Yearly Exam      Mihaela Guillen is a 44 y.o. female here for an annual exam. [id number]  GYN hx includes:  4 c sections, 1  loss 16 weeks  No personal Hx of breast, cervical, ovarian or colon CA. Family hx of:  No GYN cancers  Medically stable, reports no changes in medical Hx, follows with PMD. Currently having back issues/pain , going to PT    No LMP recorded.  Patient has had an implant. Her menstrual cycles are rare. She denies issues with bleeding during her menses. Denies history of abnormal pap smear. She denies breast concerns,  bowel/bladder dysfunction, urinary symptoms, pelvic pain, or dyspareunia today. Reports increased vaginal discharge, irritation as well as a slight odor for the past week  She is sexually active. Monogamous relationship. Her current method of contraception includes  LNG-IUD (Rima Goss, MARTY) since . Denies any issues with her BCM. She does not want STD testing today.   Denies intimate partner violence    Past Medical History:   Diagnosis Date   • Allergic    • Anemia    • Anxiety    • Asthma     not as adult   • Celiac disease    • History of one miscarriage     2017 10 weeks   • Renal cyst    • Varicella      Past Surgical History:   Procedure Laterality Date   • BACK SURGERY  2022   •  SECTION       son,    daughter,    daughter   • NECK SURGERY      C5-7, disc repair    • IN  DELIVERY ONLY N/A 2018    Procedure:  SECTION () REPEAT;  Surgeon: Martha Beckman DO;  Location: [facility];  Service: Obstetrics       Immunization History   Administered Date(s) Administered   • COVID-19 J&J (Kari) vaccine 0.5 mL 2021   • COVID-19 PFIZER VACCINE 0.3 ML IM 2021   • COVID-19 Pfizer Vac BIVALENT Colin-sucrose 12 Yr+ IM 10/21/2022   • INFLUENZA 10/05/2013, 2017, 2018, 2021, 2022   • Influenza, injectable, quadrivalent, preservative free 0.5 mL 2018, 2020   • Influenza, seasonal, injectable 10/05/2013   • Tdap 2011, 2018       Family History   Adopted: Yes   Problem Relation Age of Onset   • Crohn's disease Mother    • Hypertension Mother    • Alcohol abuse Father    • Drug abuse Father    • Asthma Sister    • No Known Problems Daughter    • No Known Problems Daughter    • No Known Problems Daughter    • Cancer Maternal Grandmother         lung   • Arthritis Maternal Grandmother    • Diabetes Maternal Grandfather    • Hearing loss Maternal Grandfather    • Heart disease Maternal Grandfather    • No Known Problems Son      Social History     Tobacco Use   • Smoking status: Never   • Smokeless tobacco: Never   Vaping Use   • Vaping Use: Never used   Substance Use Topics   • Alcohol use: No   • Drug use: No       Current Outpatient Medications:   •  acetaminophen-codeine (TYLENOL with CODEINE #3) 300-30 MG per tablet, Take 1 tablet by mouth every 6 (six) hours, Disp: , Rfl:   •  albuterol (2.5 mg/3 mL) 0.083 % nebulizer solution, Take 3 mL (2.5 mg total) by nebulization every 6 (six) hours as needed for wheezing or shortness of breath, Disp: 180 mL, Rfl: 5  •  albuterol (PROVENTIL HFA,VENTOLIN HFA) 90 mcg/act inhaler, Inhale 2 puffs every 6 (six) hours as needed for wheezing, Disp: 18 g, Rfl: 3  •  Azelastine HCl 137 MCG/SPRAY SOLN, SPRAY 1 SPRAY INTO EACH NOSTRIL 2 (TWO) TIMES A DAY USE IN EACH NOSTRIL AS DIRECTED, Disp: 90 mL, Rfl: 1  •  [employer] Covid-19 At Home Test Kit KIT, Use as directed, Disp: , Rfl:   •  cyclobenzaprine (FLEXERIL) 10 mg tablet, TAKE 1 TABLET BY MOUTH THREE TIMES A DAY FOR 30 DAYS, Disp: , Rfl:   •  dupilumab (DUPIXENT) subcutaneous injection, Inject 2 mL (300 mg total) under the skin every 14 (fourteen) days, Disp: 2 mL, Rfl: 26  •  Dupixent 300 MG/2ML SOPN, , Disp: , Rfl:   •  ergocalciferol (VITAMIN D2) 50,000 units, Take 1 capsule (50,000 Units total) by mouth every 14 (fourteen) days, Disp: 12 capsule, Rfl: 1  •  fexofenadine (ALLEGRA) 180 MG tablet, Take 180 mg by mouth daily, Disp: , Rfl:   •  fluconazole (DIFLUCAN) 150 mg tablet, Take one today and 1 in 3 days, Disp: 2 tablet, Rfl: 0  •  Fluticasone Furoate-Vilanterol (Breo Ellipta) 100-25 mcg/actuation inhaler, Inhale 1 puff daily Rinse mouth after use., Disp: 180 blister, Rfl: 3  •  gabapentin (NEURONTIN) 300 mg capsule, Take 300 mg by mouth 3 (three) times a day, Disp: , Rfl:   •  linaCLOtide (Linzess) 72 MCG CAPS, Take 1 capsule by mouth daily before breakfast, Disp: 30 capsule, Rfl: 5  •  meloxicam (MOBIC) 15 mg tablet, TAKE 1 TABLET BY MOUTH EVERY DAY FOR 30 DAYS, Disp: , Rfl:   •  metroNIDAZOLE (FLAGYL) 500 mg tablet, Take 1 tablet (500 mg total) by mouth every 12 (twelve) hours for 7 days, Disp: 14 tablet, Rfl: 0  •  montelukast (SINGULAIR) 10 mg tablet, TAKE 1 TABLET BY MOUTH EVERYDAY AT BEDTIME, Disp: 90 tablet, Rfl: 1  •  naloxone (NARCAN) 4 mg/0.1 mL nasal spray, ADMINISTER 1 SPRAY INTO ONE NOSTRIL. CALL 911.  REPEAT AFTER 2-3 MIN IF NO OR MINIMAL RESPONSE, Disp: , Rfl:   •  phentermine 30 MG capsule, Take 1 capsule (30 mg total) by mouth every morning, Disp: 30 capsule, Rfl: 0  •  predniSONE 20 mg tablet, Take 2 tablets (40 mg total) by mouth daily, Disp: 10 tablet, Rfl: 0  •  Sodium Fluoride 5000 PPM 1.1 % PSTE, Use as directed, Disp: , Rfl:   •  EPINEPHrine (EPIPEN) 0.3 mg/0.3 mL SOAJ, Inject 0.3 mL (0.3 mg total) into a muscle once for 1 dose, Disp: 0.6 mL, Rfl: 0  •  traMADol (ULTRAM) 50 mg tablet, TAKE 1 TABLET EVERY 8 HOURS BY ORAL ROUTE FOR 6 DAYS. (Patient not taking: Reported on 9/15/2023), Disp: , Rfl:   Patient Active Problem List    Diagnosis Date Noted   • Constipation 06/15/2023   • Weight gain 2023   • Vitamin D deficiency 2023   • Celiac disease    • Eosinophilia 2022   • Degenerative disc disease, cervical 2022   • Herniated nucleus pulposus, C5-6 2022   • Iron deficiency anemia 2022   • Severe persistent allergic asthma 10/10/2018   • History of 3  sections 02/15/2018       Allergies   Allergen Reactions   • Gluten Meal - Food Allergy Abdominal Pain and GI Intolerance   • Other Vomiting and Abdominal Pain     Gluten    • Fluticasone-Salmeterol Palpitations     "Didn't like the way it made me feel"   • Iron Itching     Gold label only       OB History    Para Term  AB Living   5 4 4 0 1 4   SAB IAB Ectopic Multiple Live Births   1 0 0 0 4      # Outcome Date GA Lbr Jani/2nd Weight Sex Delivery Anes PTL Lv   5 Term 07/19/18 39w0d  3340 g (7 lb 5.8 oz) F CS-LTranv Spinal N KONSTANTIN   4 Term 02/04/11 38w0d  3175 g (7 lb) M CS-Unspec Spinal  KONSTANTIN   3 Term 09/21/07 38w0d  3175 g (7 lb) F CS-Unspec Spinal  KONSTANTIN   2 Term 11/09/04 39w5d  3175 g (7 lb) F CS-Unspec EPI  KONSTANTIN      Complications: Failure to Progress in Second Stage   1 SAB                Vitals:    09/15/23 0902   BP: 130/80   BP Location: Left arm   Patient Position: Sitting   Cuff Size: Large   Weight: 69.9 kg (154 lb)   Height: 5' 2" (1.575 m)     Body mass index is 28.17 kg/m². Review of Systems     Constitutional: Negative for chills, fatigue, fever, headaches, visual disturbances, and unexpected weight change. Respiratory: Negative for cough, & shortness of breath. Cardiovascular: Negative for chest pain. .    Gastrointestinal: Negative for Abd pain, nausea & vomiting, constipation and diarrhea. Genitourinary: Negative for difficulty urinating, dysuria, hematuria, dyspareunia, unusual vaginal bleeding or discharge  Skin: Negative skin changes    Physical Exam     Constitutional: Alert & Oriented x3, well-developed and well-nourished. No distress. HENT: Atraumatic, Normocephalic, Conjunctivae clear  Neck: Normal range of motion. Neck supple. No thyromegaly, mass, nodules or tenderness  Pulmonary: Effort normal.   Abdominal: Soft. No tenderness or masses  Musculoskeletal: Normal ROM  Skin: Warm & Dry  Psychological: Normal mood, thought content, behavior & judgement     Breasts:   Right: tissue soft without masses, tenderness, skin changes or nipple discharge. No areas of erythema or pain. No subclavicular, axillary, pectoral adenopathy  Left:  tissue soft without masses, tenderness, skin changes or nipple discharge. No areas of erythema or pain.  No subclavicular, axillary, pectoral adenopathy    Pelvic exam was performed with patient supine, lithotomy position. Labia: Negative rash, tenderness, lesion or injury on the right labia. Negative rash, tenderness, lesion or injury on the left labia. Urethral meatus:  Negative for  tenderness, inflammation or discharge. Uterus: not deviated, enlarged, fixed or tender. Cervix: No CMT, no discharge or friability. IUD strings visible   Right adnexa: no mass, no tenderness and no fullness. Left adnexa: no mass, no tenderness and no fullness. Vagina: No erythema, tenderness, masses, or foreign body in the vagina. No signs of injury around the vagina. Heavy thin yellowish malodorous vaginal discharge   Perineum without lesions, signs of injury, erythema or swelling. Inguinal Canal:        Right: No inguinal adenopathy or hernia present. Left: No inguinal adenopathy or hernia present.

## 2023-09-14 NOTE — PATIENT INSTRUCTIONS
Breast Self Exam for Women   AMBULATORY CARE:   A breast self-exam (BSE)  is a way to check your breasts for lumps and other changes. Regular BSEs can help you know how your breasts normally look and feel. Most breast lumps or changes are not cancer, but you should always have them checked by a healthcare provider. Why you should do a BSE:  Breast cancer is the most common type of cancer in women. Even if you have mammograms, you may still want to do a BSE regularly. If you know how your breasts normally feel and look, it may help you know when to contact your healthcare provider. Mammograms can miss some cancers. You may find a lump during a BSE that did not show up on a mammogram.  When you should do a BSE:  If you have periods, you may want to do your BSE 1 week after your period ends. This is the time when your breasts may be the least swollen, lumpy, or tender. You can do regular BSEs even if you are breastfeeding or have breast implants. Call your doctor if:   You find any lumps or changes in your breasts. You have breast pain or fluid coming from your nipples. You have questions or concerns about your condition or care. How to do a BSE:       Look at your breasts in a mirror. Look at the size and shape of each breast and nipple. Check for swelling, lumps, dimpling, scaly skin, or other skin changes. Look for nipple changes, such as a nipple that is painful or beginning to pull inward. Gently squeeze both nipples and check to see if fluid (that is not breast milk) comes out of them. If you find any of these or other breast changes, contact your healthcare provider. Check your breasts while you sit or  the following 3 positions:    [place] your arms down at your sides. Raise your hands and join them behind your head. Put firm pressure with your hands on your hips. Bend slightly forward while you look at your breasts in the mirror. Lie down and feel your breasts.   When you lie down, your breast tissue spreads out evenly over your chest. This makes it easier for you to feel for lumps and anything that may not be normal for your breasts. Do a BSE on one breast at a time. Place a small pillow or towel under your left shoulder. Put your left arm behind your head. Use the 3 middle fingers of your right hand. Use your fingertip pads, on the top of your fingers. Your fingertip pad is the most sensitive part of your finger. Use small circles to feel your breast tissue. Use your fingertip pads to make dime-sized, overlapping circles on your breast and armpits. Use light, medium, and firm pressure. First, press lightly. Second, press with medium pressure to feel a little deeper into the breast. Last, use firm pressure to feel deep within your breast.    Examine your entire breast area. Examine the breast area from above the breast to below the breast where you feel only ribs. Make small circles with your fingertips, starting in the middle of your armpit. Make circles going up and down the breast area. Continue toward your breast and all the way across it. Examine the area from your armpit all the way over to the middle of your chest (breastbone). Stop at the middle of your chest.    Move the pillow or towel to your right shoulder, and put your right arm behind your head. Use the 3 fingertip pads of your left hand, and repeat the above steps to do a BSE on your right breast.  What else you can do to check for breast problems or cancer:  Talk to your healthcare provider about mammograms. A mammogram is an x-ray of your breasts to screen for breast cancer or other problems. Your provider can tell you the benefits and risks of mammograms. The first mammogram is usually at age 39 or 48. Your provider may recommend you start at 36 or younger if your risk for breast cancer is high. Mammograms usually continue every 1 to 2 years until age 76.        Follow up with your doctor as directed:  Write down your questions so you remember to ask them during your visits. © Copyright Laura Honeycutt 2022 Information is for End User's use only and may not be sold, redistributed or otherwise used for commercial purposes. The above information is an  only. It is not intended as medical advice for individual conditions or treatments. Talk to your doctor, nurse or pharmacist before following any medical regimen to see if it is safe and effective for you. Wellness Visit for Adults   AMBULATORY CARE:   A wellness visit  is when you see your healthcare provider to get screened for health problems. Your healthcare provider will also give you advice on how to stay healthy. Write down your questions so you remember to ask them. Ask your healthcare provider how often you should have a wellness visit. What happens at a wellness visit:  Your healthcare provider will ask about your health, and your family history of health problems. This includes high blood pressure, heart disease, and cancer. He or she will ask if you have symptoms that concern you, if you smoke, and about your mood. You may also be asked about your intake of medicines, supplements, food, and alcohol. Any of the following may be done: Your weight  will be checked. Your height may also be checked so your body mass index (BMI) can be calculated. Your BMI shows if you are at a healthy weight. Your blood pressure  and heart rate will be checked. Your temperature may also be checked. Blood and urine tests  may be done. Blood tests may be done to check your cholesterol levels. Abnormal cholesterol levels increase your risk for heart disease and stroke. You may also need a blood or urine test to check for diabetes if you are at increased risk. Urine tests may be done to look for signs of an infection or kidney disease. A physical exam  includes checking your heartbeat and lungs with a stethoscope.  Your healthcare provider may also check your skin to look for sun damage. Screening tests  may be recommended. A screening test is done to check for diseases that may not cause symptoms. The screening tests you may need depend on your age, gender, family history, and lifestyle habits. For example, colorectal screening may be recommended if you are 48years old or older. Screening tests you need if you are a woman:   A Pap smear  is used to screen for cervical cancer. Pap smears are usually done every 3 to 5 years depending on your age. You may need them more often if you have had abnormal Pap smear test results in the past. Ask your healthcare provider how often you should have a Pap smear. A mammogram  is an x-ray of your breasts to screen for breast cancer. Experts recommend mammograms every 2 years starting at age 48 years. You may need a mammogram at age 52 years or younger if you have an increased risk for breast cancer. Talk to your healthcare provider about when you should start having mammograms and how often you need them. Vaccines you may need:   Get an influenza vaccine  every year. The influenza vaccine protects you from the flu. Several types of viruses cause the flu. The viruses change over time, so new vaccines are made each year. Get a tetanus-diphtheria (Td) booster vaccine  every 10 years. This vaccine protects you against tetanus and diphtheria. Tetanus is a severe infection that may cause painful muscle spasms and lockjaw. Diphtheria is a severe bacterial infection that causes a thick covering in the back of your mouth and throat. Get a human papillomavirus (HPV) vaccine  if you are female and aged 23 to 32 or male 23 to 24 and never received it. This vaccine protects you from HPV infection. HPV is the most common infection spread by sexual contact. HPV may also cause vaginal, penile, and anal cancers. Get a pneumococcal vaccine  if you are aged 72 years or older.  The pneumococcal vaccine is an injection given to protect you from pneumococcal disease. Pneumococcal disease is an infection caused by pneumococcal bacteria. The infection may cause pneumonia, meningitis, or an ear infection. Get a shingles vaccine  if you are 60 or older, even if you have had shingles before. The shingles vaccine is an injection to protect you from the varicella-zoster virus. This is the same virus that causes chickenpox. Shingles is a painful rash that develops in people who had chickenpox or have been exposed to the virus. How to eat healthy:  My Plate is a model for planning healthy meals. It shows the types and amounts of foods that should go on your plate. Fruits and vegetables make up about half of your plate, and grains and protein make up the other half. A serving of dairy is included on the side of your plate. The amount of calories and serving sizes you need depends on your age, gender, weight, and height. Examples of healthy foods are listed below:  Eat a variety of vegetables  such as dark green, red, and orange vegetables. You can also include canned vegetables low in sodium (salt) and frozen vegetables without added butter or sauces. Eat a variety of fresh fruits , canned fruit in 100% juice, frozen fruit, and dried fruit. Include whole grains. At least half of the grains you eat should be whole grains. Examples include whole-wheat bread, wheat pasta, brown rice, and whole-grain cereals such as oatmeal.    Eat a variety of protein foods such as seafood (fish and shellfish), lean meat, and poultry without skin (turkey and chicken). Examples of lean meats include pork leg, shoulder, or tenderloin, and beef round, sirloin, tenderloin, and extra lean ground beef. Other protein foods include eggs and egg substitutes, beans, peas, soy products, nuts, and seeds. Choose low-fat dairy products such as skim or 1% milk or low-fat yogurt, cheese, and cottage cheese. Limit unhealthy fats  such as butter, hard margarine, and shortening. Exercise:  Exercise at least 30 minutes per day on most days of the week. Some examples of exercise include walking, biking, dancing, and swimming. You can also fit in more physical activity by taking the stairs instead of the elevator or parking farther away from stores. Include muscle strengthening activities 2 days each week. Regular exercise provides many health benefits. It helps you manage your weight, and decreases your risk for type 2 diabetes, heart disease, stroke, and high blood pressure. Exercise can also help improve your mood. Ask your healthcare provider about the best exercise plan for you. General health and safety guidelines:   Do not smoke. Nicotine and other chemicals in cigarettes and cigars can cause lung damage. Ask your healthcare provider for information if you currently smoke and need help to quit. E-cigarettes or smokeless tobacco still contain nicotine. Talk to your healthcare provider before you use these products. Limit alcohol. A drink of alcohol is 12 ounces of beer, 5 ounces of wine, or 1½ ounces of liquor. Lose weight, if needed. Being overweight increases your risk of certain health conditions. These include heart disease, high blood pressure, type 2 diabetes, and certain types of cancer. Protect your skin. Do not sunbathe or use tanning beds. Use sunscreen with a SPF 15 or higher. Apply sunscreen at least 15 minutes before you go outside. Reapply sunscreen every 2 hours. Wear protective clothing, hats, and sunglasses when you are outside. Drive safely. Always wear your seatbelt. Make sure everyone in your car wears a seatbelt. A seatbelt can save your life if you are in an accident. Do not use your cell phone when you are driving. This could distract you and cause an accident. Pull over if you need to make a call or send a text message. Practice safe sex. Use latex condoms if are sexually active and have more than one partner.  Your healthcare provider may recommend screening tests for sexually transmitted infections (STIs). Wear helmets, lifejackets, and protective gear. Always wear a helmet when you ride a bike or motorcycle, go skiing, or play sports that could cause a head injury. Wear protective equipment when you play sports. Wear a lifejacket when you are on a boat or doing water sports. © Copyright [facility] 2022 Information is for End User's use only and may not be sold, redistributed or otherwise used for commercial purposes. The above information is an  only. It is not intended as medical advice for individual conditions or treatments. Talk to your doctor, nurse or pharmacist before following any medical regimen to see if it is safe and effective for you. HPV (Human Papillomavirus)   AMBULATORY CARE:   Human Papillomavirus (HPV)  is the name for a group of viruses that can infect your skin or other parts of your body. HPV is the most common infection spread by sexual contact. It can also be spread from a mother to her baby during delivery. Common symptoms include the following:   Painless warts in your mouth or on your genitals    Genital or anal discharge, bleeding, itching, or pain    Pain when you urinate    Call your doctor if:   You have new or worsening symptoms. You have questions or concerns about your condition or care. HPV diagnosis:  Your healthcare provider may use a vinegar liquid to help diagnose HPV genital warts. Women 27to 72years old can be checked for HPV during regular cervical cancer screenings. An HPV test checks for certain types of HPV that can cause changes in cervical cells. Without treatment, the changed cells can become cancer. An HPV test can be done every 5 years if the results show no infection. The test can be done with or without a Pap smear. A Pap smear checks for cancer or for abnormal cells that can become cancer.  You may be tested for HPV if you have mouth or throat cancer. Treatment:  HPV cannot be cured, but an infection may go away on its own in about 2 years without causing problems. If the infection continues, some types of HPV can lead to health conditions that need to be treated. Examples include warts and certain cancers, especially squamous cell carcinoma (SCC). HPV-linked SCCs commonly develop in the anus, throat (called oropharyngeal cancer), cervix, vagina, penis, or mouth. HPV can also cause a type of cervical cancer called adenocarcinoma. Symptoms of any of these conditions may not develop for several years after you were exposed to HPV. You will need to be monitored closely. Ask your healthcare provider for more information about monitoring, conditions caused by HPV, and available treatments. Prevent an HPV infection:  HPV is usually spread through sexual activity. The following can help prevent infection:  Ask about the HPV vaccine. The HPV vaccine is given to females and males, usually at 6or 15years of age. It can be given from 9 years through 39years of age, if needed. It is most effective if given before sexual activity begins. Use a new condom, contraceptive barrier, or dental dam each time you have sex. This includes oral, vaginal, and anal sex. Talk to your healthcare provider if you have any questions about what to use or how to use it. Follow up with your doctor as directed:  Write down your questions so you remember to ask them during your visits. © Copyright Adriana Kelsey 2022 Information is for End User's use only and may not be sold, redistributed or otherwise used for commercial purposes. The above information is an  only. It is not intended as medical advice for individual conditions or treatments. Talk to your doctor, nurse or pharmacist before following any medical regimen to see if it is safe and effective for you.   Kegel Exercises for Women   AMBULATORY CARE:   Kegel exercises  help strengthen your pelvic muscles. Pelvic muscles hold your pelvic organs, such as your bladder and uterus, in place. Kegel exercises help prevent or control certain conditions, such as urine incontinence (leakage) or uterine prolapse. Call your doctor or physical therapist if:   You cannot feel your muscles tighten or relax. You continue to leak urine. You have questions or concerns about your condition or care. Use the correct muscles:  Pelvic muscles are the muscles you use to control urine flow. To target these muscles, stop and start the flow of urine several times. This will help you become familiar with how it feels to tighten and relax these muscles. How to do Kegel exercises:   Get into a comfortable position. You may lie down, stand up, or sit down to do these exercises. When you first try to do these exercises, it may be easier if you lie down. Tighten or squeeze your pelvic muscles slowly. It may feel like you are trying to hold back urine or gas. Hold this position for 3 seconds. Relax for 3 seconds. Repeat this cycle 10 times. Do not hold your breath when you do Kegel exercises. Keep your stomach, back, and leg muscles relaxed. Do 10 sets of Kegel exercises, at least 3 times a day. When you know how to do Kegel exercises, use different positions. This will help to strengthen your pelvic muscles as much as possible. You can do these exercises while you lie on the floor, watch TV, or while you stand. Tighten your pelvic muscles before you sneeze, cough, or lift to prevent urine leakage. You may notice improved bladder control within about 6 weeks. Follow up with your doctor or physical therapist as directed:  Write down your questions so you remember to ask them during your visits. © Copyright Mikayla Sánchez 2022 Information is for End User's use only and may not be sold, redistributed or otherwise used for commercial purposes. The above information is an  only.  It is not intended as medical advice for individual conditions or treatments. Talk to your doctor, nurse or pharmacist before following any medical regimen to see if it is safe and effective for you. Perineal Hygiene      Your vaginal naturally takes care of its self, it is a self washing system, the less you mess the healthier it will be     No soaps or feminine wash to the vulva, these products can cause dermitis, bacterial infections and other vulvar problems. Use only water to cleanse, or water with Dove or [employer] if necessary. No scented lotions or products are advised in or near your vulva. Use only coconut oil for moisture if needed. No douching this may cause imbalance in your vaginal PH and further issues. If you wear panty liners, you may apply a thin coating of Vaseline, A&D ointment or coconut oil to the vulvar tissues as a skin barrier     [place] underware, loose fitting clothing  Only perfume-free, dye-free laundry detergent, use a second rinse cycle   Avoid fabric softeners/dryer sheets. Partner should avoid the same products as well. Over the counter probiotic to restore vaginal margot may be helpful as well, take daily. You may also look into Boric Acid vaginal suppositories to restore vaginal PH balance for up to 2 weeks as directed on the box. You may not use these if you are pregnant      For vaginal dryness: You may use:     Coconut oil (organic, pure, unscented) as needed for moisture or lubrication. ( Do not use if allergic)       Replens moisture restore external comfort gel daily ( use as directed on the box)        Replens long lasting vaginal moisturizer  ( use as directed on the box)         For Vaginal Lubrication:          You may use:     Coconut oil (organic, pure, unscented) as a lubricant or another scent-free lubricant (Astroglide, Uberlube) if needed.   Do not use coconut oil or silicone if using a condom as this may break down the integrity of the condom and cause an unplanned pregnancy              Do not use coconut oil if allergic               Replens silky smooth lubricant, premium silicone based lubricant for intercourse. ( use as directed, a small amount will provide an enhanced natural feeling)     Any premium over the counter vaginal lubricant water or silicone based. Silicone based will have more staying power. Bacterial Vaginosis   AMBULATORY CARE:   Bacterial vaginosis  is an infection in the vagina. It may cause vaginitis (irritation and inflammation of the vagina). The cause is not known. Bacteria normally found in the vagina are imbalanced. Your risk increases if you are sexually active, you use a douche, or you have an intrauterine device (IUD). Common signs and symptoms of bacterial vaginosis:   White, gray, or yellow vaginal discharge    Vaginal discharge that smells like fish    Itching or burning around the outside of your vagina    Call your doctor or gynecologist if:   Your symptoms come back or do not improve with treatment. You have vaginal bleeding that is not your monthly period. You have questions or concerns about your condition or care. Antibiotics  are given to kill the bacteria. They may be given as a pill or as a cream to put in your vagina. Bacterial vaginosis and pregnancy:  If you have bacterial vaginosis during pregnancy, your baby may be born early or have a low birth weight. Your healthcare provider may recommend testing for bacterial vaginosis before or during your pregnancy. He or she will talk to you about your risk for premature delivery, and make sure you know the benefits and risks of testing. Prevent bacterial vaginosis:   Keep your vaginal area clean and dry. Wear underwear and pantyhose with a cotton crotch. Wipe from front to back after you urinate or have a bowel movement. After you bathe, rinse soap from your vaginal area to decrease your risk for irritation.     Do not use products that cause irritation. Always use unscented tampons or sanitary pads. Do not use feminine sprays, powders, or scented tampons. They may cause irritation and increase your risk for vaginosis. Detergents and fabric softeners may also cause irritation. Do not use a douche. This can cause an imbalance in healthy vaginal bacteria. Use latex condoms during sex. This helps prevent another infection and keeps your partner from getting the infection. Follow up with your doctor or gynecologist as directed: Bacterial vaginosis increases the risk for several health problems, such as pelvic inflammatory disease (PID) or sexually transmitted infections. Work with your healthcare providers to schedule regular appointments to check for health problems. Write down your questions so you remember to ask them during your visits. © Copyright Maggie Wood 2022 Information is for End User's use only and may not be sold, redistributed or otherwise used for commercial purposes. The above information is an  only. It is not intended as medical advice for individual conditions or treatments. Talk to your doctor, nurse or pharmacist before following any medical regimen to see if it is safe and effective for you.

## 2023-09-14 NOTE — PROGRESS NOTES
[id number]  C- section X4   LMP: No LMP recorded. Patient has had an implant. Mirena IUD   PMB:  SA:  YES   HPV: YES   Birth control: Mirena IUD   Last pap: 09/13/2022 Negative HPV Negative   Last mammo: 01/23/2023:  Last colonoscopy: 07/14/2022  Last Dexa: Not on file  Family History: No Gyn cancer in the family history.

## 2023-09-15 ENCOUNTER — ANNUAL EXAM (OUTPATIENT)
Dept: OBGYN CLINIC | Facility: CLINIC | Age: 43
End: 2023-09-15
Payer: COMMERCIAL

## 2023-09-15 VITALS
HEIGHT: 62 IN | BODY MASS INDEX: 28.34 KG/M2 | SYSTOLIC BLOOD PRESSURE: 130 MMHG | WEIGHT: 154 LBS | DIASTOLIC BLOOD PRESSURE: 80 MMHG

## 2023-09-15 DIAGNOSIS — Z01.419 ENCOUNTER FOR GYNECOLOGICAL EXAMINATION WITHOUT ABNORMAL FINDING: Primary | ICD-10-CM

## 2023-09-15 DIAGNOSIS — Z12.31 ENCOUNTER FOR SCREENING MAMMOGRAM FOR MALIGNANT NEOPLASM OF BREAST: ICD-10-CM

## 2023-09-15 DIAGNOSIS — N76.0 BV (BACTERIAL VAGINOSIS): ICD-10-CM

## 2023-09-15 DIAGNOSIS — B96.89 BV (BACTERIAL VAGINOSIS): ICD-10-CM

## 2023-09-15 LAB
BV WHIFF TEST VAG QL: ABNORMAL
CLUE CELLS SPEC QL WET PREP: ABNORMAL
PH SMN: 5.5 [PH]
T VAGINALIS VAG QL WET PREP: ABNORMAL
YEAST VAG QL WET PREP: ABNORMAL

## 2023-09-15 PROCEDURE — S0612 ANNUAL GYNECOLOGICAL EXAMINA: HCPCS | Performed by: OBSTETRICS & GYNECOLOGY

## 2023-09-15 PROCEDURE — 87210 SMEAR WET MOUNT SALINE/INK: CPT | Performed by: OBSTETRICS & GYNECOLOGY

## 2023-09-15 RX ORDER — FLUCONAZOLE 150 MG/1
TABLET ORAL
Qty: 2 TABLET | Refills: 0 | Status: SHIPPED | OUTPATIENT
Start: 2023-09-15 | End: 2023-09-18

## 2023-09-15 RX ORDER — METRONIDAZOLE 500 MG/1
500 TABLET ORAL EVERY 12 HOURS SCHEDULED
Qty: 14 TABLET | Refills: 0 | Status: SHIPPED | OUTPATIENT
Start: 2023-09-15 | End: 2023-09-22

## 2023-09-18 ENCOUNTER — APPOINTMENT (OUTPATIENT)
Dept: PHYSICAL THERAPY | Age: 43
End: 2023-09-18
Payer: COMMERCIAL

## 2023-09-18 DIAGNOSIS — M54.16 LUMBAR RADICULOPATHY: Primary | ICD-10-CM

## 2023-09-18 NOTE — PROGRESS NOTES
Daily Note     Today's date: 2023  Patient name: Suzi Trevino  : 1980  MRN: [id number]  Referring provider: Nora Osman PT  Dx:   Encounter Diagnosis     ICD-10-CM    1. Lumbar radiculopathy  M54.16                      Subjective: ***      Objective: See treatment diary below      Assessment: Tolerated treatment {Tolerated treatment :[id number]}.  Patient {assessment:[id number]}      Plan: {PLAN:[id number]}     Precautions: central sensitization      TherEx             PNE education 5'            Nustep 7'            pulleys             UBE             Cane FF                          marching             Heel raises                                                                                                                                                                                                    Ther Activity                                       Gait Training                                       Modalities

## 2023-09-19 ENCOUNTER — OFFICE VISIT (OUTPATIENT)
Dept: FAMILY MEDICINE CLINIC | Facility: CLINIC | Age: 43
End: 2023-09-19
Payer: COMMERCIAL

## 2023-09-19 VITALS
DIASTOLIC BLOOD PRESSURE: 80 MMHG | HEART RATE: 97 BPM | OXYGEN SATURATION: 97 % | BODY MASS INDEX: 28.63 KG/M2 | TEMPERATURE: 96.7 F | WEIGHT: 155.6 LBS | HEIGHT: 62 IN | SYSTOLIC BLOOD PRESSURE: 110 MMHG

## 2023-09-19 DIAGNOSIS — R42 DIZZINESS: Primary | ICD-10-CM

## 2023-09-19 DIAGNOSIS — F41.9 ANXIETY: ICD-10-CM

## 2023-09-19 DIAGNOSIS — R63.5 WEIGHT GAIN: ICD-10-CM

## 2023-09-19 DIAGNOSIS — K59.00 CONSTIPATION, UNSPECIFIED CONSTIPATION TYPE: ICD-10-CM

## 2023-09-19 DIAGNOSIS — D50.9 IRON DEFICIENCY ANEMIA, UNSPECIFIED IRON DEFICIENCY ANEMIA TYPE: ICD-10-CM

## 2023-09-19 DIAGNOSIS — J45.50 SEVERE PERSISTENT ALLERGIC ASTHMA: ICD-10-CM

## 2023-09-19 PROCEDURE — 99214 OFFICE O/P EST MOD 30 MIN: CPT | Performed by: NURSE PRACTITIONER

## 2023-09-19 RX ORDER — PHENTERMINE HYDROCHLORIDE 30 MG/1
30 CAPSULE ORAL EVERY MORNING
Qty: 30 CAPSULE | Refills: 1 | Status: SHIPPED | OUTPATIENT
Start: 2023-09-19

## 2023-09-19 RX ORDER — LORAZEPAM 0.5 MG/1
0.5 TABLET ORAL EVERY 8 HOURS PRN
Qty: 20 TABLET | Refills: 0 | Status: SHIPPED | OUTPATIENT
Start: 2023-09-19

## 2023-09-19 NOTE — PROGRESS NOTES
Assessment/Plan:     Chronic Problems:  Weight gain  Reassured patient that she did a great job with 14 pound weight loss over the past 5 months. Okay to continue phentermine for the next 2 months. If we do not see any weight loss, will stop medication. She is to call the office with any issues with the medication. Iron deficiency anemia  Recent blood work is much improved. She continues care with hematology. Severe persistent allergic asthma  Patient is having issues getting Dupixent from the mail order pharmacy. She was doing very well with this medication, has not been out for few months. Constipation  Doing much better with medication from GI. Visit Diagnosis:  Diagnoses and all orders for this visit:    Dizziness  Comments: We will obtain MRI as patient is suddenly very dizzy with worsening migraine. Orders:  -     MRI brain wo contrast; Future    Weight gain  -     phentermine 30 MG capsule; Take 1 capsule (30 mg total) by mouth every morning    Anxiety  -     LORazepam (Ativan) 0.5 mg tablet; Take 1 tablet (0.5 mg total) by mouth every 8 (eight) hours as needed for anxiety    Iron deficiency anemia, unspecified iron deficiency anemia type    Severe persistent allergic asthma    Constipation, unspecified constipation type          Subjective:    Patient ID: Christina Fernandez is a 44 y.o. female. Patient presents for weight follow up. She did not lose any weight over the past month. She continues taking phentermine. She stopped tramadol and started on tylenol with codeine. She was getting very dizzy and still gets very dizzy in the car. She did stop all of her medications for 1 week as she thought maybe this was the cause of her dizziness. She does feel better, but still dizzy at times. She does have a worsening migraine. She continues with neck and back pain. She feels she is getting nowhere with orthopedics currently. She is s /p ACDF with Dr. Mati Conklin.        The following portions of the patient's history were reviewed and updated as appropriate: allergies, current medications, past family history, past medical history, past social history, past surgical history and problem list.    Review of Systems   Constitutional: Positive for fatigue. Negative for chills, diaphoresis and fever. Respiratory: Positive for chest tightness, shortness of breath and wheezing. Negative for cough. Cardiovascular: Negative for chest pain and palpitations. Gastrointestinal: Negative for constipation, diarrhea, nausea and vomiting. Musculoskeletal: Positive for back pain and neck pain. Neurological: Positive for dizziness, light-headedness and headaches. Psychiatric/Behavioral: Positive for sleep disturbance.          /80 (BP Location: Left arm, Patient Position: Sitting, Cuff Size: Standard)   Pulse 97   Temp (!) 96.7 °F (35.9 °C) (Tympanic)   Ht 5' 2" (1.575 m)   Wt 70.6 kg (155 lb 9.6 oz)   SpO2 97%   BMI 28.46 kg/m²   Social History     Socioeconomic History   • Marital status: /Civil Union     Spouse name: Not on file   • Number of children: Not on file   • Years of education: Not on file   • Highest education level: Not on file   Occupational History   • Not on file   Tobacco Use   • Smoking status: Never   • Smokeless tobacco: Never   Vaping Use   • Vaping Use: Never used   Substance and Sexual Activity   • Alcohol use: No   • Drug use: No   • Sexual activity: Yes     Partners: Male     Birth control/protection: Condom Male   Other Topics Concern   • Not on file   Social History Narrative    Engaged to be     Always uses seatbelts     Social Determinants of Health     Financial Resource Strain: Not on file   Food Insecurity: Not on file   Transportation Needs: Not on file   Physical Activity: Not on file   Stress: Not on file   Social Connections: Not on file   Intimate Partner Violence: Not on file   Housing Stability: Not on file     Past Medical History: Diagnosis Date   • Allergic    • Anemia    • Anxiety    • Asthma     not as adult   • Celiac disease    • History of one miscarriage     2017 10 weeks   • Renal cyst    • Varicella      Family History   Adopted: Yes   Problem Relation Age of Onset   • Crohn's disease Mother    • Hypertension Mother    • Alcohol abuse Father    • Drug abuse Father    • Asthma Sister    • No Known Problems Daughter    • No Known Problems Daughter    • No Known Problems Daughter    • Cancer Maternal Grandmother         lung   • Arthritis Maternal Grandmother    • Diabetes Maternal Grandfather    • Hearing loss Maternal Grandfather    • Heart disease Maternal Grandfather    • No Known Problems Son      Past Surgical History:   Procedure Laterality Date   • BACK SURGERY  2022   •  SECTION       son,    daughter,   2011 daughter   • NECK SURGERY      C5-7, disc repair    • [place]  DELIVERY ONLY N/A 2018    Procedure:  SECTION () REPEAT;  Surgeon: Landry Sepulveda DO;  Location: [facility];  Service: Obstetrics       Current Outpatient Medications:   •  acetaminophen-codeine (TYLENOL with CODEINE #3) 300-30 MG per tablet, Take 1 tablet by mouth every 6 (six) hours, Disp: , Rfl:   •  albuterol (2.5 mg/3 mL) 0.083 % nebulizer solution, Take 3 mL (2.5 mg total) by nebulization every 6 (six) hours as needed for wheezing or shortness of breath, Disp: 180 mL, Rfl: 5  •  albuterol (PROVENTIL HFA,VENTOLIN HFA) 90 mcg/act inhaler, Inhale 2 puffs every 6 (six) hours as needed for wheezing, Disp: 18 g, Rfl: 3  •  Azelastine HCl 137 MCG/SPRAY SOLN, SPRAY 1 SPRAY INTO EACH NOSTRIL 2 (TWO) TIMES A DAY USE IN EACH NOSTRIL AS DIRECTED, Disp: 90 mL, Rfl: 1  •  [employer] Covid-19 At Home Test Kit KIT, Use as directed, Disp: , Rfl:   •  cyclobenzaprine (FLEXERIL) 10 mg tablet, TAKE 1 TABLET BY MOUTH THREE TIMES A DAY FOR 30 DAYS, Disp: , Rfl:   •  dupilumab (DUPIXENT) subcutaneous injection, Inject 2 mL (300 mg total) under the skin every 14 (fourteen) days, Disp: 2 mL, Rfl: 26  •  Dupixent 300 MG/2ML SOPN, , Disp: , Rfl:   •  ergocalciferol (VITAMIN D2) 50,000 units, Take 1 capsule (50,000 Units total) by mouth every 14 (fourteen) days, Disp: 12 capsule, Rfl: 1  •  fexofenadine (ALLEGRA) 180 MG tablet, Take 180 mg by mouth daily, Disp: , Rfl:   •  Fluticasone Furoate-Vilanterol (Breo Ellipta) 100-25 mcg/actuation inhaler, Inhale 1 puff daily Rinse mouth after use., Disp: 180 blister, Rfl: 3  •  gabapentin (NEURONTIN) 300 mg capsule, Take 300 mg by mouth 3 (three) times a day, Disp: , Rfl:   •  linaCLOtide (Linzess) 72 MCG CAPS, Take 1 capsule by mouth daily before breakfast, Disp: 30 capsule, Rfl: 5  •  LORazepam (Ativan) 0.5 mg tablet, Take 1 tablet (0.5 mg total) by mouth every 8 (eight) hours as needed for anxiety, Disp: 20 tablet, Rfl: 0  •  meloxicam (MOBIC) 15 mg tablet, TAKE 1 TABLET BY MOUTH EVERY DAY FOR 30 DAYS, Disp: , Rfl:   •  metroNIDAZOLE (FLAGYL) 500 mg tablet, Take 1 tablet (500 mg total) by mouth every 12 (twelve) hours for 7 days, Disp: 14 tablet, Rfl: 0  •  montelukast (SINGULAIR) 10 mg tablet, TAKE 1 TABLET BY MOUTH EVERYDAY AT BEDTIME, Disp: 90 tablet, Rfl: 1  •  naloxone (NARCAN) 4 mg/0.1 mL nasal spray, ADMINISTER 1 SPRAY INTO ONE NOSTRIL. CALL 911.  REPEAT AFTER 2-3 MIN IF NO OR MINIMAL RESPONSE, Disp: , Rfl:   •  phentermine 30 MG capsule, Take 1 capsule (30 mg total) by mouth every morning, Disp: 30 capsule, Rfl: 1  •  predniSONE 20 mg tablet, Take 2 tablets (40 mg total) by mouth daily, Disp: 10 tablet, Rfl: 0  •  Sodium Fluoride 5000 PPM 1.1 % PSTE, Use as directed, Disp: , Rfl:   •  EPINEPHrine (EPIPEN) 0.3 mg/0.3 mL SOAJ, Inject 0.3 mL (0.3 mg total) into a muscle once for 1 dose, Disp: 0.6 mL, Rfl: 0    Allergies   Allergen Reactions   • Gluten Meal - Food Allergy Abdominal Pain and GI Intolerance   • Other Vomiting and Abdominal Pain     Gluten    • Fluticasone-Salmeterol Palpitations     "Didn't like the way it made me feel"   • Iron Itching     Gold label only          Lab Review   Annual Exam on 09/15/2023   Component Date Value   • Yeast, Wet Prep 09/15/2023 Neg    • pH 09/15/2023 5.5    • Whiff Test 09/15/2023 Pos    • Clue Cells 09/15/2023 Pos    • Trich, Wet Prep 09/15/2023 Neg    Lab on 07/21/2023   Component Date Value   • IgA 07/21/2023 179    • Gliadin IgA 07/21/2023 19    • Gliadin IgG 07/21/2023 26 (H)    • Tissue Transglut Ab IGG 07/21/2023 10 (H)    • TISSUE TRANSGLUTAMINASE * 07/21/2023 12 (H)    • Endomysial IgA 07/21/2023 Positive (A)         Imaging: No results found. Objective:     Physical Exam  Constitutional:       Appearance: She is well-developed. Cardiovascular:      Rate and Rhythm: Normal rate and regular rhythm. Heart sounds: Normal heart sounds. No murmur heard. Pulmonary:      Effort: Pulmonary effort is normal. No respiratory distress. Breath sounds: Normal breath sounds. Skin:     General: Skin is warm and dry. Neurological:      General: No focal deficit present. Mental Status: She is alert and oriented to person, place, and time. Cranial Nerves: No cranial nerve deficit. Sensory: No sensory deficit. Motor: No weakness. Gait: Gait normal.   Psychiatric:         Mood and Affect: Mood normal.           There are no Patient Instructions on file for this visit. LENI Campuzano    Portions of the record may have been created with voice recognition software. Occasional wrong word or "sound a like" substitutions may have occurred due to the inherent limitations of voice recognition software. Read the chart carefully and recognize, using context, where substitutions have occurred.

## 2023-09-19 NOTE — ASSESSMENT & PLAN NOTE
Reassured patient that she did a great job with 14 pound weight loss over the past 5 months. Okay to continue phentermine for the next 2 months. If we do not see any weight loss, will stop medication. She is to call the office with any issues with the medication.

## 2023-09-19 NOTE — ASSESSMENT & PLAN NOTE
Patient is having issues getting Dupixent from the mail order pharmacy. She was doing very well with this medication, has not been out for few months.

## 2023-09-20 NOTE — PROGRESS NOTES
Daily Note     Today's date: 2023  Patient name: Betsey Tucker  : 1980  MRN: [id number]  Referring provider: Jamari Matos PT  Dx:   Encounter Diagnosis     ICD-10-CM    1. Lumbar radiculopathy  M54.16                      Subjective: ***      Objective: See treatment diary below      Assessment: Tolerated treatment {Tolerated treatment :[id number]}.  Patient {assessment:8201228285}      Plan: {PLAN:[id number]}     Precautions: central sensitization      TherEx             PNE education 5'            Nustep 7'            pulleys             UBE             Cane FF                          marching             Heel raises                                                                                                                                                                                                    Ther Activity                                       Gait Training                                       Modalities

## 2023-09-21 ENCOUNTER — APPOINTMENT (OUTPATIENT)
Dept: PHYSICAL THERAPY | Age: 43
End: 2023-09-21
Payer: COMMERCIAL

## 2023-09-21 DIAGNOSIS — M54.16 LUMBAR RADICULOPATHY: Primary | ICD-10-CM

## 2023-09-22 ENCOUNTER — TELEPHONE (OUTPATIENT)
Dept: HEMATOLOGY ONCOLOGY | Facility: CLINIC | Age: 43
End: 2023-09-22

## 2023-09-25 ENCOUNTER — OFFICE VISIT (OUTPATIENT)
Dept: PHYSICAL THERAPY | Age: 43
End: 2023-09-25
Payer: COMMERCIAL

## 2023-09-25 DIAGNOSIS — M54.16 LUMBAR RADICULOPATHY: Primary | ICD-10-CM

## 2023-09-25 PROCEDURE — 97110 THERAPEUTIC EXERCISES: CPT | Performed by: PHYSICAL THERAPIST

## 2023-09-25 NOTE — PROGRESS NOTES
Daily Note     Today's date: 2023  Patient name: Suzi Trevino  : 1980  MRN: [id number]  Referring provider: Nora Osman PT  Dx:   Encounter Diagnosis     ICD-10-CM    1. Lumbar radiculopathy  M54.16           Start Time: 490  Stop Time: 2967  Total time in clinic (min): 45 minutes    Subjective: Pt continues to c/o dizziness and is being referred for an MRI. Continues to c/o varying pain, numbness, tingling, HA. Objective: See treatment diary below      Assessment: Pt able to tolerate program but did have some tingling in UEs produced with pulleys. Encouraged pt and educated in graded exercises can improve chronic pain. Pt demonstrated understanding and does understand that she may have some increased soreness after new exercises. Plan: Continue per plan of care.       Precautions: central sensitization  POC expires Unit limit Auth Expiration date PT/OT + Visit Limit?   23 No None 30                           Visit/Unit Tracking  AUTH Status:  Date               None Used 11               Remaining  19              FOTO target 54  (37)  FOTO                      TherEx            PNE education 5'            Nustep 7' 7'           pulleys  2'           UBE  2/2           Cane FF  20x                        marching  20x ea           Heel raises   20x ea                        Slant   30"x4                        Paloff press             Core press                                                                                                                                  Ther Activity                                       Gait Training                                       Modalities

## 2023-09-28 ENCOUNTER — OFFICE VISIT (OUTPATIENT)
Dept: PHYSICAL THERAPY | Age: 43
End: 2023-09-28
Payer: COMMERCIAL

## 2023-09-28 DIAGNOSIS — M54.16 LUMBAR RADICULOPATHY: Primary | ICD-10-CM

## 2023-09-28 PROCEDURE — 97110 THERAPEUTIC EXERCISES: CPT

## 2023-09-28 NOTE — PROGRESS NOTES
Daily Note     Today's date: 2023  Patient name: Mihaela Guillen  : 1980  MRN: [id number]  Referring provider: Lory Copeland, PT  Dx:   Encounter Diagnosis     ICD-10-CM    1. Lumbar radiculopathy  M54.16           Start Time: 1600  Stop Time: 3282  Total time in clinic (min): 50 minutes    Subjective:  Patient reports that she sore in her shoulders with the cane lifts last session. Notes she just had Flu and Covid shot today, notes her shoulders are sore today. Objective: See treatment diary below      Assessment: Tolerated treatment fairly well today, lightly increased program today, added standing hip abd, some discomfort noted but abl to work through. Added MF press focusing on core activation, stiff and guarded with all machine transfers. Better tolerance with cane FF. Patient demonstrated fatigue post treatment and would benefit from continued PT      Plan: Continue per plan of care. Progress treatment as tolerated.        Precautions: central sensitization  POC expires Unit limit Auth Expiration date PT/OT + Visit Limit?   23 No None 30                           Visit/Unit Tracking  AUTH Status:  Date              None Used 11 12              Remaining  19 18             FOTO target 54  (37)  FOTO                      TherEx           PNE education 5'            Nustep 7' 7' 8'          pulleys  2' 2'          UBE            Cane FF  20x 20x thumb up                       marching  20x ea 20x          Heel raises   20x ea 20x ea          Hip abd    20x ea          Slant   30"x4 4x30"                       Paloff press             Core press   3"x10                                                                                                                               Ther Activity                                       Gait Training                                       Modalities

## 2023-10-02 ENCOUNTER — OFFICE VISIT (OUTPATIENT)
Dept: HEMATOLOGY ONCOLOGY | Facility: CLINIC | Age: 43
End: 2023-10-02
Payer: COMMERCIAL

## 2023-10-02 ENCOUNTER — APPOINTMENT (OUTPATIENT)
Dept: PHYSICAL THERAPY | Age: 43
End: 2023-10-02
Payer: COMMERCIAL

## 2023-10-02 VITALS
OXYGEN SATURATION: 98 % | RESPIRATION RATE: 16 BRPM | WEIGHT: 156 LBS | HEART RATE: 94 BPM | HEIGHT: 62 IN | SYSTOLIC BLOOD PRESSURE: 130 MMHG | BODY MASS INDEX: 28.71 KG/M2 | DIASTOLIC BLOOD PRESSURE: 80 MMHG | TEMPERATURE: 98.1 F

## 2023-10-02 DIAGNOSIS — D50.9 IRON DEFICIENCY ANEMIA, UNSPECIFIED IRON DEFICIENCY ANEMIA TYPE: Primary | ICD-10-CM

## 2023-10-02 PROCEDURE — 99213 OFFICE O/P EST LOW 20 MIN: CPT | Performed by: INTERNAL MEDICINE

## 2023-10-02 NOTE — PROGRESS NOTES
[address] [facility] [address]    Colby Self  1980      PRIMARY HEMATOLOGIC/ONCOLOGIC DIAGNOSIS:  1. ARNOL  2. Celiac disease    INTERIM HISTORY:  The patient presents for a follow-up. No new complaints.     PAST MEDICAL,PAST SURGICAL, FAMILY AND SOCIAL HISTORY:    Patient Active Problem List   Diagnosis   • History of 3  sections   • Severe persistent allergic asthma   • Iron deficiency anemia   • Eosinophilia   • Degenerative disc disease, cervical   • Herniated nucleus pulposus, C5-6   • Celiac disease   • Weight gain   • Vitamin D deficiency   • Constipation     Past Medical History:   Diagnosis Date   • Allergic    • Anemia    • Anxiety    • Asthma     not as adult   • Celiac disease    • History of one miscarriage     2017 10 weeks   • Renal cyst    • Varicella      Past Surgical History:   Procedure Laterality Date   • BACK SURGERY  2022   •  SECTION       son,    daughter,    daughter   • NECK SURGERY      C5-7, disc repair    • [place]  DELIVERY ONLY N/A 2018    Procedure:  SECTION () REPEAT;  Surgeon: Danica Sheffield DO;  Location: [facility];  Service: Obstetrics     Family History   Adopted: Yes   Problem Relation Age of Onset   • Crohn's disease Mother    • Hypertension Mother    • Alcohol abuse Father    • Drug abuse Father    • Asthma Sister    • No Known Problems Daughter    • No Known Problems Daughter    • No Known Problems Daughter    • Cancer Maternal Grandmother         lung   • Arthritis Maternal Grandmother    • Diabetes Maternal Grandfather    • Hearing loss Maternal Grandfather    • Heart disease Maternal Grandfather    • No Known Problems Son      Social History     Socioeconomic History   • Marital status: /Civil Union     Spouse name: Not on file   • Number of children: Not on file   • Years of education: Not on file   • Highest education level: Not on file   Occupational History   • Not on file   Tobacco Use   • Smoking status: Never   • Smokeless tobacco: Never   Vaping Use   • Vaping Use: Never used   Substance and Sexual Activity   • Alcohol use: No   • Drug use: No   • Sexual activity: Yes     Partners: Male     Birth control/protection: Condom Male   Other Topics Concern   • Not on file   Social History Narrative    Engaged to be     Always uses seatbelts     Social Determinants of Health     Financial Resource Strain: Not on file   Food Insecurity: Not on file   Transportation Needs: Not on file   Physical Activity: Not on file   Stress: Not on file   Social Connections: Not on file   Intimate Partner Violence: Not on file   Housing Stability: Not on file       Current Outpatient Medications:   •  acetaminophen-codeine (TYLENOL with CODEINE #3) 300-30 MG per tablet, Take 1 tablet by mouth every 6 (six) hours, Disp: , Rfl:   •  albuterol (2.5 mg/3 mL) 0.083 % nebulizer solution, Take 3 mL (2.5 mg total) by nebulization every 6 (six) hours as needed for wheezing or shortness of breath, Disp: 180 mL, Rfl: 5  •  albuterol (PROVENTIL HFA,VENTOLIN HFA) 90 mcg/act inhaler, Inhale 2 puffs every 6 (six) hours as needed for wheezing, Disp: 18 g, Rfl: 3  •  Azelastine HCl 137 MCG/SPRAY SOLN, SPRAY 1 SPRAY INTO EACH NOSTRIL 2 (TWO) TIMES A DAY USE IN EACH NOSTRIL AS DIRECTED, Disp: 90 mL, Rfl: 1  •  [employer] Covid-19 At Home Test Kit KIT, Use as directed, Disp: , Rfl:   •  cyclobenzaprine (FLEXERIL) 10 mg tablet, TAKE 1 TABLET BY MOUTH THREE TIMES A DAY FOR 30 DAYS, Disp: , Rfl:   •  dupilumab (DUPIXENT) subcutaneous injection, Inject 2 mL (300 mg total) under the skin every 14 (fourteen) days, Disp: 2 mL, Rfl: 26  •  Dupixent 300 MG/2ML SOPN, , Disp: , Rfl:   •  EPINEPHrine (EPIPEN) 0.3 mg/0.3 mL SOAJ, Inject 0.3 mL (0.3 mg total) into a muscle once for 1 dose, Disp: 0.6 mL, Rfl: 0  •  ergocalciferol (VITAMIN D2) 50,000 units, Take 1 capsule (50,000 Units total) by mouth every 14 (fourteen) days, Disp: 12 capsule, Rfl: 1  •  fexofenadine (ALLEGRA) 180 MG tablet, Take 180 mg by mouth daily, Disp: , Rfl:   •  Fluticasone Furoate-Vilanterol (Breo Ellipta) 100-25 mcg/actuation inhaler, Inhale 1 puff daily Rinse mouth after use., Disp: 180 blister, Rfl: 3  •  gabapentin (NEURONTIN) 300 mg capsule, Take 300 mg by mouth 3 (three) times a day, Disp: , Rfl:   •  linaCLOtide (Linzess) 72 MCG CAPS, Take 1 capsule by mouth daily before breakfast, Disp: 30 capsule, Rfl: 5  •  LORazepam (Ativan) 0.5 mg tablet, Take 1 tablet (0.5 mg total) by mouth every 8 (eight) hours as needed for anxiety, Disp: 20 tablet, Rfl: 0  •  meloxicam (MOBIC) 15 mg tablet, TAKE 1 TABLET BY MOUTH EVERY DAY FOR 30 DAYS, Disp: , Rfl:   •  montelukast (SINGULAIR) 10 mg tablet, TAKE 1 TABLET BY MOUTH EVERYDAY AT BEDTIME, Disp: 90 tablet, Rfl: 1  •  naloxone (NARCAN) 4 mg/0.1 mL nasal spray, ADMINISTER 1 SPRAY INTO ONE NOSTRIL. CALL 911. REPEAT AFTER 2-3 MIN IF NO OR MINIMAL RESPONSE, Disp: , Rfl:   •  phentermine 30 MG capsule, Take 1 capsule (30 mg total) by mouth every morning, Disp: 30 capsule, Rfl: 1  •  predniSONE 20 mg tablet, Take 2 tablets (40 mg total) by mouth daily, Disp: 10 tablet, Rfl: 0  •  Sodium Fluoride 5000 PPM 1.1 % PSTE, Use as directed, Disp: , Rfl:   Allergies   Allergen Reactions   • Gluten Meal - Food Allergy Abdominal Pain and GI Intolerance   • Other Vomiting and Abdominal Pain     Gluten    • Fluticasone-Salmeterol Palpitations     "Didn't like the way it made me feel"   • Iron Itching     Gold label only     Vitals:    10/02/23 1321   BP: 130/80   Pulse: 94   Resp: 16   Temp: 98.1 °F (36.7 °C)   SpO2: 98%       ROS:  CONSTITUTIONAL:  No fever. No chills. No dizziness. No weakness. EYES:  No pain, erythema, or discharge. No blurring of vision. ENT:  No sore throat, URI symptoms. No epistaxis. No tinnitus. CARDIOVASCULAR:  No chest pain. No palpitations.  No lower extremity edema. RESPIRATORY:  No shortness of breath, cough, pain with respiration, pleuritic chest pain. No hemoptysis. No dyspnea. No paroxysmal nocturnal dyspnea. GASTROINTESTINAL:  Normal appetite. No nausea, vomiting, diarrhea. No pain. No bloating. No melena. GENITOURINARY:  No frequency, urgency, nocturia. No hematuria or dysuria. MUSCULOSKELETAL:  No arthralgias or myalgias. INTEGUMENTARY:  No swelling. No bruising. No contusions. No abrasions. No lymphangitis. NEUROLOGIC:  No headache. No neck pain. No numbness or tingling of the extremities. No weakness. PSYCHIATRIC:  No confusion. ENDOCRINE:  No fatigue. No weakness. No history of thyroid, diabetes or adrenal problems. HEMATOLOGICAL:  No bleeding. No petechiae. No bruising.     PHYSICAL EXAM:    GENERAL: AAO x 3  HEENT: AT,NC  CVS: S1S2 RRR  LUNGS: CTA b/l  ABD: NT,ND, +BS  EXTR: no edema  NEURO: CN II-XII grossly intact    LABS:  I have reviewed pertinent labs:  CBC:   Lab Results   Component Value Date    WBC 7.15 07/21/2023    RBC 4.67 07/21/2023    HGB 14.1 07/21/2023    HCT 42.2 07/21/2023    MCV 90 07/21/2023     07/21/2023    MCH 30.2 07/21/2023    MCHC 33.4 07/21/2023    RDW 13.0 07/21/2023    MPV 10.8 07/21/2023    NEUTROABS 4.60 07/21/2023     CMP:   Lab Results   Component Value Date    SODIUM 140 01/21/2023    K 3.8 01/21/2023     01/21/2023    CO2 24 01/21/2023    AGAP 10 01/21/2023    BUN 12 01/21/2023    CREATININE 0.60 01/21/2023    GLUC 118 10/08/2018    GLUF 96 01/21/2023    CALCIUM 8.6 01/21/2023    AST 23 01/21/2023    ALT 28 01/21/2023    ALKPHOS 99 01/21/2023    TP 7.0 01/21/2023    ALB 3.6 01/21/2023    TBILI 0.63 01/21/2023    EGFR 112 01/21/2023     Liver Enzymes:   Lab Results   Component Value Date    AST 23 01/21/2023    ALT 28 01/21/2023    ALKPHOS 99 01/21/2023    TP 7.0 01/21/2023    ALB 3.6 01/21/2023    TBILI 0.63 01/21/2023     Vitamin B12 No results found for: "[id number]"  Iron Study   Lab Results Component Value Date    FERRITIN 184 07/21/2023    CONCFE 33 07/21/2023    TIBC 249 (L) 07/21/2023    IRON 82 07/21/2023     Folate No results found for: "FOLATE"  Magnesium No results found for: "MG"  Phosphorus No results found for: "PHOS"  Coagulation Panel   Lab Results   Component Value Date    DDIMER 1.00 (H) 04/19/2021    PROTIME 13.1 03/11/2022    INR 1.03 03/11/2022    PTT 31 03/11/2022       IMAGING:  No results found. I reviewed the above laboratory and imaging data. ASSESSMENT/PLAN:  1. ARNOL in the setting of celiac disease. Labs from 7/23 reviewed. No need for replacement at this time. Obtain repeat labs. F/u in 3m.

## 2023-10-03 ENCOUNTER — OFFICE VISIT (OUTPATIENT)
Dept: GASTROENTEROLOGY | Facility: CLINIC | Age: 43
End: 2023-10-03
Payer: COMMERCIAL

## 2023-10-03 VITALS
OXYGEN SATURATION: 98 % | BODY MASS INDEX: 28.82 KG/M2 | WEIGHT: 156.6 LBS | HEIGHT: 62 IN | HEART RATE: 91 BPM | DIASTOLIC BLOOD PRESSURE: 74 MMHG | SYSTOLIC BLOOD PRESSURE: 113 MMHG

## 2023-10-03 DIAGNOSIS — K90.0 CELIAC DISEASE: ICD-10-CM

## 2023-10-03 DIAGNOSIS — K58.1 IRRITABLE BOWEL SYNDROME WITH CONSTIPATION: ICD-10-CM

## 2023-10-03 DIAGNOSIS — K59.04 CHRONIC IDIOPATHIC CONSTIPATION: Primary | ICD-10-CM

## 2023-10-03 PROCEDURE — 99213 OFFICE O/P EST LOW 20 MIN: CPT | Performed by: PHYSICIAN ASSISTANT

## 2023-10-03 RX ORDER — TENAPANOR HYDROCHLORIDE 53.2 MG/1
50 TABLET ORAL DAILY
Qty: 30 TABLET | Refills: 11 | Status: SHIPPED | OUTPATIENT
Start: 2023-10-03 | End: 2023-10-03 | Stop reason: SDUPTHER

## 2023-10-03 RX ORDER — TENAPANOR HYDROCHLORIDE 53.2 MG/1
50 TABLET ORAL 2 TIMES DAILY
Qty: 60 TABLET | Refills: 11 | Status: SHIPPED | OUTPATIENT
Start: 2023-10-03

## 2023-10-03 NOTE — PROGRESS NOTES
[facility] - Outpatient Follow-up Note  Christina Fernandez 44 y.o. female MRN: [id number]  Encounter: [id number]          ASSESSMENT AND PLAN:      1. Chronic idiopathic constipation  Ibsrela 50mg BID was helping until she ran out of samples  She is back on Linzess without relief  More samples provided and script sent to [facility]    2. Celiac disease  She is doing very well on a GFD  She has been cautious to avoid cross contamination and investigate her cosmetics    ______________________________________________________________________    SUBJECTIVE: 60-year-old female with celiac disease and chronic constipation who presents for routine follow-up. She is doing very well on a gluten-free diet. She recently has become more in tune with avoiding cross-contamination and has even invested in her own cutting board and plans. She rarely eats out. She continues with constipation. She is increasingly frustrated with this symptom among others. She had been doing better on Ibsrela 50 mg 1-2 times a day however her samples ran out and she went back on Linzess. This is doing nothing for her. She denies any abdominal pain. She has no rectal bleeding. She is tolerating a diet without vomiting. She is frustrated by her inability to lose weight. She is focused on the fact that she gained 2 pounds since being here in July. REVIEW OF SYSTEMS IS OTHERWISE NEGATIVE.       Historical Information   Past Medical History:   Diagnosis Date   • Allergic    • Anemia    • Anxiety    • Asthma     not as adult   • Celiac disease    • History of one miscarriage     2017 10 weeks   • Renal cyst    • Varicella      Past Surgical History:   Procedure Laterality Date   • BACK SURGERY  2022   •  SECTION      2004 son,    daughter,    daughter   • NECK SURGERY      C5-7, disc repair    • CT  DELIVERY ONLY N/A 2018    Procedure:  SECTION () REPEAT;  Surgeon: Ema Mooney DO;  Location: [facility] ;  Service: Obstetrics     Social History   Social History     Substance and Sexual Activity   Alcohol Use No     Social History     Substance and Sexual Activity   Drug Use No     Social History     Tobacco Use   Smoking Status Never   Smokeless Tobacco Never     Family History   Adopted: Yes   Problem Relation Age of Onset   • Crohn's disease Mother    • Hypertension Mother    • Alcohol abuse Father    • Drug abuse Father    • Asthma Sister    • No Known Problems Daughter    • No Known Problems Daughter    • No Known Problems Daughter    • Cancer Maternal Grandmother         lung   • Arthritis Maternal Grandmother    • Diabetes Maternal Grandfather    • Hearing loss Maternal Grandfather    • Heart disease Maternal Grandfather    • No Known Problems Son        Meds/Allergies       Current Outpatient Medications:   •  acetaminophen-codeine (TYLENOL with CODEINE #3) 300-30 MG per tablet  •  albuterol (2.5 mg/3 mL) 0.083 % nebulizer solution  •  albuterol (PROVENTIL HFA,VENTOLIN HFA) 90 mcg/act inhaler  •  Azelastine HCl 137 MCG/SPRAY SOLN  •  [employer] Covid-19 At Home Test Kit KIT  •  cyclobenzaprine (FLEXERIL) 10 mg tablet  •  dupilumab (DUPIXENT) subcutaneous injection  •  Dupixent 300 MG/2ML SOPN  •  ergocalciferol (VITAMIN D2) 50,000 units  •  fexofenadine (ALLEGRA) 180 MG tablet  •  Fluticasone Furoate-Vilanterol (Breo Ellipta) 100-25 mcg/actuation inhaler  •  gabapentin (NEURONTIN) 300 mg capsule  •  LORazepam (Ativan) 0.5 mg tablet  •  meloxicam (MOBIC) 15 mg tablet  •  montelukast (SINGULAIR) 10 mg tablet  •  naloxone (NARCAN) 4 mg/0.1 mL nasal spray  •  phentermine 30 MG capsule  •  predniSONE 20 mg tablet  •  Sodium Fluoride 5000 PPM 1.1 % PSTE  •  Tenapanor HCl (Ibsrela) 50 MG TABS  •  EPINEPHrine (EPIPEN) 0.3 mg/0.3 mL SOAJ    Allergies   Allergen Reactions   • Gluten Meal - Food Allergy Abdominal Pain and GI Intolerance   • Other Vomiting and Abdominal Pain Gluten    • Fluticasone-Salmeterol Palpitations     "Didn't like the way it made me feel"   • Iron Itching     Gold label only           Objective     Blood pressure 113/74, pulse 91, height 5' 2" (1.575 m), weight 71 kg (156 lb 9.6 oz), SpO2 98 %, currently breastfeeding. Body mass index is 28.64 kg/m². PHYSICAL EXAM:      General Appearance:   Alert, cooperative, no distress   HEENT:   Normocephalic, atraumatic, anicteric.     Neck:  Supple, symmetrical, trachea midline   Lungs:   Clear to auscultation bilaterally; no rales, rhonchi or wheezing; respirations unlabored    Heart[de-identified]   Regular rate and rhythm; no murmur, rub, or gallop. Abdomen:   Soft, non-tender, non-distended; normal bowel sounds; no masses, no organomegaly    Genitalia:   Deferred    Rectal:   Deferred    Extremities:  No cyanosis, clubbing or edema    Pulses:  2+ and symmetric    Skin:  No jaundice, rashes, or lesions    Lymph nodes:  No palpable cervical lymphadenopathy        Lab Results:   No visits with results within 1 Day(s) from this visit. Latest known visit with results is:   Annual Exam on 09/15/2023   Component Date Value   • Yeast, Wet Prep 09/15/2023 Neg    • pH 09/15/2023 5.5    • Whiff Test 09/15/2023 Pos    • Clue Cells 09/15/2023 Pos    • Trich, Wet Prep 09/15/2023 Neg          Radiology Results:   No results found.

## 2023-10-04 ENCOUNTER — TELEPHONE (OUTPATIENT)
Dept: GASTROENTEROLOGY | Facility: CLINIC | Age: 43
End: 2023-10-04

## 2023-10-04 ENCOUNTER — APPOINTMENT (OUTPATIENT)
Dept: PHYSICAL THERAPY | Age: 43
End: 2023-10-04
Payer: COMMERCIAL

## 2023-10-05 ENCOUNTER — OFFICE VISIT (OUTPATIENT)
Dept: PHYSICAL THERAPY | Age: 43
End: 2023-10-05
Payer: COMMERCIAL

## 2023-10-05 DIAGNOSIS — M54.16 LUMBAR RADICULOPATHY: Primary | ICD-10-CM

## 2023-10-05 PROCEDURE — 97112 NEUROMUSCULAR REEDUCATION: CPT

## 2023-10-05 PROCEDURE — 97110 THERAPEUTIC EXERCISES: CPT

## 2023-10-05 NOTE — PROGRESS NOTES
Daily Note     Today's date: 10/5/2023  Patient name: Elida Amador  : 1980  MRN: [id number]  Referring provider: Sneha Bedoya, PT  Dx:   Encounter Diagnosis     ICD-10-CM    1. Lumbar radiculopathy  M54.16           Start Time: 1600  Stop Time: 7712  Total time in clinic (min): 55 minutes    Subjective: Patient reports her R hip and L knee is hurting today. Pt reports she is getting MRI tomorrow and is a little nervous. Objective: See treatment diary below      Assessment: Tolerated treatment fairly well, some challenges today due to increased knee and hip pain. Review HEP for stretches LE and back. Added ball wit the standing exercises to work on stability. Slow guarded transitions from machine to machine. Some numbness noted with shoulder FF with cane. Increased time on UBE and pulleys. Patient exhibited good technique with therapeutic exercises and would benefit from continued PT      Plan: Continue per plan of care.       Precautions: central sensitization  POC expires Unit limit Auth Expiration date PT/OT + Visit Limit?   23 No None 30                           Visit/Unit Tracking  AUTH Status:  Date 9/25 9/28 10/5            None Used 11 12 13             Remaining  19 18 17            FOTO target 54  (37)  FOTO                      TherEx 9/13 9/25 9/28 10/5         PNE education 5'            Nustep 7' 7' 8' 8' L1         pulleys  2' 2' 3'         UBE  2/2 2/2 3/3         Cane FF  20x 20x thumb up 18x*                      marching  20x ea 20x 20x with ball at table         Heel raises   20x ea 20x ea 20x with ball at table         Hip abd    20x ea nt         Slant   30"x4 4x30" 4x30"  L2                      Paloff press             Core press   3"x10 5"x10 med yellow ball                                   Hamstring step on stool    HEP [id number]"x2         piriformis    HEP review  15"x2         Modified cat/cow stretch    Hep review 15"x2 Ther Activity                                       Gait Training                                       Modalities

## 2023-10-06 ENCOUNTER — HOSPITAL ENCOUNTER (OUTPATIENT)
Dept: MRI IMAGING | Facility: HOSPITAL | Age: 43
End: 2023-10-06
Payer: COMMERCIAL

## 2023-10-06 DIAGNOSIS — R42 DIZZINESS: ICD-10-CM

## 2023-10-06 PROCEDURE — G1004 CDSM NDSC: HCPCS

## 2023-10-06 PROCEDURE — 70551 MRI BRAIN STEM W/O DYE: CPT

## 2023-10-09 ENCOUNTER — APPOINTMENT (OUTPATIENT)
Dept: PHYSICAL THERAPY | Age: 43
End: 2023-10-09
Payer: COMMERCIAL

## 2023-10-09 ENCOUNTER — TELEPHONE (OUTPATIENT)
Age: 43
End: 2023-10-09

## 2023-10-09 NOTE — TELEPHONE ENCOUNTER
Emailed Colt Curiel from [employer] to assist pt with patient assistance. Unable to leave a message mailbox is full. We have samples for pt. Will try calling again later.

## 2023-10-09 NOTE — TELEPHONE ENCOUNTER
Pharmacy Benefits     [place], [address] [place] [address] ([facility])    Covered:  Retail, Mail Order    Unknown:  Specialty, Long-Term Care  Member ID:  [id number]  Group ID:  Clyda Jarrell name:  [facility] [address] [facility]:  [id number]  PCN:

## 2023-10-09 NOTE — TELEPHONE ENCOUNTER
PA has already submitted and is in process for this patient and drug. ;DZZUIF:[id number];IDOHZD: In Process;

## 2023-10-10 ENCOUNTER — OFFICE VISIT (OUTPATIENT)
Dept: PHYSICAL THERAPY | Age: 43
End: 2023-10-10
Payer: COMMERCIAL

## 2023-10-10 DIAGNOSIS — M54.16 LUMBAR RADICULOPATHY: Primary | ICD-10-CM

## 2023-10-10 PROCEDURE — 97110 THERAPEUTIC EXERCISES: CPT | Performed by: PHYSICAL THERAPIST

## 2023-10-10 RX ORDER — TENAPANOR HYDROCHLORIDE 53.2 MG/1
50 TABLET ORAL 2 TIMES DAILY
Qty: 60 TABLET | Refills: 11 | Status: SHIPPED | OUTPATIENT
Start: 2023-10-10

## 2023-10-10 NOTE — TELEPHONE ENCOUNTER
Spoke to [address] from [employer] [place] [facility] requested we faxed revised (K58.1) office visit note and prescription for IBSrela to fax # [phone]. Fax a copy of Denied prior auth to [phone] so they can start the patient assistance process for patient. Patient's mailbox if full and was unable to leave a message. Will try calling back later. Need to have the patient call [employer] [place] [facility] directly to start the patient assistance process. The telephone number is [phone].

## 2023-10-10 NOTE — TELEPHONE ENCOUNTER
Spoke to pt and provider her with [employer] contacts info. Pt advised that she will call after work today.

## 2023-10-10 NOTE — PROGRESS NOTES
Daily Note     Today's date: 10/10/2023  Patient name: Naila Mcmahon  : 1980  MRN: [id number]  Referring provider: Julene Jeans, PT  Dx:   Encounter Diagnosis     ICD-10-CM    1. Lumbar radiculopathy  M54.16           Start Time: 7410  Stop Time: 3484  Total time in clinic (min): 45 minutes    Subjective: Pt continues to report pain in her hips and numbness in her hands. She had an MRI of the brain and is awaiting results. Pt also notes she is scheduled to also have a neck MRI. Objective: See treatment diary below      Assessment: Pt tolerated session fair. Pt moves slow and guarded through session. Plan: Continue per plan of care.       Precautions: central sensitization  POC expires Unit limit Auth Expiration date PT/OT + Visit Limit?   23 No None 30                           Visit/Unit Tracking  AUTH Status:  Date 9/25 9/28 10/5 10/10           None Used 11 12 13 14            Remaining  19 18 17 16           FOTO target 54  (37)  56                      TherEx 9/13 9/25 9/28 10/5 10/10        PNE education 5'            Nustep 7' 7' 8' 8' L1 8 L4        pulleys  2' 2' 3' 3'        UBE  /2 2/2 3/3 5/5        Cane FF  20x 20x thumb up 18x* 20                     marching  20x ea 20x 20x with ball at table 20x        Heel raises   20x ea 20x ea 20x with ball at table 20x        Hip abd    20x ea nt         Slant   30"x4 4x30" 4x30"  L2 4x                     Paloff press             Core press   3"x10 5"x10 med yellow ball 10x                                  Hamstring step on stool    HEP [id number]"x2         piriformis    HEP review  15"x2         Modified cat/cow stretch    Hep review 15"x2                                                             Ther Activity                                       Gait Training                                       Modalities

## 2023-10-12 ENCOUNTER — OFFICE VISIT (OUTPATIENT)
Dept: PHYSICAL THERAPY | Age: 43
End: 2023-10-12
Payer: COMMERCIAL

## 2023-10-12 DIAGNOSIS — M54.16 LUMBAR RADICULOPATHY: Primary | ICD-10-CM

## 2023-10-12 PROCEDURE — 97110 THERAPEUTIC EXERCISES: CPT

## 2023-10-12 NOTE — PROGRESS NOTES
Daily Note     Today's date: 10/12/2023  Patient name: Adah Boxer  : 1980  MRN: [id number]  Referring provider: Mallika Mcgee, PT  Dx:   Encounter Diagnosis     ICD-10-CM    1. Lumbar radiculopathy  M54.16           Start Time: 1600  Stop Time: 1700  Total time in clinic (min): 60 minutes    Subjective: Patient reports she is still getting dizzy. Patient notes she gets MRI neck tomorrow. Objective: See treatment diary below      Assessment: Tolerated treatment fairly well, c/o pain bl hips throughout session, reviewed stretches hips lolis hip flexors/runners stretch standing. Some numbness bl hands after UBE. Patient demonstrated fatigue post treatment and would benefit from continued PT      Plan: Continue per plan of care. Progress treatment as tolerated.        Precautions: central sensitization  POC expires Unit limit Auth Expiration date PT/OT + Visit Limit?   23 No None 30                           Visit/Unit Tracking  AUTH Status:  Date 9/25 9/28 10/5 10/10 10/12          None Used 11 12 13 14 15           Remaining  19 18 17 16 15          FOTO target 54  (37)  56                      TherEx 9/13 9/25 9/28 10/5 10/10 10/12       PNE education 5'            Nustep 7' 7' 8' 8' L1 8 L4 10'  L4       pulleys  2' 2' 3' 3' 3'       UBE  2/2 2/2 3/3 5/5 5/5       Cane FF  20x 20x thumb up 18x* 20 20x                    marching  20x ea 20x 20x with ball at table 20x 20x       Heel raises   20x ea 20x ea 20x with ball at table 20x 20x       Hip abd    20x ea nt         Slant   30"x4 4x30" 4x30"  L2 4x 4x                    Paloff press             Core press   3"x10 5"x10 med yellow ball 10x 10x                                  Hamstring step on stool    HEP [id number]"x2         piriformis    HEP review  15"x2         Modified cat/cow stretch    Hep review 15"x2                                                             Ther Activity                                       Gait Training Modalities

## 2023-10-13 DIAGNOSIS — J01.00 ACUTE NON-RECURRENT MAXILLARY SINUSITIS: Primary | ICD-10-CM

## 2023-10-13 RX ORDER — AMOXICILLIN AND CLAVULANATE POTASSIUM 875; 125 MG/1; MG/1
1 TABLET, FILM COATED ORAL EVERY 12 HOURS SCHEDULED
Qty: 14 TABLET | Refills: 0 | Status: SHIPPED | OUTPATIENT
Start: 2023-10-13 | End: 2023-10-20

## 2023-10-16 ENCOUNTER — OFFICE VISIT (OUTPATIENT)
Dept: PHYSICAL THERAPY | Age: 43
End: 2023-10-16
Payer: COMMERCIAL

## 2023-10-16 ENCOUNTER — TELEPHONE (OUTPATIENT)
Dept: GASTROENTEROLOGY | Facility: CLINIC | Age: 43
End: 2023-10-16

## 2023-10-16 DIAGNOSIS — M54.16 LUMBAR RADICULOPATHY: Primary | ICD-10-CM

## 2023-10-16 PROCEDURE — 97110 THERAPEUTIC EXERCISES: CPT

## 2023-10-16 NOTE — PROGRESS NOTES
Daily Note     Today's date: 10/16/2023  Patient name: Nataliya Aiken  : 1980  MRN: [id number]  Referring provider: Brea Garsia PT  Dx:   Encounter Diagnosis     ICD-10-CM    1. Lumbar radiculopathy  M54.16           Start Time:   Stop Time:   Total time in clinic (min): 38 minutes    Subjective: Patient reports she had neck MRI and is waiting for her results. Reports she is still getting HA's at times. Patient reports her hips are feeling better, no c/o pain for two day. Objective: See treatment diary below      Assessment: Tolerated treatment fairly well today, slow guarded throughout session but less today. Increased resistance on the nustep today. Patient demonstrated fatigue post treatment and would benefit from continued PT      Plan: Continue per plan of care. Progress treatment as tolerated.        Precautions: central sensitization  POC expires Unit limit Auth Expiration date PT/OT + Visit Limit?   23 No None 30                           Visit/Unit Tracking  AUTH Status:  Date 9/25 9/28 10/5 10/10 10/12 10/16         None Used 11 12 13 14 15 16          Remaining  19 18 17 16 15 14         FOTO target 54  (37)  56                      TherEx 9/13 9/25 9/28 10/5 10/10 10/12 10/16      PNE education 5'            Nustep 7' 7' 8' 8' L1 8 L4 10'  L4 10' L5      pulleys  2' 2' 3' 3' 3' 3'      UBE  2/2 2/2 3/3 5/5 5/5 4.5/4.5      Cane FF  20x 20x thumb up 18x* 20 20x 20x                   marching  20x ea 20x 20x with ball at table 20x 20x 20x      Heel raises   20x ea 20x ea 20x with ball at table 20x 20x 20x      Hip abd    20x ea nt         Slant   30"x4 4x30" 4x30"  L2 4x 4x 4x                   Paloff press             Core press   3"x10 5"x10 med yellow ball 10x 10x  10x                                Hamstring step on stool    HEP [id number]"x2         piriformis    HEP review  15"x2         Modified cat/cow stretch    Hep review 15"x2 Ther Activity                                       Gait Training                                       Modalities

## 2023-10-19 ENCOUNTER — OFFICE VISIT (OUTPATIENT)
Dept: PHYSICAL THERAPY | Age: 43
End: 2023-10-19
Payer: COMMERCIAL

## 2023-10-19 DIAGNOSIS — M54.16 LUMBAR RADICULOPATHY: Primary | ICD-10-CM

## 2023-10-19 PROCEDURE — 97110 THERAPEUTIC EXERCISES: CPT

## 2023-10-19 NOTE — PROGRESS NOTES
Daily Note     Today's date: 10/19/2023  Patient name: Candelaria Kelly  : 1980  MRN: [id number]  Referring provider: Brennan Rodriguez, PT  Dx:   Encounter Diagnosis     ICD-10-CM    1. Lumbar radiculopathy  M54.16           Start Time: 1600  Stop Time: 2608  Total time in clinic (min): 50 minutes    Subjective: Patient reports she is still getting HA'S and her hands are going numb at times. Patient notes she is constantly dropping objects at work. Objective: See treatment diary below      Assessment: Tolerated treatment fairly well today, modified program due to patient not able to use her R foot due to having a tendon release yesterday kept program light and NWB due to foot pain. Patient demonstrated fatigue post treatment and would benefit from continued PT      Plan: Continue per plan of care. Progress treatment as tolerated.        Precautions: central sensitization  POC expires Unit limit Auth Expiration date PT/OT + Visit Limit?   23 No None 30                           Visit/Unit Tracking  AUTH Status:  Date 9/25 9/28 10/5 10/10 10/12 10/16 10/19        None Used 11 12 13 14 15 16 17         Remaining  19 18 17 16 15 14 13        FOTO target 54  (37)  56                      TherEx 9/13 9/25 9/28 10/5 10/10 10/12 10/16 10/19     PNE education 5'            Nustep 7' 7' 8' 8' L1 8 L4 10'  L4 10' L5 nt     pulleys  2' 2' 3' 3' 3' 3' 4'     UBE  2/2 2/2 3/3 5/5 5/5 4.5/4.5 5     Cane FF  20x 20x thumb up 18x* 20 20x 20x 30x     Web slide row        20x green     marching  20x ea 20x 20x with ball at table 20x 20x 20x Ball sq 30x     Heel raises   20x ea 20x ea 20x with ball at table 20x 20x 20x Hip abd 30x grn tb     Hip abd    20x ea nt         Slant   30"x4 4x30" 4x30"  L2 4x 4x 4x nt                  Paloff press             Core press   3"x10 5"x10 med yellow ball 10x 10x  10x 15x seated                               Hamstring step on stool    HEP [id number]"x2         piriformis    HEP review  15"x2         Modified cat/cow stretch    Hep review 15"x2                                                             Ther Activity                                       Gait Training                                       Modalities

## 2023-10-25 ENCOUNTER — APPOINTMENT (OUTPATIENT)
Dept: LAB | Facility: HOSPITAL | Age: 43
End: 2023-10-25
Payer: COMMERCIAL

## 2023-10-25 DIAGNOSIS — M54.59 OTHER LOW BACK PAIN: ICD-10-CM

## 2023-10-25 LAB
ANA SER QL IA: NEGATIVE
B BURGDOR IGG+IGM SER QL IA: NEGATIVE
CRP SERPL QL: <1 MG/L
ERYTHROCYTE [DISTWIDTH] IN BLOOD BY AUTOMATED COUNT: 13.2 % (ref 11.6–15.1)
ERYTHROCYTE [SEDIMENTATION RATE] IN BLOOD: 16 MM/HOUR (ref 0–19)
HCT VFR BLD AUTO: 41.7 % (ref 34.8–46.1)
HGB BLD-MCNC: 13.8 G/DL (ref 11.5–15.4)
MCH RBC QN AUTO: 30.3 PG (ref 26.8–34.3)
MCHC RBC AUTO-ENTMCNC: 33.1 G/DL (ref 31.4–37.4)
MCV RBC AUTO: 91 FL (ref 82–98)
PLATELET # BLD AUTO: 390 THOUSANDS/UL (ref 149–390)
PMV BLD AUTO: 11.1 FL (ref 8.9–12.7)
RBC # BLD AUTO: 4.56 MILLION/UL (ref 3.81–5.12)
URATE SERPL-MCNC: 3.7 MG/DL (ref 2–7.5)
WBC # BLD AUTO: 7.13 THOUSAND/UL (ref 4.31–10.16)

## 2023-10-25 PROCEDURE — 86038 ANTINUCLEAR ANTIBODIES: CPT

## 2023-10-25 PROCEDURE — 84550 ASSAY OF BLOOD/URIC ACID: CPT

## 2023-10-25 PROCEDURE — 36415 COLL VENOUS BLD VENIPUNCTURE: CPT

## 2023-10-25 PROCEDURE — 86430 RHEUMATOID FACTOR TEST QUAL: CPT

## 2023-10-25 PROCEDURE — 85652 RBC SED RATE AUTOMATED: CPT

## 2023-10-25 PROCEDURE — 86618 LYME DISEASE ANTIBODY: CPT

## 2023-10-25 PROCEDURE — 86140 C-REACTIVE PROTEIN: CPT

## 2023-10-25 PROCEDURE — 85027 COMPLETE CBC AUTOMATED: CPT

## 2023-10-26 ENCOUNTER — OFFICE VISIT (OUTPATIENT)
Dept: PHYSICAL THERAPY | Age: 43
End: 2023-10-26
Payer: COMMERCIAL

## 2023-10-26 DIAGNOSIS — M54.16 LUMBAR RADICULOPATHY: Primary | ICD-10-CM

## 2023-10-26 LAB — RHEUMATOID FACT SER QL LA: NEGATIVE

## 2023-10-26 PROCEDURE — 97110 THERAPEUTIC EXERCISES: CPT

## 2023-10-26 NOTE — PROGRESS NOTES
Daily Note     Today's date: 10/26/2023  Patient name: Luanne Goodwin  : 1980  MRN: [id number]  Referring provider: Malachi Guzman, PT  Dx:   Encounter Diagnosis     ICD-10-CM    1. Lumbar radiculopathy  M54.16           Start Time:   Stop Time: 1700  Total time in clinic (min): 55 minutes    Subjective: Patient reports dull ache today in he LB, notes she still is c/o increased headaches. Objective: See treatment diary below      Assessment: Tolerated treatment fairly well, still slow guarded movements,  able to resume rest of her program today with added ex from last session. Patient demonstrated fatigue post treatment and would benefit from continued PT      Plan: Continue per plan of care.       Precautions: central sensitization  POC expires Unit limit Auth Expiration date PT/OT + Visit Limit?   23 No None 30                           Visit/Unit Tracking  AUTH Status:  Date 9/25 9/28 10/5 10/10 10/12 10/16 10/19 10/26       None Used 11 12 13 14 15 16 17 18        Remaining  19 18 17 16 15 14 13 12       FOTO target 54  (37)  56                      TherEx 9/13 9/25 9/28 10/5 10/10 10/12 10/16 10/19 10/26    PNE education 5'            Nustep 7' 7' 8' 8' L1 8 L4 10'  L4 8' L5 nt 10' L4    pulleys  2' 2' 3' 3' 3' 3' 4' 3'    UBE  2/2 2/2 3/3 5 5/5 4.5/4.5 5    Cane FF  20x 20x thumb up 18x* 20 20x 20x 30x 30x    Web slide row        20x green 20x     marching  20x ea 20x 20x with ball at table 20x 20x 20x Ball sq 30x 20x    Heel raises   20x ea 20x ea 20x with ball at table 20x 20x 20x nt 20x    Hip abd    20x ea nt    30x gntb 30x gtb    Slant   30"x4 4x30" 4x30"  L2 4x 4x 4x nt 4x 30" ea    Hip add          30x ball    Paloff press             Core press   3"x10 5"x10 med yellow ball 10x 10x  10x 15x seated 15x                               Hamstring step on stool    HEP [id number]"x2         piriformis    HEP review  15"x2         Modified cat/cow stretch    Hep review 15"x2 Ther Activity                                       Gait Training                                       Modalities

## 2023-10-30 ENCOUNTER — OFFICE VISIT (OUTPATIENT)
Dept: PHYSICAL THERAPY | Age: 43
End: 2023-10-30
Payer: COMMERCIAL

## 2023-10-30 DIAGNOSIS — M54.16 LUMBAR RADICULOPATHY: Primary | ICD-10-CM

## 2023-10-30 PROCEDURE — 97110 THERAPEUTIC EXERCISES: CPT | Performed by: PHYSICAL THERAPIST

## 2023-10-30 NOTE — PROGRESS NOTES
Daily Note     Today's date: 10/30/2023  Patient name: Carolyn Cunha  : 1980  MRN: [id number]  Referring provider: Lisa Melo PT  Dx:   Encounter Diagnosis     ICD-10-CM    1. Lumbar radiculopathy  M54.16           Start Time:   Stop Time:   Total time in clinic (min): 45 minutes    Subjective: Pt reports she needs surgery on her low back, but if she does not change jobs, the surgeon will not allow her to go back to work. She also is awaiting an EMG for [facility]. Objective: See treatment diary below  1:1 with 23min    Assessment: Tolerated treatment well tonight. Alternated U/LE exercises to prevent excessive fatigue. Pt moved at a much more even pace, no excessive rest periods needed. Plan: Potential discharge next visit.      Precautions: central sensitization  POC expires Unit limit Auth Expiration date PT/OT + Visit Limit?   23 No None 30                           Visit/Unit Tracking  AUTH Status:  Date 9/25 9/28 10/5 10/10 10/12 10/16 10/19 10/26 10/30      None Used 11 12 13 14 15 16 17 18 19       Remaining  19 18 17 16 15 14 13 12 11      FOTO target 54  (37)  56                      TherEx 9/13 9/25 9/28 10/5 10/10 10/12 10/16 10/19 10/26 10/30   PNE education 5'            Nustep 7' 7' 8' 8' L1 8 L4 10'  L4 10' L5 nt 10' L4 10   pulleys  2' 2' 3' 3' 3' 3' 4' 3' 3'   UBE  2/2 2/2 3/3 5 5 4.5/4.5    Cane FF  20x 20x thumb up 18x* 20 20x 20x 30x 30x    Web slide row        20x green 20x     marching  20x ea 20x 20x with ball at table 20x 20x 20x Ball sq 30x 20x 20x   Heel raises   20x ea 20x ea 20x with ball at table 20x 20x 20x nt 20x 20x   Hip abd    20x ea nt    30x gntb 30x gtb 30x   Slant   30"x4 4x30" 4x30"  L2 4x 4x 4x nt 4x 30" ea 30"x4   Hip add          30x ball 30x   Paloff press             Core press   3"x10 5"x10 med yellow ball 10x 10x  10x 15x seated 15x  15x                             Hamstring step on stool    HEP [id number]"x2         piriformis HEP review  15"x2         Modified cat/cow stretch    Hep review 15"x2                                                             Ther Activity                                       Gait Training                                       Modalities

## 2023-11-02 ENCOUNTER — OFFICE VISIT (OUTPATIENT)
Dept: PHYSICAL THERAPY | Age: 43
End: 2023-11-02
Payer: COMMERCIAL

## 2023-11-02 DIAGNOSIS — M54.16 LUMBAR RADICULOPATHY: Primary | ICD-10-CM

## 2023-11-02 PROCEDURE — 97110 THERAPEUTIC EXERCISES: CPT

## 2023-11-02 NOTE — PROGRESS NOTES
Daily Note     Today's date: 2023  Patient name: Deon Rodriguez  : 1980  MRN: [id number]  Referring provider: Alicia Castillo PT  Dx:   Encounter Diagnosis     ICD-10-CM    1. Lumbar radiculopathy  M54.16           Start Time: 1600  Stop Time: 1700  Total time in clinic (min): 60 minutes    Subjective: Patient reports that she is hurting this morning, worked all day, stiff and sore from weather. Objective: See treatment diary below      Assessment: Tolerated treatment well, good movements with TE,  Alternated U/LE exercises to prevent excessive fatigue. Pt moved at a much more even pace, no excessive rest periods needed. Patient demonstrated fatigue post treatment and exhibited good technique with therapeutic exercises      Plan: Patient will be d/c'ed today to independent HEP and was offered our step down/fitness program. Primary therapist will D/C orders.       Precautions: central sensitization  POC expires Unit limit Auth Expiration date PT/OT + Visit Limit?   23 No None 30                           Visit/Unit Tracking  AUTH Status:  Date 9/25 9/28 10/5 10/10 10/12 10/16 10/19 10/26 10/30 11/2     None Used 11 12 13 14 15 16 17 18 19 20      Remaining  19 18 17 16 15 14 13 12 11 10     FOTO target 54  (37)  56                      TherEx   9/28 10/5 10/10 10/12 10/16 10/19 10/26 11/2   PNE education             Nustep  7' 8' 8' L1 8 L4 10'  L4 10' L5 nt 10' L4 10 L4   pulleys  2' 2' 3' 3' 3' 3' 4' 3' 3'   UBE   3/3 5/5 5 4.5/4.5 5   Cane FF  20x 20x thumb up 18x* 20 20x 20x 30x 30x    Web slide row        20x green 20x  30x blue    marching  20x ea 20x 20x with ball at table 20x 20x 20x Ball sq 30x 20x    Heel raises   20x ea 20x ea 20x with ball at table 20x 20x 20x nt 20x 20x   Hip abd    20x ea nt    30x gntb 30x gtb 30x   Slant   30"x4 4x30" 4x30"  L2 4x 4x 4x nt 4x 30" ea 30"x4   Hip add          30x ball 30x   Paloff press             Core press   3"x10 5"x10 med yellow ball 10x 10x  10x 15x seated 15x  15x   Horizontal UE abd          15x grn tb                Hamstring step on stool    HEP [id number]"x2         piriformis    HEP review  15"x2         Modified cat/cow stretch    Hep review 15"x2                                                             Ther Activity                                       Gait Training                                       Modalities

## 2023-11-20 ENCOUNTER — OFFICE VISIT (OUTPATIENT)
Dept: FAMILY MEDICINE CLINIC | Facility: CLINIC | Age: 43
End: 2023-11-20
Payer: COMMERCIAL

## 2023-11-20 VITALS
DIASTOLIC BLOOD PRESSURE: 70 MMHG | BODY MASS INDEX: 28.37 KG/M2 | HEART RATE: 86 BPM | HEIGHT: 62 IN | SYSTOLIC BLOOD PRESSURE: 100 MMHG | TEMPERATURE: 97.2 F | WEIGHT: 154.2 LBS | OXYGEN SATURATION: 97 % | RESPIRATION RATE: 12 BRPM

## 2023-11-20 DIAGNOSIS — J45.50 SEVERE PERSISTENT ALLERGIC ASTHMA: ICD-10-CM

## 2023-11-20 DIAGNOSIS — J45.21 MILD INTERMITTENT ASTHMA WITH ACUTE EXACERBATION: ICD-10-CM

## 2023-11-20 DIAGNOSIS — R63.5 WEIGHT GAIN: ICD-10-CM

## 2023-11-20 DIAGNOSIS — E55.9 VITAMIN D DEFICIENCY: Primary | ICD-10-CM

## 2023-11-20 DIAGNOSIS — Z00.00 HEALTHCARE MAINTENANCE: ICD-10-CM

## 2023-11-20 PROCEDURE — 99214 OFFICE O/P EST MOD 30 MIN: CPT | Performed by: NURSE PRACTITIONER

## 2023-11-20 RX ORDER — ALBUTEROL SULFATE 2.5 MG/3ML
2.5 SOLUTION RESPIRATORY (INHALATION) EVERY 6 HOURS PRN
Qty: 180 ML | Refills: 5 | Status: SHIPPED | OUTPATIENT
Start: 2023-11-20

## 2023-11-20 RX ORDER — PHENTERMINE HYDROCHLORIDE 30 MG/1
30 CAPSULE ORAL EVERY MORNING
Qty: 30 CAPSULE | Refills: 1 | Status: SHIPPED | OUTPATIENT
Start: 2023-11-20

## 2023-11-20 RX ORDER — ALBUTEROL SULFATE 90 UG/1
2 AEROSOL, METERED RESPIRATORY (INHALATION) EVERY 6 HOURS PRN
Qty: 18 G | Refills: 3 | Status: SHIPPED | OUTPATIENT
Start: 2023-11-20

## 2023-11-20 NOTE — PROGRESS NOTES
Name: Adah Boxer      : 1980      MRN: [id number]  Encounter Provider: LENI Mascorro Ace  Encounter Date: 2023   Encounter department: [address] [place] [address]     1. Vitamin D deficiency  -     Vitamin D 25 hydroxy; Future    2. Mild intermittent asthma with acute exacerbation  -     albuterol (2.5 mg/3 mL) 0.083 % nebulizer solution; Take 3 mL (2.5 mg total) by nebulization every 6 (six) hours as needed for wheezing or shortness of breath  -     albuterol (PROVENTIL HFA,VENTOLIN HFA) 90 mcg/act inhaler; Inhale 2 puffs every 6 (six) hours as needed for wheezing    3. Weight gain  Assessment & Plan:  Nice and steady weight loss with phentermine. Advised to continue 30 mg daily, food diary low calorie diet. Orders:  -     phentermine 30 MG capsule; Take 1 capsule (30 mg total) by mouth every morning    4. Healthcare maintenance  -     Lipid Panel with Direct LDL reflex; Future  -     TSH, 3rd generation with Free T4 reflex; Future    5. Severe persistent allergic asthma  Assessment & Plan:  Patient is offered to pick sent as she had issues obtaining medication from the pharmacy. Continue on albuterol nebulizer and inhaler as needed, Allegra daily, Singulair daily and Breo. Advised to follow-up with pulmonology. Depression Screening and Follow-up Plan: Patient was screened for depression during today's encounter. They screened negative with a PHQ-2 score of 0. Subjective      Patient presents for follow up on phentermine. She did lose a total of 15 pounds in the past few months with phentermine. She does feel that it helps her. She is getting back injections with Dr. Mane Linares. She will be having back surgery with Dr. Josiane Rizvi. She is off of her Dupixent as she had issues receiving the medication from the pharmacy. Review of Systems   Constitutional:  Negative for chills and fever.    HENT:  Negative for ear pain and sore throat. Eyes:  Negative for pain and visual disturbance. Respiratory:  Positive for shortness of breath and wheezing. Negative for cough. Cardiovascular:  Negative for chest pain and palpitations. Gastrointestinal:  Negative for abdominal pain and vomiting. Genitourinary:  Negative for dysuria and hematuria. Musculoskeletal:  Positive for arthralgias, back pain, gait problem and neck pain. Skin:  Negative for color change and rash. Allergic/Immunologic: Positive for environmental allergies. Neurological:  Negative for seizures and syncope. Psychiatric/Behavioral:  Positive for sleep disturbance. All other systems reviewed and are negative.       Current Outpatient Medications on File Prior to Visit   Medication Sig    acetaminophen-codeine (TYLENOL with CODEINE #3) 300-30 MG per tablet Take 1 tablet by mouth every 6 (six) hours    Azelastine HCl 137 MCG/SPRAY SOLN SPRAY 1 SPRAY INTO EACH NOSTRIL 2 (TWO) TIMES A DAY USE IN EACH NOSTRIL AS DIRECTED    dupilumab (DUPIXENT) subcutaneous injection Inject 2 mL (300 mg total) under the skin every 14 (fourteen) days    fexofenadine (ALLEGRA) 180 MG tablet Take 180 mg by mouth daily    Fluticasone Furoate-Vilanterol (Breo Ellipta) 100-25 mcg/actuation inhaler Inhale 1 puff daily Rinse mouth after use.    gabapentin (NEURONTIN) 300 mg capsule Take 300 mg by mouth 3 (three) times a day    LORazepam (Ativan) 0.5 mg tablet Take 1 tablet (0.5 mg total) by mouth every 8 (eight) hours as needed for anxiety    montelukast (SINGULAIR) 10 mg tablet TAKE 1 TABLET BY MOUTH EVERYDAY AT BEDTIME    [DISCONTINUED] albuterol (2.5 mg/3 mL) 0.083 % nebulizer solution Take 3 mL (2.5 mg total) by nebulization every 6 (six) hours as needed for wheezing or shortness of breath    [DISCONTINUED] albuterol (PROVENTIL HFA,VENTOLIN HFA) 90 mcg/act inhaler Inhale 2 puffs every 6 (six) hours as needed for wheezing    [DISCONTINUED] Dupixent 300 MG/2ML SOPN cyclobenzaprine (FLEXERIL) 10 mg tablet TAKE 1 TABLET BY MOUTH THREE TIMES A DAY FOR 30 DAYS    EPINEPHrine (EPIPEN) 0.3 mg/0.3 mL SOAJ Inject 0.3 mL (0.3 mg total) into a muscle once for 1 dose    ergocalciferol (VITAMIN D2) 50,000 units Take 1 capsule (50,000 Units total) by mouth every 14 (fourteen) days    meloxicam (MOBIC) 15 mg tablet TAKE 1 TABLET BY MOUTH EVERY DAY FOR 30 DAYS    naloxone (NARCAN) 4 mg/0.1 mL nasal spray ADMINISTER 1 SPRAY INTO ONE NOSTRIL. CALL 911. REPEAT AFTER 2-3 MIN IF NO OR MINIMAL RESPONSE    predniSONE 20 mg tablet Take 2 tablets (40 mg total) by mouth daily    Sodium Fluoride 5000 PPM 1.1 % PSTE Use as directed    Tenapanor HCl (Ibsrela) 50 MG TABS Take 50 mg by mouth 2 (two) times a day    [DISCONTINUED] [employer] Covid-19 At Home Test Kit KIT Use as directed (Patient not taking: Reported on 11/20/2023)    [DISCONTINUED] phentermine 30 MG capsule Take 1 capsule (30 mg total) by mouth every morning       Objective     /70   Pulse 86   Temp (!) 97.2 °F (36.2 °C)   Resp 12   Ht 5' 2" (1.575 m)   Wt 69.9 kg (154 lb 3.2 oz)   SpO2 97%   BMI 28.20 kg/m²     Physical Exam  Constitutional:       Appearance: She is well-developed. Cardiovascular:      Rate and Rhythm: Normal rate and regular rhythm. Heart sounds: Normal heart sounds. No murmur heard. Pulmonary:      Effort: Pulmonary effort is normal. No respiratory distress. Breath sounds: Normal breath sounds. Skin:     General: Skin is warm and dry. Neurological:      Mental Status: She is alert and oriented to person, place, and time.        [id number] [facility]

## 2023-11-20 NOTE — ASSESSMENT & PLAN NOTE
Patient is offered to pick sent as she had issues obtaining medication from the pharmacy. Continue on albuterol nebulizer and inhaler as needed, Allegra daily, Singulair daily and Breo. Advised to follow-up with pulmonology.

## 2023-11-20 NOTE — ASSESSMENT & PLAN NOTE
Nice and steady weight loss with phentermine. Advised to continue 30 mg daily, food diary low calorie diet.

## 2023-12-04 RX ORDER — METHYLPREDNISOLONE 4 MG/1
TABLET ORAL
COMMUNITY
Start: 2023-10-17

## 2023-12-05 ENCOUNTER — OFFICE VISIT (OUTPATIENT)
Dept: GASTROENTEROLOGY | Facility: CLINIC | Age: 43
End: 2023-12-05
Payer: COMMERCIAL

## 2023-12-05 VITALS
SYSTOLIC BLOOD PRESSURE: 111 MMHG | WEIGHT: 155.6 LBS | BODY MASS INDEX: 27.57 KG/M2 | HEIGHT: 63 IN | OXYGEN SATURATION: 97 % | HEART RATE: 79 BPM | DIASTOLIC BLOOD PRESSURE: 69 MMHG

## 2023-12-05 DIAGNOSIS — K59.04 CHRONIC IDIOPATHIC CONSTIPATION: Primary | ICD-10-CM

## 2023-12-05 DIAGNOSIS — K90.0 CELIAC DISEASE: ICD-10-CM

## 2023-12-05 PROCEDURE — 99213 OFFICE O/P EST LOW 20 MIN: CPT | Performed by: PHYSICIAN ASSISTANT

## 2023-12-05 NOTE — PROGRESS NOTES
[facility] - Outpatient Follow-up Note  Eddie Blanco 44 y.o. female MRN: [id number]  Encounter: [id number]          ASSESSMENT AND PLAN:      1. Chronic idiopathic constipation  She continues to do well on Ibsrela 50mg BID  She is having a BM daily but notes it small and pellet like  Advised increase water and fiber  Start a fiber supplement   Use Prune juice    2. Celiac disease  Continue GFD    ______________________________________________________________________    SUBJECTIVE: 27-year-old female with a history of celiac disease, constipation, asthma who presents for routine follow-up. Overall she continues to do well on Ibsrela 50 mg twice daily. She reports that she is having a daily bowel movement although it is small and pellet-like. She previously was not having a bowel movement more than once a week. She denies any severe abdominal pain. She continues to follow a gluten-free diet and does well with this. She has no complaints of rectal bleeding. She admits that she does not drink enough water throughout the day. She tries to eat plenty of fiber although is a little bit limited due to her celiac disease. REVIEW OF SYSTEMS IS OTHERWISE NEGATIVE.       Historical Information   Past Medical History:   Diagnosis Date    Allergic     Anemia     Anxiety     Asthma     not as adult    Celiac disease     History of one miscarriage     2017 10 weeks    Renal cyst     Varicella      Past Surgical History:   Procedure Laterality Date    BACK SURGERY  2022     SECTION       son,    daughter,    daughter    NECK SURGERY      C5-7, disc repair     [place]  DELIVERY ONLY N/A 2018    Procedure:  SECTION () REPEAT;  Surgeon: Jaye Diaz DO;  Location: [facility] ;  Service: Obstetrics     Social History   Social History     Substance and Sexual Activity   Alcohol Use No     Social History     Substance and Sexual Activity   Drug Use No     Social History     Tobacco Use   Smoking Status Never   Smokeless Tobacco Never     Family History   Adopted: Yes   Problem Relation Age of Onset    Crohn's disease Mother     Hypertension Mother     Alcohol abuse Father     Drug abuse Father     Asthma Sister     No Known Problems Daughter     No Known Problems Daughter     No Known Problems Daughter     Cancer Maternal Grandmother         lung    Arthritis Maternal Grandmother     Diabetes Maternal Grandfather     Hearing loss Maternal Grandfather     Heart disease Maternal Grandfather     No Known Problems Son        Meds/Allergies       Current Outpatient Medications:     acetaminophen-codeine (TYLENOL with CODEINE #3) 300-30 MG per tablet    albuterol (2.5 mg/3 mL) 0.083 % nebulizer solution    albuterol (PROVENTIL HFA,VENTOLIN HFA) 90 mcg/act inhaler    Azelastine HCl 137 MCG/SPRAY SOLN    cyclobenzaprine (FLEXERIL) 10 mg tablet    dupilumab (DUPIXENT) subcutaneous injection    EPINEPHrine (EPIPEN) 0.3 mg/0.3 mL SOAJ    ergocalciferol (VITAMIN D2) 50,000 units    fexofenadine (ALLEGRA) 180 MG tablet    Fluticasone Furoate-Vilanterol (Breo Ellipta) 100-25 mcg/actuation inhaler    gabapentin (NEURONTIN) 300 mg capsule    LORazepam (Ativan) 0.5 mg tablet    meloxicam (MOBIC) 15 mg tablet    methylPREDNISolone 4 MG tablet therapy pack    montelukast (SINGULAIR) 10 mg tablet    naloxone (NARCAN) 4 mg/0.1 mL nasal spray    phentermine 30 MG capsule    predniSONE 20 mg tablet    Sodium Fluoride 5000 PPM 1.1 % PSTE    Tenapanor HCl (Ibsrela) 50 MG TABS    Allergies   Allergen Reactions    Gluten Meal - Food Allergy Abdominal Pain and GI Intolerance    Other Vomiting and Abdominal Pain     Gluten     Fluticasone-Salmeterol Palpitations     "Didn't like the way it made me feel"    Iron Itching     Gold label only           Objective     Blood pressure 111/69, pulse 79, height 5' 2.5" (1.588 m), weight 70.6 kg (155 lb 9.6 oz), SpO2 97 %, currently breastfeeding. Body mass index is 28.01 kg/m². PHYSICAL EXAM:      General Appearance:   Alert, cooperative, no distress   HEENT:   Normocephalic, atraumatic, anicteric. Neck:  Supple, symmetrical, trachea midline   Lungs:   Clear to auscultation bilaterally; no rales, rhonchi or wheezing; respirations unlabored    Heart[de-identified]   Regular rate and rhythm; no murmur, rub, or gallop. Abdomen:   Soft, non-tender, non-distended; normal bowel sounds; no masses, no organomegaly    Genitalia:   Deferred    Rectal:   Deferred    Extremities:  No cyanosis, clubbing or edema    Pulses:  2+ and symmetric    Skin:  No jaundice, rashes, or lesions    Lymph nodes:  No palpable cervical lymphadenopathy        Lab Results:   No visits with results within 1 Day(s) from this visit. Latest known visit with results is:   Appointment on 10/25/2023   Component Date Value    SERA 10/25/2023 Negative     Rheumatoid Factor 10/25/2023 Negative     CRP 10/25/2023 <1.0     WBC 10/25/2023 7.13     RBC 10/25/2023 4.56     Hemoglobin 10/25/2023 13.8     Hematocrit 10/25/2023 41.7     MCV 10/25/2023 91     MCH 10/25/2023 30.3     MCHC 10/25/2023 33.1     RDW 10/25/2023 13.2     Platelets 20/87/5437 390     MPV 10/25/2023 11.1     Sed Rate 10/25/2023 16     Uric Acid 10/25/2023 3.7     Lyme Total Antibodies 10/25/2023 Negative          Radiology Results:   No results found.

## 2023-12-19 ENCOUNTER — TELEPHONE (OUTPATIENT)
Dept: HEMATOLOGY ONCOLOGY | Facility: CLINIC | Age: 43
End: 2023-12-19

## 2024-01-19 ENCOUNTER — OFFICE VISIT (OUTPATIENT)
Dept: FAMILY MEDICINE CLINIC | Facility: CLINIC | Age: 44
End: 2024-01-19
Payer: COMMERCIAL

## 2024-01-19 VITALS
DIASTOLIC BLOOD PRESSURE: 70 MMHG | SYSTOLIC BLOOD PRESSURE: 100 MMHG | OXYGEN SATURATION: 96 % | BODY MASS INDEX: 27.64 KG/M2 | HEIGHT: 63 IN | HEART RATE: 93 BPM | WEIGHT: 156 LBS

## 2024-01-19 DIAGNOSIS — J01.00 ACUTE NON-RECURRENT MAXILLARY SINUSITIS: ICD-10-CM

## 2024-01-19 DIAGNOSIS — H61.21 IMPACTED CERUMEN OF RIGHT EAR: ICD-10-CM

## 2024-01-19 DIAGNOSIS — R63.5 WEIGHT GAIN: ICD-10-CM

## 2024-01-19 DIAGNOSIS — K59.00 CONSTIPATION, UNSPECIFIED CONSTIPATION TYPE: ICD-10-CM

## 2024-01-19 DIAGNOSIS — F51.01 PRIMARY INSOMNIA: Primary | ICD-10-CM

## 2024-01-19 DIAGNOSIS — J45.50 SEVERE PERSISTENT ALLERGIC ASTHMA: ICD-10-CM

## 2024-01-19 PROCEDURE — 69210 REMOVE IMPACTED EAR WAX UNI: CPT | Performed by: NURSE PRACTITIONER

## 2024-01-19 PROCEDURE — 99214 OFFICE O/P EST MOD 30 MIN: CPT | Performed by: NURSE PRACTITIONER

## 2024-01-19 RX ORDER — METHOCARBAMOL 750 MG/1
750 TABLET, FILM COATED ORAL 4 TIMES DAILY PRN
COMMUNITY
End: 2024-01-19

## 2024-01-19 RX ORDER — AMOXICILLIN AND CLAVULANATE POTASSIUM 500; 125 MG/1; MG/1
1 TABLET, FILM COATED ORAL 2 TIMES DAILY
COMMUNITY
Start: 2024-01-11 | End: 2024-01-20

## 2024-01-19 RX ORDER — TRAZODONE HYDROCHLORIDE 50 MG/1
50 TABLET ORAL
Qty: 30 TABLET | Refills: 0 | Status: SHIPPED | OUTPATIENT
Start: 2024-01-19

## 2024-01-19 RX ORDER — LINACLOTIDE 72 UG/1
72 CAPSULE, GELATIN COATED ORAL
COMMUNITY

## 2024-01-19 RX ORDER — PHENTERMINE HYDROCHLORIDE 30 MG/1
30 CAPSULE ORAL EVERY MORNING
Qty: 30 CAPSULE | Refills: 1 | Status: CANCELLED | OUTPATIENT
Start: 2024-01-19

## 2024-01-19 NOTE — PROGRESS NOTES
Name: Jennifer Obrien      : 1980      MRN: [id number]  Encounter Provider: LENI Campuzano  Encounter Date: 2024   Encounter department: [facility] [address]    Assessment & Plan     1. Primary insomnia  Assessment & Plan:  Advised patient to start on trazodone at night.  Advised to call if this is not helping her sleep.  Discussed the importance of sleep hygiene.    Orders:  -     traZODone (DESYREL) 50 mg tablet; Take 1 tablet (50 mg total) by mouth daily at bedtime    2. Weight gain  Assessment & Plan:  Advised to stop phentermine for the next few months as she does have several surgeries coming up.  We will see how she does off the phentermine in 2 months.  Discussed food diary, exercise as tolerated.      3. Severe persistent allergic asthma  Assessment & Plan:  Patient is no longer on Dupixent due to pharmacy/insurance issues.  Lungs are clear on auscultation.  Continue albuterol nebulizer and inhaler as needed.  Continue on Breo daily.  Follow-up with pulmonology.      4. Constipation, unspecified constipation type  Assessment & Plan:  Better with Ibsrela.      5. Acute non-recurrent maxillary sinusitis  Comments:  Advised patient to start on Flonase daily.  Patient is finishing Augmentin from her dentist.           Subjective      Patient presents for routine follow up. She is on augmentin for sinusitis from dentist for dental pain. This has been for 2 weeks.  She continues with right-sided dental pain.  She did not lose any weight with phentermine for the past 2 months.  She feels that the medication is not working for her anymore.  Her constipation is better controlled with current medications.  Patient has carpal tunnel release with Dr. Frankel next week.      Review of Systems   Constitutional:  Negative for chills, diaphoresis and fever.   HENT:  Negative for ear pain and sore throat.    Eyes:  Negative for pain and visual disturbance.    Respiratory:  Positive for chest tightness, shortness of breath and wheezing. Negative for cough.    Cardiovascular:  Negative for chest pain and palpitations.   Gastrointestinal:  Positive for constipation. Negative for abdominal pain, diarrhea, nausea and vomiting.   Genitourinary:  Negative for dysuria, frequency and hematuria.   Musculoskeletal:  Positive for back pain and neck pain. Negative for arthralgias.   Skin:  Negative for color change and rash.   Neurological:  Positive for dizziness, light-headedness, numbness and headaches.   Psychiatric/Behavioral:  Positive for sleep disturbance.    All other systems reviewed and are negative.      Current Outpatient Medications on File Prior to Visit   Medication Sig    acetaminophen-codeine (TYLENOL with CODEINE #3) 300-30 MG per tablet Take 1 tablet by mouth every 6 (six) hours Until after surgery    albuterol (2.5 mg/3 mL) 0.083 % nebulizer solution Take 3 mL (2.5 mg total) by nebulization every 6 (six) hours as needed for wheezing or shortness of breath    albuterol (PROVENTIL HFA,VENTOLIN HFA) 90 mcg/act inhaler Inhale 2 puffs every 6 (six) hours as needed for wheezing    amoxicillin-clavulanate (AUGMENTIN) 500-125 mg per tablet Take 1 tablet by mouth 2 (two) times a day    Azelastine HCl 137 MCG/SPRAY SOLN SPRAY 1 SPRAY INTO EACH NOSTRIL 2 (TWO) TIMES A DAY USE IN EACH NOSTRIL AS DIRECTED    cyclobenzaprine (FLEXERIL) 10 mg tablet Until 1/23    ergocalciferol (VITAMIN D2) 50,000 units Take 1 capsule (50,000 Units total) by mouth every 14 (fourteen) days    fexofenadine (ALLEGRA) 180 MG tablet Take 180 mg by mouth daily    Fluticasone Furoate-Vilanterol (Breo Ellipta) 100-25 mcg/actuation inhaler Inhale 1 puff daily Rinse mouth after use.    gabapentin (NEURONTIN) 300 mg capsule Take 300 mg by mouth 3 (three) times a day After 1/23    linaCLOtide (Linzess) 72 MCG CAPS Take 72 mcg by mouth    LORazepam (Ativan) 0.5 mg tablet Take 1 tablet (0.5 mg total) by  "mouth every 8 (eight) hours as needed for anxiety    meloxicam (MOBIC) 15 mg tablet After 1/23    montelukast (SINGULAIR) 10 mg tablet TAKE 1 TABLET BY MOUTH EVERYDAY AT BEDTIME    naloxone (NARCAN) 4 mg/0.1 mL nasal spray After 1/23    phentermine 30 MG capsule Take 1 capsule (30 mg total) by mouth every morning    predniSONE 20 mg tablet Take 2 tablets (40 mg total) by mouth daily    Sodium Fluoride 5000 PPM 1.1 % PSTE Use as directed    [DISCONTINUED] methocarbamol (ROBAXIN) 750 mg tablet Take 750 mg by mouth 4 (four) times a day as needed After 1/23    dupilumab (DUPIXENT) subcutaneous injection Inject 2 mL (300 mg total) under the skin every 14 (fourteen) days (Patient not taking: Reported on 1/19/2024)    EPINEPHrine (EPIPEN) 0.3 mg/0.3 mL SOAJ Inject 0.3 mL (0.3 mg total) into a muscle once for 1 dose    Tenapanor HCl (Ibsrela) 50 MG TABS Take 50 mg by mouth 2 (two) times a day (Patient not taking: Reported on 1/19/2024)    [DISCONTINUED] methylPREDNISolone 4 MG tablet therapy pack Take as directed (Patient not taking: Reported on 1/19/2024)       Objective     /70   Pulse 93   Ht 5' 2.5\" (1.588 m)   Wt 70.8 kg (156 lb)   SpO2 96%   BMI 28.08 kg/m²     Physical Exam  Constitutional:       Appearance: She is well-developed.   HENT:      Right Ear: There is impacted cerumen.      Left Ear: Tympanic membrane normal.      Nose: No congestion.      Mouth/Throat:      Pharynx: Oropharyngeal exudate present.   Cardiovascular:      Rate and Rhythm: Normal rate and regular rhythm.      Heart sounds: Normal heart sounds. No murmur heard.  Pulmonary:      Effort: Pulmonary effort is normal. No respiratory distress.      Breath sounds: Normal breath sounds.   Lymphadenopathy:      Cervical: No cervical adenopathy.   Skin:     General: Skin is warm and dry.   Neurological:      Mental Status: She is alert and oriented to person, place, and time.     Ear cerumen removal    Date/Time: 1/19/2024 9:20 " AM    Performed by: LENI Campuzano  Authorized by: LENI Campuzano  Universal Protocol:  Consent: Verbal consent obtained.  Patient identity confirmed: verbally with patient    Procedure details:     Location:  R ear    Procedure type: irrigation with instrumentation      Instrumentation: curette      Approach:  External  Post-procedure details:     Complication:  None    Hearing quality:  Normal    Patient tolerance of procedure:  Tolerated well, no immediate complications      LENI Campuzano

## 2024-01-19 NOTE — ASSESSMENT & PLAN NOTE
Advised to stop phentermine for the next few months as she does have several surgeries coming up.  We will see how she does off the phentermine in 2 months.  Discussed food diary, exercise as tolerated.

## 2024-01-19 NOTE — ASSESSMENT & PLAN NOTE
Patient is no longer on Dupixent due to pharmacy/insurance issues.  Lungs are clear on auscultation.  Continue albuterol nebulizer and inhaler as needed.  Continue on Breo daily.  Follow-up with pulmonology.

## 2024-01-19 NOTE — ASSESSMENT & PLAN NOTE
Advised patient to start on trazodone at night.  Advised to call if this is not helping her sleep.  Discussed the importance of sleep hygiene.

## 2024-01-30 DIAGNOSIS — K58.1 IRRITABLE BOWEL SYNDROME WITH CONSTIPATION: ICD-10-CM

## 2024-01-31 RX ORDER — TENAPANOR HYDROCHLORIDE 53.2 MG/1
50 TABLET ORAL 2 TIMES DAILY
Qty: 60 TABLET | Refills: 11 | Status: SHIPPED | OUTPATIENT
Start: 2024-01-31

## 2024-01-31 NOTE — TELEPHONE ENCOUNTER
Spoke to pt.  Pt. confirmed she was receiving Isbrela medication via mail from [facility].      Routing rx for approval. Thanks.

## 2024-02-05 ENCOUNTER — OFFICE VISIT (OUTPATIENT)
Dept: FAMILY MEDICINE CLINIC | Facility: CLINIC | Age: 44
End: 2024-02-05
Payer: COMMERCIAL

## 2024-02-05 VITALS
HEIGHT: 63 IN | WEIGHT: 159 LBS | DIASTOLIC BLOOD PRESSURE: 70 MMHG | HEART RATE: 88 BPM | BODY MASS INDEX: 28.17 KG/M2 | SYSTOLIC BLOOD PRESSURE: 112 MMHG

## 2024-02-05 DIAGNOSIS — J01.10 ACUTE NON-RECURRENT FRONTAL SINUSITIS: ICD-10-CM

## 2024-02-05 DIAGNOSIS — Z01.818 PREOP EXAMINATION: Primary | ICD-10-CM

## 2024-02-05 PROCEDURE — 99243 OFF/OP CNSLTJ NEW/EST LOW 30: CPT | Performed by: NURSE PRACTITIONER

## 2024-02-05 RX ORDER — DOXYCYCLINE HYCLATE 100 MG/1
100 CAPSULE ORAL EVERY 12 HOURS SCHEDULED
Qty: 14 CAPSULE | Refills: 0 | Status: SHIPPED | OUTPATIENT
Start: 2024-02-05 | End: 2024-02-12

## 2024-02-05 NOTE — PROGRESS NOTES
PRE-OPERATIVE EVALUATION  [facility] [address] [facility]    NAME: Jennifer Obrien  AGE: 44 y.o. SEX: female  : 1980     DATE: 2024    Internal Medicine Pre-Operative Evaluation      Chief Complaint: Pre-operative Evaluation     Surgery: ALIF L5-S1   Anticipated Date of Surgery: 24  Referring Provider: No ref. provider found       History of Present Illness:     Jennifer Obrien is a 44 y.o. female who presents to the office today for a [id number] consultation at the request of surgeon Dr. Frankel who plans on performing ALIF L5-S1 on 24. Planned anesthesia is general. Patient has a bleeding risk of: no recent abnormal bleeding, no remote history of abnormal bleeding, and no use of Ca-channel blockers. Patient does not have objections to receiving blood products if needed. Current anti-platelet/anti-coagulation medications that the patient is prescribed includes:  none     Assessment of Chronic Conditions:   - Asthma: uncontrolled     Assessment of Cardiac Risk:  Denies unstable or severe angina or MI in the last 6 weeks or history of stent placement in the last year   Denies decompensated heart failure (e.g. New onset heart failure, NYHA functional class IV heart failure, or worsening existing heart failure)  Denies significant arrhythmias such as high grade AV block, symptomatic ventricular arrhythmia, newly recognized ventricular tachycardia, supraventricular tachycardia with resting heart rate >100, or symptomatic bradycardia  Denies severe heart valve disease including aortic stenosis or symptomatic mitral stenosis     Exercise Capacity:  Able to walk 4 blocks without symptoms?: Yes  Able to walk 2 flights without symptoms?: Yes    Prior Anesthesia Reactions: No     Personal history of venous thromboembolic disease? No    History of steroid use for >2 weeks within last year? No    STOP-BANG Sleep Apnea Screening Questionnaire:         Review of Systems:  "    Review of Systems   Constitutional:  Positive for fatigue. Negative for chills, diaphoresis and fever.   HENT:  Positive for congestion, postnasal drip, sinus pressure and sinus pain. Negative for ear pain and sore throat.    Eyes:  Negative for pain and visual disturbance.   Respiratory:  Positive for cough. Negative for chest tightness, shortness of breath and wheezing.    Cardiovascular:  Negative for chest pain and palpitations.   Gastrointestinal:  Positive for constipation. Negative for abdominal pain, diarrhea, nausea and vomiting.   Genitourinary:  Negative for dysuria, frequency and hematuria.   Musculoskeletal:  Negative for arthralgias and back pain.   Skin:  Negative for color change and rash.   Neurological:  Positive for headaches. Negative for seizures and syncope.   All other systems reviewed and are negative.      Current Problem List:     Patient Active Problem List   Diagnosis    History of 3  sections    Severe persistent allergic asthma    Iron deficiency anemia    Eosinophilia    Degenerative disc disease, cervical    Herniated nucleus pulposus, C5-6    Celiac disease    Weight gain    Vitamin D deficiency    Constipation    Primary insomnia       Allergies:     Allergies   Allergen Reactions    Gluten Meal - Food Allergy Abdominal Pain and GI Intolerance    Other Vomiting and Abdominal Pain     Gluten     Fluticasone-Salmeterol Palpitations     \"Didn't like the way it made me feel\"    Iron Itching     Gold label only       Physical Exam:       Current Outpatient Medications:     acetaminophen-codeine (TYLENOL with CODEINE #3) 300-30 MG per tablet, Take 1 tablet by mouth every 6 (six) hours Until after surgery, Disp: , Rfl:     albuterol (2.5 mg/3 mL) 0.083 % nebulizer solution, Take 3 mL (2.5 mg total) by nebulization every 6 (six) hours as needed for wheezing or shortness of breath, Disp: 180 mL, Rfl: 5    albuterol (PROVENTIL HFA,VENTOLIN HFA) 90 mcg/act inhaler, Inhale 2 puffs " every 6 (six) hours as needed for wheezing, Disp: 18 g, Rfl: 3    Azelastine HCl 137 MCG/SPRAY SOLN, SPRAY 1 SPRAY INTO EACH NOSTRIL 2 (TWO) TIMES A DAY USE IN EACH NOSTRIL AS DIRECTED, Disp: 90 mL, Rfl: 1    doxycycline hyclate (VIBRAMYCIN) 100 mg capsule, Take 1 capsule (100 mg total) by mouth every 12 (twelve) hours for 7 days, Disp: 14 capsule, Rfl: 0    fexofenadine (ALLEGRA) 180 MG tablet, Take 180 mg by mouth daily, Disp: , Rfl:     gabapentin (NEURONTIN) 300 mg capsule, Take 300 mg by mouth 3 (three) times a day After , Disp: , Rfl:     meloxicam (MOBIC) 15 mg tablet, After , Disp: , Rfl:     montelukast (SINGULAIR) 10 mg tablet, TAKE 1 TABLET BY MOUTH EVERYDAY AT BEDTIME, Disp: 90 tablet, Rfl: 1    naloxone (NARCAN) 4 mg/0.1 mL nasal spray, After , Disp: , Rfl:     predniSONE 20 mg tablet, Take 2 tablets (40 mg total) by mouth daily, Disp: 10 tablet, Rfl: 0    Sodium Fluoride 5000 PPM 1.1 % PSTE, Use as directed, Disp: , Rfl:     Tenapanor HCl (Ibsrela) 50 MG TABS, Take 50 mg by mouth 2 (two) times a day, Disp: 60 tablet, Rfl: 11    traZODone (DESYREL) 50 mg tablet, Take 1 tablet (50 mg total) by mouth daily at bedtime, Disp: 30 tablet, Rfl: 0    EPINEPHrine (EPIPEN) 0.3 mg/0.3 mL SOAJ, Inject 0.3 mL (0.3 mg total) into a muscle once for 1 dose, Disp: 0.6 mL, Rfl: 0    ergocalciferol (VITAMIN D2) 50,000 units, Take 1 capsule (50,000 Units total) by mouth every 14 (fourteen) days, Disp: 12 capsule, Rfl: 1    phentermine 30 MG capsule, Take 1 capsule (30 mg total) by mouth every morning (Patient not taking: Reported on 2024), Disp: 30 capsule, Rfl: 1    Past Medical History:       Past Medical History:   Diagnosis Date    Allergic     Anemia     Anxiety     Asthma     not as adult    Celiac disease     History of one miscarriage     2017 10 weeks    Renal cyst     Varicella         Past Surgical History:   Procedure Laterality Date    BACK SURGERY  2022     SECTION        son,    daughter,    daughter    NECK SURGERY      C5-7, disc repair     AR  DELIVERY ONLY N/A 2018    Procedure:  SECTION () REPEAT;  Surgeon: Kashmir Crook DO;  [facility]: [facility] ;  Service: Obstetrics        Family History   Adopted: Yes   Problem Relation Age of Onset    Crohn's disease Mother     Hypertension Mother     Alcohol abuse Father     Drug abuse Father     Asthma Sister     No Known Problems Daughter     No Known Problems Daughter     No Known Problems Daughter     Cancer Maternal Grandmother         lung    Arthritis Maternal Grandmother     Diabetes Maternal Grandfather     Hearing loss Maternal Grandfather     Heart disease Maternal Grandfather     No Known Problems Son         Social History     Socioeconomic History    Marital status: /Civil Union     Spouse name: Not on file    Number of children: Not on file    Years of education: Not on file    Highest education level: Not on file   Occupational History    Not on file   Tobacco Use    Smoking status: Never    Smokeless tobacco: Never   Vaping Use    Vaping status: Never Used   Substance and Sexual Activity    Alcohol use: No    Drug use: No    Sexual activity: Yes     Partners: Male     Birth control/protection: Condom Male   Other Topics Concern    Not on file   Social History Narrative    Engaged to be     Always uses seatbelts     Social Determinants of Health     Financial Resource Strain: Not on file   Food Insecurity: Not on file   Transportation Needs: Not on file   Physical Activity: Not on file   Stress: Not on file   Social Connections: Not on file   Intimate Partner Violence: Not on file   Housing Stability: Not on file        Physical Exam:     Physical Exam  Constitutional:       Appearance: She is well-developed.   HENT:      Right Ear: Tympanic membrane normal.      Left Ear: Tympanic membrane normal.      Nose: Congestion present.      Mouth/Throat:      Pharynx: No  "oropharyngeal exudate.   Cardiovascular:      Rate and Rhythm: Normal rate and regular rhythm.      Heart sounds: Normal heart sounds. No murmur heard.  Pulmonary:      Effort: Pulmonary effort is normal. No respiratory distress.      Breath sounds: Normal breath sounds.   Skin:     General: Skin is warm and dry.   Neurological:      Mental Status: She is alert and oriented to person, place, and time.   Psychiatric:         Mood and Affect: Mood normal.          Data:     Pre-operative work-up  CBC:   Results from last 6 Months   Lab Units 10/25/23  1413   WBC Thousand/uL 7.13   RBC Million/uL 4.56   HEMOGLOBIN g/dL 13.8   HEMATOCRIT % 41.7   MCV fL 91   MCH pg 30.3   MCHC g/dL 33.1   RDW % 13.2   MPV fL 11.1   PLATELETS Thousands/uL 390     Chemistry Profile:   Results from last 6 Months   Lab Units 01/31/24  1020   POTASSIUM mmol/L 4.1   CHLORIDE mmol/L 107   CO2 mmol/L 24   BUN mg/dL 15   CREATININE mg/dL 0.43   CALCIUM mg/dL 9.4   EGFR  124     Coagulation Studies:   Results from last 6 Months   Lab Units 01/31/24  1020   INR  1.0     Endocrine Studies:       Invalid input(s): \"[id number]\"    EKG: EKG: normal EKG, normal sinus rhythm, unchanged from previous tracings.    Chest x-ray: n/a    Previous cardiopulmonary studies within the past year:  Echocardiogram: n/a  Cardiac Catheterization: n/a  Stress Test: n/a  Pulmonary Function Testing: n/a      Assessment & Recommendations:     1. Preop examination      Cleared for surgery.  Continue medications through to surgery.      2. Acute non-recurrent frontal sinusitis  doxycycline hyclate (VIBRAMYCIN) 100 mg capsule    Will treat with doxycycline.  Advised to continue azelastine nasal spray.  Advised to follow-up if not improving.          Pre-Op Evaluation Assessment  44 y.o. female with planned surgery: ALIF L5-S1.    Known risk factors for perioperative complications: None.      Cardiac Risk Estimation: per the Revised Cardiac Risk Index (Circ. 100:1043, 1999), " the patient's risk factors for cardiac complications include  none , putting her in: RCI RISK CLASS I (0 risk factors, risk of major cardiac compl. appr. 0.5%).    Current medications which may produce withdrawal symptoms if withheld perioperatively: none.    Pre-Op Evaluation Plan  1. Further preoperative workup as follows:   - None; no further preoperative work-up is required    2. Change in medication regimen before surgery:   - None, continue medication regimen including morning of surgery, with sip of water    3. Prophylaxis for cardiac events with perioperative beta-blockers: not indicated.    4. Patient requires further consultation with: None    Clearance  Patient is CLEARED for surgery without any additional cardiac testing.     LENI Campuzano  [address] [address]  Phone#  [phone]  Fax#  [phone]

## 2024-02-06 ENCOUNTER — HOSPITAL ENCOUNTER (OUTPATIENT)
Dept: MAMMOGRAPHY | Facility: CLINIC | Age: 44
Discharge: HOME | End: 2024-02-06
Payer: COMMERCIAL

## 2024-02-06 VITALS — BODY MASS INDEX: 28.17 KG/M2 | HEIGHT: 63 IN | WEIGHT: 159 LBS

## 2024-02-06 DIAGNOSIS — Z12.31 ENCOUNTER FOR SCREENING MAMMOGRAM FOR MALIGNANT NEOPLASM OF BREAST: ICD-10-CM

## 2024-02-06 PROCEDURE — 77063 BREAST TOMOSYNTHESIS BI: CPT

## 2024-02-06 PROCEDURE — 77067 SCR MAMMO BI INCL CAD: CPT

## 2024-02-12 ENCOUNTER — OFFICE VISIT (OUTPATIENT)
Age: 44
End: 2024-02-12
Payer: COMMERCIAL

## 2024-02-12 VITALS
RESPIRATION RATE: 16 BRPM | DIASTOLIC BLOOD PRESSURE: 78 MMHG | HEIGHT: 62 IN | OXYGEN SATURATION: 96 % | WEIGHT: 162 LBS | BODY MASS INDEX: 29.81 KG/M2 | HEART RATE: 91 BPM | SYSTOLIC BLOOD PRESSURE: 120 MMHG

## 2024-02-12 DIAGNOSIS — J45.50 SEVERE PERSISTENT ALLERGIC ASTHMA: Primary | ICD-10-CM

## 2024-02-12 DIAGNOSIS — Z01.818 PRE-OPERATIVE CLEARANCE: ICD-10-CM

## 2024-02-12 PROCEDURE — 99214 OFFICE O/P EST MOD 30 MIN: CPT

## 2024-02-12 RX ORDER — ALBUTEROL SULFATE 90 UG/1
2 AEROSOL, METERED RESPIRATORY (INHALATION) EVERY 6 HOURS PRN
Qty: 18 G | Refills: 3 | Status: SHIPPED | OUTPATIENT
Start: 2024-02-12

## 2024-02-12 RX ORDER — FLUTICASONE FUROATE AND VILANTEROL 200; 25 UG/1; UG/1
1 POWDER RESPIRATORY (INHALATION) DAILY
Qty: 180 BLISTER | Refills: 3 | Status: SHIPPED | OUTPATIENT
Start: 2024-02-12 | End: 2025-02-06

## 2024-02-12 NOTE — PROGRESS NOTES
Pulmonary Follow Up Note  Jennifer Obrien 44 y.o. female MRN: [id number]  2/12/2024    Assessment:    Severe persistent allergic asthma  -No recent exacerbations of her asthma requiring prednisone, ED visits/hospitalizations  -Has not required use of albuterol x 2 to 3 weeks  -Patient however, has had increasing BRIONES, asthma and allergy symptoms since Dupixent stopped sometime last summer.  Also has been without her Breo since last summer.  Has not followed-up with pulmonology since May 2022    Plan:  -Will restart patient on Dupixent injections with 600 mg loading dose and then 300 mg biweekly, Breo 200 1 puff daily, and continue albuterol HFA nebs every 6 hours as needed  -Patient denies need for epinephrine prescription.  Has current pen at home.  -Continue Singulair 10 mg at night, avoid known allergens  -Return to office in 6 weeks or sooner if needed    Pre-op clearance  ARISCAT Score 15. Patient is at low risk for post-op pulmonary complications. Would not recommend proceeding with surgery if patient has current infection or exacerbation. Overall, there are no strict pulmonary contraindications to undergoing surgical operation.  Would avoid surgery if she is in the midst of an exacerbation.  She should maintain her inhaler therapy perioperatively.      Plan:    Diagnoses and all orders for this visit:    Severe persistent allergic asthma  -     dupilumab (DUPIXENT) subcutaneous injection; Inject 2 mL (300 mg total) under the skin every 14 (fourteen) days  -     fluticasone-vilanterol (Breo Ellipta) 200-25 mcg/actuation inhaler; Inhale 1 puff daily Rinse mouth after use.  -     albuterol (PROVENTIL HFA,VENTOLIN HFA) 90 mcg/act inhaler; Inhale 2 puffs every 6 (six) hours as needed for wheezing  -     dupilumab (DUPIXENT) subcutaneous injection; Inject 4 mL (600 mg total) under the skin once for 1 dose    Pre-operative clearance    Return in about 6 weeks (around 3/25/2024).        History of Present Illness      Chief Complaint: No chief complaint on file.      Patient ID: Jennifer is a 44 y.o. y.o. female has a past medical history of Allergic, Anemia, Anxiety, Asthma, Celiac disease, History of one miscarriage, Renal cyst, and Varicella.      2/12/2024  HPI: Jennifer Obrien is a 44 y.o. female who presents to the office today for a follow-up visit for her asthma.  She is also here for a preop clearance.  She is scheduled for ALIF L5-S1 on 2/28/24 at [facility] with Dr. Tre Frankel.She was previously seen in the office in May 2022.  Her asthma was well-controlled at that time since she started using Dupixent injections.  She was maintained on Breo 200 and albuterol as needed.  Also on Singulair and Claritin for allergies.      Patient reports she has been out of her Breo inhaler and Dupixent injections since summer 2023.  Patient has noticed an increase in her asthma and allergy symptoms since.  However, she has not had any recent acute exacerbations of her asthma requiring ED visits/hospitalizations or need for prednisone.  Patient reports last use of her albuterol inhaler was approximately 2 to 3 weeks ago.  Patient reports her asthma symptoms are worse with talking and with exertion.      Patient denies any previous complications from general anesthesia.  No recent fevers, chills, upper respiratory infections, chest pain, lower extremity swelling.      Review of Systems   Constitutional:  Negative for activity change, chills, diaphoresis, fever and unexpected weight change.   HENT:  Negative for congestion, postnasal drip, rhinorrhea, sore throat, trouble swallowing and voice change.    Respiratory:  Positive for shortness of breath. Negative for cough, chest tightness and wheezing.    Cardiovascular:  Negative for chest pain, palpitations and leg swelling.   Allergic/Immunologic: Positive for environmental allergies.       Historical Information   Past Medical History:   Diagnosis Date    Allergic     Anemia      Anxiety     Asthma     not as adult    Celiac disease     History of one miscarriage     2017 10 weeks    Renal cyst     Varicella      Past Surgical History:   Procedure Laterality Date    BACK SURGERY  2022     SECTION      2004 son,    daughter,    daughter    NECK SURGERY      C5-7, disc repair     MD  DELIVERY ONLY N/A 2018    Procedure:  SECTION () REPEAT;  Surgeon: Kashmir Crook DO;  Location: [facility] ;  Service: Obstetrics     Family History   Adopted: Yes   Problem Relation Age of Onset    Crohn's disease Mother     Hypertension Mother     Alcohol abuse Father     Drug abuse Father     Asthma Sister     No Known Problems Daughter     No Known Problems Daughter     No Known Problems Daughter     Cancer Maternal Grandmother         lung    Arthritis Maternal Grandmother     Diabetes Maternal Grandfather     Hearing loss Maternal Grandfather     Heart disease Maternal Grandfather     No Known Problems Son     Breast cancer Neg Hx        Smoking history: She reports that she has never smoked. She has never used smokeless tobacco.    Occupational History:     Immunization History   Administered Date(s) Administered    COVID-19 J&J (Medialets) vaccine 0.5 mL 2021    COVID-19 PFIZER VACCINE 0.3 ML IM 2021    COVID-19 Pfizer Vac BIVALENT Colin-sucrose 12 Yr+ IM 10/21/2022    COVID-19 Pfizer mRNA vacc PF colin-sucrose 12 yr and older (Comirnaty) 2023    INFLUENZA 10/05/2013, 2017, 2018, 2021, 2022    Influenza, injectable, quadrivalent, preservative free 0.5 mL 2018, 2020    Influenza, seasonal, injectable 10/05/2013    Tdap 2011, 2018       Meds/Allergies     Current Outpatient Medications:     acetaminophen-codeine (TYLENOL with CODEINE #3) 300-30 MG per tablet, Take 1 tablet by mouth every 6 (six) hours Until after surgery, Disp: , Rfl:     albuterol (2.5 mg/3 mL) 0.083 % nebulizer solution, Take 3  mL (2.5 mg total) by nebulization every 6 (six) hours as needed for wheezing or shortness of breath, Disp: 180 mL, Rfl: 5    albuterol (PROVENTIL HFA,VENTOLIN HFA) 90 mcg/act inhaler, Inhale 2 puffs every 6 (six) hours as needed for wheezing, Disp: 18 g, Rfl: 3    Azelastine HCl 137 MCG/SPRAY SOLN, SPRAY 1 SPRAY INTO EACH NOSTRIL 2 (TWO) TIMES A DAY USE IN EACH NOSTRIL AS DIRECTED, Disp: 90 mL, Rfl: 1    doxycycline hyclate (VIBRAMYCIN) 100 mg capsule, Take 1 capsule (100 mg total) by mouth every 12 (twelve) hours for 7 days, Disp: 14 capsule, Rfl: 0    dupilumab (DUPIXENT) subcutaneous injection, Inject 2 mL (300 mg total) under the skin every 14 (fourteen) days, Disp: 2 mL, Rfl: 11    dupilumab (DUPIXENT) subcutaneous injection, Inject 4 mL (600 mg total) under the skin once for 1 dose, Disp: 4 mL, Rfl: 0    ergocalciferol (VITAMIN D2) 50,000 units, Take 1 capsule (50,000 Units total) by mouth every 14 (fourteen) days, Disp: 12 capsule, Rfl: 1    fexofenadine (ALLEGRA) 180 MG tablet, Take 180 mg by mouth daily, Disp: , Rfl:     fluticasone-vilanterol (Breo Ellipta) 200-25 mcg/actuation inhaler, Inhale 1 puff daily Rinse mouth after use., Disp: 180 blister, Rfl: 3    gabapentin (NEURONTIN) 300 mg capsule, Take 300 mg by mouth 3 (three) times a day After 1/23, Disp: , Rfl:     meloxicam (MOBIC) 15 mg tablet, After 1/23, Disp: , Rfl:     montelukast (SINGULAIR) 10 mg tablet, TAKE 1 TABLET BY MOUTH EVERYDAY AT BEDTIME, Disp: 90 tablet, Rfl: 1    naloxone (NARCAN) 4 mg/0.1 mL nasal spray, After 1/23, Disp: , Rfl:     predniSONE 20 mg tablet, Take 2 tablets (40 mg total) by mouth daily, Disp: 10 tablet, Rfl: 0    Sodium Fluoride 5000 PPM 1.1 % PSTE, Use as directed, Disp: , Rfl:     Tenapanor HCl (Ibsrela) 50 MG TABS, Take 50 mg by mouth 2 (two) times a day, Disp: 60 tablet, Rfl: 11    traZODone (DESYREL) 50 mg tablet, Take 1 tablet (50 mg total) by mouth daily at bedtime, Disp: 30 tablet, Rfl: 0    EPINEPHrine (EPIPEN)  "0.3 mg/0.3 mL SOAJ, Inject 0.3 mL (0.3 mg total) into a muscle once for 1 dose, Disp: 0.6 mL, Rfl: 0    phentermine 30 MG capsule, Take 1 capsule (30 mg total) by mouth every morning (Patient not taking: Reported on 2/5/2024), Disp: 30 capsule, Rfl: 1  Allergies:   Allergies   Allergen Reactions    Gluten Meal - Food Allergy Abdominal Pain and GI Intolerance    Other Vomiting and Abdominal Pain     Gluten     Fluticasone-Salmeterol Palpitations     \"Didn't like the way it made me feel\"    Iron Itching     Gold label only         Vitals:  Vitals:    02/12/24 0823 02/12/24 0824   BP: 120/78    BP Location: Right arm    Patient Position: Sitting    Cuff Size: Large    Pulse: 91    Resp: 16    SpO2: 96% 96%   Weight: 73.5 kg (162 lb)    Height: 5' 2\" (1.575 m)    Oxygen Therapy  SpO2: 96 %  Oxygen Therapy: None (Room air)  .  Wt Readings from Last 3 Encounters:   02/12/24 73.5 kg (162 lb)   02/06/24 72.1 kg (159 lb)   02/05/24 72.1 kg (159 lb)     Body mass index is 29.63 kg/m².    Physical Exam  Vitals and nursing note reviewed.   Constitutional:       General: She is not in acute distress.     Appearance: Normal appearance. She is well-developed.   Cardiovascular:      Rate and Rhythm: Normal rate and regular rhythm.      Heart sounds: Normal heart sounds, S1 normal and S2 normal. No murmur heard.  Pulmonary:      Effort: Pulmonary effort is normal.      Breath sounds: Normal breath sounds. No decreased breath sounds, wheezing, rhonchi or rales.   Musculoskeletal:         General: No swelling.      Right lower leg: No edema.      Left lower leg: No edema.   Neurological:      Mental Status: She is alert.   Psychiatric:         Mood and Affect: Mood and affect normal.         Behavior: Behavior normal. Behavior is cooperative.           Labs: I have personally reviewed pertinent lab results.  Lab Results   Component Value Date    WBC 7.13 10/25/2023    HGB 13.8 10/25/2023    HCT 41.7 10/25/2023    MCV 91 10/25/2023    "  10/25/2023     Lab Results   Component Value Date    CALCIUM 9.4 01/31/2024    K 4.1 01/31/2024    CO2 24 01/31/2024     01/31/2024    BUN 15 01/31/2024    CREATININE 0.43 01/31/2024     Lab Results   Component Value Date     (H) 02/28/2022     Lab Results   Component Value Date    ALT 28 01/21/2023    AST 23 01/21/2023    ALKPHOS 99 01/21/2023       Imaging and other studies: I have personally reviewed pertinent reports    Pulmonary function testing:     Pulmonary Functions Testing Results: 3/17/2022    FEV1/FVC ratio 73%    FEV1 91% predicted  % predicted  (+) response to bronchodilators   % predicted   % predicted  DLCO corrected for hemoglobin 107 % predicted    Impression: Normal spirometry.  Significant bronchodilator response.  Lung volumes demonstrate hyperinflation and air trapping.  Normal diffusion capacity.

## 2024-02-12 NOTE — ASSESSMENT & PLAN NOTE
-No recent exacerbations of her asthma requiring prednisone, ED visits/hospitalizations  -Has not required use of albuterol x 2 to 3 weeks  -Patient however, has had increasing BRIONES, asthma and allergy symptoms since Dupixent stopped sometime last summer.  Also has been without her Breo since last summer.  Has not followed-up with pulmonology since May 2022    Plan:  -Will restart patient on Dupixent injections with 600 mg loading dose and then 300 mg biweekly, Breo 200 1 puff daily, and continue albuterol HFA/ nebs every 6 hours as needed  -Patient denies need for epinephrine prescription.  Has current pen at home.  -Continue Singulair 10 mg at night, avoid known allergens  -Return to office in 6 weeks or sooner if needed

## 2024-02-16 DIAGNOSIS — F51.01 PRIMARY INSOMNIA: ICD-10-CM

## 2024-02-16 RX ORDER — TRAZODONE HYDROCHLORIDE 50 MG/1
50 TABLET ORAL
Qty: 30 TABLET | Refills: 0 | Status: SHIPPED | OUTPATIENT
Start: 2024-02-16

## 2024-02-20 ENCOUNTER — TELEPHONE (OUTPATIENT)
Dept: HEMATOLOGY ONCOLOGY | Facility: CLINIC | Age: 44
End: 2024-02-20

## 2024-02-20 NOTE — TELEPHONE ENCOUNTER
Informed of upcoming appt and the labs to be done prior,  did express understanding and that he would remind the patient

## 2024-02-22 DIAGNOSIS — J45.50 SEVERE PERSISTENT ALLERGIC ASTHMA: ICD-10-CM

## 2024-02-26 ENCOUNTER — TELEPHONE (OUTPATIENT)
Dept: HEMATOLOGY ONCOLOGY | Facility: CLINIC | Age: 44
End: 2024-02-26

## 2024-03-01 ENCOUNTER — TELEPHONE (OUTPATIENT)
Age: 44
End: 2024-03-01

## 2024-03-01 NOTE — TELEPHONE ENCOUNTER
PA for Ibsrela     Submitted via    []CMM-KEY   [x][employer]-Case ID # Case [id number]   []Faxed to plan   []Other website   []Phone call Case ID #     Office notes sent, clinical questions answered. Awaiting determination    Turnaround time for your insurance to make a decision on your Prior Authorization can take 7-21 business days.

## 2024-03-04 ENCOUNTER — APPOINTMENT (OUTPATIENT)
Dept: LAB | Facility: HOSPITAL | Age: 44
End: 2024-03-04
Payer: COMMERCIAL

## 2024-03-04 DIAGNOSIS — D50.9 IRON DEFICIENCY ANEMIA, UNSPECIFIED IRON DEFICIENCY ANEMIA TYPE: ICD-10-CM

## 2024-03-04 DIAGNOSIS — Z00.00 HEALTHCARE MAINTENANCE: ICD-10-CM

## 2024-03-04 DIAGNOSIS — E55.9 VITAMIN D DEFICIENCY: ICD-10-CM

## 2024-03-04 LAB
25(OH)D3 SERPL-MCNC: 13.3 NG/ML (ref 30–100)
ALBUMIN SERPL BCP-MCNC: 4.1 G/DL (ref 3.5–5)
ALP SERPL-CCNC: 88 U/L (ref 34–104)
ALT SERPL W P-5'-P-CCNC: 14 U/L (ref 7–52)
ANION GAP SERPL CALCULATED.3IONS-SCNC: 5 MMOL/L
AST SERPL W P-5'-P-CCNC: 19 U/L (ref 13–39)
BILIRUB SERPL-MCNC: 0.27 MG/DL (ref 0.2–1)
BUN SERPL-MCNC: 14 MG/DL (ref 5–25)
CALCIUM SERPL-MCNC: 9.4 MG/DL (ref 8.4–10.2)
CHLORIDE SERPL-SCNC: 106 MMOL/L (ref 96–108)
CHOLEST SERPL-MCNC: 167 MG/DL
CO2 SERPL-SCNC: 28 MMOL/L (ref 21–32)
CREAT SERPL-MCNC: 0.5 MG/DL (ref 0.6–1.3)
FERRITIN SERPL-MCNC: 183 NG/ML (ref 11–307)
FOLATE SERPL-MCNC: 4.1 NG/ML
GFR SERPL CREATININE-BSD FRML MDRD: 118 ML/MIN/1.73SQ M
GLUCOSE SERPL-MCNC: 96 MG/DL (ref 65–140)
HDLC SERPL-MCNC: 32 MG/DL
IRON SATN MFR SERPL: 16 % (ref 15–50)
IRON SERPL-MCNC: 41 UG/DL (ref 50–212)
LDLC SERPL CALC-MCNC: 99 MG/DL (ref 0–100)
POTASSIUM SERPL-SCNC: 4.5 MMOL/L (ref 3.5–5.3)
PROT SERPL-MCNC: 7.1 G/DL (ref 6.4–8.4)
SODIUM SERPL-SCNC: 139 MMOL/L (ref 135–147)
TIBC SERPL-MCNC: 255 UG/DL (ref 250–450)
TRIGL SERPL-MCNC: 178 MG/DL
TSH SERPL DL<=0.05 MIU/L-ACNC: 1.32 UIU/ML (ref 0.45–4.5)
UIBC SERPL-MCNC: 214 UG/DL (ref 155–355)
VIT B12 SERPL-MCNC: 597 PG/ML (ref 180–914)

## 2024-03-04 PROCEDURE — 82306 VITAMIN D 25 HYDROXY: CPT

## 2024-03-04 PROCEDURE — 83540 ASSAY OF IRON: CPT

## 2024-03-04 PROCEDURE — 83550 IRON BINDING TEST: CPT

## 2024-03-04 PROCEDURE — 82746 ASSAY OF FOLIC ACID SERUM: CPT

## 2024-03-04 PROCEDURE — 80053 COMPREHEN METABOLIC PANEL: CPT

## 2024-03-04 PROCEDURE — 84443 ASSAY THYROID STIM HORMONE: CPT

## 2024-03-04 PROCEDURE — 36415 COLL VENOUS BLD VENIPUNCTURE: CPT

## 2024-03-04 PROCEDURE — 80061 LIPID PANEL: CPT

## 2024-03-04 PROCEDURE — 82607 VITAMIN B-12: CPT

## 2024-03-04 PROCEDURE — 82728 ASSAY OF FERRITIN: CPT

## 2024-03-05 ENCOUNTER — HOSPITAL ENCOUNTER (OUTPATIENT)
Dept: ULTRASOUND IMAGING | Facility: CLINIC | Age: 44
Discharge: HOME | End: 2024-03-05
Payer: COMMERCIAL

## 2024-03-05 ENCOUNTER — HOSPITAL ENCOUNTER (OUTPATIENT)
Dept: MAMMOGRAPHY | Facility: CLINIC | Age: 44
Discharge: HOME | End: 2024-03-05
Payer: COMMERCIAL

## 2024-03-05 VITALS — BODY MASS INDEX: 28.7 KG/M2 | HEIGHT: 63 IN | WEIGHT: 162 LBS

## 2024-03-05 DIAGNOSIS — R92.8 ABNORMAL MAMMOGRAM: ICD-10-CM

## 2024-03-05 PROCEDURE — 76642 ULTRASOUND BREAST LIMITED: CPT

## 2024-03-05 PROCEDURE — G0279 TOMOSYNTHESIS, MAMMO: HCPCS

## 2024-03-05 PROCEDURE — 77065 DX MAMMO INCL CAD UNI: CPT

## 2024-03-07 NOTE — TELEPHONE ENCOUNTER
PA for Ibsrela  Denied    Reason:(Screenshot if applicable)        Message sent to office clinical pool Yes    Denial letter scanned into Media Yes    Appeal started No

## 2024-03-07 NOTE — TELEPHONE ENCOUNTER
Routing to PA  IBSRELA was denied.   Would you like me to reach out to Camiku  from [facility] to reach out to pt for pt assistance?

## 2024-03-08 ENCOUNTER — OFFICE VISIT (OUTPATIENT)
Dept: HEMATOLOGY ONCOLOGY | Facility: CLINIC | Age: 44
End: 2024-03-08
Payer: COMMERCIAL

## 2024-03-08 VITALS
HEART RATE: 93 BPM | RESPIRATION RATE: 16 BRPM | BODY MASS INDEX: 28.7 KG/M2 | TEMPERATURE: 98.7 F | OXYGEN SATURATION: 98 % | DIASTOLIC BLOOD PRESSURE: 74 MMHG | HEIGHT: 63 IN | WEIGHT: 162 LBS | SYSTOLIC BLOOD PRESSURE: 112 MMHG

## 2024-03-08 DIAGNOSIS — D50.9 IRON DEFICIENCY ANEMIA, UNSPECIFIED IRON DEFICIENCY ANEMIA TYPE: Primary | ICD-10-CM

## 2024-03-08 DIAGNOSIS — E55.9 VITAMIN D DEFICIENCY: ICD-10-CM

## 2024-03-08 DIAGNOSIS — K90.0 CELIAC DISEASE: ICD-10-CM

## 2024-03-08 DIAGNOSIS — E53.8 FOLATE DEFICIENCY: ICD-10-CM

## 2024-03-08 PROCEDURE — 99214 OFFICE O/P EST MOD 30 MIN: CPT | Performed by: PHYSICIAN ASSISTANT

## 2024-03-08 RX ORDER — PREGABALIN 75 MG/1
75 CAPSULE ORAL 2 TIMES DAILY
COMMUNITY
Start: 2024-02-23 | End: 2024-03-11

## 2024-03-08 RX ORDER — IBUPROFEN 800 MG/1
800 TABLET ORAL ONCE
COMMUNITY
Start: 2024-02-23

## 2024-03-08 RX ORDER — FOLIC ACID 1 MG/1
1 TABLET ORAL DAILY
Qty: 90 TABLET | Refills: 1 | Status: SHIPPED | OUTPATIENT
Start: 2024-03-08 | End: 2024-09-04

## 2024-03-08 RX ORDER — OXYCODONE HYDROCHLORIDE 5 MG/1
5 TABLET ORAL EVERY 4 HOURS PRN
COMMUNITY
Start: 2024-03-01 | End: 2024-03-11

## 2024-03-08 RX ORDER — ERGOCALCIFEROL 1.25 MG/1
50000 CAPSULE ORAL WEEKLY
Qty: 12 CAPSULE | Refills: 1 | Status: SHIPPED | OUTPATIENT
Start: 2024-03-08

## 2024-03-08 NOTE — TELEPHONE ENCOUNTER
Sent Dilcia an email in regards to pt's denial for the medication. Will call to let her know that someone will reach out to her for pt assistance.    Called pt and VM is full. Notified via [employer]

## 2024-03-08 NOTE — PROGRESS NOTES
[facility] HEMATOLOGY ONCOLOGY SPECIALISTS [place]  [address] [facility]  [place] [address]  Hematology Ambulatory Follow-Up  Jennifer Obrien, 1980, [id number]  3/8/2024      Assessment and Plan   1. Iron deficiency anemia, unspecified iron deficiency anemia type  2. Celiac disease  3. Folate deficiency  - CBC and differential; Standing  - Iron Panel (Includes Ferritin, Iron Sat%, Iron, and TIBC); Standing  - Folate; Standing  - folic acid (FOLVITE) 1 mg tablet; Take 1 tablet (1 mg total) by mouth daily  Dispense: 90 tablet; Refill: 1    44-year-old female with history of celiac's disease resulting in iron deficiency anemia.  She had failed oral iron previously and required IV Venofer treatments in the past.  Most recent labs -> Hgb 13.5, MCV 93, ferritin 183, iron 41, TIBC normal, iron saturation 16%.  Folate 4.1.  B12 597.  Patient denies any bleeding.    Discussion/plan   Iron panel with mixed results. Ferritin is normal however suspect this may be falsely normal in setting of inflammation due to recent surgery. Likely has borderline low iron levels at this time.   Labs c/w folate deficiency likely secondary to celiac disease. Begin folic acid 1 mg daily. Rx sent to pharmacy  Repeat iron panel in 1m, then continue CBC, iron panel, folic acid q3m. IV Venofer prn   RTC 6m     Patient voiced agreement and understanding to the above.   Patient advised to call the Hematology/Oncology office with any questions and concerns regarding the above.    Barrier(s) to care: None  The patient is able to self care.    Jalyn Michelle PA-C   Medical Oncology/Hematology  [facility] [address] [facility]    Subjective     Chief Complaint   Patient presents with    Follow-up     3 month follow-up       History of present illness: Jennifer Obrien is a 44-year-old female who presents as follow-up for iron deficiency anemia in setting of celiac disease.    1. Iron Deficiency Anemia secondary to  Celiac Disease  - anemia present since at least 2018.   - Hgb initially was 8.4g/dL. She attempted oral iron and failed this despite being on high dose oral iron.  - EGD: Patient had testing showing duodenal mucosa with intraepithelial lymphocytosis and mild villous blunting.  She had positive tissue transglutaminase testing result and these findings were consistent with celiac's disease.  - : throughout  had multiple Venofer treatments. Last treatment was 2022  - 3/2023: Hemoglobin 13.6, MCV 90, ferritin 276, iron 104, TIBC 246, iron sat 42%.  -2024: Hemoglobin 13.5, MCV 93, ferritin 183, iron 41, TIBC normal, iron saturation 16%.  Folate 4.1.  B12 597.    Interval history: Patient presents with walker.  She underwent back surgery recently.  Denies fatigue, shortness of breath, hematochezia or melena.  No night sweats or lymphadenopathy.    Review of Systems   Respiratory:  Negative for shortness of breath.    Cardiovascular:  Negative for leg swelling.       Patient Active Problem List   Diagnosis    History of 3  sections    Severe persistent allergic asthma    Iron deficiency anemia    Eosinophilia    Degenerative disc disease, cervical    Herniated nucleus pulposus, C5-6    Celiac disease    Weight gain    Vitamin D deficiency    Constipation    Primary insomnia     Past Medical History:   Diagnosis Date    Allergic     Anemia     Anxiety     Asthma     not as adult    Celiac disease     History of one miscarriage     2017 10 weeks    Renal cyst     Varicella      Past Surgical History:   Procedure Laterality Date    BACK SURGERY  2022     SECTION      2004 son,    daughter,    daughter    NECK SURGERY      C5-7, disc repair     FL  DELIVERY ONLY N/A 2018    Procedure:  SECTION () REPEAT;  Surgeon: Kashmir Crook DO;  Location: [facility] ;  Service: Obstetrics     Family History   Adopted: Yes   Problem Relation Age of Onset    Crohn's  disease Mother     Hypertension Mother     Alcohol abuse Father     Drug abuse Father     Asthma Sister     No Known Problems Daughter     No Known Problems Daughter     No Known Problems Daughter     Cancer Maternal Grandmother         lung    Arthritis Maternal Grandmother     Diabetes Maternal Grandfather     Hearing loss Maternal Grandfather     Heart disease Maternal Grandfather     No Known Problems Son     Breast cancer Neg Hx      Social History     Socioeconomic History    Marital status: /Civil Union     Spouse name: None    Number of children: None    Years of education: None    Highest education level: None   Occupational History    None   Tobacco Use    Smoking status: Never    Smokeless tobacco: Never   Vaping Use    Vaping status: Never Used   Substance and Sexual Activity    Alcohol use: No    Drug use: No    Sexual activity: Yes     Partners: Male     Birth control/protection: Condom Male   Other Topics Concern    None   Social History Narrative    Engaged to be     Always uses seatbelts     Social Determinants of Health     Financial Resource Strain: Low Risk  (3/1/2024)    Received from [facility]    Overall Financial Resource Strain (CARDIA)     Difficulty of Paying Living Expenses: Not hard at all   Food Insecurity: No Food Insecurity (3/1/2024)    Received from [facility]    Hunger Vital Sign     Worried About Running Out of Food in the Last Year: Never true     Ran Out of Food in the Last Year: Never true   Transportation Needs: No Transportation Needs (3/1/2024)    Received from [facility]    PRAPARE - Transportation     Lack of Transportation (Medical): No     Lack of Transportation (Non-Medical): No   Physical Activity: Not on file   Stress: Not on file   Social Connections: Not on file   Intimate Partner Violence: Not At Risk (3/1/2024)    Received from [facility]    Humiliation, Afraid, Rape, and Kick  questionnaire     Fear of Current or Ex-Partner: No     Emotionally Abused: No     Physically Abused: No     Sexually Abused: No   Housing Stability: Low Risk  (3/1/2024)    Received from [facility]    Housing Stability Vital Sign     Unable to Pay for Housing in the Last Year: No     Number of Places Lived in the Last Year: 1     Unstable Housing in the Last Year: No       Current Outpatient Medications:     albuterol (2.5 mg/3 mL) 0.083 % nebulizer solution, Take 3 mL (2.5 mg total) by nebulization every 6 (six) hours as needed for wheezing or shortness of breath, Disp: 180 mL, Rfl: 5    albuterol (PROVENTIL HFA,VENTOLIN HFA) 90 mcg/act inhaler, Inhale 2 puffs every 6 (six) hours as needed for wheezing, Disp: 18 g, Rfl: 3    Azelastine HCl 137 MCG/SPRAY SOLN, SPRAY 1 SPRAY INTO EACH NOSTRIL 2 (TWO) TIMES A DAY USE IN EACH NOSTRIL AS DIRECTED, Disp: 90 mL, Rfl: 1    dupilumab (DUPIXENT) subcutaneous injection, Inject 2 mL (300 mg total) under the skin every 14 (fourteen) days Start 2 weeks after 600 mg loading dose, Disp: 2 mL, Rfl: 11    EPINEPHrine (EPIPEN) 0.3 mg/0.3 mL SOAJ, Inject 0.3 mL (0.3 mg total) into a muscle once for 1 dose, Disp: 0.6 mL, Rfl: 0    ergocalciferol (VITAMIN D2) 50,000 units, Take 1 capsule (50,000 Units total) by mouth once a week, Disp: 12 capsule, Rfl: 1    fexofenadine (ALLEGRA) 180 MG tablet, Take 180 mg by mouth daily, Disp: , Rfl:     fluticasone-vilanterol (Breo Ellipta) 200-25 mcg/actuation inhaler, Inhale 1 puff daily Rinse mouth after use., Disp: 180 blister, Rfl: 3    gabapentin (NEURONTIN) 300 mg capsule, Take 300 mg by mouth 3 (three) times a day After 1/23, Disp: , Rfl:     ibuprofen (MOTRIN) 800 mg tablet, Take 800 mg by mouth once, Disp: , Rfl:     meloxicam (MOBIC) 15 mg tablet, After 1/23, Disp: , Rfl:     montelukast (SINGULAIR) 10 mg tablet, TAKE 1 TABLET BY MOUTH EVERYDAY AT BEDTIME, Disp: 90 tablet, Rfl: 1    naloxone (NARCAN) 4 mg/0.1 mL nasal  "spray, After 1/23, Disp: , Rfl:     oxyCODONE (ROXICODONE) 5 immediate release tablet, Take 5 mg by mouth every 4 (four) hours as needed for severe pain or moderate pain, Disp: , Rfl:     predniSONE 20 mg tablet, Take 2 tablets (40 mg total) by mouth daily, Disp: 10 tablet, Rfl: 0    Sodium Fluoride 5000 PPM 1.1 % PSTE, Use as directed, Disp: , Rfl:     Tenapanor HCl (Ibsrela) 50 MG TABS, Take 50 mg by mouth 2 (two) times a day, Disp: 60 tablet, Rfl: 11    traZODone (DESYREL) 50 mg tablet, TAKE 1 TABLET BY MOUTH DAILY AT BEDTIME, Disp: 30 tablet, Rfl: 0    acetaminophen-codeine (TYLENOL with CODEINE #3) 300-30 MG per tablet, Take 1 tablet by mouth every 6 (six) hours Until after surgery (Patient not taking: Reported on 3/8/2024), Disp: , Rfl:     phentermine 30 MG capsule, Take 1 capsule (30 mg total) by mouth every morning (Patient not taking: Reported on 2/5/2024), Disp: 30 capsule, Rfl: 1    pregabalin (LYRICA) 75 mg capsule, Take 75 mg by mouth 2 (two) times a day (Patient not taking: Reported on 3/8/2024), Disp: , Rfl:   Allergies   Allergen Reactions    Gluten Meal - Food Allergy Abdominal Pain and GI Intolerance    Other Vomiting and Abdominal Pain     Gluten     Fluticasone-Salmeterol Palpitations     \"Didn't like the way it made me feel\"    Iron Itching     Gold label only       Objective   /74   Pulse 93   Temp 98.7 °F (37.1 °C) (Temporal)   Resp 16   Ht 5' 2.5\" (1.588 m)   Wt 73.5 kg (162 lb)   SpO2 98%   BMI 29.16 kg/m²    Physical Exam  Vitals reviewed.   Constitutional:       Comments: Presents with walker.   HENT:      Head: Normocephalic.   Cardiovascular:      Rate and Rhythm: Normal rate and regular rhythm.   Pulmonary:      Effort: Pulmonary effort is normal.      Breath sounds: Normal breath sounds.   Abdominal:      Palpations: Abdomen is soft.      Tenderness: There is no abdominal tenderness.   Musculoskeletal:      Cervical back: Neck supple.   Skin:     Findings: No rash. "   Neurological:      Mental Status: She is alert.         Result Review  Labs:  Appointment on 03/04/2024   Component Date Value Ref Range Status    Sodium 03/04/2024 139  135 - 147 mmol/L Final    Potassium 03/04/2024 4.5  3.5 - 5.3 mmol/L Final    Chloride 03/04/2024 106  96 - 108 mmol/L Final    CO2 03/04/2024 28  21 - 32 mmol/L Final    ANION GAP 03/04/2024 5  mmol/L Final    BUN 03/04/2024 14  5 - 25 mg/dL Final    Creatinine 03/04/2024 0.50 (L)  0.60 - 1.30 mg/dL Final    Standardized to IDMS reference method    Glucose 03/04/2024 96  65 - 140 mg/dL Final    If the patient is fasting, the ADA then defines impaired fasting glucose as > 100 mg/dL and diabetes as > or equal to 123 mg/dL.    Calcium 03/04/2024 9.4  8.4 - 10.2 mg/dL Final    AST 03/04/2024 19  13 - 39 U/L Final    ALT 03/04/2024 14  7 - 52 U/L Final    Specimen collection should occur prior to Sulfasalazine administration due to the potential for falsely depressed results.     Alkaline Phosphatase 03/04/2024 88  34 - 104 U/L Final    Total Protein 03/04/2024 7.1  6.4 - 8.4 g/dL Final    Albumin 03/04/2024 4.1  3.5 - 5.0 g/dL Final    Total Bilirubin 03/04/2024 0.27  0.20 - 1.00 mg/dL Final    Use of this assay is not recommended for patients undergoing treatment with eltrombopag due to the potential for falsely elevated results.  N-acetyl-p-benzoquinone imine (metabolite of Acetaminophen) will generate erroneously low results in samples for patients that have taken an overdose of Acetaminophen.    eGFR 03/04/2024 118  ml/min/1.73sq m Final    Vitamin B-12 03/04/2024 597  180 - 914 pg/mL Final    Folate 03/04/2024 4.1 (L)  >5.9 ng/mL Final    The World Health Organization has determined deficient folate concentrations are considered to be <4.0 ng/mL.    Cholesterol 03/04/2024 167  See Comment mg/dL Final    Cholesterol:         Pediatric <18 Years        Desirable          <170 mg/dL      Borderline High    170-199 mg/dL      High                >=200 mg/dL        Adult >=18 Years            Desirable         <200 mg/dL      Borderline High   200-239 mg/dL      High              >239 mg/dL      Triglycerides 03/04/2024 178 (H)  See Comment mg/dL Final    Triglyceride:     0-9Y            <75mg/dL     10Y-17Y         <90 mg/dL       >=18Y     Normal          <150 mg/dL     Borderline High 150-199 mg/dL     High            200-499 mg/dL        Very High       >499 mg/dL    Specimen collection should occur prior to Metamizole administration due to the potential for falsely depressed results.    HDL, Direct 03/04/2024 32 (L)  >=50 mg/dL Final    LDL Calculated 03/04/2024 99  0 - 100 mg/dL Final    LDL Cholesterol:     Optimal           <100 mg/dl     Near Optimal      100-129 mg/dl     Above Optimal       Borderline High 130-159 mg/dl       High            160-189 mg/dl       Very High       >189 mg/dl         This screening LDL is a calculated result.   It does not have the accuracy of the Direct Measured LDL in the monitoring of patients with hyperlipidemia and/or statin therapy.   Direct Measure LDL (ENN546) must be ordered separately in these patients.    TSH 3RD GENERATON 03/04/2024 1.316  0.450 - 4.500 uIU/mL Final    The recommended reference ranges for TSH during pregnancy are as follows:   First trimester 0.100 to 2.500 uIU/mL   Second trimester  0.200 to 3.000 uIU/mL   Third trimester 0.300 to 3.000 uIU/m    Note: Normal ranges may not apply to patients who are transgender, non-binary, or whose legal sex, sex at birth, and gender identity differ.  Adult TSH (3rd generation) reference range follows the recommended guidelines of the American Thyroid Association, January, 2020.    Vit D, 25-Hydroxy 03/04/2024 13.3 (L)  30.0 - 100.0 ng/mL Final    Vitamin D guidelines established by Clinical Guidelines Subcommittee  of the Endocrine Society Task Force, 2011    Deficiency <20ng/ml   Insufficiency 20-30ng/ml   Sufficient  ng/ml     Iron Saturation  03/04/2024 16  15 - 50 % Final    TIBC 03/04/2024 255  250 - 450 ug/dL Final    Iron 03/04/2024 41 (L)  50 - 212 ug/dL Final    Patients treated with metal-binding drugs (ie. Deferoxamine) may have depressed iron values.    UIBC 03/04/2024 214  155 - 355 ug/dL Final    Ferritin 03/04/2024 183  11 - 307 ng/mL Final       Imaging:   I reviewed relevant imaging    Please note:  This report has been generated by a voice recognition software system. Therefore there may be syntax, spelling, and/or grammatical errors. Please call if you have any questions.

## 2024-03-11 ENCOUNTER — HOSPITAL ENCOUNTER (OUTPATIENT)
Dept: VASCULAR ULTRASOUND | Facility: HOSPITAL | Age: 44
Discharge: HOME | End: 2024-03-11
Payer: COMMERCIAL

## 2024-03-11 ENCOUNTER — OFFICE VISIT (OUTPATIENT)
Dept: FAMILY MEDICINE CLINIC | Facility: CLINIC | Age: 44
End: 2024-03-11
Payer: COMMERCIAL

## 2024-03-11 VITALS
DIASTOLIC BLOOD PRESSURE: 76 MMHG | SYSTOLIC BLOOD PRESSURE: 122 MMHG | WEIGHT: 170 LBS | BODY MASS INDEX: 30.12 KG/M2 | OXYGEN SATURATION: 96 % | HEART RATE: 85 BPM | HEIGHT: 63 IN

## 2024-03-11 DIAGNOSIS — M79.89 CALF SWELLING: Primary | ICD-10-CM

## 2024-03-11 DIAGNOSIS — J11.1 FLU: ICD-10-CM

## 2024-03-11 DIAGNOSIS — M79.89 CALF SWELLING: ICD-10-CM

## 2024-03-11 PROCEDURE — 93971 EXTREMITY STUDY: CPT | Performed by: SURGERY

## 2024-03-11 PROCEDURE — 93971 EXTREMITY STUDY: CPT

## 2024-03-11 PROCEDURE — 99214 OFFICE O/P EST MOD 30 MIN: CPT | Performed by: NURSE PRACTITIONER

## 2024-03-11 RX ORDER — DIAZEPAM 5 MG/1
5 TABLET ORAL EVERY 12 HOURS PRN
COMMUNITY

## 2024-03-11 NOTE — PROGRESS NOTES
Name: Jennifer Obrien      : 1980      MRN: [id number]  Encounter Provider: LENI Campuzano  Encounter Date: 3/11/2024   Encounter department: [facility] [address]    Assessment & Plan     1. Calf swelling  Comments:  obtain STAT VAS.  Orders:  -     VAS VENOUS DUPLEX -LOWER LIMB UNILATERAL; Future; Expected date: 2024    2. Flu  Comments:  On day 3 of Tamiflu.  Advised symptomatic care with fluids, rest, Mucinex.  Advised to call if not improving.           Subjective      Patient presents for sick visit.  S/p ALIF on  with [facility]. She does have the flu, seen in the ER on 3/9. On tamiflu. Having pain behind right knee.       Review of Systems   Constitutional:  Positive for fatigue and fever. Negative for chills and diaphoresis.   HENT:  Positive for sinus pressure and sinus pain. Negative for congestion and sore throat.    Respiratory:  Positive for shortness of breath. Negative for cough, chest tightness and wheezing.    Cardiovascular:  Negative for chest pain and palpitations.   Gastrointestinal:  Positive for abdominal pain and diarrhea. Negative for nausea and vomiting.   Musculoskeletal:  Positive for arthralgias (right knee) and myalgias.   Neurological:  Positive for headaches.       Current Outpatient Medications on File Prior to Visit   Medication Sig    albuterol (2.5 mg/3 mL) 0.083 % nebulizer solution Take 3 mL (2.5 mg total) by nebulization every 6 (six) hours as needed for wheezing or shortness of breath    albuterol (PROVENTIL HFA,VENTOLIN HFA) 90 mcg/act inhaler Inhale 2 puffs every 6 (six) hours as needed for wheezing    Azelastine HCl 137 MCG/SPRAY SOLN SPRAY 1 SPRAY INTO EACH NOSTRIL 2 (TWO) TIMES A DAY USE IN EACH NOSTRIL AS DIRECTED    diazepam (VALIUM) 5 mg tablet Take 5 mg by mouth every 12 (twelve) hours as needed for anxiety    dupilumab (DUPIXENT) subcutaneous injection Inject 2 mL (300 mg total) under the skin every 14 (fourteen)  "days Start 2 weeks after 600 mg loading dose    EPINEPHrine (EPIPEN) 0.3 mg/0.3 mL SOAJ Inject 0.3 mL (0.3 mg total) into a muscle once for 1 dose    ergocalciferol (VITAMIN D2) 50,000 units Take 1 capsule (50,000 Units total) by mouth once a week    fexofenadine (ALLEGRA) 180 MG tablet Take 180 mg by mouth daily    fluticasone-vilanterol (Breo Ellipta) 200-25 mcg/actuation inhaler Inhale 1 puff daily Rinse mouth after use.    folic acid (FOLVITE) 1 mg tablet Take 1 tablet (1 mg total) by mouth daily    gabapentin (NEURONTIN) 300 mg capsule Take 300 mg by mouth 3 (three) times a day After 1/23    ibuprofen (MOTRIN) 800 mg tablet Take 800 mg by mouth once    meloxicam (MOBIC) 15 mg tablet After 1/23    montelukast (SINGULAIR) 10 mg tablet TAKE 1 TABLET BY MOUTH EVERYDAY AT BEDTIME    naloxone (NARCAN) 4 mg/0.1 mL nasal spray After 1/23    oxyCODONE (ROXICODONE) 5 immediate release tablet Take 5 mg by mouth every 4 (four) hours as needed for severe pain or moderate pain    predniSONE 20 mg tablet Take 2 tablets (40 mg total) by mouth daily    Sodium Fluoride 5000 PPM 1.1 % PSTE Use as directed    Tenapanor HCl (Ibsrela) 50 MG TABS Take 50 mg by mouth 2 (two) times a day    traZODone (DESYREL) 50 mg tablet TAKE 1 TABLET BY MOUTH DAILY AT BEDTIME    phentermine 30 MG capsule Take 1 capsule (30 mg total) by mouth every morning (Patient not taking: Reported on 3/11/2024)    [DISCONTINUED] acetaminophen-codeine (TYLENOL with CODEINE #3) 300-30 MG per tablet Take 1 tablet by mouth every 6 (six) hours Until after surgery (Patient not taking: Reported on 3/8/2024)    [DISCONTINUED] pregabalin (LYRICA) 75 mg capsule Take 75 mg by mouth 2 (two) times a day (Patient not taking: Reported on 3/8/2024)       Objective     /76   Pulse 85   Ht 5' 2.5\" (1.588 m)   Wt 77.1 kg (170 lb)   SpO2 96%   BMI 30.60 kg/m²     Physical Exam  Constitutional:       Appearance: She is well-developed.   HENT:      Right Ear: Tympanic " membrane normal.      Left Ear: Tympanic membrane normal.      Nose: No congestion.      Mouth/Throat:      Pharynx: No oropharyngeal exudate.   Cardiovascular:      Rate and Rhythm: Normal rate and regular rhythm.      Heart sounds: Normal heart sounds. No murmur heard.  Pulmonary:      Effort: Pulmonary effort is normal. No respiratory distress.      Breath sounds: Normal breath sounds.   Musculoskeletal:         General: Tenderness (rle) present.      Comments: Pos kael's sign     Skin:     General: Skin is warm and dry.   Neurological:      Mental Status: She is alert and oriented to person, place, and time.   Psychiatric:         Mood and Affect: Mood normal.       LENI Campuzano

## 2024-03-15 ENCOUNTER — TELEPHONE (OUTPATIENT)
Age: 44
End: 2024-03-15

## 2024-03-15 NOTE — TELEPHONE ENCOUNTER
Spoke with pt, informed her that I spoke rep at [facility], they have updated the pt's profile so she can start receiving the maintenance dose of her Dupixent. Pt states is concerned about how she is to take her Dupixent. Pt states last injections was 2/29/24 (600mg), so she is not sure whether she can continue with just the maintenance dose or have to start over with the loading dose. Informed pt to call the office once she receives her delivery date, and we can discuss with the provider her next steps. Pt confirmed understanding.

## 2024-03-15 NOTE — TELEPHONE ENCOUNTER
Patient called stating that she was supposed to have a Dupixent injection on Tuesday but [facility] said they wont deliver it to her because they need the doctor to call to provide them with more information. Please advise.     [facility]- [phone]

## 2024-03-18 ENCOUNTER — TELEPHONE (OUTPATIENT)
Age: 44
End: 2024-03-18

## 2024-03-18 ENCOUNTER — DOCUMENTATION (OUTPATIENT)
Dept: PULMONOLOGY | Facility: CLINIC | Age: 44
End: 2024-03-18

## 2024-03-18 NOTE — TELEPHONE ENCOUNTER
Incoming call: Gaby from [facility]    Re: Pt of Dinah Martinez    Needs clarification on Dupixent:  Was change from pens to syringes done intentionally? Pt historically used the pens. If pens are needed, please send new script     Pharmacy phone:  [phone], option 1, then option 6

## 2024-03-19 ENCOUNTER — OFFICE VISIT (OUTPATIENT)
Dept: FAMILY MEDICINE CLINIC | Facility: CLINIC | Age: 44
End: 2024-03-19
Payer: COMMERCIAL

## 2024-03-19 VITALS
TEMPERATURE: 97.5 F | WEIGHT: 170.2 LBS | HEART RATE: 85 BPM | OXYGEN SATURATION: 98 % | HEIGHT: 63 IN | DIASTOLIC BLOOD PRESSURE: 68 MMHG | BODY MASS INDEX: 30.16 KG/M2 | SYSTOLIC BLOOD PRESSURE: 110 MMHG

## 2024-03-19 DIAGNOSIS — J45.50 SEVERE PERSISTENT ALLERGIC ASTHMA: Primary | ICD-10-CM

## 2024-03-19 DIAGNOSIS — Z98.890 BACK PAIN WITH HISTORY OF SPINAL SURGERY: ICD-10-CM

## 2024-03-19 DIAGNOSIS — F51.01 PRIMARY INSOMNIA: ICD-10-CM

## 2024-03-19 DIAGNOSIS — M54.9 BACK PAIN WITH HISTORY OF SPINAL SURGERY: ICD-10-CM

## 2024-03-19 DIAGNOSIS — F41.9 ANXIETY: ICD-10-CM

## 2024-03-19 PROCEDURE — 99214 OFFICE O/P EST MOD 30 MIN: CPT | Performed by: NURSE PRACTITIONER

## 2024-03-19 RX ORDER — TRAZODONE HYDROCHLORIDE 100 MG/1
100 TABLET ORAL
Qty: 90 TABLET | Refills: 1 | Status: SHIPPED | OUTPATIENT
Start: 2024-03-19 | End: 2024-03-19

## 2024-03-19 RX ORDER — OSELTAMIVIR PHOSPHATE 75 MG/1
CAPSULE ORAL
COMMUNITY
Start: 2024-03-09 | End: 2024-03-19

## 2024-03-19 RX ORDER — LEVOFLOXACIN 750 MG/1
750 TABLET, FILM COATED ORAL DAILY
COMMUNITY
Start: 2024-03-13 | End: 2024-03-19

## 2024-03-19 RX ORDER — GABAPENTIN 600 MG/1
TABLET ORAL
COMMUNITY
Start: 2024-03-13

## 2024-03-19 RX ORDER — AMITRIPTYLINE HYDROCHLORIDE 10 MG/1
10 TABLET, FILM COATED ORAL
Qty: 90 TABLET | Refills: 0 | Status: SHIPPED | OUTPATIENT
Start: 2024-03-19

## 2024-03-19 NOTE — TELEPHONE ENCOUNTER
Spoke with Dupixent, clarified that medication is supposed to be Pen not syringe. Medication updated.

## 2024-03-19 NOTE — ASSESSMENT & PLAN NOTE
She has been doing great with Dupixent.  She is unable to obtain medication from the specialty pharmacy for the past few weeks.  She did reach out to pulmonology about this issue and this has not been resolved yet.

## 2024-03-19 NOTE — ASSESSMENT & PLAN NOTE
We really do not want to add any more medications for her.  Advised to switch to amitriptyline from trazodone to see if this will help a little bit with her sleep and anxiety.  She is already on gabapentin and Valium.  Will consider SSRI if anxiety does not improve.

## 2024-03-19 NOTE — PROGRESS NOTES
Name: Jennifer Obrien      : 1980      MRN: [id number]  Encounter Provider: LENI Campuzano  Encounter Date: 3/19/2024   Encounter department: [facility] [address]    Assessment & Plan     1. Severe persistent allergic asthma  Assessment & Plan:  She has been doing great with Dupixent.  She is unable to obtain medication from the specialty pharmacy for the past few weeks.  She did reach out to pulmonology about this issue and this has not been resolved yet.      2. Primary insomnia  Assessment & Plan:  Trazodone does not seem to be helping her much.  Will switch to amitriptyline.    Orders:  -     amitriptyline (ELAVIL) 10 mg tablet; Take 1 tablet (10 mg total) by mouth daily at bedtime    3. Back pain with history of spinal surgery    4. Anxiety  Assessment & Plan:  We really do not want to add any more medications for her.  Advised to switch to amitriptyline from trazodone to see if this will help a little bit with her sleep and anxiety.  She is already on gabapentin and Valium.  Will consider SSRI if anxiety does not improve.             Subjective      Patient presents for routine follow up with her . She has not had her dupixent for 3 weeks now. She had it in the hospital. She called pulm. she is taking the Ibsrela and working well for bowels. Saw Dr. Frankel yesterday, keeping a close eye. Pain is ok, burning pain in left side. Doing therapy for carpal tunnel.       Review of Systems   Constitutional:  Positive for fatigue. Negative for chills, diaphoresis and fever.   HENT:  Positive for congestion and postnasal drip.    Respiratory:  Positive for cough and shortness of breath. Negative for chest tightness and wheezing.    Gastrointestinal:  Positive for constipation. Negative for diarrhea, nausea and vomiting.   Musculoskeletal:  Positive for arthralgias and back pain.   Allergic/Immunologic: Positive for environmental allergies.   Neurological:   Positive for dizziness.   Psychiatric/Behavioral:  The patient is nervous/anxious.        Current Outpatient Medications on File Prior to Visit   Medication Sig    albuterol (2.5 mg/3 mL) 0.083 % nebulizer solution Take 3 mL (2.5 mg total) by nebulization every 6 (six) hours as needed for wheezing or shortness of breath    albuterol (PROVENTIL HFA,VENTOLIN HFA) 90 mcg/act inhaler Inhale 2 puffs every 6 (six) hours as needed for wheezing    Azelastine HCl 137 MCG/SPRAY SOLN SPRAY 1 SPRAY INTO EACH NOSTRIL 2 (TWO) TIMES A DAY USE IN EACH NOSTRIL AS DIRECTED    diazepam (VALIUM) 5 mg tablet Take 5 mg by mouth every 12 (twelve) hours as needed for anxiety    dupilumab (DUPIXENT) subcutaneous injection Inject 2 mL (300 mg total) under the skin every 14 (fourteen) days Start 2 weeks after 600 mg loading dose    EPINEPHrine (EPIPEN) 0.3 mg/0.3 mL SOAJ Inject 0.3 mL (0.3 mg total) into a muscle once for 1 dose    ergocalciferol (VITAMIN D2) 50,000 units Take 1 capsule (50,000 Units total) by mouth once a week    fexofenadine (ALLEGRA) 180 MG tablet Take 180 mg by mouth daily    fluticasone-vilanterol (Breo Ellipta) 200-25 mcg/actuation inhaler Inhale 1 puff daily Rinse mouth after use.    folic acid (FOLVITE) 1 mg tablet Take 1 tablet (1 mg total) by mouth daily    gabapentin (NEURONTIN) 300 mg capsule Take 300 mg by mouth 3 (three) times a day After 1/23    gabapentin (NEURONTIN) 600 MG tablet take 1 tablet by mouth three times a day for 30 days    ibuprofen (MOTRIN) 800 mg tablet Take 800 mg by mouth once    meloxicam (MOBIC) 15 mg tablet After 1/23    montelukast (SINGULAIR) 10 mg tablet TAKE 1 TABLET BY MOUTH EVERYDAY AT BEDTIME    naloxone (NARCAN) 4 mg/0.1 mL nasal spray After 1/23    predniSONE 20 mg tablet Take 2 tablets (40 mg total) by mouth daily    Sodium Fluoride 5000 PPM 1.1 % PSTE Use as directed    Tenapanor HCl (Ibsrela) 50 MG TABS Take 50 mg by mouth 2 (two) times a day    [DISCONTINUED] levofloxacin  "(LEVAQUIN) 750 mg tablet Take 750 mg by mouth daily    [DISCONTINUED] oseltamivir (TAMIFLU) 75 mg capsule take 1 capsule by mouth twice a day for 5 days    [DISCONTINUED] traZODone (DESYREL) 50 mg tablet TAKE 1 TABLET BY MOUTH DAILY AT BEDTIME    phentermine 30 MG capsule Take 1 capsule (30 mg total) by mouth every morning (Patient not taking: Reported on 3/11/2024)       Objective     /68 (BP Location: Right arm, Patient Position: Sitting)   Pulse 85   Temp 97.5 °F (36.4 °C)   Ht 5' 2.5\" (1.588 m)   Wt 77.2 kg (170 lb 3.2 oz)   SpO2 98%   BMI 30.63 kg/m²     Physical Exam  Constitutional:       Appearance: She is well-developed.   Cardiovascular:      Rate and Rhythm: Normal rate and regular rhythm.      Heart sounds: Normal heart sounds. No murmur heard.  Pulmonary:      Effort: Pulmonary effort is normal. No respiratory distress.      Breath sounds: Normal breath sounds.   Skin:     General: Skin is warm and dry.   Neurological:      Mental Status: She is alert and oriented to person, place, and time.       LENI Campuzano    "

## 2024-03-20 ENCOUNTER — TELEPHONE (OUTPATIENT)
Dept: GASTROENTEROLOGY | Facility: CLINIC | Age: 44
End: 2024-03-20

## 2024-03-25 ENCOUNTER — OFFICE VISIT (OUTPATIENT)
Age: 44
End: 2024-03-25
Payer: COMMERCIAL

## 2024-03-25 VITALS
HEART RATE: 80 BPM | TEMPERATURE: 97.9 F | OXYGEN SATURATION: 99 % | RESPIRATION RATE: 16 BRPM | HEIGHT: 63 IN | DIASTOLIC BLOOD PRESSURE: 72 MMHG | BODY MASS INDEX: 30.12 KG/M2 | WEIGHT: 170 LBS | SYSTOLIC BLOOD PRESSURE: 120 MMHG

## 2024-03-25 DIAGNOSIS — J45.50 SEVERE PERSISTENT ALLERGIC ASTHMA: Primary | ICD-10-CM

## 2024-03-25 DIAGNOSIS — H92.03 CHRONIC EAR PAIN, BILATERAL: ICD-10-CM

## 2024-03-25 DIAGNOSIS — J34.89 FRONTAL SINUS PAIN: ICD-10-CM

## 2024-03-25 DIAGNOSIS — G89.29 CHRONIC EAR PAIN, BILATERAL: ICD-10-CM

## 2024-03-25 PROCEDURE — 99213 OFFICE O/P EST LOW 20 MIN: CPT

## 2024-03-25 NOTE — PROGRESS NOTES
Pulmonary Follow Up Note  Jennifer Obrien 44 y.o. female MRN: [id number]  3/26/2024    Assessment:    Severe persistent allergic asthma  -Patient received loading dose of Dupixent 600 mg around February 28th.  She is having issues with the pharmacy and receiving consistent supply of Dupixent, has not had any doses since initial loading dose  -Called [facility] pharmacy while patient was in the office, requested expedited delivery of Dupixent pens. Apparently, there's an issue with billing and patient needs to call to fix her account.  Number provided for patient to call at [phone]   -Patient's asthma symptoms are otherwise currently stable, fortunately  -She will continue Breo 200 1 puff daily, albuterol HFA/nebs every 6 hours as needed and Singulair 10 mg nightly    Frontal sinus pain/chronic ear pain  -Patient reports frontal sinus pain for several months as well as chronic bilateral ear pain.  She is requesting referral to ENT.  Referral placed      Plan:    Diagnoses and all orders for this visit:    Severe persistent allergic asthma  -     Ambulatory Referral to Otolaryngology; Future    Frontal sinus pain  -     Ambulatory Referral to Otolaryngology; Future    Chronic ear pain, bilateral  -     Ambulatory Referral to Otolaryngology; Future      Return in about 3 months (around 6/25/2024).        History of Present Illness     Chief Complaint: No chief complaint on file.      Patient ID: Jennifer is a 44 y.o. y.o. female has a past medical history of Allergic, Anemia, Anxiety, Asthma, Celiac disease, History of one miscarriage, Renal cyst, and Varicella.      3/25/2024  HPI: Jennifer Obrien is a 44 y.o. female who presents to the office today for a follow-up visit for her asthma.  She was previously seen in the office 6 weeks ago. Re-ordered Dupixent and Breo inhaler that she was out of since summer 2023. Maintained on singulair and claritin for allergies.  Unfortunately, patient has been having great difficulty  receiving Dupixent medication from specialty pharmacy.  She received loading dose around , and has not received additional Dupixent pens since.  Fortunately, her asthma symptoms are in control.  She is taking her Breo daily as prescribed and not requiring frequent use of her albuterol inhaler. She underwent recent back surgery on  without respiratory complications.       Review of Systems   Constitutional:  Negative for activity change, chills, diaphoresis, fever and unexpected weight change.   HENT:  Negative for congestion, postnasal drip, rhinorrhea, sore throat, trouble swallowing and voice change.    Respiratory:  Negative for cough, chest tightness, shortness of breath and wheezing.    Cardiovascular:  Negative for chest pain, palpitations and leg swelling.   Allergic/Immunologic: Positive for environmental allergies.       Historical Information   Past Medical History:   Diagnosis Date    Allergic     Anemia     Anxiety     Asthma     not as adult    Celiac disease     History of one miscarriage     2017 10 weeks    Renal cyst     Varicella      Past Surgical History:   Procedure Laterality Date    BACK SURGERY  2022     SECTION      2004 son,    daughter,    daughter    NECK SURGERY      C5-7, disc repair     IN  DELIVERY ONLY N/A 2018    Procedure:  SECTION () REPEAT;  Surgeon: Kashmir Crook DO;  Location: [facility] ;  Service: Obstetrics     Family History   Adopted: Yes   Problem Relation Age of Onset    Crohn's disease Mother     Hypertension Mother     Alcohol abuse Father     Drug abuse Father     Asthma Sister     No Known Problems Daughter     No Known Problems Daughter     No Known Problems Daughter     Cancer Maternal Grandmother         lung    Arthritis Maternal Grandmother     Diabetes Maternal Grandfather     Hearing loss Maternal Grandfather     Heart disease Maternal Grandfather     No Known Problems Son     Breast cancer Neg  Hx        Smoking history: She reports that she has never smoked. She has never used smokeless tobacco.    Occupational History:     Immunization History   Administered Date(s) Administered    COVID-19 J&J (Shicon) vaccine 0.5 mL 04/11/2021    COVID-19 PFIZER VACCINE 0.3 ML IM 11/27/2021    COVID-19 Pfizer Vac BIVALENT Colin-sucrose 12 Yr+ IM 10/21/2022    COVID-19 Pfizer mRNA vacc PF colin-sucrose 12 yr and older (Comirnaty) 09/28/2023    INFLUENZA 10/05/2013, 08/23/2017, 08/31/2018, 11/27/2021, 09/07/2022    Influenza, injectable, quadrivalent, preservative free 0.5 mL 08/31/2018, 12/28/2020    Influenza, seasonal, injectable 10/05/2013    Tdap 03/05/2011, 05/30/2018       Meds/Allergies     Current Outpatient Medications:     albuterol (2.5 mg/3 mL) 0.083 % nebulizer solution, Take 3 mL (2.5 mg total) by nebulization every 6 (six) hours as needed for wheezing or shortness of breath, Disp: 180 mL, Rfl: 5    albuterol (PROVENTIL HFA,VENTOLIN HFA) 90 mcg/act inhaler, Inhale 2 puffs every 6 (six) hours as needed for wheezing, Disp: 18 g, Rfl: 3    amitriptyline (ELAVIL) 10 mg tablet, Take 1 tablet (10 mg total) by mouth daily at bedtime, Disp: 90 tablet, Rfl: 0    Azelastine HCl 137 MCG/SPRAY SOLN, SPRAY 1 SPRAY INTO EACH NOSTRIL 2 (TWO) TIMES A DAY USE IN EACH NOSTRIL AS DIRECTED, Disp: 90 mL, Rfl: 1    diazepam (VALIUM) 5 mg tablet, Take 5 mg by mouth every 12 (twelve) hours as needed for anxiety, Disp: , Rfl:     dupilumab (DUPIXENT) subcutaneous injection, Inject 2 mL (300 mg total) under the skin every 14 (fourteen) days Start 2 weeks after 600 mg loading dose, Disp: 2 mL, Rfl: 11    EPINEPHrine (EPIPEN) 0.3 mg/0.3 mL SOAJ, Inject 0.3 mL (0.3 mg total) into a muscle once for 1 dose, Disp: 0.6 mL, Rfl: 0    ergocalciferol (VITAMIN D2) 50,000 units, Take 1 capsule (50,000 Units total) by mouth once a week, Disp: 12 capsule, Rfl: 1    fexofenadine (ALLEGRA) 180 MG tablet, Take 180 mg by mouth daily, Disp: , Rfl:      "fluticasone-vilanterol (Breo Ellipta) 200-25 mcg/actuation inhaler, Inhale 1 puff daily Rinse mouth after use., Disp: 180 blister, Rfl: 3    folic acid (FOLVITE) 1 mg tablet, Take 1 tablet (1 mg total) by mouth daily, Disp: 90 tablet, Rfl: 1    gabapentin (NEURONTIN) 300 mg capsule, Take 300 mg by mouth 3 (three) times a day After 1/23, Disp: , Rfl:     ibuprofen (MOTRIN) 800 mg tablet, Take 800 mg by mouth once, Disp: , Rfl:     meloxicam (MOBIC) 15 mg tablet, After 1/23, Disp: , Rfl:     montelukast (SINGULAIR) 10 mg tablet, TAKE 1 TABLET BY MOUTH EVERYDAY AT BEDTIME, Disp: 90 tablet, Rfl: 1    naloxone (NARCAN) 4 mg/0.1 mL nasal spray, After 1/23, Disp: , Rfl:     predniSONE 20 mg tablet, Take 2 tablets (40 mg total) by mouth daily, Disp: 10 tablet, Rfl: 0    Sodium Fluoride 5000 PPM 1.1 % PSTE, Use as directed, Disp: , Rfl:     Tenapanor HCl (Ibsrela) 50 MG TABS, Take 50 mg by mouth 2 (two) times a day, Disp: 60 tablet, Rfl: 11    gabapentin (NEURONTIN) 600 MG tablet, take 1 tablet by mouth three times a day for 30 days (Patient not taking: Reported on 3/25/2024), Disp: , Rfl:     phentermine 30 MG capsule, Take 1 capsule (30 mg total) by mouth every morning (Patient not taking: Reported on 3/11/2024), Disp: 30 capsule, Rfl: 1  Allergies:   Allergies   Allergen Reactions    Gluten Meal - Food Allergy Abdominal Pain and GI Intolerance    Other Vomiting and Abdominal Pain     Gluten     Fluticasone-Salmeterol Palpitations     \"Didn't like the way it made me feel\"    Iron Itching     Gold label only         Vitals:  Vitals:    03/25/24 1501 03/25/24 1502   BP: 120/72    BP Location: Left arm    Patient Position: Sitting    Cuff Size: Large    Pulse: 80    Resp: 16    Temp: 97.9 °F (36.6 °C)    SpO2: 99% 99%   Weight: 77.1 kg (170 lb)    Height: 5' 3\" (1.6 m)    Oxygen Therapy  SpO2: 99 %  Oxygen Therapy: None (Room air)  .  Wt Readings from Last 3 Encounters:   03/25/24 77.1 kg (170 lb)   03/19/24 77.2 kg (170 lb " 3.2 oz)   03/11/24 77.1 kg (170 lb)     Body mass index is 30.11 kg/m².    Physical Exam  Vitals and nursing note reviewed.   Constitutional:       General: She is not in acute distress.     Appearance: Normal appearance. She is well-developed.   Cardiovascular:      Rate and Rhythm: Normal rate and regular rhythm.      Heart sounds: Normal heart sounds, S1 normal and S2 normal. No murmur heard.  Pulmonary:      Effort: Pulmonary effort is normal.      Breath sounds: Normal breath sounds. No decreased breath sounds, wheezing, rhonchi or rales.   Musculoskeletal:         General: No swelling.      Right lower leg: No edema.      Left lower leg: No edema.   Neurological:      Mental Status: She is alert.   Psychiatric:         Mood and Affect: Mood and affect normal.         Behavior: Behavior normal. Behavior is cooperative.           Labs: I have personally reviewed pertinent lab results.  Lab Results   Component Value Date    WBC 7.43 03/04/2024    HGB 13.5 03/04/2024    HCT 41.3 03/04/2024    MCV 93 03/04/2024     03/04/2024     Lab Results   Component Value Date    CALCIUM 8.6 03/09/2024    K 4.2 03/09/2024    CO2 27 03/09/2024     03/09/2024    BUN 23 03/09/2024    CREATININE 0.53 03/09/2024     Lab Results   Component Value Date     (H) 02/28/2022     Lab Results   Component Value Date    ALT 27 03/09/2024    AST 25 03/09/2024    ALKPHOS 70 03/09/2024       Imaging and other studies: I have personally reviewed pertinent reports    Pulmonary function testing:     Pulmonary Functions Testing Results: 3/17/2022    FEV1/FVC ratio 73%    FEV1 91% predicted  % predicted  (+) response to bronchodilators   % predicted   % predicted  DLCO corrected for hemoglobin 107 % predicted    Impression: Normal spirometry.  Significant bronchodilator response.  Lung volumes demonstrate hyperinflation and air trapping.  Normal diffusion capacity.

## 2024-03-26 ENCOUNTER — TELEPHONE (OUTPATIENT)
Dept: GASTROENTEROLOGY | Facility: CLINIC | Age: 44
End: 2024-03-26

## 2024-03-26 NOTE — ASSESSMENT & PLAN NOTE
-Patient received loading dose of Dupixent 600 mg around February 28th.  She is having issues with the pharmacy and receiving consistent supply of Dupixent, has not had any doses since initial loading dose  -Called [facility] pharmacy while patient was in the office, requested expedited delivery of Dupixent pens. Apparently, there's an issue with billing and patient needs to call to fix her account.  Number provided for patient to call at [phone]   -Patient's asthma symptoms are otherwise currently stable, fortunately  -She will continue Breo 200 1 puff daily, albuterol HFA/nebs every 6 hours as needed and Singulair 10 mg nightly

## 2024-03-27 DIAGNOSIS — K58.1 IRRITABLE BOWEL SYNDROME WITH CONSTIPATION: Primary | ICD-10-CM

## 2024-03-27 RX ORDER — PLECANATIDE 3 MG/1
3 TABLET ORAL DAILY
Qty: 90 TABLET | Refills: 3 | Status: SHIPPED | OUTPATIENT
Start: 2024-03-27

## 2024-03-27 NOTE — TELEPHONE ENCOUNTER
Please see other task  Ibsrela was denied  There is no documentation of pt trying and failing [facility].  If this is for Pt assistance, Office handles.  Thank you    Routing to GI office.

## 2024-03-29 ENCOUNTER — APPOINTMENT (OUTPATIENT)
Dept: LAB | Facility: HOSPITAL | Age: 44
End: 2024-03-29
Payer: COMMERCIAL

## 2024-03-29 DIAGNOSIS — K90.0 CELIAC DISEASE: ICD-10-CM

## 2024-03-29 DIAGNOSIS — E53.8 FOLATE DEFICIENCY: ICD-10-CM

## 2024-03-29 DIAGNOSIS — D50.9 IRON DEFICIENCY ANEMIA, UNSPECIFIED IRON DEFICIENCY ANEMIA TYPE: ICD-10-CM

## 2024-03-29 LAB
BASOPHILS # BLD AUTO: 0.06 THOUSANDS/ÂΜL (ref 0–0.1)
BASOPHILS NFR BLD AUTO: 1 % (ref 0–1)
EOSINOPHIL # BLD AUTO: 0.27 THOUSAND/ÂΜL (ref 0–0.61)
EOSINOPHIL NFR BLD AUTO: 3 % (ref 0–6)
ERYTHROCYTE [DISTWIDTH] IN BLOOD BY AUTOMATED COUNT: 12.9 % (ref 11.6–15.1)
FERRITIN SERPL-MCNC: 176 NG/ML (ref 11–307)
FOLATE SERPL-MCNC: >22.3 NG/ML
HCT VFR BLD AUTO: 41.7 % (ref 34.8–46.1)
HGB BLD-MCNC: 13.7 G/DL (ref 11.5–15.4)
IMM GRANULOCYTES # BLD AUTO: 0.04 THOUSAND/UL (ref 0–0.2)
IMM GRANULOCYTES NFR BLD AUTO: 1 % (ref 0–2)
IRON SATN MFR SERPL: 28 % (ref 15–50)
IRON SERPL-MCNC: 78 UG/DL (ref 50–212)
LYMPHOCYTES # BLD AUTO: 2.59 THOUSANDS/ÂΜL (ref 0.6–4.47)
LYMPHOCYTES NFR BLD AUTO: 32 % (ref 14–44)
MCH RBC QN AUTO: 29.8 PG (ref 26.8–34.3)
MCHC RBC AUTO-ENTMCNC: 32.9 G/DL (ref 31.4–37.4)
MCV RBC AUTO: 91 FL (ref 82–98)
MONOCYTES # BLD AUTO: 0.59 THOUSAND/ÂΜL (ref 0.17–1.22)
MONOCYTES NFR BLD AUTO: 7 % (ref 4–12)
NEUTROPHILS # BLD AUTO: 4.49 THOUSANDS/ÂΜL (ref 1.85–7.62)
NEUTS SEG NFR BLD AUTO: 56 % (ref 43–75)
NRBC BLD AUTO-RTO: 0 /100 WBCS
PLATELET # BLD AUTO: 330 THOUSANDS/UL (ref 149–390)
PMV BLD AUTO: 10.5 FL (ref 8.9–12.7)
RBC # BLD AUTO: 4.6 MILLION/UL (ref 3.81–5.12)
TIBC SERPL-MCNC: 281 UG/DL (ref 250–450)
UIBC SERPL-MCNC: 203 UG/DL (ref 155–355)
WBC # BLD AUTO: 8.04 THOUSAND/UL (ref 4.31–10.16)

## 2024-03-29 PROCEDURE — 85025 COMPLETE CBC W/AUTO DIFF WBC: CPT

## 2024-03-29 PROCEDURE — 83550 IRON BINDING TEST: CPT

## 2024-03-29 PROCEDURE — 82728 ASSAY OF FERRITIN: CPT

## 2024-03-29 PROCEDURE — 82746 ASSAY OF FOLIC ACID SERUM: CPT

## 2024-03-29 PROCEDURE — 36415 COLL VENOUS BLD VENIPUNCTURE: CPT

## 2024-03-29 PROCEDURE — 83540 ASSAY OF IRON: CPT

## 2024-04-02 NOTE — TELEPHONE ENCOUNTER
Patients GI provider:       Number to return call: ( [phone] 8am to 8pm    Reason for call: pt pram asking for status of prior auth faxed to office [phone]    Scheduled procedure/appointment date if applicable: Apt/procedure

## 2024-04-05 ENCOUNTER — OFFICE VISIT (OUTPATIENT)
Dept: GASTROENTEROLOGY | Facility: CLINIC | Age: 44
End: 2024-04-05
Payer: COMMERCIAL

## 2024-04-05 VITALS
TEMPERATURE: 97.8 F | WEIGHT: 168.4 LBS | BODY MASS INDEX: 29.84 KG/M2 | OXYGEN SATURATION: 97 % | HEIGHT: 63 IN | HEART RATE: 89 BPM | DIASTOLIC BLOOD PRESSURE: 74 MMHG | SYSTOLIC BLOOD PRESSURE: 124 MMHG

## 2024-04-05 DIAGNOSIS — K58.1 IRRITABLE BOWEL SYNDROME WITH CONSTIPATION: Primary | ICD-10-CM

## 2024-04-05 DIAGNOSIS — K90.0 CELIAC DISEASE: ICD-10-CM

## 2024-04-05 PROCEDURE — 99213 OFFICE O/P EST LOW 20 MIN: CPT | Performed by: PHYSICIAN ASSISTANT

## 2024-04-05 NOTE — TELEPHONE ENCOUNTER
Patients GI provider:  Sj Hickey    Number to return call: [phone]  Fax number # [phone]    Reason for call: Maddy from [email or web address] called in to confirm if the PA denial letter has been faxed over due to them not receiving fax and they would like to know if the provider will proceed with an appeal. Maddy can be reached at the number above, thank you.     Scheduled procedure/appointment date if applicable: Apt/procedure 10/09/2024

## 2024-04-05 NOTE — PROGRESS NOTES
[facility] - Outpatient Follow-up Note  Jennifer Obrien 44 y.o. female MRN: [id number]  Encounter: [id number]          ASSESSMENT AND PLAN:      1. Irritable bowel syndrome with constipation  She does very well on Ibsrela 50mg BID  Her insurance has completely denied this several times  Will resubmit to [id number] outdoors for samples    2. Celiac disease  She is very strict with her GFD  Recent labs including CBC, Iron panel and folate are normal    ______________________________________________________________________    SUBJECTIVE: 44-year-old female with a long history of constipation predominant irritable bowel syndrome who presents for routine follow-up.  As long as she takes Ibsrela 50 mg twice daily she feels well.  She has regular bowel movements.  She has no significant abdominal pain.  She denies significant gaseousness or bloating.  Unfortunately there have been many issues getting this through her insurance company or through the drug company.  She has gotten it through samples and through the drug company several times but not on a consistent basis.  Most recently after the last denial she was prescribed Trulance.  She has yet to try this.  She previously failed treatment with Linzess, MiraLAX, Amitiza, Colace, fiber and probiotic.    She is very strict with her gluten-free diet since being diagnosed with celiac disease.  She is even careful to avoid cross-contamination.  She did become iron deficient after her recent surgery however this has resolved.  She is now on a folic acid supplement.  Her last folate level was normal.  She takes a magnesium supplement on a daily basis.      REVIEW OF SYSTEMS IS OTHERWISE NEGATIVE.      Historical Information   Past Medical History:   Diagnosis Date    Allergic     Anemia     Anxiety     Asthma     not as adult    Celiac disease     History of one miscarriage     6/2017 10 weeks    Renal cyst     Varicella      Past Surgical History:   Procedure  Laterality Date    BACK SURGERY  2022     SECTION      2004 son,    daughter,    daughter    NECK SURGERY      C5-7, disc repair     [place]  DELIVERY ONLY N/A 2018    Procedure:  SECTION () REPEAT;  Surgeon: Kashmir Crook DO;  Location: [facility] ;  Service: Obstetrics     Social History   Social History     Substance and Sexual Activity   Alcohol Use No     Social History     Substance and Sexual Activity   Drug Use No     Social History     Tobacco Use   Smoking Status Never   Smokeless Tobacco Never     Family History   Adopted: Yes   Problem Relation Age of Onset    Crohn's disease Mother     Hypertension Mother     Alcohol abuse Father     Drug abuse Father     Asthma Sister     No Known Problems Daughter     No Known Problems Daughter     No Known Problems Daughter     Cancer Maternal Grandmother         lung    Arthritis Maternal Grandmother     Diabetes Maternal Grandfather     Hearing loss Maternal Grandfather     Heart disease Maternal Grandfather     No Known Problems Son     Breast cancer Neg Hx        Meds/Allergies       Current Outpatient Medications:     albuterol (2.5 mg/3 mL) 0.083 % nebulizer solution    albuterol (PROVENTIL HFA,VENTOLIN HFA) 90 mcg/act inhaler    amitriptyline (ELAVIL) 10 mg tablet    Azelastine HCl 137 MCG/SPRAY SOLN    diazepam (VALIUM) 5 mg tablet    dupilumab (DUPIXENT) subcutaneous injection    EPINEPHrine (EPIPEN) 0.3 mg/0.3 mL SOAJ    ergocalciferol (VITAMIN D2) 50,000 units    fexofenadine (ALLEGRA) 180 MG tablet    fluticasone-vilanterol (Breo Ellipta) 200-25 mcg/actuation inhaler    folic acid (FOLVITE) 1 mg tablet    gabapentin (NEURONTIN) 300 mg capsule    gabapentin (NEURONTIN) 600 MG tablet    ibuprofen (MOTRIN) 800 mg tablet    meloxicam (MOBIC) 15 mg tablet    montelukast (SINGULAIR) 10 mg tablet    naloxone (NARCAN) 4 mg/0.1 mL nasal spray    phentermine 30 MG capsule    Plecanatide (Trulance) 3 MG TABS    predniSONE  "20 mg tablet    Sodium Fluoride 5000 PPM 1.1 % PSTE    Tenapanor HCl (Ibsrela) 50 MG TABS    Allergies   Allergen Reactions    Gluten Meal - Food Allergy Abdominal Pain and GI Intolerance    Other Vomiting and Abdominal Pain     Gluten     Fluticasone-Salmeterol Palpitations     \"Didn't like the way it made me feel\"    Iron Itching     Gold label only           Objective     not currently breastfeeding. There is no height or weight on file to calculate BMI.      PHYSICAL EXAM:      General Appearance:   Alert, cooperative, no distress   HEENT:   Normocephalic, atraumatic, anicteric.     Neck:  Supple, symmetrical, trachea midline   Lungs:   Clear to auscultation bilaterally; no rales, rhonchi or wheezing; respirations unlabored    Heart::   Regular rate and rhythm; no murmur, rub, or gallop.   Abdomen:   Soft, non-tender, non-distended; normal bowel sounds; no masses, no organomegaly    Genitalia:   Deferred    Rectal:   Deferred    Extremities:  No cyanosis, clubbing or edema    Pulses:  2+ and symmetric    Skin:  No jaundice, rashes, or lesions    Lymph nodes:  No palpable cervical lymphadenopathy        Lab Results:   No visits with results within 1 Day(s) from this visit.   Latest known visit with results is:   Appointment on 03/29/2024   Component Date Value    WBC 03/29/2024 8.04     RBC 03/29/2024 4.60     Hemoglobin 03/29/2024 13.7     Hematocrit 03/29/2024 41.7     MCV 03/29/2024 91     MCH 03/29/2024 29.8     MCHC 03/29/2024 32.9     RDW 03/29/2024 12.9     MPV 03/29/2024 10.5     Platelets 03/29/2024 330     nRBC 03/29/2024 0     Neutrophils Relative 03/29/2024 56     Immature Grans % 03/29/2024 1     Lymphocytes Relative 03/29/2024 32     Monocytes Relative 03/29/2024 7     Eosinophils Relative 03/29/2024 3     Basophils Relative 03/29/2024 1     Neutrophils Absolute 03/29/2024 4.49     Absolute Immature Grans 03/29/2024 0.04     Absolute Lymphocytes 03/29/2024 2.59     Absolute Monocytes 03/29/2024 0.59 "     Eosinophils Absolute 03/29/2024 0.27     Basophils Absolute 03/29/2024 0.06     Folate 03/29/2024 >22.3     Iron Saturation 03/29/2024 28     TIBC 03/29/2024 281     Iron 03/29/2024 78     UIBC 03/29/2024 203     Ferritin 03/29/2024 176          Radiology Results:   VAS VENOUS DUPLEX -LOWER LIMB UNILATERAL    Result Date: 3/11/2024  Narrative:  [facility] REPORT [facility]INICAL: Indications: Patient presents with right lower extremity pain x 4 days s/p back surgery. Operative History: no prior cardiovascular surgeries Risk Factors The patient has no history of DVT.   CONCLUSION:  Impression: RIGHT LOWER LIMB No evidence of acute or chronic deep vein thrombosis . No evidence of superficial thrombophlebitis noted. Doppler evaluation shows a normal response to augmentation maneuvers. Popliteal, posterior tibial and anterior tibial arterial Doppler waveform's are triphasic.  LEFT LOWER LIMB LIMITED Evaluation shows no evidence of thrombus in the common femoral vein. Doppler evaluation shows a normal response to augmentation maneuvers.  Technical findings were given to Luly Villeda @ 6:14PM  SIGNATURE: Electronically Signed by: CHRIS PEÑA MD on 2024-03-11 07:44:20 PM    CT spine lumbar w contrast    Result Date: 3/9/2024  Narrative: PROCEDURE INFORMATION: Exam: CT Lumbar Spine With Contrast Exam date and time: 3/9/2024 8:39 PM Age: 44 years old Clinical indication: Low back pain; Prior surgery; Surgery date: <1 month; Surgery type: Alif; Additional info: Abcess TECHNIQUE: Imaging protocol: Computed tomography of the lumbar spine with contrast. Radiation optimization: All CT scans at this facility use at least one of these dose optimization techniques: automated exposure control; mA and/or kV adjustment per patient size (includes targeted exams where dose is matched to clinical indication); or iterative reconstruction. Contrast material: [id number]; Contrast volume: 100 ml; Contrast route: INTRAVENOUS (IV);    COMPARISON: MR L SPINE^WO 2/5/2024 8:14 AM FINDINGS: Bones/joints: The lumbar vertebrae are aligned normally. There is an anterior spinal fusion spanning the L5 and S1 levels. There is mild disc narrowing at L5-S1. No spinal canal stenosis. Intact symmetric SI joints. Soft tissues: There is no paraspinous mass or hematoma. Other findings: There are no acute major organ abnormalities in the visualized portion of the abdomen.     Impression: IMPRESSION: Status both anterior spinal fusion at the L5-S1 level. Otherwise normal CT of the lumbar spine.

## 2024-04-09 ENCOUNTER — TELEPHONE (OUTPATIENT)
Dept: GASTROENTEROLOGY | Facility: CLINIC | Age: 44
End: 2024-04-09

## 2024-04-09 NOTE — TELEPHONE ENCOUNTER
Message  Received: 4 days ago  Lynn Hickey PA-C  [facility]; [facility]  Caller: Unspecified (4 days ago,  1:42 PM)  Patient's Ibsrela was denied by her insurance which we knew would happen.  Can we please take that denial letter (most recent) and submit to the drug company so that she can hopefully get samples from the drug company...  Trulance does not work.  She previously failed Linzess, Amitiza and Miralax

## 2024-04-09 NOTE — TELEPHONE ENCOUNTER
Spoke with Samir Coffey and she confirmed that the pt is due for renewal. She asked that we fax the denial to [phone] and they will expedite the process.      Denial was faxed.

## 2024-04-15 DIAGNOSIS — G25.81 RLS (RESTLESS LEGS SYNDROME): Primary | ICD-10-CM

## 2024-04-15 RX ORDER — ROPINIROLE 0.5 MG/1
0.5 TABLET, FILM COATED ORAL
Qty: 30 TABLET | Refills: 0 | Status: SHIPPED | OUTPATIENT
Start: 2024-04-15 | End: 2024-04-26 | Stop reason: SDUPTHER

## 2024-04-26 DIAGNOSIS — F51.01 PRIMARY INSOMNIA: Primary | ICD-10-CM

## 2024-04-26 DIAGNOSIS — G25.81 RLS (RESTLESS LEGS SYNDROME): ICD-10-CM

## 2024-04-26 RX ORDER — ROPINIROLE 2 MG/1
2 TABLET, FILM COATED ORAL
Qty: 90 TABLET | Refills: 1 | Status: SHIPPED | OUTPATIENT
Start: 2024-04-26

## 2024-05-14 DIAGNOSIS — J45.50 SEVERE PERSISTENT ALLERGIC ASTHMA: ICD-10-CM

## 2024-05-15 RX ORDER — ALBUTEROL SULFATE 90 UG/1
2 AEROSOL, METERED RESPIRATORY (INHALATION) EVERY 6 HOURS PRN
Qty: 18 G | Refills: 5 | Status: SHIPPED | OUTPATIENT
Start: 2024-05-15

## 2024-06-11 ENCOUNTER — OFFICE VISIT (OUTPATIENT)
Dept: FAMILY MEDICINE CLINIC | Facility: CLINIC | Age: 44
End: 2024-06-11
Payer: COMMERCIAL

## 2024-06-11 VITALS
DIASTOLIC BLOOD PRESSURE: 84 MMHG | HEIGHT: 63 IN | TEMPERATURE: 98 F | WEIGHT: 168 LBS | OXYGEN SATURATION: 98 % | SYSTOLIC BLOOD PRESSURE: 124 MMHG | RESPIRATION RATE: 18 BRPM | BODY MASS INDEX: 29.77 KG/M2 | HEART RATE: 88 BPM

## 2024-06-11 DIAGNOSIS — F41.1 GAD (GENERALIZED ANXIETY DISORDER): Primary | ICD-10-CM

## 2024-06-11 DIAGNOSIS — F41.9 ANXIETY: ICD-10-CM

## 2024-06-11 DIAGNOSIS — J45.50 SEVERE PERSISTENT ALLERGIC ASTHMA: ICD-10-CM

## 2024-06-11 PROCEDURE — 99214 OFFICE O/P EST MOD 30 MIN: CPT | Performed by: NURSE PRACTITIONER

## 2024-06-11 RX ORDER — BUSPIRONE HYDROCHLORIDE 5 MG/1
5 TABLET ORAL 2 TIMES DAILY
Qty: 60 TABLET | Refills: 0 | Status: SHIPPED | OUTPATIENT
Start: 2024-06-11

## 2024-06-11 RX ORDER — DULOXETIN HYDROCHLORIDE 20 MG/1
CAPSULE, DELAYED RELEASE ORAL
COMMUNITY
Start: 2024-06-03 | End: 2024-06-11

## 2024-06-11 RX ORDER — DULOXETIN HYDROCHLORIDE 60 MG/1
60 CAPSULE, DELAYED RELEASE ORAL DAILY
Qty: 90 CAPSULE | Refills: 1 | Status: SHIPPED | OUTPATIENT
Start: 2024-06-11

## 2024-06-11 NOTE — ASSESSMENT & PLAN NOTE
Doing well with Dupixent.  Continue on albuterol inhaler and nebulizer as needed.  Continue care with pulmonology.

## 2024-06-11 NOTE — PROGRESS NOTES
Assessment/Plan:     Chronic Problems:  Severe persistent allergic asthma  Doing well with Dupixent.  Continue on albuterol inhaler and nebulizer as needed.  Continue care with pulmonology.    Anxiety  Patient was started on Cymbalta through orthopedics.  Will increase Cymbalta to 60 mg daily.  Will add on BuSpar to take twice daily as anxiety is very severe right now.  Will follow-up in 1 month.      Visit Diagnosis:  Diagnoses and all orders for this visit:    SPENCER (generalized anxiety disorder)  -     DULoxetine (CYMBALTA) 60 mg delayed release capsule; Take 1 capsule (60 mg total) by mouth daily  -     busPIRone (BUSPAR) 5 mg tablet; Take 1 tablet (5 mg total) by mouth 2 (two) times a day    Severe persistent allergic asthma    Anxiety    Other orders  -     Discontinue: DULoxetine (CYMBALTA) 20 mg capsule; take 1 capsule by mouth every day in the morning          Subjective:    Patient ID: Jennifer Obrien is a 44 y.o. female.    Patient presents with her  and daughter for routine follow-up.  She continues to have issues at work with her activity restriction from orthopedics.  Patient is status post ALIF from February.  Patient is extremely anxious and feels this is causing bowel issues.        The following portions of the patient's history were reviewed and updated as appropriate: allergies, current medications, past family history, past medical history, past social history, past surgical history and problem list.    Review of Systems   Constitutional:  Positive for fatigue. Negative for chills, diaphoresis and fever.   HENT:  Negative for ear pain and sore throat.    Eyes:  Negative for pain and visual disturbance.   Respiratory:  Positive for cough, chest tightness, shortness of breath and wheezing.    Cardiovascular:  Negative for chest pain and palpitations.   Gastrointestinal:  Positive for diarrhea, nausea and vomiting. Negative for abdominal pain and constipation.   Genitourinary:  Negative for  "dysuria and hematuria.   Musculoskeletal:  Negative for arthralgias and back pain.   Skin:  Negative for color change and rash.   Neurological:  Negative for seizures and syncope.   Psychiatric/Behavioral:  Positive for dysphoric mood and sleep disturbance. The patient is nervous/anxious.    All other systems reviewed and are negative.        /84 (BP Location: Left arm, Patient Position: Sitting)   Pulse 88   Temp 98 °F (36.7 °C)   Resp 18   Ht 5' 3\" (1.6 m)   Wt 76.2 kg (168 lb)   SpO2 98%   BMI 29.76 kg/m²   Social History     Socioeconomic History    Marital status: /Civil Union     Spouse name: Not on file    Number of children: Not on file    Years of education: Not on file    Highest education level: Not on file   Occupational History    Not on file   Tobacco Use    Smoking status: Never    Smokeless tobacco: Never   Vaping Use    Vaping status: Never Used   Substance and Sexual Activity    Alcohol use: No    Drug use: No    Sexual activity: Yes     Partners: Male     Birth control/protection: Condom Male   Other Topics Concern    Not on file   Social History Narrative    Engaged to be     Always uses seatbelts     Social Determinants of Health     Financial Resource Strain: Low Risk  (3/1/2024)    [facility]    Overall Financial Resource Strain (CARDIA)     Difficulty of Paying Living Expenses: Not hard at all   Food Insecurity: No Food Insecurity (3/1/2024)    [facility]    Hunger Vital Sign     Worried About Running Out of Food in the Last Year: Never true     Ran Out of Food in the Last Year: Never true   Transportation Needs: No Transportation Needs (3/1/2024)    [facility]    PRAPARE - Transportation     Lack of Transportation (Medical): No     Lack of Transportation (Non-Medical): No   Physical " Activity: Not on file   Stress: Not on file   Social Connections: Not on file   Intimate Partner Violence: Not At Risk (3/1/2024)    Received from ,     Humiliation, Afraid, Rape, and Kick questionnaire     Fear of Current or Ex-Partner: No     Emotionally Abused: No     Physically Abused: No     Sexually Abused: No   Housing Stability: Low Risk  (3/1/2024)    Received from ,     Housing Stability Vital Sign     Unable to Pay for Housing in the Last Year: No     Number of Places Lived in the Last Year: 1     Unstable Housing in the Last Year: No     Past Medical History:   Diagnosis Date    Allergic     Anemia     Anxiety     Asthma     not as adult    Celiac disease     History of one miscarriage     2017 10 weeks    Renal cyst     Varicella      Family History   Adopted: Yes   Problem Relation Age of Onset    Crohn's disease Mother     Hypertension Mother     Alcohol abuse Father     Drug abuse Father     Asthma Sister     No Known Problems Daughter     No Known Problems Daughter     No Known Problems Daughter     Cancer Maternal Grandmother         lung    Arthritis Maternal Grandmother     Diabetes Maternal Grandfather     Hearing loss Maternal Grandfather     Heart disease Maternal Grandfather     No Known Problems Son     Breast cancer Neg Hx      Past Surgical History:   Procedure Laterality Date    BACK SURGERY  2022    BACK SURGERY Left 2024    CARPAL TUNNEL RELEASE  2024     SECTION      2004 son,    daughter,    daughter    NECK SURGERY      C5-7, disc repair     [place]IV[place] N/A 2018    Procedure:  SECTION () REPEAT;  Surgeon: Kashmir Corok DO;  Location: [facility];  Service: Obstetrics       Current Outpatient Medications:     albuterol (2.5 mg/3 mL) 0.083 % nebulizer solution, Take 3 mL (2.5 mg total) by nebulization every 6  (six) hours as needed for wheezing or shortness of breath, Disp: 180 mL, Rfl: 5    albuterol (PROVENTIL HFA,VENTOLIN HFA) 90 mcg/act inhaler, Inhale 2 puffs every 6 (six) hours as needed for wheezing, Disp: 18 g, Rfl: 5    amitriptyline (ELAVIL) 10 mg tablet, Take 1 tablet (10 mg total) by mouth daily at bedtime, Disp: 90 tablet, Rfl: 0    Azelastine HCl 137 MCG/SPRAY SOLN, SPRAY 1 SPRAY INTO EACH NOSTRIL 2 (TWO) TIMES A DAY USE IN EACH NOSTRIL AS DIRECTED, Disp: 90 mL, Rfl: 1    busPIRone (BUSPAR) 5 mg tablet, Take 1 tablet (5 mg total) by mouth 2 (two) times a day, Disp: 60 tablet, Rfl: 0    DULoxetine (CYMBALTA) 60 mg delayed release capsule, Take 1 capsule (60 mg total) by mouth daily, Disp: 90 capsule, Rfl: 1    dupilumab (DUPIXENT) subcutaneous injection, Inject 2 mL (300 mg total) under the skin every 14 (fourteen) days Start 2 weeks after 600 mg loading dose, Disp: 2 mL, Rfl: 11    EPINEPHrine (EPIPEN) 0.3 mg/0.3 mL SOAJ, Inject 0.3 mL (0.3 mg total) into a muscle once for 1 dose, Disp: 0.6 mL, Rfl: 0    ergocalciferol (VITAMIN D2) 50,000 units, Take 1 capsule (50,000 Units total) by mouth once a week, Disp: 12 capsule, Rfl: 1    fexofenadine (ALLEGRA) 180 MG tablet, Take 180 mg by mouth daily, Disp: , Rfl:     fluticasone-vilanterol (Breo Ellipta) 200-25 mcg/actuation inhaler, Inhale 1 puff daily Rinse mouth after use., Disp: 180 blister, Rfl: 3    folic acid (FOLVITE) 1 mg tablet, Take 1 tablet (1 mg total) by mouth daily, Disp: 90 tablet, Rfl: 1    gabapentin (NEURONTIN) 300 mg capsule, Take 300 mg by mouth 3 (three) times a day After 1/23, Disp: , Rfl:     meloxicam (MOBIC) 15 mg tablet, After 1/23, Disp: , Rfl:     montelukast (SINGULAIR) 10 mg tablet, TAKE 1 TABLET BY MOUTH EVERYDAY AT BEDTIME, Disp: 90 tablet, Rfl: 1    naloxone (NARCAN) 4 mg/0.1 mL nasal spray, After 1/23 /prn, Disp: , Rfl:     Plecanatide (Trulance) 3 MG TABS, Take 3 mg by mouth in the morning, Disp: 90 tablet, Rfl: 3    predniSONE  "20 mg tablet, Take 2 tablets (40 mg total) by mouth daily, Disp: 10 tablet, Rfl: 0    rOPINIRole (REQUIP) 2 mg tablet, Take 1 tablet (2 mg total) by mouth daily at bedtime, Disp: 90 tablet, Rfl: 1    Sodium Fluoride 5000 PPM 1.1 % PSTE, Use as directed, Disp: , Rfl:     Tenapanor HCl (Ibsrela) 50 MG TABS, Take 50 mg by mouth 2 (two) times a day, Disp: 60 tablet, Rfl: 11    Allergies   Allergen Reactions    Gluten Meal - Food Allergy Abdominal Pain and GI Intolerance    Other Vomiting and Abdominal Pain     Gluten     Fluticasone-Salmeterol Palpitations     \"Didn't like the way it made me feel\"    Iron Itching     Gold label only          Lab Review   not applicable     Imaging: No results found.    Objective:     Physical Exam  Constitutional:       Appearance: She is well-developed.   Cardiovascular:      Rate and Rhythm: Normal rate and regular rhythm.      Heart sounds: Normal heart sounds. No murmur heard.  Pulmonary:      Effort: Pulmonary effort is normal. No respiratory distress.      Breath sounds: Normal breath sounds.   Skin:     General: Skin is warm and dry.   Neurological:      Mental Status: She is alert and oriented to person, place, and time.           There are no Patient Instructions on file for this visit.    LENI Campuzano    Portions of the record may have been created with voice recognition software.  Occasional wrong word or \"sound a like\" substitutions may have occurred due to the inherent limitations of voice recognition software.  Read the chart carefully and recognize, using context, where substitutions have occurred.  "

## 2024-06-11 NOTE — ASSESSMENT & PLAN NOTE
Patient was started on Cymbalta through orthopedics.  Will increase Cymbalta to 60 mg daily.  Will add on BuSpar to take twice daily as anxiety is very severe right now.  Will follow-up in 1 month.

## 2024-06-14 DIAGNOSIS — F51.01 PRIMARY INSOMNIA: ICD-10-CM

## 2024-06-14 RX ORDER — AMITRIPTYLINE HYDROCHLORIDE 10 MG/1
10 TABLET, FILM COATED ORAL
Qty: 90 TABLET | Refills: 1 | Status: SHIPPED | OUTPATIENT
Start: 2024-06-14

## 2024-06-24 ENCOUNTER — OFFICE VISIT (OUTPATIENT)
Age: 44
End: 2024-06-24
Payer: COMMERCIAL

## 2024-06-24 VITALS
BODY MASS INDEX: 30.3 KG/M2 | HEIGHT: 63 IN | HEART RATE: 87 BPM | RESPIRATION RATE: 16 BRPM | WEIGHT: 171 LBS | DIASTOLIC BLOOD PRESSURE: 78 MMHG | OXYGEN SATURATION: 98 % | TEMPERATURE: 98.6 F | SYSTOLIC BLOOD PRESSURE: 122 MMHG

## 2024-06-24 DIAGNOSIS — J45.20 MILD INTERMITTENT ASTHMA WITHOUT COMPLICATION: ICD-10-CM

## 2024-06-24 DIAGNOSIS — J45.50 SEVERE PERSISTENT ALLERGIC ASTHMA: Primary | ICD-10-CM

## 2024-06-24 PROCEDURE — 99213 OFFICE O/P EST LOW 20 MIN: CPT

## 2024-06-24 RX ORDER — MOMETASONE FUROATE AND FORMOTEROL FUMARATE DIHYDRATE 100; 5 UG/1; UG/1
2 AEROSOL RESPIRATORY (INHALATION) 2 TIMES DAILY
Qty: 13 G | Refills: 3 | Status: SHIPPED | OUTPATIENT
Start: 2024-06-24

## 2024-06-24 NOTE — PROGRESS NOTES
Pulmonary Follow Up Note  Jennifer Obrien 44 y.o. female MRN: [id number]  6/24/2024    Assessment:    Severe persistent allergic asthma  -Jennifer has been doing very well with her asthma now that she has been getting her Dupixent regularly.  Not requiring use of her rescue inhaler often  -Unfortunately, Breo 200 is no longer on her formulary, requesting alternate inhaler  -Will trial Dulera 100-5 mcg 2 puffs twice daily, appears it is on her formulary  -Continue albuterol HFA/nebs every 6 hours as needed  -Continue montelukast 10 mg nightly  -Follow-up in 6 months or sooner if needed        Plan:    Diagnoses and all orders for this visit:    Severe persistent allergic asthma  -     mometasone-formoterol (Dulera) 100-5 MCG/ACT inhaler; Inhale 2 puffs 2 (two) times a day Rinse mouth after use.        Return in about 6 months (around 12/24/2024).        History of Present Illness     Chief Complaint:   Chief Complaint   Patient presents with    Follow-up       Patient ID: Jennifer is a 44 y.o. y.o. female has a past medical history of Allergic, Anemia, Anxiety, Asthma, Celiac disease, History of one miscarriage, Renal cyst, and Varicella.      6/24/2024  HPI: Jennifer Obrien is a 44 y.o. female who presents to the office today for a follow-up visit for her asthma.  Patient was previously seen in the office 3 months ago.  She was having issues getting her Dupixent regularly from the pharmacy.  This issue is now resolved and she has been self administering every 2 weeks, last dose 6 days ago.  She has been maintained on Breo 200, Singulair nightly, and albuterol as needed.  Patient tells me Breo is no longer on her formulary and cannot afford out-of-pocket costs.  Requesting alternate inhaler.  Patient is otherwise doing well with her asthma.  Denies any daily symptoms.  States that humidity did bother her last week with chest tightness and she needed to use her rescue inhaler, otherwise not using often.  Denies any  nighttime symptoms.  No current issues with her sinuses.  She was referred to ENT at her last office visit, has not made an appointment yet.      Review of Systems   Constitutional:  Negative for activity change, chills, diaphoresis, fever and unexpected weight change.   HENT:  Negative for congestion, postnasal drip, rhinorrhea, sore throat, trouble swallowing and voice change.    Respiratory:  Negative for cough, chest tightness, shortness of breath and wheezing.    Cardiovascular:  Negative for chest pain, palpitations and leg swelling.   Allergic/Immunologic: Positive for environmental allergies.       Historical Information   Past Medical History:   Diagnosis Date    Allergic     Anemia     Anxiety     Asthma     not as adult    Celiac disease     History of one miscarriage     2017 10 weeks    Renal cyst     Varicella      Past Surgical History:   Procedure Laterality Date    BACK SURGERY  2022    BACK SURGERY Left 2024    CARPAL TUNNEL RELEASE  2024     SECTION      2004 son,    daughter,    daughter    NECK SURGERY      C5-7, disc repair     ND  DELIVERY ONLY N/A 2018    Procedure:  SECTION () REPEAT;  Surgeon: Kashmir Crook DO;  Location: [facility] ;  Service: Obstetrics     Family History   Adopted: Yes   Problem Relation Age of Onset    Crohn's disease Mother     Hypertension Mother     Alcohol abuse Father     Drug abuse Father     Asthma Sister     No Known Problems Daughter     No Known Problems Daughter     No Known Problems Daughter     Cancer Maternal Grandmother         lung    Arthritis Maternal Grandmother     Diabetes Maternal Grandfather     Hearing loss Maternal Grandfather     Heart disease Maternal Grandfather     No Known Problems Son     Breast cancer Neg Hx        Smoking history: She reports that she has never smoked. She has never used smokeless tobacco.    Occupational History:     Immunization History   Administered  Date(s) Administered    COVID-19 J&J ([employer]) vaccine 0.5 mL 04/11/2021    COVID-19 PFIZER VACCINE 0.3 ML IM 11/27/2021    COVID-19 Pfizer Vac BIVALENT Colin-sucrose 12 Yr+ IM 10/21/2022    COVID-19 Pfizer mRNA vacc PF colin-sucrose 12 yr and older (Comirnaty) 09/28/2023    INFLUENZA 10/05/2013, 08/23/2017, 08/31/2018, 11/27/2021, 09/07/2022    Influenza, injectable, quadrivalent, preservative free 0.5 mL 08/31/2018, 12/28/2020    Influenza, seasonal, injectable 10/05/2013    Tdap 03/05/2011, 05/30/2018       Meds/Allergies     Current Outpatient Medications:     albuterol (2.5 mg/3 mL) 0.083 % nebulizer solution, Take 3 mL (2.5 mg total) by nebulization every 6 (six) hours as needed for wheezing or shortness of breath, Disp: 180 mL, Rfl: 5    albuterol (PROVENTIL HFA,VENTOLIN HFA) 90 mcg/act inhaler, Inhale 2 puffs every 6 (six) hours as needed for wheezing, Disp: 18 g, Rfl: 5    amitriptyline (ELAVIL) 10 mg tablet, TAKE 1 TABLET BY MOUTH DAILY AT BEDTIME, Disp: 90 tablet, Rfl: 1    Azelastine HCl 137 MCG/SPRAY SOLN, SPRAY 1 SPRAY INTO EACH NOSTRIL 2 (TWO) TIMES A DAY USE IN EACH NOSTRIL AS DIRECTED, Disp: 90 mL, Rfl: 1    busPIRone (BUSPAR) 5 mg tablet, Take 1 tablet (5 mg total) by mouth 2 (two) times a day, Disp: 60 tablet, Rfl: 0    DULoxetine (CYMBALTA) 60 mg delayed release capsule, Take 1 capsule (60 mg total) by mouth daily, Disp: 90 capsule, Rfl: 1    dupilumab (DUPIXENT) subcutaneous injection, Inject 2 mL (300 mg total) under the skin every 14 (fourteen) days Start 2 weeks after 600 mg loading dose, Disp: 2 mL, Rfl: 11    EPINEPHrine (EPIPEN) 0.3 mg/0.3 mL SOAJ, Inject 0.3 mL (0.3 mg total) into a muscle once for 1 dose, Disp: 0.6 mL, Rfl: 0    ergocalciferol (VITAMIN D2) 50,000 units, Take 1 capsule (50,000 Units total) by mouth once a week, Disp: 12 capsule, Rfl: 1    fexofenadine (ALLEGRA) 180 MG tablet, Take 180 mg by mouth daily, Disp: , Rfl:     folic acid (FOLVITE) 1 mg tablet, Take 1 tablet (1 mg  "total) by mouth daily, Disp: 90 tablet, Rfl: 1    gabapentin (NEURONTIN) 300 mg capsule, Take 300 mg by mouth 3 (three) times a day After 1/23, Disp: , Rfl:     meloxicam (MOBIC) 15 mg tablet, After 1/23, Disp: , Rfl:     mometasone-formoterol (Dulera) 100-5 MCG/ACT inhaler, Inhale 2 puffs 2 (two) times a day Rinse mouth after use., Disp: 13 g, Rfl: 3    montelukast (SINGULAIR) 10 mg tablet, TAKE 1 TABLET BY MOUTH EVERYDAY AT BEDTIME, Disp: 90 tablet, Rfl: 1    naloxone (NARCAN) 4 mg/0.1 mL nasal spray, After 1/23 /prn, Disp: , Rfl:     Plecanatide (Trulance) 3 MG TABS, Take 3 mg by mouth in the morning, Disp: 90 tablet, Rfl: 3    predniSONE 20 mg tablet, Take 2 tablets (40 mg total) by mouth daily, Disp: 10 tablet, Rfl: 0    rOPINIRole (REQUIP) 2 mg tablet, Take 1 tablet (2 mg total) by mouth daily at bedtime, Disp: 90 tablet, Rfl: 1    Sodium Fluoride 5000 PPM 1.1 % PSTE, Use as directed, Disp: , Rfl:     Tenapanor HCl (Ibsrela) 50 MG TABS, Take 50 mg by mouth 2 (two) times a day, Disp: 60 tablet, Rfl: 11  Allergies:   Allergies   Allergen Reactions    Gluten Meal - Food Allergy Abdominal Pain and GI Intolerance    Other Vomiting and Abdominal Pain     Gluten     Fluticasone-Salmeterol Palpitations     \"Didn't like the way it made me feel\"    Iron Itching     Gold label only         Vitals:  Vitals:    06/24/24 1526   BP: 122/78   BP Location: Left arm   Patient Position: Sitting   Cuff Size: Large   Pulse: 87   Resp: 16   Temp: 98.6 °F (37 °C)   SpO2: 98%   Weight: 77.6 kg (171 lb)   Height: 5' 3\" (1.6 m)   Oxygen Therapy  SpO2: 98 %  .  Wt Readings from Last 3 Encounters:   06/24/24 77.6 kg (171 lb)   06/11/24 76.2 kg (168 lb)   04/05/24 76.4 kg (168 lb 6.4 oz)     Body mass index is 30.29 kg/m².    Physical Exam  Vitals and nursing note reviewed.   Constitutional:       General: She is not in acute distress.     Appearance: Normal appearance. She is well-developed.   Cardiovascular:      Rate and Rhythm: Normal " rate and regular rhythm.      Heart sounds: Normal heart sounds, S1 normal and S2 normal. No murmur heard.  Pulmonary:      Effort: Pulmonary effort is normal.      Breath sounds: Normal breath sounds. No decreased breath sounds, wheezing, rhonchi or rales.   Musculoskeletal:         General: No swelling.      Right lower leg: No edema.      Left lower leg: No edema.   Neurological:      Mental Status: She is alert.   Psychiatric:         Mood and Affect: Mood and affect normal.         Behavior: Behavior normal. Behavior is cooperative.           Labs: I have personally reviewed pertinent lab results.  Lab Results   Component Value Date    WBC 8.04 03/29/2024    HGB 13.7 03/29/2024    HCT 41.7 03/29/2024    MCV 91 03/29/2024     03/29/2024     Lab Results   Component Value Date    CALCIUM 8.6 03/09/2024    K 4.2 03/09/2024    CO2 27 03/09/2024     03/09/2024    BUN 23 03/09/2024    CREATININE 0.53 03/09/2024     Lab Results   Component Value Date     (H) 02/28/2022     Lab Results   Component Value Date    ALT 27 03/09/2024    AST 25 03/09/2024    ALKPHOS 70 03/09/2024       Imaging and other studies: I have personally reviewed pertinent reports    Pulmonary function testing:     Pulmonary Functions Testing Results: 3/17/2022    FEV1/FVC ratio 73%    FEV1 91% predicted  % predicted  (+) response to bronchodilators   % predicted   % predicted  DLCO corrected for hemoglobin 107 % predicted    Impression: Normal spirometry.  Significant bronchodilator response.  Lung volumes demonstrate hyperinflation and air trapping.  Normal diffusion capacity.

## 2024-06-24 NOTE — ASSESSMENT & PLAN NOTE
-Jennifer has been doing very well with her asthma now that she has been getting her Dupixent regularly.  Not requiring use of her rescue inhaler often  -Unfortunately, Breo 200 is no longer on her formulary, requesting alternate inhaler  -Will trial Dulera 100-5 mcg 2 puffs twice daily, appears it is on her formulary  -Continue albuterol HFA/nebs every 6 hours as needed  -Continue montelukast 10 mg nightly  -Follow-up in 6 months or sooner if needed

## 2024-06-25 RX ORDER — MONTELUKAST SODIUM 10 MG/1
10 TABLET ORAL
Qty: 90 TABLET | Refills: 1 | Status: SHIPPED | OUTPATIENT
Start: 2024-06-25

## 2024-07-05 DIAGNOSIS — R21 RASH: Primary | ICD-10-CM

## 2024-07-05 RX ORDER — METHYLPREDNISOLONE 4 MG/1
TABLET ORAL
Qty: 21 EACH | Refills: 0 | Status: SHIPPED | OUTPATIENT
Start: 2024-07-05

## 2024-07-09 ENCOUNTER — OFFICE VISIT (OUTPATIENT)
Dept: FAMILY MEDICINE CLINIC | Facility: CLINIC | Age: 44
End: 2024-07-09
Payer: COMMERCIAL

## 2024-07-09 VITALS
RESPIRATION RATE: 18 BRPM | HEIGHT: 63 IN | BODY MASS INDEX: 29.45 KG/M2 | SYSTOLIC BLOOD PRESSURE: 120 MMHG | HEART RATE: 80 BPM | WEIGHT: 166.2 LBS | DIASTOLIC BLOOD PRESSURE: 80 MMHG | OXYGEN SATURATION: 98 %

## 2024-07-09 DIAGNOSIS — J45.50 SEVERE PERSISTENT ALLERGIC ASTHMA: ICD-10-CM

## 2024-07-09 DIAGNOSIS — F41.1 GAD (GENERALIZED ANXIETY DISORDER): ICD-10-CM

## 2024-07-09 DIAGNOSIS — L30.9 DERMATITIS: Primary | ICD-10-CM

## 2024-07-09 DIAGNOSIS — F41.9 ANXIETY: ICD-10-CM

## 2024-07-09 PROCEDURE — 99214 OFFICE O/P EST MOD 30 MIN: CPT | Performed by: NURSE PRACTITIONER

## 2024-07-09 RX ORDER — TRIAMCINOLONE ACETONIDE 1 MG/G
CREAM TOPICAL 2 TIMES DAILY
Qty: 15 G | Refills: 0 | Status: SHIPPED | OUTPATIENT
Start: 2024-07-09

## 2024-07-09 RX ORDER — PREDNISONE 10 MG/1
TABLET ORAL
Qty: 30 TABLET | Refills: 0 | Status: SHIPPED | OUTPATIENT
Start: 2024-07-09

## 2024-07-09 RX ORDER — KETOCONAZOLE 20 MG/G
CREAM TOPICAL
COMMUNITY
Start: 2024-06-24

## 2024-07-09 RX ORDER — HYDROXYZINE HYDROCHLORIDE 25 MG/1
25 TABLET, FILM COATED ORAL EVERY 6 HOURS PRN
Qty: 30 TABLET | Refills: 0 | Status: SHIPPED | OUTPATIENT
Start: 2024-07-09

## 2024-07-09 RX ORDER — BUSPIRONE HYDROCHLORIDE 5 MG/1
5 TABLET ORAL 2 TIMES DAILY
Qty: 60 TABLET | Refills: 0 | Status: SHIPPED | OUTPATIENT
Start: 2024-07-09

## 2024-07-09 NOTE — PROGRESS NOTES
Ambulatory Visit  Name: Jennifer Obrien      : 1980      MRN: [id number]  Encounter Provider: LENI Campuzano  Encounter Date: 2024   Encounter department: [facility] [address]    Assessment & Plan   1. Dermatitis  -     triamcinolone (KENALOG) 0.1 % cream; Apply topically 2 (two) times a day  -     predniSONE 10 mg tablet; 50/50/40/40/30/30/20/20/10/10  -     hydrOXYzine HCL (ATARAX) 25 mg tablet; Take 1 tablet (25 mg total) by mouth every 6 (six) hours as needed for itching  2. Severe persistent allergic asthma  Assessment & Plan:  Continue care with pulmonology.  Continue on albuterol nebulizer and inhaler as needed.  Continues with Dupixent.  3. Anxiety  Assessment & Plan:  Much improved with increase of Cymbalta.       History of Present Illness     Patient presents with her  for follow-up on anxiety.  They do feel like Cymbalta increase is working well for her anxiety.  She is much less anxious.  She is struggling with rash dermatitis from allergen.        Review of Systems   Constitutional:  Positive for fatigue. Negative for chills, diaphoresis and fever.   HENT:  Negative for ear pain and sore throat.    Eyes:  Negative for pain and visual disturbance.   Respiratory:  Positive for cough, chest tightness, shortness of breath and wheezing.    Cardiovascular:  Negative for chest pain and palpitations.   Gastrointestinal:  Negative for abdominal pain and vomiting.   Genitourinary:  Negative for dysuria and hematuria.   Musculoskeletal:  Negative for arthralgias and back pain.   Skin:  Negative for color change and rash.   Neurological:  Negative for seizures and syncope.   Psychiatric/Behavioral:  Positive for sleep disturbance. Negative for dysphoric mood. The patient is nervous/anxious.    All other systems reviewed and are negative.    Current Outpatient Medications on File Prior to Visit   Medication Sig Dispense Refill    albuterol (2.5 mg/3 mL)  0.083 % nebulizer solution Take 3 mL (2.5 mg total) by nebulization every 6 (six) hours as needed for wheezing or shortness of breath 180 mL 5    albuterol (PROVENTIL HFA,VENTOLIN HFA) 90 mcg/act inhaler Inhale 2 puffs every 6 (six) hours as needed for wheezing 18 g 5    amitriptyline (ELAVIL) 10 mg tablet TAKE 1 TABLET BY MOUTH DAILY AT BEDTIME 90 tablet 1    Azelastine HCl 137 MCG/SPRAY SOLN SPRAY 1 SPRAY INTO EACH NOSTRIL 2 (TWO) TIMES A DAY USE IN EACH NOSTRIL AS DIRECTED 90 mL 1    DULoxetine (CYMBALTA) 60 mg delayed release capsule Take 1 capsule (60 mg total) by mouth daily 90 capsule 1    dupilumab (DUPIXENT) subcutaneous injection Inject 2 mL (300 mg total) under the skin every 14 (fourteen) days Start 2 weeks after 600 mg loading dose 2 mL 11    EPINEPHrine (EPIPEN) 0.3 mg/0.3 mL SOAJ Inject 0.3 mL (0.3 mg total) into a muscle once for 1 dose 0.6 mL 0    ergocalciferol (VITAMIN D2) 50,000 units Take 1 capsule (50,000 Units total) by mouth once a week 12 capsule 1    fexofenadine (ALLEGRA) 180 MG tablet Take 180 mg by mouth daily      folic acid (FOLVITE) 1 mg tablet Take 1 tablet (1 mg total) by mouth daily 90 tablet 1    gabapentin (NEURONTIN) 300 mg capsule Take 300 mg by mouth 3 (three) times a day After 1/23      ketoconazole (NIZORAL) 2 % cream APPLY TWICE A DAY FOR 6 WEEKS AS DIRECTED      meloxicam (MOBIC) 15 mg tablet After 1/23      mometasone-formoterol (Dulera) 100-5 MCG/ACT inhaler Inhale 2 puffs 2 (two) times a day Rinse mouth after use. 13 g 3    montelukast (SINGULAIR) 10 mg tablet Take 1 tablet (10 mg total) by mouth daily at bedtime 90 tablet 1    naloxone (NARCAN) 4 mg/0.1 mL nasal spray After 1/23 /prn      Plecanatide (Trulance) 3 MG TABS Take 3 mg by mouth in the morning 90 tablet 3    predniSONE 20 mg tablet Take 2 tablets (40 mg total) by mouth daily 10 tablet 0    rOPINIRole (REQUIP) 2 mg tablet Take 1 tablet (2 mg total) by mouth daily at bedtime 90 tablet 1    Sodium Fluoride 5000  "PPM 1.1 % PSTE Use as directed      Tenapanor HCl (Ibsrela) 50 MG TABS Take 50 mg by mouth 2 (two) times a day 60 tablet 11    [DISCONTINUED] methylPREDNISolone 4 MG tablet therapy pack Use as directed on package 21 each 0    [DISCONTINUED] busPIRone (BUSPAR) 5 mg tablet Take 1 tablet (5 mg total) by mouth 2 (two) times a day 60 tablet 0     No current facility-administered medications on file prior to visit.      Objective     /80 (BP Location: Left arm, Patient Position: Sitting)   Pulse 80   Resp 18   Ht 5' 3\" (1.6 m)   Wt 75.4 kg (166 lb 3.2 oz)   SpO2 98%   BMI 29.44 kg/m²     Physical Exam  Vitals and nursing note reviewed.   Constitutional:       General: She is not in acute distress.     Appearance: She is well-developed.   HENT:      Head: Normocephalic and atraumatic.   Cardiovascular:      Rate and Rhythm: Normal rate and regular rhythm.      Heart sounds: No murmur heard.  Pulmonary:      Effort: Pulmonary effort is normal. No respiratory distress.      Breath sounds: Normal breath sounds.   Musculoskeletal:         General: No swelling.      Cervical back: Neck supple.   Skin:     General: Skin is warm and dry.      Capillary Refill: Capillary refill takes less than 2 seconds.      Findings: Rash present. Rash is urticarial (throughout entire body).   Neurological:      Mental Status: She is alert.   Psychiatric:         Mood and Affect: Mood normal.               "

## 2024-07-09 NOTE — ASSESSMENT & PLAN NOTE
Continue care with pulmonology.  Continue on albuterol nebulizer and inhaler as needed.  Continues with Dupixent.

## 2024-07-11 DIAGNOSIS — D50.9 IRON DEFICIENCY ANEMIA, UNSPECIFIED IRON DEFICIENCY ANEMIA TYPE: ICD-10-CM

## 2024-07-11 DIAGNOSIS — K90.0 CELIAC DISEASE: ICD-10-CM

## 2024-07-11 DIAGNOSIS — E53.8 FOLATE DEFICIENCY: ICD-10-CM

## 2024-07-12 RX ORDER — FOLIC ACID 1 MG/1
1000 TABLET ORAL DAILY
Qty: 90 TABLET | Refills: 1 | Status: SHIPPED | OUTPATIENT
Start: 2024-07-12

## 2024-07-31 ENCOUNTER — APPOINTMENT (OUTPATIENT)
Dept: LAB | Facility: HOSPITAL | Age: 44
End: 2024-07-31
Payer: COMMERCIAL

## 2024-07-31 DIAGNOSIS — E53.8 FOLATE DEFICIENCY: ICD-10-CM

## 2024-07-31 DIAGNOSIS — D50.9 IRON DEFICIENCY ANEMIA, UNSPECIFIED IRON DEFICIENCY ANEMIA TYPE: ICD-10-CM

## 2024-07-31 DIAGNOSIS — M54.51 VERTEBROGENIC LOW BACK PAIN: ICD-10-CM

## 2024-07-31 DIAGNOSIS — K90.0 CELIAC DISEASE: ICD-10-CM

## 2024-07-31 LAB
BASOPHILS # BLD AUTO: 0.07 THOUSANDS/ÂΜL (ref 0–0.1)
BASOPHILS NFR BLD AUTO: 1 % (ref 0–1)
CRP SERPL QL: 1.9 MG/L
EOSINOPHIL # BLD AUTO: 0.46 THOUSAND/ÂΜL (ref 0–0.61)
EOSINOPHIL NFR BLD AUTO: 8 % (ref 0–6)
ERYTHROCYTE [DISTWIDTH] IN BLOOD BY AUTOMATED COUNT: 13.2 % (ref 11.6–15.1)
ERYTHROCYTE [SEDIMENTATION RATE] IN BLOOD: 23 MM/HOUR (ref 0–19)
FERRITIN SERPL-MCNC: 173 NG/ML (ref 11–307)
FOLATE SERPL-MCNC: 11.3 NG/ML
HCT VFR BLD AUTO: 41.8 % (ref 34.8–46.1)
HGB BLD-MCNC: 14.1 G/DL (ref 11.5–15.4)
IMM GRANULOCYTES # BLD AUTO: 0.01 THOUSAND/UL (ref 0–0.2)
IMM GRANULOCYTES NFR BLD AUTO: 0 % (ref 0–2)
IRON SATN MFR SERPL: 47 % (ref 15–50)
IRON SERPL-MCNC: 129 UG/DL (ref 50–212)
LYMPHOCYTES # BLD AUTO: 2 THOUSANDS/ÂΜL (ref 0.6–4.47)
LYMPHOCYTES NFR BLD AUTO: 36 % (ref 14–44)
MCH RBC QN AUTO: 30.7 PG (ref 26.8–34.3)
MCHC RBC AUTO-ENTMCNC: 33.7 G/DL (ref 31.4–37.4)
MCV RBC AUTO: 91 FL (ref 82–98)
MONOCYTES # BLD AUTO: 0.55 THOUSAND/ÂΜL (ref 0.17–1.22)
MONOCYTES NFR BLD AUTO: 10 % (ref 4–12)
NEUTROPHILS # BLD AUTO: 2.52 THOUSANDS/ÂΜL (ref 1.85–7.62)
NEUTS SEG NFR BLD AUTO: 45 % (ref 43–75)
NRBC BLD AUTO-RTO: 0 /100 WBCS
PLATELET # BLD AUTO: 313 THOUSANDS/UL (ref 149–390)
PMV BLD AUTO: 10.1 FL (ref 8.9–12.7)
RBC # BLD AUTO: 4.59 MILLION/UL (ref 3.81–5.12)
TIBC SERPL-MCNC: 276 UG/DL (ref 250–450)
UIBC SERPL-MCNC: 147 UG/DL (ref 155–355)
URATE SERPL-MCNC: 4.5 MG/DL (ref 2–7.5)
WBC # BLD AUTO: 5.61 THOUSAND/UL (ref 4.31–10.16)

## 2024-07-31 PROCEDURE — 83540 ASSAY OF IRON: CPT

## 2024-07-31 PROCEDURE — 84550 ASSAY OF BLOOD/URIC ACID: CPT

## 2024-07-31 PROCEDURE — 82728 ASSAY OF FERRITIN: CPT

## 2024-07-31 PROCEDURE — 86038 ANTINUCLEAR ANTIBODIES: CPT

## 2024-07-31 PROCEDURE — 82746 ASSAY OF FOLIC ACID SERUM: CPT

## 2024-07-31 PROCEDURE — 85652 RBC SED RATE AUTOMATED: CPT

## 2024-07-31 PROCEDURE — 85025 COMPLETE CBC W/AUTO DIFF WBC: CPT

## 2024-07-31 PROCEDURE — 86140 C-REACTIVE PROTEIN: CPT

## 2024-07-31 PROCEDURE — 86618 LYME DISEASE ANTIBODY: CPT

## 2024-07-31 PROCEDURE — 36415 COLL VENOUS BLD VENIPUNCTURE: CPT

## 2024-07-31 PROCEDURE — 86430 RHEUMATOID FACTOR TEST QUAL: CPT

## 2024-07-31 PROCEDURE — 83550 IRON BINDING TEST: CPT

## 2024-08-01 LAB
ANA SER QL IA: NEGATIVE
B BURGDOR IGG+IGM SER QL IA: NEGATIVE
RHEUMATOID FACT SER QL LA: NEGATIVE

## 2024-08-05 DIAGNOSIS — F41.1 GAD (GENERALIZED ANXIETY DISORDER): ICD-10-CM

## 2024-08-05 RX ORDER — BUSPIRONE HYDROCHLORIDE 5 MG/1
5 TABLET ORAL 2 TIMES DAILY
Qty: 60 TABLET | Refills: 0 | Status: SHIPPED | OUTPATIENT
Start: 2024-08-05

## 2024-09-16 ENCOUNTER — OFFICE VISIT (OUTPATIENT)
Dept: FAMILY MEDICINE CLINIC | Facility: CLINIC | Age: 44
End: 2024-09-16
Payer: COMMERCIAL

## 2024-09-16 VITALS
WEIGHT: 171.8 LBS | SYSTOLIC BLOOD PRESSURE: 110 MMHG | HEIGHT: 63 IN | BODY MASS INDEX: 30.44 KG/M2 | HEART RATE: 85 BPM | DIASTOLIC BLOOD PRESSURE: 70 MMHG | OXYGEN SATURATION: 96 %

## 2024-09-16 DIAGNOSIS — F51.01 PRIMARY INSOMNIA: ICD-10-CM

## 2024-09-16 DIAGNOSIS — Z23 NEED FOR INFLUENZA VACCINATION: ICD-10-CM

## 2024-09-16 DIAGNOSIS — E66.09 CLASS 1 OBESITY DUE TO EXCESS CALORIES WITHOUT SERIOUS COMORBIDITY WITH BODY MASS INDEX (BMI) OF 30.0 TO 30.9 IN ADULT: ICD-10-CM

## 2024-09-16 DIAGNOSIS — E55.9 VITAMIN D DEFICIENCY: ICD-10-CM

## 2024-09-16 DIAGNOSIS — K59.00 CONSTIPATION, UNSPECIFIED CONSTIPATION TYPE: ICD-10-CM

## 2024-09-16 DIAGNOSIS — F41.9 ANXIETY: ICD-10-CM

## 2024-09-16 DIAGNOSIS — J45.50 SEVERE PERSISTENT ALLERGIC ASTHMA: ICD-10-CM

## 2024-09-16 DIAGNOSIS — Z00.00 HEALTHCARE MAINTENANCE: ICD-10-CM

## 2024-09-16 DIAGNOSIS — F41.1 GAD (GENERALIZED ANXIETY DISORDER): ICD-10-CM

## 2024-09-16 DIAGNOSIS — L30.9 DERMATITIS: ICD-10-CM

## 2024-09-16 DIAGNOSIS — Z00.00 ANNUAL PHYSICAL EXAM: Primary | ICD-10-CM

## 2024-09-16 PROCEDURE — 90471 IMMUNIZATION ADMIN: CPT | Performed by: NURSE PRACTITIONER

## 2024-09-16 PROCEDURE — 99396 PREV VISIT EST AGE 40-64: CPT | Performed by: NURSE PRACTITIONER

## 2024-09-16 PROCEDURE — 90656 IIV3 VACC NO PRSV 0.5 ML IM: CPT | Performed by: NURSE PRACTITIONER

## 2024-09-16 RX ORDER — DULOXETIN HYDROCHLORIDE 30 MG/1
30 CAPSULE, DELAYED RELEASE ORAL DAILY
Qty: 90 CAPSULE | Refills: 3 | Status: SHIPPED | OUTPATIENT
Start: 2024-09-16

## 2024-09-16 RX ORDER — BUSPIRONE HYDROCHLORIDE 10 MG/1
10 TABLET ORAL 3 TIMES DAILY
Qty: 270 TABLET | Refills: 1 | Status: SHIPPED | OUTPATIENT
Start: 2024-09-16

## 2024-09-16 RX ORDER — TRIAMCINOLONE ACETONIDE 1 MG/G
CREAM TOPICAL 2 TIMES DAILY
Qty: 453.6 G | Refills: 1 | Status: SHIPPED | OUTPATIENT
Start: 2024-09-16

## 2024-09-16 RX ORDER — TIRZEPATIDE 2.5 MG/.5ML
2.5 INJECTION, SOLUTION SUBCUTANEOUS WEEKLY
Qty: 2 ML | Refills: 0 | Status: SHIPPED | OUTPATIENT
Start: 2024-09-16 | End: 2024-10-14

## 2024-09-16 NOTE — PROGRESS NOTES
Adult Annual Physical  Name: Jennifer Obrien      : 1980      MRN: [id number]  Encounter Provider: LENI Campuzano  Encounter Date: 2024   Encounter department: [facility] [address]    Assessment & Plan  Dermatitis    Orders:  •  triamcinolone (KENALOG) 0.1 % cream; Apply topically 2 (two) times a day    SPENCER (generalized anxiety disorder)  Will increase Cymbalta to 90 mg daily.  Will increase BuSpar to 10 mg 3 times daily as well.  Orders:  •  DULoxetine (CYMBALTA) 30 mg delayed release capsule; Take 1 capsule (30 mg total) by mouth daily  •  busPIRone (BUSPAR) 10 mg tablet; Take 1 tablet (10 mg total) by mouth 3 (three) times a day    Vitamin D deficiency    Orders:  •  Vitamin D 25 hydroxy; Future    Healthcare maintenance    Orders:  •  CBC and differential; Future  •  Comprehensive metabolic panel; Future  •  Lipid panel; Future  •  TSH, 3rd generation with Free T4 reflex; Future  •  Vitamin D 25 hydroxy; Future    Class 1 obesity due to excess calories without serious comorbidity with body mass index (BMI) of 30.0 to 30.9 in adult  Counseled with diet and exercise as tolerated.  Patient did fail phentermine.  Orders:  •  tirzepatide (Zepbound) 2.5 mg/0.5 mL auto-injector; Inject 0.5 mL (2.5 mg total) under the skin once a week for 28 days    Need for influenza vaccination    Orders:  •  influenza vaccine preservative-free 0.5 mL IM (Fluzone, Afluria, Fluarix, Flulaval)    Severe persistent allergic asthma  Doing well with Dupixent.       Anxiety         Primary insomnia         Constipation, unspecified constipation type         Annual physical exam         Immunizations and preventive care screenings were discussed with patient today. Appropriate education was printed on patient's after visit summary.    Counseling:  Exercise: the importance of regular exercise/physical activity was discussed. Recommend exercise 3-5 times per week for at least 30 minutes.           History of Present Illness     Adult Annual Physical:  Patient presents for annual physical. Patient presents for annual physical. She eats like a bird, she is active at work. She went to her back doctor and wants her to do aqua therapy. Concerned with weight gain. .     Diet and Physical Activity:  - Diet/Nutrition: well balanced diet.  - Exercise: walking.    General Health:  - Sleep: sleeps well.  - Hearing: normal hearing bilateral ears.  - Vision: wears glasses and goes for regular eye exams.  - Dental: regular dental visits.    /GYN Health:  - Follows with GYN: yes.   - Menopause: premenopausal.   - Contraception: IUD placement.      Review of Systems   Constitutional:  Negative for chills, diaphoresis and fever.   HENT:  Negative for ear pain and sore throat.    Eyes:  Negative for pain and visual disturbance.   Respiratory:  Positive for cough, chest tightness, shortness of breath and wheezing.    Cardiovascular:  Negative for chest pain and palpitations.   Gastrointestinal:  Positive for constipation. Negative for abdominal pain, diarrhea, nausea and vomiting.   Genitourinary:  Negative for dysuria, frequency and hematuria.   Musculoskeletal:  Positive for back pain. Negative for arthralgias.   Skin:  Negative for color change and rash.   Neurological:  Positive for headaches. Negative for dizziness, seizures, syncope and light-headedness.   Psychiatric/Behavioral:  The patient is nervous/anxious.    All other systems reviewed and are negative.    Current Outpatient Medications on File Prior to Visit   Medication Sig Dispense Refill   • albuterol (2.5 mg/3 mL) 0.083 % nebulizer solution Take 3 mL (2.5 mg total) by nebulization every 6 (six) hours as needed for wheezing or shortness of breath 180 mL 5   • albuterol (PROVENTIL HFA,VENTOLIN HFA) 90 mcg/act inhaler Inhale 2 puffs every 6 (six) hours as needed for wheezing 18 g 5   • amitriptyline (ELAVIL) 10 mg tablet TAKE 1 TABLET BY MOUTH DAILY AT  BEDTIME 90 tablet 1   • Azelastine HCl 137 MCG/SPRAY SOLN SPRAY 1 SPRAY INTO EACH NOSTRIL 2 (TWO) TIMES A DAY USE IN EACH NOSTRIL AS DIRECTED 90 mL 1   • DULoxetine (CYMBALTA) 60 mg delayed release capsule Take 1 capsule (60 mg total) by mouth daily 90 capsule 1   • dupilumab (DUPIXENT) subcutaneous injection Inject 2 mL (300 mg total) under the skin every 14 (fourteen) days Start 2 weeks after 600 mg loading dose 2 mL 11   • EPINEPHrine (EPIPEN) 0.3 mg/0.3 mL SOAJ Inject 0.3 mL (0.3 mg total) into a muscle once for 1 dose 0.6 mL 0   • ergocalciferol (VITAMIN D2) 50,000 units Take 1 capsule (50,000 Units total) by mouth once a week 12 capsule 1   • fexofenadine (ALLEGRA) 180 MG tablet Take 180 mg by mouth daily     • folic acid (FOLVITE) 1 mg tablet TAKE 1 TABLET BY MOUTH EVERY DAY 90 tablet 1   • gabapentin (NEURONTIN) 300 mg capsule Take 300 mg by mouth 3 (three) times a day After 1/23     • hydrOXYzine HCL (ATARAX) 25 mg tablet Take 1 tablet (25 mg total) by mouth every 6 (six) hours as needed for itching 30 tablet 0   • ketoconazole (NIZORAL) 2 % cream APPLY TWICE A DAY FOR 6 WEEKS AS DIRECTED     • meloxicam (MOBIC) 15 mg tablet After 1/23     • mometasone-formoterol (Dulera) 100-5 MCG/ACT inhaler Inhale 2 puffs 2 (two) times a day Rinse mouth after use. 13 g 3   • montelukast (SINGULAIR) 10 mg tablet Take 1 tablet (10 mg total) by mouth daily at bedtime 90 tablet 1   • naloxone (NARCAN) 4 mg/0.1 mL nasal spray After 1/23 /prn     • Plecanatide (Trulance) 3 MG TABS Take 3 mg by mouth in the morning 90 tablet 3   • predniSONE 10 mg tablet 50/50/40/40/30/30/20/20/10/10 30 tablet 0   • predniSONE 20 mg tablet Take 2 tablets (40 mg total) by mouth daily 10 tablet 0   • rOPINIRole (REQUIP) 2 mg tablet Take 1 tablet (2 mg total) by mouth daily at bedtime 90 tablet 1   • Sodium Fluoride 5000 PPM 1.1 % PSTE Use as directed     • Tenapanor HCl (Ibsrela) 50 MG TABS Take 50 mg by mouth 2 (two) times a day 60 tablet 11   •  "[DISCONTINUED] busPIRone (BUSPAR) 5 mg tablet TAKE 1 TABLET BY MOUTH TWICE A DAY 60 tablet 0   • [DISCONTINUED] triamcinolone (KENALOG) 0.1 % cream Apply topically 2 (two) times a day 15 g 0     No current facility-administered medications on file prior to visit.        Objective     /70 (BP Location: Left arm, Patient Position: Sitting, Cuff Size: Standard)   Pulse 85   Ht 5' 3\" (1.6 m)   Wt 77.9 kg (171 lb 12.8 oz)   SpO2 96%   BMI 30.43 kg/m²     Physical Exam  Vitals and nursing note reviewed.   Constitutional:       General: She is not in acute distress.     Appearance: She is well-developed.   HENT:      Head: Normocephalic and atraumatic.      Right Ear: Tympanic membrane normal.      Left Ear: Tympanic membrane normal.      Nose: No congestion.      Mouth/Throat:      Pharynx: No oropharyngeal exudate.   Eyes:      Conjunctiva/sclera: Conjunctivae normal.   Cardiovascular:      Rate and Rhythm: Normal rate and regular rhythm.      Heart sounds: No murmur heard.  Pulmonary:      Effort: Pulmonary effort is normal. No respiratory distress.      Breath sounds: Normal breath sounds.   Abdominal:      Palpations: Abdomen is soft.      Tenderness: There is no abdominal tenderness.   Musculoskeletal:         General: No swelling.      Cervical back: Neck supple.   Skin:     General: Skin is warm and dry.      Capillary Refill: Capillary refill takes less than 2 seconds.   Neurological:      Mental Status: She is alert and oriented to person, place, and time.   Psychiatric:         Mood and Affect: Mood normal.     Administrative Statements   I have spent a total time of 30 minutes in caring for this patient on the day of the visit/encounter including Counseling / Coordination of care, Documenting in the medical record, and Reviewing / ordering tests, medicine, procedures  .  "

## 2024-09-17 NOTE — PROGRESS NOTES
Diagnoses and all orders for this visit:    Encounter for gynecological examination without abnormal finding    Encounter for screening mammogram for malignant neoplasm of breast  -     Mammo screening bilateral w 3d and cad; Future    Intrauterine contraceptive device threads lost, initial encounter  -     US pelvis complete w transvaginal; Future      Call to schedule ultrasound  Perineal hygiene reviewed   Weight bearing exercises minium of 150 mins/weekly advised.   Kegel exercises recommended daily, see AVS for instructions and recommendations  SBE encouraged, ASCCP guidelines reviewed. Condoms encouraged with all sexual activity to prevent STI's.   Gardisil vaccines recommended up to age 45  Calcium/ Vit D dietary requirements discussed,   Advised to call with any issues,  all concerns & questions addressed.   See after visit summary for further information and recommendations to the above mentioned subjects which we may or may not have covered in detail during your visit   F/U Annually and PRN      Health Maintenance:    Last PAP: 2022 Neg/Neg  Next PAP Due:     Last Mammogram: 2024  also had additional imaging for right asymmetry   Life time Tyrlamar Burnsck % 9.68, Density C heterogeneously dense , Bi-Rads 2 Benign  Next Mammogram: order given    Last Colonoscopy: 2022   RTO 5 years     Gardisil:  Not completed Gardasil 9 was advised for prevention of HPV-related disease. We discussed risks/benefits, SE's/AE's at length and all questions were answered. Written info was provided for review. The patient agrees to consider and is aware she may call to schedule injection #1 at any time.        Subjective    CC: Yearly Exam      Jennifer Obrien is a 44 y.o. female here for an annual exam.   GYN hx includes:  4 c sections, 1  loss 16 weeks , Mirena IUD   Family hx of: No GYN cancers  Medically stable, recent back surgery, doing well  follows with PMD, recently returned to  work    No LMP recorded (lmp unknown). Patient has had an implant.  Her menstrual cycles are rare. She denies issues with bleeding during her menses.   Denies history of abnormal pap smear.  She denies breast concerns, abnormal vaginal discharge, vaginal itching, odor, irritation, bowel/bladder dysfunction, urinary symptoms, pelvic pain today.   She is sexually active. Monogamous relationship.  Her current method of contraception includes  LNG-IUD (Mirena, Kyleena, Luma) since  . Denies any issues with her BCM.  She does not want STD testing today.  Denies intimate partner violence    Past Medical History:   Diagnosis Date    Allergic     Anemia     Anxiety     Asthma     not as adult    Celiac disease     History of one miscarriage     2017 10 weeks    Renal cyst     Varicella      Past Surgical History:   Procedure Laterality Date    BACK SURGERY  2022    BACK SURGERY Left 2024    CARPAL TUNNEL RELEASE  2024     SECTION       son,    daughter,    daughter    NECK SURGERY      C5-7, disc repair     [place]  DELIVERY ONLY N/A 2018    Procedure:  SECTION () REPEAT;  Surgeon: Kashmir Crook DO;  Location: [facility];  Service: Obstetrics       Immunization History   Administered Date(s) Administered    COVID-19 J&J (Kari) vaccine 0.5 mL 2021    COVID-19 PFIZER VACCINE 0.3 ML IM 2021    COVID-19 Pfizer Vac BIVALENT Colin-sucrose 12 Yr+ IM 10/21/2022    COVID-19 Pfizer mRNA vacc PF colin-sucrose 12 yr and older (Comirnaty) 2023    INFLUENZA 10/05/2013, 2017, 2018, 2021, 2022, 2023    Influenza, injectable, quadrivalent, preservative free 0.5 mL 2018, 2020    Influenza, seasonal, injectable 10/05/2013    Influenza, seasonal, injectable, preservative free 2024    Tdap 2011, 2018       Family History   Adopted: Yes   Problem Relation Age of Onset    Crohn's disease Mother      Hypertension Mother     Alcohol abuse Father     Drug abuse Father     Asthma Sister     No Known Problems Daughter     No Known Problems Daughter     No Known Problems Daughter     Cancer Maternal Grandmother         lung    Arthritis Maternal Grandmother     Diabetes Maternal Grandfather     Hearing loss Maternal Grandfather     Heart disease Maternal Grandfather     No Known Problems Son     Breast cancer Neg Hx      Social History     Tobacco Use    Smoking status: Never    Smokeless tobacco: Never   Vaping Use    Vaping status: Never Used   Substance Use Topics    Alcohol use: No    Drug use: No       Current Outpatient Medications:     albuterol (2.5 mg/3 mL) 0.083 % nebulizer solution, Take 3 mL (2.5 mg total) by nebulization every 6 (six) hours as needed for wheezing or shortness of breath, Disp: 180 mL, Rfl: 5    albuterol (PROVENTIL HFA,VENTOLIN HFA) 90 mcg/act inhaler, Inhale 2 puffs every 6 (six) hours as needed for wheezing, Disp: 18 g, Rfl: 5    amitriptyline (ELAVIL) 10 mg tablet, TAKE 1 TABLET BY MOUTH DAILY AT BEDTIME, Disp: 90 tablet, Rfl: 1    Azelastine HCl 137 MCG/SPRAY SOLN, SPRAY 1 SPRAY INTO EACH NOSTRIL 2 (TWO) TIMES A DAY USE IN EACH NOSTRIL AS DIRECTED, Disp: 90 mL, Rfl: 1    busPIRone (BUSPAR) 10 mg tablet, Take 1 tablet (10 mg total) by mouth 3 (three) times a day, Disp: 270 tablet, Rfl: 1    DULoxetine (CYMBALTA) 30 mg delayed release capsule, Take 1 capsule (30 mg total) by mouth daily, Disp: 90 capsule, Rfl: 3    DULoxetine (CYMBALTA) 60 mg delayed release capsule, Take 1 capsule (60 mg total) by mouth daily, Disp: 90 capsule, Rfl: 1    dupilumab (DUPIXENT) subcutaneous injection, Inject 2 mL (300 mg total) under the skin every 14 (fourteen) days Start 2 weeks after 600 mg loading dose, Disp: 2 mL, Rfl: 11    EPINEPHrine (EPIPEN) 0.3 mg/0.3 mL SOAJ, Inject 0.3 mL (0.3 mg total) into a muscle once for 1 dose, Disp: 0.6 mL, Rfl: 0    ergocalciferol (VITAMIN D2) 50,000 units, Take 1  capsule (50,000 Units total) by mouth once a week, Disp: 12 capsule, Rfl: 1    fexofenadine (ALLEGRA) 180 MG tablet, Take 180 mg by mouth daily, Disp: , Rfl:     folic acid (FOLVITE) 1 mg tablet, TAKE 1 TABLET BY MOUTH EVERY DAY, Disp: 90 tablet, Rfl: 1    gabapentin (NEURONTIN) 300 mg capsule, Take 300 mg by mouth 3 (three) times a day After 1/23, Disp: , Rfl:     hydrOXYzine HCL (ATARAX) 25 mg tablet, Take 1 tablet (25 mg total) by mouth every 6 (six) hours as needed for itching, Disp: 30 tablet, Rfl: 0    ketoconazole (NIZORAL) 2 % cream, APPLY TWICE A DAY FOR 6 WEEKS AS DIRECTED, Disp: , Rfl:     meloxicam (MOBIC) 15 mg tablet, After 1/23, Disp: , Rfl:     mometasone-formoterol (Dulera) 100-5 MCG/ACT inhaler, Inhale 2 puffs 2 (two) times a day Rinse mouth after use., Disp: 13 g, Rfl: 3    montelukast (SINGULAIR) 10 mg tablet, Take 1 tablet (10 mg total) by mouth daily at bedtime, Disp: 90 tablet, Rfl: 1    naloxone (NARCAN) 4 mg/0.1 mL nasal spray, After 1/23 /prn, Disp: , Rfl:     Plecanatide (Trulance) 3 MG TABS, Take 3 mg by mouth in the morning, Disp: 90 tablet, Rfl: 3    predniSONE 10 mg tablet, 50/50/40/40/30/30/20/20/10/10, Disp: 30 tablet, Rfl: 0    predniSONE 20 mg tablet, Take 2 tablets (40 mg total) by mouth daily, Disp: 10 tablet, Rfl: 0    rOPINIRole (REQUIP) 2 mg tablet, Take 1 tablet (2 mg total) by mouth daily at bedtime, Disp: 90 tablet, Rfl: 1    Sodium Fluoride 5000 PPM 1.1 % PSTE, Use as directed, Disp: , Rfl:     Tenapanor HCl (Ibsrela) 50 MG TABS, Take 50 mg by mouth 2 (two) times a day, Disp: 60 tablet, Rfl: 11    tirzepatide (Zepbound) 2.5 mg/0.5 mL auto-injector, Inject 0.5 mL (2.5 mg total) under the skin once a week for 28 days, Disp: 2 mL, Rfl: 0    triamcinolone (KENALOG) 0.1 % cream, Apply topically 2 (two) times a day, Disp: 453.6 g, Rfl: 1  Patient Active Problem List    Diagnosis Date Noted    Back pain with history of spinal surgery 03/19/2024    Anxiety 03/19/2024    Primary  "insomnia 2024    Constipation 06/15/2023    Weight gain 2023    Vitamin D deficiency 2023    Celiac disease     Eosinophilia 2022    Degenerative disc disease, cervical 2022    Herniated nucleus pulposus, C5-6 2022    Iron deficiency anemia 2022    Severe persistent allergic asthma 10/10/2018    History of 3  sections 02/15/2018       Allergies   Allergen Reactions    Gluten Meal - Food Allergy Abdominal Pain and GI Intolerance    Other Vomiting and Abdominal Pain     Gluten     Fluticasone-Salmeterol Palpitations     \"Didn't like the way it made me feel\"    Iron Itching     Gold label only       OB History    Para Term  AB Living   5 4 4 0 1 4   SAB IAB Ectopic Multiple Live Births   1 0 0 0 4      # Outcome Date GA Lbr Jani/2nd Weight Sex Type Anes PTL Lv   5 Term 18 39w0d  3340 g (7 lb 5.8 oz) F CS-LTranv Spinal N KONSTANTIN   4 Term 11 38w0d  3175 g (7 lb) M CS-Unspec Spinal  KONSTANTIN   3 Term 07 38w0d  3175 g (7 lb) F CS-Unspec Spinal  KONSTANTIN   2 Term 04 39w5d  3175 g (7 lb) F CS-Unspec EPI  KONSTANTIN      Complications: Failure to Progress in Second Stage   1 SAB                Vitals:    24 0908   BP: 142/88   BP Location: Left arm   Patient Position: Sitting   Cuff Size: Adult   Weight: 78.9 kg (174 lb)   Height: 5' 3\" (1.6 m)     Body mass index is 30.82 kg/m².    Review of Systems     Constitutional: Negative for chills, fatigue, fever, headaches, visual disturbances, and unexpected weight change.   Respiratory: Negative for cough, & shortness of breath.  Cardiovascular: Negative for chest pain. .    Gastrointestinal: Negative for Abd pain, nausea & vomiting, constipation and diarrhea.   Genitourinary: Negative for difficulty urinating, dysuria, hematuria,  unusual vaginal bleeding or discharge  Skin: Negative skin changes    Physical Exam     Constitutional: Alert & Oriented x3, well-developed and well-nourished. No distress.   HENT: " Atraumatic, Normocephalic, Conjunctivae clear  Neck: Normal range of motion. Neck supple. No thyromegaly, mass, nodules or tenderness  Pulmonary: Effort normal.   Abdominal: Soft. No tenderness or masses  Musculoskeletal: Normal ROM  Skin: Warm & Dry  Psychological: Normal mood, thought content, behavior & judgement     Breasts:   Right: tissue soft without masses, tenderness, skin changes or nipple discharge. No areas of erythema or pain. No subclavicular, axillary, pectoral adenopathy  Left:  tissue soft without masses, tenderness, skin changes or nipple discharge. No areas of erythema or pain. No subclavicular, axillary, pectoral adenopathy    Pelvic exam was performed with patient supine, lithotomy position.      Labia: Negative rash, tenderness, lesion or injury on the right labia.              Negative rash, tenderness, lesion or injury on the left labia.   Urethral meatus:  Negative for  tenderness, inflammation or discharge.   Uterus: not deviated, enlarged, fixed or tender.   Cervix: No CMT, no discharge or friability. No IUD strings visible   Right adnexa: no mass, no tenderness and no fullness.  Left adnexa: no mass, no tenderness and no fullness.   Vagina: No erythema, tenderness, masses, or foreign body in the vagina. No signs of injury around the vagina. No unusual vaginal discharge   Perineum without lesions, signs of injury, erythema or swelling.  Inguinal Canal:        Right: No inguinal adenopathy or hernia present.        Left: No inguinal adenopathy or hernia present.

## 2024-09-17 NOTE — PATIENT INSTRUCTIONS
Patient Education     Lowering Your Risk of Breast Cancer   About this topic   Breast cancer is a serious illness. Breast cancer is when abnormal cells grow and divide more quickly in your breast. These cells form a growth or tumor. The abnormal cells may enter nearby tissue and spread to other parts of the body. It is the type of cancer most often seen in women. Men can have breast cancer, but it is a rare condition.  General   Some things in your life may increase your risk of breast cancer. You may not be able to change some of these. Others you can control.  You are more likely to get breast cancer if you:  Have a mother, sister, or daughter who has had breast cancer  Have used hormones for menopause for more than 5 years  Have had radiation therapy to the breast or chest in the past  Are overweight or do not exercise  Had your first menstrual period before you were 12 years old  Went through menopause after age 56  Have never been pregnant or had your first child after age 36  Have had breast cancer before  Drink alcohol in any form  Have dense breasts  Are older in age  There is no certain way to prevent breast cancer. There are things you can do to lower your chances of having breast cancer.  Keep a healthy weight. Lose weight if you are overweight. Being overweight raises your chances of having breast cancer.  Eat a healthy diet to maintain a healthy weight, such as more fruits, vegetables, and lean cuts of meat. Decrease the amount of saturated fat in your diet.  Exercise. Being active helps you keep a healthy weight.  Limit your alcohol intake or do not drink alcohol. The more alcohol you drink, the higher your risk.  Do not smoke cigarettes. Smoking can increase your risk of many types of cancer.  Breastfeed your baby. This may help protect you. The longer you breastfeed, the more protection you have.  Talk with your doctor about:  Limiting or stopping hormone therapy.  Taking certain drugs to prevent  breast cancer. For women at high risk of having breast cancer, there are a few drugs that may lower your risk.  Surgery to prevent you from having breast cancer if you are very high risk.  When do I need to call the doctor?   Changes in your breasts  A lump or area in your breast that feels different  Discharge from your nipple  Skin on your breast is dimpled or indented  You have questions or concerns about your breasts  Helpful tips   Talk to your doctor about the best kind of breast cancer screening for you.  If you want to do self breast exams, have your doctor show you the right way to do them.  Tell your doctor of any abnormal finding.  Last Reviewed Date   2021-10-04  Consumer Information Use and Disclaimer   This generalized information is a limited summary of diagnosis, treatment, and/or medication information. It is not meant to be comprehensive and should be used as a tool to help the user understand and/or assess potential diagnostic and treatment options. It does NOT include all information about conditions, treatments, medications, side effects, or risks that may apply to a specific patient. It is not intended to be medical advice or a substitute for the medical advice, diagnosis, or treatment of a health care provider based on the health care provider's examination and assessment of a patient’s specific and unique circumstances. Patients must speak with a health care provider for complete information about their health, medical questions, and treatment options, including any risks or benefits regarding use of medications. This information does not endorse any treatments or medications as safe, effective, or approved for treating a specific patient. UpTo[employer] Inc[employer] and its affiliates disclaim any warranty or liability relating to this information or the use thereof. The use of this information is governed by the Terms of Use, available at  https://www.woltersPayClipuwer.com/en/know/clinical-effectiveness-terms   Copyright   Copyright © 2024 [employer]To[employer], Inc. and its affiliates and/or licensors. All rights reserved.       Perineal Hygiene      Your vaginal naturally takes care of its self, it is a self washing system, the less you mess the healthier it will be     No soaps or feminine wash to the vulva, these products can cause dermitis, bacterial infections and other vulvar problems.   Use only water to cleanse, or water with Dove or Dove Sensitive Skin Bar soap if necessary.    Avoid the use of washcloths, exfoliating samuel's, netted scrubbers, loofa's, use your hands only to cleanse the vulvar tissues    No scented lotions or products are advised in or near your vulva.    Use coconut oil in its solid form for moisture if needed.  No douching this may cause imbalance in your vaginal PH and further issues.    If you wear panty liners, you may apply a thin coating of Vaseline, A&D ointment or coconut oil to the vulvar tissues as a skin barrier     Cotton underware, loose fitting clothing  Only perfume-free, dye-free laundry detergent, use a second rinse cycle   Avoid fabric softeners/dryer sheets.       Your partner should avoid the same products as well.       Over the counter probiotic to restore vaginal margot may be helpful as well, take daily.       You may also look into Boric Acid vaginal suppositories to restore vaginal PH balance for up to 2 weeks as directed on the box. You may not use these if you are pregnant      For vaginal dryness:      You may use:     Coconut oil in solid form, not liquid (organic, pure, unscented) as needed for moisture or lubrication. ( Do not use if allergic)       Replens moisture restore external comfort gel daily ( use as directed on the box)        Replens long lasting vaginal moisturizer  ( use as directed on the box)     May try Sazneo vulvar moisturizer ( found on Amazon )    May try Revaree vaginal inserts        For  Vaginal Lubrication:          You may use:     Coconut oil in solid form, not liquid (organic, pure, unscented) as a lubricant or another scent-free lubricant (Astroglide, Uberlube) if needed.  Do not use coconut oil or silicone if using a condom as this may break down the integrity of the condom and cause an unplanned pregnancy              Do not use coconut oil if allergic               Replens silky smooth lubricant, premium silicone based lubricant for intercourse. ( use as directed, a small amount will provide an enhanced natural feeling)     Any premium over the counter vaginal lubricant water or silicone based. Silicone based will have more staying power.        For Vulvar irritation/itching:        You may use Hydrocortisone 1 % over the counter to external vulvar tissues 2 x daily for 5-7 days to help with irriation and itch relief.  Patient Education     Pelvic Floor Exercises   About this topic   The pelvic floor consists of muscles and strong bands of tissues which support all of the organs in your pelvis. Some of these organs are the bladder and the small and large bowel as well as the womb and the prostate. If the muscles and tissues get weak, your organs may drop. This can lead to other problems. Your urine may leak when you laugh, sneeze, or cough. You may not be able to drain the bladder fully. You may have less problems if you do exercises to strengthen your pelvic floor and abdominal muscles.  Kegel exercises help make the muscles in the pelvic floor stronger. Anyone can do them. It is also important to make your abdominal muscles stronger. In order for these exercises to work, you must be consistent when doing them.  General   Before starting with a program, ask your doctor if you are healthy enough to do these exercises. Your doctor may have you work with a  or physical therapist to make a safe exercise program to meet your needs.  Strengthening Exercises   Kegel exercises keep your  pelvic muscles firm and strong. You can do these in many different positions. Start by lying down with your knees bent and feet on the bed. Squeeze the pelvic muscles as if you are trying to stop the flow of urine. Hold these muscles for a count of 3, and then slowly relax them for a count of 3. Try to work up to squeezing for a count of 10 and slowly relaxing for a count of 10. Increase the muscle squeeze until you get to 10. When relaxing, slowly relax to a count of 10.  Breathe out when you are squeezing and breathe in when you are relaxing. Your goal is to try to do 10 Kegels 3 or more times each day. Take time to rest between sets. Be sure to only contract your pelvic floor muscles, not your buttocks, thighs, or abdominal muscles.  Pelvic floor contractions ? There are a few ways to feel the pelvic muscles contract.  When you are passing urine, try suddenly stopping your flow of urine. Do not do this on a regular basis, but only to feel what the contraction feels like. Doing this while passing urine can lead to other problems.  Put a finger into the vagina or rectum. Contract the muscles around your finger as if you were trying to stop the flow of urine or stop the passing of gas.  Place two chairs without arms about 2 inches (5 cm) apart. Sit so you have one butt cheek on each chair. Now, try sneha your pelvic floor muscles. This will help you keep from using other muscles.  Pelvic tilts ? Lie on your back with your knees bent and feet flat on the floor. Tighten your stomach muscles and press your lower back down to the floor. Try doing Kegels with this exercise when your back is flattened. Hold 3 to 5 seconds. Relax.  Straight leg raises lying down ? Lie on your back with one leg straight. Bend your other knee so the foot is flat on the bed. Keeping your leg straight, lift the leg up to the level of your other knee. Lower it back down. Repeat with the other leg.  Hip lifts ? Lie on your back with your  knees bent and feet flat on the floor. Tighten your stomach muscles and lift your buttocks off the floor. Try doing Kegels when up in this position. Hold 3 to 5 seconds. Relax.  Abdominal bracing ? Do this exercise in different positions: Lying down, sitting, and standing. Tighten your stomach muscles. While keeping the stomach muscles tight, tighten your pelvic floor muscles. Now, forcefully laugh or cough to see if you were able to prevent urine from leaking.  Abdominal crunches ? Lie on your back with both knees bent. Keep your feet flat on the floor. Place your hands in one of these positions. Try starting with the first position since it is the easiest. As you get better, use the other positions to make it harder.  Crunches with arms at sides.  Crunches with arms across chest.  Crunches with arms behind head. Be careful not to interlock your fingers behind your neck or head while doing crunches. This may add tension to your neck and cause strain.  Look at the ceiling. Tighten your belly muscles and lift your shoulders and upper back off the floor. Breathe out while you are doing this. Lower your shoulders to the floor. Breathe in while you are doing this. Relax your belly muscles all the way before starting another crunch.             What will the results be?   Less leakage of urine when you cough, sneeze, laugh, or run  Fewer strong urges to pass urine  Fewer trips to the bathroom each day  Less risk of organs, such as the uterus or bladder, dropping into the vagina  Faster recovery after childbirth or prostate surgery  Stronger core muscles  Increased sensitivity during sex  Helpful tips   You can also try doing a different kind of Kegels. Do 5 quick, strong pelvic floor contractions. Sometimes, if you have an urge to pass urine but are not near a bathroom, you can do this kind of Kegel to calm the urge.  Stay active and work out to keep your muscles strong and flexible.  Keep a healthy weight to avoid  putting too much stress on your spine. Eat a healthy diet to keep your muscles healthy.  Be sure you do not hold your breath when exercising. This can raise your blood pressure. If you tend to hold your breath, try counting out loud when exercising. If any exercise bothers you, stop right away.  Try walking or cycling at an easy pace for a few minutes to warm up your muscles. Do this again after exercising.  Doing exercises before a meal may be a good way to get into a routine. A good time to do these exercises is each time you are stopped at a stop light while driving.  Exercise may be slightly uncomfortable, but you should not have sharp pains. If you do get sharp pains, stop what you are doing. If the sharp pains continue, call your doctor.  Last Reviewed Date   2021-03-31  Consumer Information Use and Disclaimer   This generalized information is a limited summary of diagnosis, treatment, and/or medication information. It is not meant to be comprehensive and should be used as a tool to help the user understand and/or assess potential diagnostic and treatment options. It does NOT include all information about conditions, treatments, medications, side effects, or risks that may apply to a specific patient. It is not intended to be medical advice or a substitute for the medical advice, diagnosis, or treatment of a health care provider based on the health care provider's examination and assessment of a patient’s specific and unique circumstances. Patients must speak with a health care provider for complete information about their health, medical questions, and treatment options, including any risks or benefits regarding use of medications. This information does not endorse any treatments or medications as safe, effective, or approved for treating a specific patient. UpToDate, Inc. and its affiliates disclaim any warranty or liability relating to this information or the use thereof. The use of this information is  "governed by the Terms of Use, available at https://www.Chuger.com/en/know/clinical-effectiveness-terms   Copyright   Copyright © 2024 UpToDate, Inc. and its affiliates and/or licensors. All rights reserved.    Patient Education     Intrauterine devices (IUDs)   The Basics   Written by the doctors and editors at [facility]   What is an intrauterine device? -- An intrauterine device (\"IUD\") is a type of birth control. It is a small, T-shaped device. A doctor or nurse puts an IUD in your uterus by going through your vagina and cervix (figure 1).  IUDs are made of flexible plastic and have 2 thin plastic strings that hang out of the cervix. They are very small, a little more than 1 inch (2.5 cm) in width and length.  An IUD is one of the safest, most effective methods for preventing pregnancy. It is a good choice for people, including teens, who do not want to get pregnant for at least 1 year. An IUD can also be used to prevent pregnancy if it is put in within 5 days after you have unprotected sex. This is known as \"emergency contraception.\"  You can also use IUDs for reasons other than birth control. For example, 1 type of IUD can be used to treat heavy, painful periods.  What are the different types of IUDs? -- There are 2 categories of IUDs available in the . One type contains copper. The other type contains a hormone called \"levonorgestrel\" (figure 2):   Copper IUD - There is only 1 copper-containing IUD (brand name: Paragard). It can stay in your uterus for 10 years, or longer for some people, to prevent pregnancy. Some people who use it get heavier or longer periods than they had before getting the IUD.   Hormonal IUDs - There are 4 hormone-containing IUDs (brand names: Mirena, Liletta, Kyleena, Luma) (figure 2). Depending on which of these you have, it can stay in your uterus for up to 8, 5, or 3 years. Many people who use hormonal IUDs have lighter, less painful periods than they had before getting the " "IUD. Some people stop getting a period at all, but this is not harmful. After having the IUD removed, periods usually return to normal within a month or 2.  Other types of IUDs are also available outside of the [place].  What are the benefits of using an IUD? -- The benefits of using an IUD include:   IUDs are very effective. Fewer than 1 in 100 people who use an IUD get pregnant during the first year of use.   You do not have to remember to do anything or take any birth control medicines on a regular basis.   IUDs have few side effects.   IUDs do not contain estrogen, a hormone that some people can't or don't want to take.   If you decide you want to get pregnant, you can have the IUD taken out.   If you use an IUD for several years, it costs less overall than many other types of birth control. That's because there are no costs after you have it inserted.   There is evidence that using an IUD lowers your risk of getting cervical cancer.  What are the downsides of using an IUD? -- The downsides of using an IUD include:   Unlike condoms, an IUD does not protect you against infections that you can catch during sex. These are called \"sexually transmitted infections\" (\"STIs\") or \"sexually transmitted diseases.\" But you and your partner(s) can use condoms to prevent spreading infections.   There is a small chance that the IUD will come out during your period. If this happens, you will need to get a new IUD. If you see your IUD in your underwear, on your pad, or in the toilet, call your doctor or nurse.   The initial cost is higher than the cost of other methods. But, there are no more costs after it is inserted.   Only a doctor or nurse can insert or remove an IUD.  You should not get an IUD if you recently had an infection that spread to your uterus and other nearby organs. This is called a \"pelvic infection.\" STIs such as chlamydia and gonorrhea can cause pelvic infections.  Which type of IUD is best for me? -- Your nurse " "or doctor can talk to you about the options and help you choose the right IUD for you.  A copper IUD might be a good choice if you:   Want or need to avoid hormones   Want to avoid big changes in your period, such as not having any periods or bleeding or spotting between periods   Want birth control for up to 10 years, or possibly longer  A hormonal IUD might be a good choice if you:   Have heavy, painful periods. These IUDs can make your periods lighter and less painful.   Have pelvic pain from a condition called \"endometriosis.\" These IUDs might help reduce the pain.   Want birth control for up to 8 years, depending on which device you choose  Does it hurt to have an IUD put in? -- You will likely feel some discomfort and slight cramping after the nurse or doctor puts the IUD in your uterus. People who have never given birth might feel more discomfort than people who have. The cramps generally go away within a day or 2. Over-the-counter pain medicines like ibuprofen (sample brand names: Advil, Motrin) or naproxen (sample brand name: Aleve) can help cramps go away faster.  After the IUD is in place, you should not be able to feel it.  Should I see a doctor or nurse? -- If you have an IUD, see your doctor or nurse right away if:   You have bad pain in your lower belly.   Your period is late or very different from normal.   You cannot feel the string of the IUD or if the string seems shorter than usual.   You think your IUD might have moved or fallen out.   You had sex with someone who has or might have an STI, or you think you have an STI.   You have an unexplained fever.  All topics are updated as new evidence becomes available and our peer review process is complete.  This topic retrieved from [employer] on: Feb 26, 2024.  Topic 06431 Version 21.0  Release: 32.2.4 - C32.56  © 2024 UpToDate, Inc. and/or its affiliates. All rights reserved.  figure 1: Female reproductive anatomy     The internal organs that make up " "the female reproductive system are located in the lower belly. These include the uterus, fallopian tubes, ovaries, cervix, and vagina.  The uterus has an inner lining, called the \"endometrium,\" and a thicker outer layer, called the \"myometrium.\"  Graphic 02268 Version 8.0  figure 2: IUDs     This picture shows 2 types of IUDs. There are different IUDs available. They are placed inside the uterus to help prevent pregnancy. Some hormonal IUDs can help reduce menstrual bleeding. The copper IUD can actually make menstrual bleeding heavier.  Graphic 21496 Version 15.0  Consumer Information Use and Disclaimer   Disclaimer: This generalized information is a limited summary of diagnosis, treatment, and/or medication information. It is not meant to be comprehensive and should be used as a tool to help the user understand and/or assess potential diagnostic and treatment options. It does NOT include all information about conditions, treatments, medications, side effects, or risks that may apply to a specific patient. It is not intended to be medical advice or a substitute for the medical advice, diagnosis, or treatment of a health care provider based on the health care provider's examination and assessment of a patient's specific and unique circumstances. Patients must speak with a health care provider for complete information about their health, medical questions, and treatment options, including any risks or benefits regarding use of medications. This information does not endorse any treatments or medications as safe, effective, or approved for treating a specific patient. UpToDate, Inc. and its affiliates disclaim any warranty or liability relating to this information or the use thereof.The use of this information is governed by the Terms of Use, available at https://www.wolterskluwer.com/en/know/clinical-effectiveness-terms. 2024© UpToDate, Inc. and its affiliates and/or licensors. All rights reserved.  Copyright   © 2024 " [employer] and/or its affiliates. All rights reserved.

## 2024-09-18 ENCOUNTER — ANNUAL EXAM (OUTPATIENT)
Dept: OBGYN CLINIC | Facility: CLINIC | Age: 44
End: 2024-09-18
Payer: COMMERCIAL

## 2024-09-18 VITALS
WEIGHT: 174 LBS | DIASTOLIC BLOOD PRESSURE: 88 MMHG | BODY MASS INDEX: 30.83 KG/M2 | HEIGHT: 63 IN | SYSTOLIC BLOOD PRESSURE: 142 MMHG

## 2024-09-18 DIAGNOSIS — Z12.31 ENCOUNTER FOR SCREENING MAMMOGRAM FOR MALIGNANT NEOPLASM OF BREAST: ICD-10-CM

## 2024-09-18 DIAGNOSIS — Z01.419 ENCOUNTER FOR GYNECOLOGICAL EXAMINATION WITHOUT ABNORMAL FINDING: Primary | ICD-10-CM

## 2024-09-18 DIAGNOSIS — T83.32XA INTRAUTERINE CONTRACEPTIVE DEVICE THREADS LOST, INITIAL ENCOUNTER: ICD-10-CM

## 2024-09-18 PROCEDURE — S0612 ANNUAL GYNECOLOGICAL EXAMINA: HCPCS | Performed by: OBSTETRICS & GYNECOLOGY

## 2024-09-20 ENCOUNTER — TELEPHONE (OUTPATIENT)
Age: 44
End: 2024-09-20

## 2024-09-20 NOTE — TELEPHONE ENCOUNTER
PA for Zepbound) 2.5 mg/0.5 mL auto-injector SUBMITTED     via    []CMM-KEY:   [x][facility]-Case ID # [id number]   []Faxed to plan   []Other website   []Phone call Case ID #     Office notes sent, clinical questions answered. Awaiting determination    Turnaround time for your insurance to make a decision on your Prior Authorization can take 7-21 business days.

## 2024-09-20 NOTE — TELEPHONE ENCOUNTER
PA for Zepbound 2.5 mg APPROVED     Date(s) approved August 21, 2024 to May 18, 2025     Case #[id number]       Patient advised by          [x]MyChart Message  []Phone call   []LMOM  []L/M to call office as no active Communication consent on file  [x]Unable to leave message, mail box full       Pharmacy advised by    [x]Fax  []Phone call    Approval letter scanned into Media No Not available at decision

## 2024-09-26 ENCOUNTER — TELEPHONE (OUTPATIENT)
Age: 44
End: 2024-09-26

## 2024-10-09 ENCOUNTER — OFFICE VISIT (OUTPATIENT)
Dept: GASTROENTEROLOGY | Facility: CLINIC | Age: 44
End: 2024-10-09
Payer: COMMERCIAL

## 2024-10-09 VITALS
OXYGEN SATURATION: 98 % | HEIGHT: 63 IN | BODY MASS INDEX: 29.41 KG/M2 | DIASTOLIC BLOOD PRESSURE: 78 MMHG | TEMPERATURE: 97.6 F | SYSTOLIC BLOOD PRESSURE: 142 MMHG | HEART RATE: 84 BPM | WEIGHT: 166 LBS

## 2024-10-09 DIAGNOSIS — K90.0 CELIAC DISEASE: ICD-10-CM

## 2024-10-09 DIAGNOSIS — K58.1 IRRITABLE BOWEL SYNDROME WITH CONSTIPATION: Primary | ICD-10-CM

## 2024-10-09 DIAGNOSIS — R11.0 NAUSEA: ICD-10-CM

## 2024-10-09 PROCEDURE — 99213 OFFICE O/P EST LOW 20 MIN: CPT | Performed by: PHYSICIAN ASSISTANT

## 2024-10-09 RX ORDER — ONDANSETRON 4 MG/1
4 TABLET, ORALLY DISINTEGRATING ORAL EVERY 6 HOURS PRN
Qty: 27 TABLET | Refills: 3 | Status: SHIPPED | OUTPATIENT
Start: 2024-10-09

## 2024-10-09 RX ORDER — PLECANATIDE 3 MG/1
3 TABLET ORAL DAILY
Qty: 90 TABLET | Refills: 3 | Status: SHIPPED | OUTPATIENT
Start: 2024-10-09

## 2024-10-09 NOTE — ASSESSMENT & PLAN NOTE
She is doing well  Will update celiac markers  She notes if she mistakenly eats gluten she gets nausea/vomiting      She notes occasional nausea/vomiting even if she does not eat gluten   This occurs about once every 1-2 weeks  Will given Zofran to take PRN

## 2024-10-09 NOTE — PROGRESS NOTES
Ambulatory Visit  Name: Jennifer Obrien      : 1980      MRN: [id number]  Encounter Provider: Lynn Hickey PA-C  Encounter Date: 10/9/2024   Encounter department: [facility] [place]    Assessment & Plan  Irritable bowel syndrome with constipation  Trulance is helping  If this fails we will resubmit for Ibsrela 50mg BID  Continue high fiber diet  Continue minimum 64oz water per day    Celiac disease  She is doing well  Will update celiac markers  She notes if she mistakenly eats gluten she gets nausea/vomiting      She notes occasional nausea/vomiting even if she does not eat gluten   This occurs about once every 1-2 weeks  Will given Zofran to take PRN      History of Present Illness     Jennifer Obrien is a 44 y.o. female with a history of celiac disease, constipation predominant irritable bowel syndrome, anxiety, asthma who presents for routine follow-up.  Overall she is doing okay.  She is taking Trulance 3 mg daily.  She is having mostly regular bowel movements.  She reports that she occasionally will have a hard stool or skip a day.  She does not take anything additional on his days.  She denies any severe abdominal pain.  She does report episodes of nausea and vomiting.  She reports that she thinks that these are happening if she mistakenly eats something that does contain gluten.  Otherwise she is being very cautious to avoid gluten.   She reports episodes of nausea and vomiting.  This typically happens about once per week.  She thinks it is happening if she mistakenly eats something with gluten although she cannot always identify a trigger.  She reports that the nausea will start about 2 hours after eating and that she will vomit the whole food that she ate.    She was first diagnosed with celiac disease in .  Repeat markers in  noted them to be significantly improving.  She also struggles with iron deficiency.  She recently completed a series of iron  "infusions with normalization of her levels.      Review of Systems   Constitutional:  Negative for appetite change, fatigue and unexpected weight change.   Respiratory:  Negative for cough, shortness of breath and wheezing.    Gastrointestinal:  Positive for constipation, nausea and vomiting. Negative for abdominal distention, abdominal pain and blood in stool.           Objective     /78 (BP Location: Left arm, Patient Position: Sitting, Cuff Size: Standard)   Pulse 84   Temp 97.6 °F (36.4 °C) (Temporal)   Ht 5' 3\" (1.6 m)   Wt 75.3 kg (166 lb)   LMP  (LMP Unknown)   SpO2 98%   BMI 29.41 kg/m²     Physical Exam  Constitutional:       Appearance: Normal appearance. She is normal weight.   HENT:      Head: Normocephalic and atraumatic.   Cardiovascular:      Rate and Rhythm: Normal rate and regular rhythm.   Pulmonary:      Effort: Pulmonary effort is normal.      Breath sounds: Normal breath sounds.   Abdominal:      General: Abdomen is flat. Bowel sounds are normal.      Palpations: Abdomen is soft.      Tenderness: There is no abdominal tenderness.   Neurological:      Mental Status: She is alert.         "

## 2024-10-11 ENCOUNTER — TELEPHONE (OUTPATIENT)
Dept: HEMATOLOGY ONCOLOGY | Facility: CLINIC | Age: 44
End: 2024-10-11

## 2024-10-11 DIAGNOSIS — D50.9 IRON DEFICIENCY ANEMIA, UNSPECIFIED IRON DEFICIENCY ANEMIA TYPE: Primary | ICD-10-CM

## 2024-10-11 DIAGNOSIS — E53.8 FOLATE DEFICIENCY: ICD-10-CM

## 2024-10-11 NOTE — TELEPHONE ENCOUNTER
Called pt and voicemail box was full. Will send a mychart message as an attempt to contact pt regarding labs for upcoming appt.

## 2024-10-17 ENCOUNTER — TELEPHONE (OUTPATIENT)
Dept: HEMATOLOGY ONCOLOGY | Facility: CLINIC | Age: 44
End: 2024-10-17

## 2024-10-17 NOTE — TELEPHONE ENCOUNTER
Couldn't get ahold of Jennifer at her number so I spoke with spouse Stuart about her upcoming appt and how the labs were ordered and she needed to get them done.

## 2024-10-18 ENCOUNTER — APPOINTMENT (OUTPATIENT)
Dept: LAB | Facility: HOSPITAL | Age: 44
End: 2024-10-18
Payer: COMMERCIAL

## 2024-10-18 DIAGNOSIS — D50.9 IRON DEFICIENCY ANEMIA, UNSPECIFIED IRON DEFICIENCY ANEMIA TYPE: ICD-10-CM

## 2024-10-18 DIAGNOSIS — E55.9 VITAMIN D DEFICIENCY: ICD-10-CM

## 2024-10-18 DIAGNOSIS — K90.0 CELIAC DISEASE: ICD-10-CM

## 2024-10-18 DIAGNOSIS — E53.8 FOLATE DEFICIENCY: ICD-10-CM

## 2024-10-18 DIAGNOSIS — Z00.00 HEALTHCARE MAINTENANCE: ICD-10-CM

## 2024-10-18 LAB
25(OH)D3 SERPL-MCNC: 32 NG/ML (ref 30–100)
ALBUMIN SERPL BCG-MCNC: 4.6 G/DL (ref 3.5–5)
ALP SERPL-CCNC: 98 U/L (ref 34–104)
ALT SERPL W P-5'-P-CCNC: 15 U/L (ref 7–52)
ANION GAP SERPL CALCULATED.3IONS-SCNC: 7 MMOL/L (ref 4–13)
AST SERPL W P-5'-P-CCNC: 19 U/L (ref 13–39)
BASOPHILS # BLD AUTO: 0.08 THOUSANDS/ΜL (ref 0–0.1)
BASOPHILS NFR BLD AUTO: 1 % (ref 0–1)
BILIRUB SERPL-MCNC: 0.65 MG/DL (ref 0.2–1)
BUN SERPL-MCNC: 15 MG/DL (ref 5–25)
CALCIUM SERPL-MCNC: 9.7 MG/DL (ref 8.4–10.2)
CHLORIDE SERPL-SCNC: 104 MMOL/L (ref 96–108)
CHOLEST SERPL-MCNC: 165 MG/DL
CO2 SERPL-SCNC: 28 MMOL/L (ref 21–32)
CREAT SERPL-MCNC: 0.65 MG/DL (ref 0.6–1.3)
EOSINOPHIL # BLD AUTO: 0.49 THOUSAND/ΜL (ref 0–0.61)
EOSINOPHIL NFR BLD AUTO: 6 % (ref 0–6)
ERYTHROCYTE [DISTWIDTH] IN BLOOD BY AUTOMATED COUNT: 12.5 % (ref 11.6–15.1)
FERRITIN SERPL-MCNC: 200 NG/ML (ref 11–307)
FOLATE SERPL-MCNC: 19.2 NG/ML
GFR SERPL CREATININE-BSD FRML MDRD: 109 ML/MIN/1.73SQ M
GLIADIN PEPTIDE IGA SER-ACNC: 29.8 U/ML
GLIADIN PEPTIDE IGA SER-ACNC: POSITIVE
GLIADIN PEPTIDE IGG SER-ACNC: 8.5 U/ML
GLIADIN PEPTIDE IGG SER-ACNC: NEGATIVE
GLUCOSE P FAST SERPL-MCNC: 99 MG/DL (ref 65–99)
HCT VFR BLD AUTO: 46.9 % (ref 34.8–46.1)
HDLC SERPL-MCNC: 38 MG/DL
HGB BLD-MCNC: 15.1 G/DL (ref 11.5–15.4)
IGA SERPL-MCNC: 188 MG/DL (ref 66–433)
IMM GRANULOCYTES # BLD AUTO: 0.03 THOUSAND/UL (ref 0–0.2)
IMM GRANULOCYTES NFR BLD AUTO: 0 % (ref 0–2)
IRON SATN MFR SERPL: 21 % (ref 15–50)
IRON SERPL-MCNC: 55 UG/DL (ref 50–212)
LDLC SERPL CALC-MCNC: 115 MG/DL (ref 0–100)
LYMPHOCYTES # BLD AUTO: 1.87 THOUSANDS/ΜL (ref 0.6–4.47)
LYMPHOCYTES NFR BLD AUTO: 22 % (ref 14–44)
MCH RBC QN AUTO: 29.6 PG (ref 26.8–34.3)
MCHC RBC AUTO-ENTMCNC: 32.2 G/DL (ref 31.4–37.4)
MCV RBC AUTO: 92 FL (ref 82–98)
MONOCYTES # BLD AUTO: 0.51 THOUSAND/ΜL (ref 0.17–1.22)
MONOCYTES NFR BLD AUTO: 6 % (ref 4–12)
NEUTROPHILS # BLD AUTO: 5.61 THOUSANDS/ΜL (ref 1.85–7.62)
NEUTS SEG NFR BLD AUTO: 65 % (ref 43–75)
NONHDLC SERPL-MCNC: 127 MG/DL
NRBC BLD AUTO-RTO: 0 /100 WBCS
PLATELET # BLD AUTO: 348 THOUSANDS/UL (ref 149–390)
PMV BLD AUTO: 11.1 FL (ref 8.9–12.7)
POTASSIUM SERPL-SCNC: 3.9 MMOL/L (ref 3.5–5.3)
PROT SERPL-MCNC: 7.5 G/DL (ref 6.4–8.4)
RBC # BLD AUTO: 5.1 MILLION/UL (ref 3.81–5.12)
SODIUM SERPL-SCNC: 139 MMOL/L (ref 135–147)
TIBC SERPL-MCNC: 258 UG/DL (ref 250–450)
TRIGL SERPL-MCNC: 60 MG/DL
TSH SERPL DL<=0.05 MIU/L-ACNC: 1.01 UIU/ML (ref 0.45–4.5)
TTG IGA SER-ACNC: 19.1 U/ML
TTG IGA SER-ACNC: POSITIVE
TTG IGG SER-ACNC: 17.9 U/ML
TTG IGG SER-ACNC: POSITIVE
UIBC SERPL-MCNC: 203 UG/DL (ref 155–355)
WBC # BLD AUTO: 8.59 THOUSAND/UL (ref 4.31–10.16)

## 2024-10-18 PROCEDURE — 83550 IRON BINDING TEST: CPT

## 2024-10-18 PROCEDURE — 86364 TISS TRNSGLTMNASE EA IG CLAS: CPT

## 2024-10-18 PROCEDURE — 86258 DGP ANTIBODY EACH IG CLASS: CPT

## 2024-10-18 PROCEDURE — 82306 VITAMIN D 25 HYDROXY: CPT

## 2024-10-18 PROCEDURE — 80061 LIPID PANEL: CPT

## 2024-10-18 PROCEDURE — 82784 ASSAY IGA/IGD/IGG/IGM EACH: CPT

## 2024-10-18 PROCEDURE — 80053 COMPREHEN METABOLIC PANEL: CPT

## 2024-10-18 PROCEDURE — 83540 ASSAY OF IRON: CPT

## 2024-10-18 PROCEDURE — 85025 COMPLETE CBC W/AUTO DIFF WBC: CPT

## 2024-10-18 PROCEDURE — 36415 COLL VENOUS BLD VENIPUNCTURE: CPT

## 2024-10-18 PROCEDURE — 84443 ASSAY THYROID STIM HORMONE: CPT

## 2024-10-18 PROCEDURE — 82728 ASSAY OF FERRITIN: CPT

## 2024-10-18 PROCEDURE — 82746 ASSAY OF FOLIC ACID SERUM: CPT

## 2024-10-18 NOTE — TELEPHONE ENCOUNTER
Patient's  called to cancel today's appointment with Jalyn. Per Stuart patient had a family emergency and will call back to reschedule.     Per Stuart will like a call back from provider or nurse to go over lab results      Please call.     Thanks!

## 2024-10-23 ENCOUNTER — TELEPHONE (OUTPATIENT)
Dept: HEMATOLOGY ONCOLOGY | Facility: CLINIC | Age: 44
End: 2024-10-23

## 2024-10-23 NOTE — TELEPHONE ENCOUNTER
Called patient to review lab results.  CBC and iron panel are within acceptable range.  I offered her continued observation annually with our clinic versus continued monitoring by PCP.  She is in favor of the latter.  Will send communication to her PCP to check iron panel at least annually.  Patient be referred back to our office if she develops any cytopenias or has ferritin less than 30.

## 2024-10-28 DIAGNOSIS — E66.09 CLASS 1 OBESITY DUE TO EXCESS CALORIES WITHOUT SERIOUS COMORBIDITY WITH BODY MASS INDEX (BMI) OF 30.0 TO 30.9 IN ADULT: Primary | ICD-10-CM

## 2024-10-28 DIAGNOSIS — E66.811 CLASS 1 OBESITY DUE TO EXCESS CALORIES WITHOUT SERIOUS COMORBIDITY WITH BODY MASS INDEX (BMI) OF 30.0 TO 30.9 IN ADULT: Primary | ICD-10-CM

## 2024-10-28 RX ORDER — TIRZEPATIDE 5 MG/.5ML
5 INJECTION, SOLUTION SUBCUTANEOUS WEEKLY
Qty: 2 ML | Refills: 0 | Status: SHIPPED | OUTPATIENT
Start: 2024-10-28

## 2024-11-07 ENCOUNTER — HOSPITAL ENCOUNTER (OUTPATIENT)
Dept: ULTRASOUND IMAGING | Facility: HOSPITAL | Age: 44
End: 2024-11-07
Payer: COMMERCIAL

## 2024-11-07 DIAGNOSIS — T83.32XA INTRAUTERINE CONTRACEPTIVE DEVICE THREADS LOST, INITIAL ENCOUNTER: ICD-10-CM

## 2024-11-07 PROCEDURE — 76830 TRANSVAGINAL US NON-OB: CPT

## 2024-11-07 PROCEDURE — 76856 US EXAM PELVIC COMPLETE: CPT

## 2024-11-14 ENCOUNTER — RESULTS FOLLOW-UP (OUTPATIENT)
Dept: OBGYN CLINIC | Facility: MEDICAL CENTER | Age: 44
End: 2024-11-14

## 2024-11-26 ENCOUNTER — PATIENT MESSAGE (OUTPATIENT)
Age: 44
End: 2024-11-26

## 2024-12-08 DIAGNOSIS — E66.09 CLASS 1 OBESITY DUE TO EXCESS CALORIES WITHOUT SERIOUS COMORBIDITY WITH BODY MASS INDEX (BMI) OF 30.0 TO 30.9 IN ADULT: ICD-10-CM

## 2024-12-08 DIAGNOSIS — E66.811 CLASS 1 OBESITY DUE TO EXCESS CALORIES WITHOUT SERIOUS COMORBIDITY WITH BODY MASS INDEX (BMI) OF 30.0 TO 30.9 IN ADULT: ICD-10-CM

## 2024-12-09 RX ORDER — TIRZEPATIDE 5 MG/.5ML
5 INJECTION, SOLUTION SUBCUTANEOUS WEEKLY
Qty: 2 ML | Refills: 0 | Status: SHIPPED | OUTPATIENT
Start: 2024-12-09

## 2024-12-16 ENCOUNTER — OFFICE VISIT (OUTPATIENT)
Dept: FAMILY MEDICINE CLINIC | Facility: CLINIC | Age: 44
End: 2024-12-16
Payer: COMMERCIAL

## 2024-12-16 VITALS
DIASTOLIC BLOOD PRESSURE: 62 MMHG | HEIGHT: 63 IN | OXYGEN SATURATION: 97 % | BODY MASS INDEX: 25.91 KG/M2 | SYSTOLIC BLOOD PRESSURE: 120 MMHG | WEIGHT: 146.2 LBS | HEART RATE: 101 BPM

## 2024-12-16 DIAGNOSIS — J45.50 SEVERE PERSISTENT ALLERGIC ASTHMA: ICD-10-CM

## 2024-12-16 DIAGNOSIS — F41.9 ANXIETY: ICD-10-CM

## 2024-12-16 DIAGNOSIS — Z23 NEED FOR COVID-19 VACCINE: Primary | ICD-10-CM

## 2024-12-16 DIAGNOSIS — G25.81 RLS (RESTLESS LEGS SYNDROME): ICD-10-CM

## 2024-12-16 DIAGNOSIS — R63.5 WEIGHT GAIN: ICD-10-CM

## 2024-12-16 DIAGNOSIS — F41.1 GAD (GENERALIZED ANXIETY DISORDER): ICD-10-CM

## 2024-12-16 DIAGNOSIS — K90.0 CELIAC DISEASE: ICD-10-CM

## 2024-12-16 PROCEDURE — 90480 ADMN SARSCOV2 VAC 1/ONLY CMP: CPT | Performed by: NURSE PRACTITIONER

## 2024-12-16 PROCEDURE — 99214 OFFICE O/P EST MOD 30 MIN: CPT | Performed by: NURSE PRACTITIONER

## 2024-12-16 PROCEDURE — 91320 SARSCV2 VAC 30MCG TRS-SUC IM: CPT | Performed by: NURSE PRACTITIONER

## 2024-12-16 RX ORDER — ROPINIROLE 4 MG/1
4 TABLET, FILM COATED ORAL
Qty: 90 TABLET | Refills: 1 | Status: SHIPPED | OUTPATIENT
Start: 2024-12-16

## 2024-12-16 RX ORDER — BUSPIRONE HYDROCHLORIDE 15 MG/1
15 TABLET ORAL 3 TIMES DAILY
Qty: 180 TABLET | Refills: 1 | Status: SHIPPED | OUTPATIENT
Start: 2024-12-16

## 2024-12-16 NOTE — PROGRESS NOTES
Name: Jennifer Obrien      : 1980      MRN: [id number]  Encounter Provider: LENI Campuzano  Encounter Date: 2024   Encounter department: [facility] [address]  :  Assessment & Plan  SPENCER (generalized anxiety disorder)  Will increase BuSpar to 15 mg 3 times daily.  Advised to call if not improving.  Continues on Cymbalta.  Orders:    busPIRone (BUSPAR) 15 mg tablet; Take 1 tablet (15 mg total) by mouth 3 (three) times a day    RLS (restless legs syndrome)  Will increase requip to 4mg at night.   Orders:    rOPINIRole (REQUIP) 4 mg tablet; Take 1 tablet (4 mg total) by mouth daily at bedtime    Need for COVID-19 vaccine    Orders:    COVID-19 Pfizer mRNA vaccine 12 yr and older (Comirnaty pre-filled syringe)    Severe persistent allergic asthma  Doing well with dupixent.        Celiac disease         Anxiety         Weight gain  Doing well with zepbound. Continue healthy diet.               History of Present Illness     Patient presents for routine follow up. Doing great with zepbound.she lost 25 lbs in 3 months.  Struggling with RLS. Will be getting a shot this Wednesday from Dr. Frankel in her back.       Review of Systems   Constitutional:  Negative for chills, diaphoresis and fever.   HENT:  Negative for ear pain and sore throat.    Eyes:  Negative for pain and visual disturbance.   Respiratory:  Negative for cough and shortness of breath.    Cardiovascular:  Negative for chest pain and palpitations.   Gastrointestinal:  Negative for abdominal pain and vomiting.   Genitourinary:  Negative for dysuria and hematuria.   Musculoskeletal:  Positive for arthralgias and joint swelling. Negative for back pain.   Skin:  Negative for color change and rash.   Neurological:  Positive for headaches. Negative for dizziness, seizures, syncope and light-headedness.   Psychiatric/Behavioral:  The patient is nervous/anxious.    All other systems reviewed and are  "negative.      Objective   /62 (BP Location: Right arm, Patient Position: Sitting, Cuff Size: Standard)   Pulse 101   Ht 5' 3\" (1.6 m)   Wt 66.3 kg (146 lb 3.2 oz)   SpO2 97%   BMI 25.90 kg/m²      Physical Exam  Vitals and nursing note reviewed.   Constitutional:       General: She is not in acute distress.     Appearance: She is well-developed.   HENT:      Head: Normocephalic and atraumatic.      Right Ear: Tympanic membrane normal.      Left Ear: Tympanic membrane normal.      Nose: No congestion.      Mouth/Throat:      Pharynx: No oropharyngeal exudate.   Eyes:      Conjunctiva/sclera: Conjunctivae normal.   Cardiovascular:      Rate and Rhythm: Normal rate and regular rhythm.      Heart sounds: No murmur heard.  Pulmonary:      Effort: Pulmonary effort is normal. No respiratory distress.      Breath sounds: Normal breath sounds.   Abdominal:      Palpations: Abdomen is soft.      Tenderness: There is no abdominal tenderness.   Musculoskeletal:         General: No swelling.      Cervical back: Neck supple.   Skin:     General: Skin is warm and dry.      Capillary Refill: Capillary refill takes less than 2 seconds.   Neurological:      Mental Status: She is alert and oriented to person, place, and time.   Psychiatric:         Mood and Affect: Mood normal.       Administrative Statements   I have spent a total time of 20 minutes in caring for this patient on the day of the visit/encounter including Counseling / Coordination of care, Documenting in the medical record, Reviewing / ordering tests, medicine, procedures  , and Obtaining or reviewing history  . Topics discussed with the patient / family include symptom assessment and management and medication review.  "

## 2024-12-18 ENCOUNTER — APPOINTMENT (OUTPATIENT)
Dept: LAB | Facility: HOSPITAL | Age: 44
End: 2024-12-18
Attending: PODIATRIST
Payer: COMMERCIAL

## 2024-12-18 DIAGNOSIS — R94.5 ABNORMAL LIVER FUNCTION: ICD-10-CM

## 2024-12-18 DIAGNOSIS — B35.1 ONYCHOMYCOSIS: ICD-10-CM

## 2024-12-18 LAB
ALBUMIN SERPL BCG-MCNC: 4.2 G/DL (ref 3.5–5)
ALP SERPL-CCNC: 79 U/L (ref 34–104)
ALT SERPL W P-5'-P-CCNC: 13 U/L (ref 7–52)
AST SERPL W P-5'-P-CCNC: 17 U/L (ref 13–39)
BILIRUB DIRECT SERPL-MCNC: 0.09 MG/DL (ref 0–0.2)
BILIRUB SERPL-MCNC: 0.51 MG/DL (ref 0.2–1)
PROT SERPL-MCNC: 6.7 G/DL (ref 6.4–8.4)

## 2024-12-18 PROCEDURE — 80076 HEPATIC FUNCTION PANEL: CPT

## 2024-12-18 PROCEDURE — 36415 COLL VENOUS BLD VENIPUNCTURE: CPT

## 2025-01-09 ENCOUNTER — OFFICE VISIT (OUTPATIENT)
Dept: GASTROENTEROLOGY | Facility: CLINIC | Age: 45
End: 2025-01-09
Payer: COMMERCIAL

## 2025-01-09 VITALS
BODY MASS INDEX: 25.52 KG/M2 | HEART RATE: 80 BPM | WEIGHT: 144 LBS | SYSTOLIC BLOOD PRESSURE: 115 MMHG | OXYGEN SATURATION: 99 % | HEIGHT: 63 IN | DIASTOLIC BLOOD PRESSURE: 76 MMHG | TEMPERATURE: 97.5 F

## 2025-01-09 DIAGNOSIS — K58.1 IRRITABLE BOWEL SYNDROME WITH CONSTIPATION: ICD-10-CM

## 2025-01-09 DIAGNOSIS — K90.0 CELIAC DISEASE: Primary | ICD-10-CM

## 2025-01-09 PROCEDURE — 99213 OFFICE O/P EST LOW 20 MIN: CPT | Performed by: PHYSICIAN ASSISTANT

## 2025-01-09 RX ORDER — TERBINAFINE HYDROCHLORIDE 250 MG/1
TABLET ORAL
COMMUNITY
Start: 2024-12-31

## 2025-01-09 NOTE — PROGRESS NOTES
Name: Jennifer Obrien      : 1980      MRN: [id number]  Encounter Provider: Lynn Hickey PA-C  Encounter Date: 2025   Encounter department: [facility] [place]  :  Assessment & Plan  Celiac disease  Her celiac antibody markers were somewhat elevated in October (had normalized previously)  She has been even more cautious and strict about what she is consuming  She is keeping a food diary  She is using an kaylie on her phone called Gluten free scanner  Her nutritional markers were thankfully normal    If she accidentally consumes gluten she will nausea/vomiting  This has only happened a small handful of times since last visit    Repeat markers next month    Irritable bowel syndrome with constipation  Taking Trulance but this is not working as well as Ibsrela  She switched because she was having trouble getting delivers from the drug company  Will resubmit for Ibsrela           History of Present Illness   HPI  Jennifer Obrien is a 44 y.o. female with celiac disease and constipation predominant-year-old bowel syndrome who presents for follow-up.  She was diagnosed with celiac disease in .  At the time of diagnosis her antibody levels were very high, duodenal biopsies were positive and she had noted scalloping of the duodenum.  After starting a [place] free diet in 2023 her markers completely normalized.  She reports that if she accidentally consumes gluten she will have a symptom of nausea and vomiting.  She does not get diarrhea.  In October labs were repeated and her markers were noted to be slightly elevated again.  Her other nutritional markers such as iron levels folate and vitamin B are normal.  She continues to struggle with constipation.  She is taking Trulance and although it helps it does not work as well as Ibsrela.  She is having small hard stool.  She is not having a bowel movement daily.  She reports that when she was on Ibsrela her symptoms are  "much improved.        Review of Systems       Objective   /76 (BP Location: Right arm, Patient Position: Sitting, Cuff Size: Standard)   Pulse 80   Temp 97.5 °F (36.4 °C) (Temporal)   Ht 5' 3\" (1.6 m)   Wt 65.3 kg (144 lb)   SpO2 99%   BMI 25.51 kg/m²      Physical Exam      "

## 2025-01-09 NOTE — ASSESSMENT & PLAN NOTE
Her celiac antibody markers were somewhat elevated in October (had normalized previously)  She has been even more cautious and strict about what she is consuming  She is keeping a food diary  She is using an kaylie on her phone called Gluten free scanner  Her nutritional markers were thankfully normal    If she accidentally consumes gluten she will nausea/vomiting  This has only happened a small handful of times since last visit    Repeat markers next month

## 2025-01-13 ENCOUNTER — TELEPHONE (OUTPATIENT)
Dept: GASTROENTEROLOGY | Facility: CLINIC | Age: 45
End: 2025-01-13

## 2025-01-13 DIAGNOSIS — E66.09 CLASS 1 OBESITY DUE TO EXCESS CALORIES WITHOUT SERIOUS COMORBIDITY WITH BODY MASS INDEX (BMI) OF 30.0 TO 30.9 IN ADULT: ICD-10-CM

## 2025-01-13 DIAGNOSIS — E66.811 CLASS 1 OBESITY DUE TO EXCESS CALORIES WITHOUT SERIOUS COMORBIDITY WITH BODY MASS INDEX (BMI) OF 30.0 TO 30.9 IN ADULT: ICD-10-CM

## 2025-01-13 NOTE — TELEPHONE ENCOUNTER
----- Message from Lynn Hickey PA-C sent at 1/9/2025  1:08 PM EST -----  Can we please call drug company and find out how we can get patient back on Ibsrela.  She was previously receiving medication through them but was unable to contact anyone for refills.  Thanks!

## 2025-01-13 NOTE — TELEPHONE ENCOUNTER
Patient's medication approval from patient assistance program until 12/31/2024.  Sent email to [employer] assistance program for next step so patient can still receive medication IBSRELA

## 2025-01-14 DIAGNOSIS — K58.1 IRRITABLE BOWEL SYNDROME WITH CONSTIPATION: ICD-10-CM

## 2025-01-14 DIAGNOSIS — K58.1 IRRITABLE BOWEL SYNDROME WITH CONSTIPATION: Primary | ICD-10-CM

## 2025-01-14 RX ORDER — TIRZEPATIDE 5 MG/.5ML
5 INJECTION, SOLUTION SUBCUTANEOUS WEEKLY
Qty: 2 ML | Refills: 0 | Status: SHIPPED | OUTPATIENT
Start: 2025-01-14

## 2025-01-14 RX ORDER — TENAPANOR HYDROCHLORIDE 53.2 MG/1
50 TABLET ORAL 2 TIMES DAILY
Qty: 60 TABLET | Refills: 11 | Status: SHIPPED | OUTPATIENT
Start: 2025-01-14 | End: 2025-01-22

## 2025-01-14 NOTE — TELEPHONE ENCOUNTER
[place] Warda     SUBMITTED to [employer]    via      [x]S[employer]-Case ID #   [id number]:[facility] HOME DELIVERYPhone:[phone]:[phone]     All office notes, labs and other pertaining documents and studies sent. Clinical questions answered. Awaiting determination from insurance company.     Turnaround time for your insurance to make a decision on your Prior Authorization can take 7-21 business days.

## 2025-01-15 RX ORDER — LUBIPROSTONE 8 UG/1
CAPSULE ORAL
Refills: 0 | OUTPATIENT
Start: 2025-01-15

## 2025-01-15 NOTE — TELEPHONE ENCOUNTER
Marylou from [employer] assistance program calling in to see if we still wanted ibsrela case opened, if so they are requesting a new script be faxed to them at   Fax: [phone]   Please fax new Script and the prior auth information.     Marylou reports last year pt was receiving meds through them as the cost was very little or none at all.       CB # [phone] option 1

## 2025-01-15 NOTE — TELEPHONE ENCOUNTER
Pt assistance is handled through the GI office.  A Prior auth was submitted by the PA team and waiting for determination.   Thank you

## 2025-01-15 NOTE — TELEPHONE ENCOUNTER
Called [facility] and spoke with Aashish.  Advised aashish that we are waiting for the denied letter so we can proceed with the patient assistance again.  Aashish noted and voiced understanding.

## 2025-01-17 NOTE — TELEPHONE ENCOUNTER
Approved    Prior authorization approved  Payer: [employer] Case ID: [id number]    [phone]    [phone]  Note from payer: CaseId:[id number];Status:Approved;Review Type:Prior Auth;Coverage Start Date:12/15/2024;Coverage End Date:01/15/2026;  Approval Details    Authorized from December 15, 2024 to January 15, 2026  Electronic appeal: Not supported  View History  Medication Being Authorized    Tenapanor HCl (Ibsrela) 50 MG TABS  APPROVED       Patient advised by          [x]MyChart Message  [x]Phone call mailbox full  []LMOM  []L/M to call office as no active Communication consent on file  []Unable to leave detailed message as VM not approved on Communication consent       Pharmacy advised by    [x]Fax  []Phone call    No letter recvd at time of approval

## 2025-01-19 DIAGNOSIS — F51.01 PRIMARY INSOMNIA: ICD-10-CM

## 2025-01-20 RX ORDER — AMITRIPTYLINE HYDROCHLORIDE 10 MG/1
10 TABLET ORAL
Qty: 90 TABLET | Refills: 1 | Status: SHIPPED | OUTPATIENT
Start: 2025-01-20

## 2025-01-21 DIAGNOSIS — F41.1 GAD (GENERALIZED ANXIETY DISORDER): ICD-10-CM

## 2025-01-21 RX ORDER — DULOXETIN HYDROCHLORIDE 60 MG/1
60 CAPSULE, DELAYED RELEASE ORAL DAILY
Qty: 90 CAPSULE | Refills: 1 | Status: SHIPPED | OUTPATIENT
Start: 2025-01-21

## 2025-01-22 ENCOUNTER — TELEPHONE (OUTPATIENT)
Dept: GASTROENTEROLOGY | Facility: CLINIC | Age: 45
End: 2025-01-22

## 2025-01-22 DIAGNOSIS — K58.1 IRRITABLE BOWEL SYNDROME WITH CONSTIPATION: Primary | ICD-10-CM

## 2025-01-22 RX ORDER — TENAPANOR HYDROCHLORIDE 53.2 MG/1
50 TABLET ORAL 2 TIMES DAILY
Qty: 60 TABLET | Refills: 11 | Status: SHIPPED | OUTPATIENT
Start: 2025-01-22

## 2025-01-29 ENCOUNTER — TELEPHONE (OUTPATIENT)
Dept: PULMONOLOGY | Facility: CLINIC | Age: 45
End: 2025-01-29

## 2025-01-29 NOTE — TELEPHONE ENCOUNTER
Received fax from [facility] to renew auth for Dupixent 300mg. Submitted to [employer]. Key: [id number]

## 2025-02-18 ENCOUNTER — OFFICE VISIT (OUTPATIENT)
Dept: FAMILY MEDICINE CLINIC | Facility: CLINIC | Age: 45
End: 2025-02-18
Payer: COMMERCIAL

## 2025-02-18 VITALS
RESPIRATION RATE: 18 BRPM | TEMPERATURE: 98.2 F | HEART RATE: 86 BPM | SYSTOLIC BLOOD PRESSURE: 118 MMHG | HEIGHT: 63 IN | DIASTOLIC BLOOD PRESSURE: 70 MMHG | BODY MASS INDEX: 24.56 KG/M2 | OXYGEN SATURATION: 98 % | WEIGHT: 138.6 LBS

## 2025-02-18 DIAGNOSIS — R11.10 VOMITING, UNSPECIFIED VOMITING TYPE, UNSPECIFIED WHETHER NAUSEA PRESENT: ICD-10-CM

## 2025-02-18 DIAGNOSIS — J45.50 SEVERE PERSISTENT ALLERGIC ASTHMA: ICD-10-CM

## 2025-02-18 DIAGNOSIS — H66.90 ACUTE OTITIS MEDIA, UNSPECIFIED OTITIS MEDIA TYPE: Primary | ICD-10-CM

## 2025-02-18 LAB
SARS-COV-2 AG UPPER RESP QL IA: NEGATIVE
SL AMB POCT RAPID FLU A: NEGATIVE
SL AMB POCT RAPID FLU B: NEGATIVE
VALID CONTROL: NORMAL

## 2025-02-18 PROCEDURE — 87811 SARS-COV-2 COVID19 W/OPTIC: CPT | Performed by: NURSE PRACTITIONER

## 2025-02-18 PROCEDURE — 87804 INFLUENZA ASSAY W/OPTIC: CPT | Performed by: NURSE PRACTITIONER

## 2025-02-18 PROCEDURE — 99214 OFFICE O/P EST MOD 30 MIN: CPT | Performed by: NURSE PRACTITIONER

## 2025-02-18 RX ORDER — AMOXICILLIN 500 MG/1
500 TABLET, FILM COATED ORAL 2 TIMES DAILY
Qty: 20 TABLET | Refills: 0 | Status: SHIPPED | OUTPATIENT
Start: 2025-02-18 | End: 2025-02-28

## 2025-02-18 NOTE — LETTER
February 18, 2025     Patient: Jennifre Obrien  YOB: 1980  Date of Visit: 2/18/2025      To Whom it May Concern:    Jennifer Obrien is under my professional care. Jennifer was seen in my office on 2/18/2025. Jennifer may return to work on 2/21/2025 .    If you have any questions or concerns, please don't hesitate to call.         Sincerely,          LENI Campuzano        CC: No Recipients

## 2025-02-18 NOTE — LETTER
February 18, 2025     Patient: Jennifer Obrien  YOB: 1980  Date of Visit: 2/18/2025      To Whom it May Concern:    Jennifer Obrien is under my professional care. Jennifer was seen in my office on 2/18/2025. Jennifer may return to work on 2/21/2025 . She is excused from 2/12/2025-2/21/2025.    If you have any questions or concerns, please don't hesitate to call.         Sincerely,          LENI Campuzano        CC: No Recipients

## 2025-02-25 ENCOUNTER — APPOINTMENT (OUTPATIENT)
Dept: LAB | Facility: HOSPITAL | Age: 45
End: 2025-02-25
Payer: COMMERCIAL

## 2025-02-25 DIAGNOSIS — E53.8 FOLATE DEFICIENCY: ICD-10-CM

## 2025-02-25 DIAGNOSIS — B35.1 ONYCHOMYCOSIS: ICD-10-CM

## 2025-02-25 DIAGNOSIS — D50.9 IRON DEFICIENCY ANEMIA, UNSPECIFIED IRON DEFICIENCY ANEMIA TYPE: ICD-10-CM

## 2025-02-25 DIAGNOSIS — K90.0 CELIAC DISEASE: ICD-10-CM

## 2025-02-25 LAB
ALBUMIN SERPL BCG-MCNC: 4.1 G/DL (ref 3.5–5)
ALP SERPL-CCNC: 77 U/L (ref 34–104)
ALT SERPL W P-5'-P-CCNC: 13 U/L (ref 7–52)
AST SERPL W P-5'-P-CCNC: 18 U/L (ref 13–39)
BASOPHILS # BLD AUTO: 0.1 THOUSANDS/ÂΜL (ref 0–0.1)
BASOPHILS NFR BLD AUTO: 1 % (ref 0–1)
BILIRUB DIRECT SERPL-MCNC: 0.07 MG/DL (ref 0–0.2)
BILIRUB SERPL-MCNC: 0.45 MG/DL (ref 0.2–1)
EOSINOPHIL # BLD AUTO: 0.31 THOUSAND/ÂΜL (ref 0–0.61)
EOSINOPHIL NFR BLD AUTO: 4 % (ref 0–6)
ERYTHROCYTE [DISTWIDTH] IN BLOOD BY AUTOMATED COUNT: 12.7 % (ref 11.6–15.1)
FERRITIN SERPL-MCNC: 231 NG/ML (ref 11–307)
FOLATE SERPL-MCNC: 7.4 NG/ML
HCT VFR BLD AUTO: 38.8 % (ref 34.8–46.1)
HGB BLD-MCNC: 12.6 G/DL (ref 11.5–15.4)
IMM GRANULOCYTES # BLD AUTO: 0.02 THOUSAND/UL (ref 0–0.2)
IMM GRANULOCYTES NFR BLD AUTO: 0 % (ref 0–2)
IRON SATN MFR SERPL: 14 % (ref 15–50)
IRON SERPL-MCNC: 34 UG/DL (ref 50–212)
LYMPHOCYTES # BLD AUTO: 2.11 THOUSANDS/ÂΜL (ref 0.6–4.47)
LYMPHOCYTES NFR BLD AUTO: 26 % (ref 14–44)
MCH RBC QN AUTO: 29.3 PG (ref 26.8–34.3)
MCHC RBC AUTO-ENTMCNC: 32.5 G/DL (ref 31.4–37.4)
MCV RBC AUTO: 90 FL (ref 82–98)
MONOCYTES # BLD AUTO: 0.64 THOUSAND/ÂΜL (ref 0.17–1.22)
MONOCYTES NFR BLD AUTO: 8 % (ref 4–12)
NEUTROPHILS # BLD AUTO: 4.99 THOUSANDS/ÂΜL (ref 1.85–7.62)
NEUTS SEG NFR BLD AUTO: 61 % (ref 43–75)
NRBC BLD AUTO-RTO: 0 /100 WBCS
PLATELET # BLD AUTO: 377 THOUSANDS/UL (ref 149–390)
PMV BLD AUTO: 11.1 FL (ref 8.9–12.7)
PROT SERPL-MCNC: 6.7 G/DL (ref 6.4–8.4)
RBC # BLD AUTO: 4.3 MILLION/UL (ref 3.81–5.12)
TIBC SERPL-MCNC: 247.8 UG/DL (ref 250–450)
TRANSFERRIN SERPL-MCNC: 177 MG/DL (ref 203–362)
UIBC SERPL-MCNC: 214 UG/DL (ref 155–355)
WBC # BLD AUTO: 8.17 THOUSAND/UL (ref 4.31–10.16)

## 2025-02-25 PROCEDURE — 36415 COLL VENOUS BLD VENIPUNCTURE: CPT

## 2025-02-25 PROCEDURE — 83540 ASSAY OF IRON: CPT

## 2025-02-25 PROCEDURE — 82746 ASSAY OF FOLIC ACID SERUM: CPT

## 2025-02-25 PROCEDURE — 82728 ASSAY OF FERRITIN: CPT

## 2025-02-25 PROCEDURE — 80076 HEPATIC FUNCTION PANEL: CPT

## 2025-02-25 PROCEDURE — 85025 COMPLETE CBC W/AUTO DIFF WBC: CPT

## 2025-02-25 PROCEDURE — 83550 IRON BINDING TEST: CPT

## 2025-02-26 ENCOUNTER — RESULTS FOLLOW-UP (OUTPATIENT)
Dept: HEMATOLOGY ONCOLOGY | Facility: CLINIC | Age: 45
End: 2025-02-26

## 2025-02-26 DIAGNOSIS — D50.9 IRON DEFICIENCY ANEMIA, UNSPECIFIED IRON DEFICIENCY ANEMIA TYPE: Primary | ICD-10-CM

## 2025-02-26 DIAGNOSIS — E66.09 CLASS 1 OBESITY DUE TO EXCESS CALORIES WITHOUT SERIOUS COMORBIDITY WITH BODY MASS INDEX (BMI) OF 30.0 TO 30.9 IN ADULT: ICD-10-CM

## 2025-02-26 DIAGNOSIS — E66.811 CLASS 1 OBESITY DUE TO EXCESS CALORIES WITHOUT SERIOUS COMORBIDITY WITH BODY MASS INDEX (BMI) OF 30.0 TO 30.9 IN ADULT: ICD-10-CM

## 2025-02-27 RX ORDER — TIRZEPATIDE 5 MG/.5ML
5 INJECTION, SOLUTION SUBCUTANEOUS WEEKLY
Qty: 2 ML | Refills: 0 | Status: SHIPPED | OUTPATIENT
Start: 2025-02-27 | End: 2025-03-03

## 2025-02-28 ENCOUNTER — APPOINTMENT (OUTPATIENT)
Dept: LAB | Facility: HOSPITAL | Age: 45
End: 2025-02-28
Payer: COMMERCIAL

## 2025-02-28 DIAGNOSIS — E66.09 CLASS 1 OBESITY DUE TO EXCESS CALORIES WITHOUT SERIOUS COMORBIDITY WITH BODY MASS INDEX (BMI) OF 30.0 TO 30.9 IN ADULT: ICD-10-CM

## 2025-02-28 DIAGNOSIS — D50.9 IRON DEFICIENCY ANEMIA, UNSPECIFIED IRON DEFICIENCY ANEMIA TYPE: ICD-10-CM

## 2025-02-28 DIAGNOSIS — E66.811 CLASS 1 OBESITY DUE TO EXCESS CALORIES WITHOUT SERIOUS COMORBIDITY WITH BODY MASS INDEX (BMI) OF 30.0 TO 30.9 IN ADULT: ICD-10-CM

## 2025-02-28 PROCEDURE — 83520 IMMUNOASSAY QUANT NOS NONAB: CPT

## 2025-02-28 PROCEDURE — 36415 COLL VENOUS BLD VENIPUNCTURE: CPT

## 2025-03-02 LAB — STFR SERPL-SCNC: 11.7 NMOL/L (ref 12.2–27.3)

## 2025-03-03 RX ORDER — TIRZEPATIDE 5 MG/.5ML
INJECTION, SOLUTION SUBCUTANEOUS
Qty: 6 ML | Refills: 0 | Status: SHIPPED | OUTPATIENT
Start: 2025-03-03

## 2025-03-17 ENCOUNTER — OFFICE VISIT (OUTPATIENT)
Dept: FAMILY MEDICINE CLINIC | Facility: CLINIC | Age: 45
End: 2025-03-17
Payer: COMMERCIAL

## 2025-03-17 VITALS
WEIGHT: 140.4 LBS | SYSTOLIC BLOOD PRESSURE: 110 MMHG | HEART RATE: 78 BPM | BODY MASS INDEX: 24.88 KG/M2 | DIASTOLIC BLOOD PRESSURE: 70 MMHG | OXYGEN SATURATION: 100 % | HEIGHT: 63 IN

## 2025-03-17 DIAGNOSIS — J45.50 SEVERE PERSISTENT ALLERGIC ASTHMA: ICD-10-CM

## 2025-03-17 DIAGNOSIS — R21 RASH: Primary | ICD-10-CM

## 2025-03-17 DIAGNOSIS — F41.9 ANXIETY: ICD-10-CM

## 2025-03-17 DIAGNOSIS — R63.5 WEIGHT GAIN: ICD-10-CM

## 2025-03-17 PROCEDURE — 99214 OFFICE O/P EST MOD 30 MIN: CPT | Performed by: NURSE PRACTITIONER

## 2025-03-17 RX ORDER — PREDNISONE 10 MG/1
TABLET ORAL
Qty: 30 TABLET | Refills: 0 | Status: SHIPPED | OUTPATIENT
Start: 2025-03-17

## 2025-03-17 NOTE — PROGRESS NOTES
"Name: Jennifer Obrien      : 1980      MRN: [id number]  Encounter Provider: LENI Campuzano  Encounter Date: 3/17/2025   Encounter department: [facility] [address]  :  Assessment & Plan  Rash    Orders:    predniSONE 10 mg tablet; 50/50/40/40/30/30/20/20/10/10    Severe persistent allergic asthma  Well-managed currently.  Will reach out to pulmonology.       Anxiety  Continue on Cymbalta, BuSpar and hydroxyzine as needed.       Weight gain  Patient has been doing great with Zepbound.  Continue weekly injections.              History of Present Illness   Patient presents with her  for routine follow-up.  She is concerned with rash on her legs and back.  Her breathing had been really well-controlled with Dupixent, she has now been off the medication for 2 months due to pharmacy issues.      Review of Systems   Constitutional:  Negative for chills and fever.   HENT:  Negative for ear pain and sore throat.    Eyes:  Negative for pain and visual disturbance.   Respiratory:  Negative for cough and shortness of breath.    Cardiovascular:  Negative for chest pain and palpitations.   Gastrointestinal:  Negative for abdominal pain and vomiting.   Genitourinary:  Negative for dysuria and hematuria.   Musculoskeletal:  Negative for arthralgias and back pain.   Skin:  Negative for color change and rash.   Neurological:  Negative for dizziness, seizures, syncope, light-headedness and headaches.   All other systems reviewed and are negative.      Objective   /70 (BP Location: Left arm, Patient Position: Sitting, Cuff Size: Standard)   Pulse 78   Ht 5' 3\" (1.6 m)   Wt 63.7 kg (140 lb 6.4 oz)   SpO2 100%   BMI 24.87 kg/m²      Physical Exam  Vitals and nursing note reviewed.   Constitutional:       General: She is not in acute distress.     Appearance: She is well-developed.   HENT:      Head: Normocephalic and atraumatic.   Eyes:      Conjunctiva/sclera: " Conjunctivae normal.   Cardiovascular:      Rate and Rhythm: Normal rate and regular rhythm.      Heart sounds: No murmur heard.  Pulmonary:      Effort: Pulmonary effort is normal. No respiratory distress.      Breath sounds: Normal breath sounds.   Abdominal:      Palpations: Abdomen is soft.      Tenderness: There is no abdominal tenderness.   Musculoskeletal:         General: No swelling.      Cervical back: Neck supple.   Skin:     General: Skin is warm and dry.      Capillary Refill: Capillary refill takes less than 2 seconds.   Neurological:      Mental Status: She is alert and oriented to person, place, and time.   Psychiatric:         Mood and Affect: Mood normal.

## 2025-03-21 LAB
DME PARACHUTE DELIVERY DATE ACTUAL: NORMAL
DME PARACHUTE DELIVERY DATE REQUESTED: NORMAL
DME PARACHUTE ITEM DESCRIPTION: NORMAL
DME PARACHUTE ORDER STATUS: NORMAL
DME PARACHUTE SUPPLIER NAME: NORMAL
DME PARACHUTE SUPPLIER PHONE: NORMAL

## 2025-04-23 DIAGNOSIS — F41.1 GAD (GENERALIZED ANXIETY DISORDER): ICD-10-CM

## 2025-04-24 RX ORDER — BUSPIRONE HYDROCHLORIDE 15 MG/1
15 TABLET ORAL 3 TIMES DAILY
Qty: 90 TABLET | Refills: 5 | Status: SHIPPED | OUTPATIENT
Start: 2025-04-24

## 2025-05-29 ENCOUNTER — HOSPITAL ENCOUNTER (OUTPATIENT)
Dept: MAMMOGRAPHY | Facility: CLINIC | Age: 45
Discharge: HOME | End: 2025-05-29
Payer: COMMERCIAL

## 2025-05-29 VITALS — WEIGHT: 140 LBS | BODY MASS INDEX: 24.8 KG/M2 | HEIGHT: 63 IN

## 2025-05-29 DIAGNOSIS — Z12.31 ENCOUNTER FOR SCREENING MAMMOGRAM FOR HIGH-RISK PATIENT: ICD-10-CM

## 2025-05-29 PROCEDURE — 77063 BREAST TOMOSYNTHESIS BI: CPT

## 2025-05-29 PROCEDURE — 77067 SCR MAMMO BI INCL CAD: CPT

## 2025-06-03 ENCOUNTER — RESULTS FOLLOW-UP (OUTPATIENT)
Dept: OBGYN CLINIC | Facility: CLINIC | Age: 45
End: 2025-06-03

## 2025-06-06 ENCOUNTER — TELEPHONE (OUTPATIENT)
Dept: MAMMOGRAPHY | Facility: CLINIC | Age: 45
End: 2025-06-06

## 2025-06-19 ENCOUNTER — TELEPHONE (OUTPATIENT)
Dept: GASTROENTEROLOGY | Facility: CLINIC | Age: 45
End: 2025-06-19

## 2025-06-19 NOTE — TELEPHONE ENCOUNTER
Patient has an upcoming appt with Guillermina on 6/26. Spoke to pt and reminder her to get her celiac panel done. Pt advised she will get lab done but will need to reschedule appt as she will be away on vacation.  Pt ask if office can call back to reschedule office visit with guillermina. Thanks.

## 2025-06-22 DIAGNOSIS — G25.81 RLS (RESTLESS LEGS SYNDROME): ICD-10-CM

## 2025-06-22 RX ORDER — ROPINIROLE 4 MG/1
4 TABLET, FILM COATED ORAL
Qty: 90 TABLET | Refills: 1 | Status: SHIPPED | OUTPATIENT
Start: 2025-06-22

## 2025-06-23 DIAGNOSIS — E66.09 CLASS 1 OBESITY DUE TO EXCESS CALORIES WITHOUT SERIOUS COMORBIDITY WITH BODY MASS INDEX (BMI) OF 30.0 TO 30.9 IN ADULT: ICD-10-CM

## 2025-06-23 DIAGNOSIS — E66.811 CLASS 1 OBESITY DUE TO EXCESS CALORIES WITHOUT SERIOUS COMORBIDITY WITH BODY MASS INDEX (BMI) OF 30.0 TO 30.9 IN ADULT: ICD-10-CM

## 2025-06-23 RX ORDER — TIRZEPATIDE 5 MG/.5ML
5 INJECTION, SOLUTION SUBCUTANEOUS WEEKLY
Qty: 6 ML | Refills: 0 | Status: SHIPPED | OUTPATIENT
Start: 2025-06-23

## 2025-06-23 NOTE — TELEPHONE ENCOUNTER
Reason for call:   [x] Refill   [] Prior Auth  [] Other:     Office:   [x] PCP/Provider -   [] Specialty/Provider -     Medication: tirzepatide (Zepbound) 5 mg/0.5 mL auto-injector INJECT 0.5 ML (5 MG TOTAL) UNDER THE SKIN ONE TIME PER WEEK       Pharmacy: [facility]/pharmacy #[id number] - [place] - [address]     Local Pharmacy   Does the patient have enough for 3 days?   [x] Yes   [] No - Send as HP to POD    Mail Away Pharmacy   Does the patient have enough for 10 days?   [] Yes   [] No - Send as HP to POD

## 2025-06-24 ENCOUNTER — TELEPHONE (OUTPATIENT)
Age: 45
End: 2025-06-24

## 2025-06-24 RX ORDER — TIRZEPATIDE 5 MG/.5ML
INJECTION, SOLUTION SUBCUTANEOUS
Refills: 0 | OUTPATIENT
Start: 2025-06-24

## 2025-06-24 NOTE — TELEPHONE ENCOUNTER
PA for Zepbound 5MG            via    [x]CMM-KEY: [id number]    [x]PA sent as URGENT    All office notes, labs and other pertaining documents and studies sent. Clinical questions answered. Awaiting determination from insurance company.     Turnaround time for your insurance to make a decision on your Prior Authorization can take 7-21 business days.

## 2025-06-24 NOTE — TELEPHONE ENCOUNTER
Patient must do an enrollment in virtual care per insurance prior to prior authorization. Patient can contact [phone] or via website [employer]   If patient has any questions they can reach out to member services on the back of their card. [phone].      Once patient has enrolled please send back to POD for Prior Authorization

## 2025-07-03 DIAGNOSIS — E66.09 CLASS 1 OBESITY DUE TO EXCESS CALORIES WITHOUT SERIOUS COMORBIDITY WITH BODY MASS INDEX (BMI) OF 30.0 TO 30.9 IN ADULT: ICD-10-CM

## 2025-07-03 DIAGNOSIS — E66.811 CLASS 1 OBESITY DUE TO EXCESS CALORIES WITHOUT SERIOUS COMORBIDITY WITH BODY MASS INDEX (BMI) OF 30.0 TO 30.9 IN ADULT: ICD-10-CM

## 2025-07-03 RX ORDER — TIRZEPATIDE 5 MG/.5ML
5 INJECTION, SOLUTION SUBCUTANEOUS WEEKLY
Qty: 6 ML | Refills: 0 | Status: SHIPPED | OUTPATIENT
Start: 2025-07-03

## 2025-07-19 DIAGNOSIS — E66.09 CLASS 1 OBESITY DUE TO EXCESS CALORIES WITHOUT SERIOUS COMORBIDITY WITH BODY MASS INDEX (BMI) OF 30.0 TO 30.9 IN ADULT: ICD-10-CM

## 2025-07-19 DIAGNOSIS — E66.811 CLASS 1 OBESITY DUE TO EXCESS CALORIES WITHOUT SERIOUS COMORBIDITY WITH BODY MASS INDEX (BMI) OF 30.0 TO 30.9 IN ADULT: ICD-10-CM

## 2025-07-21 ENCOUNTER — TELEPHONE (OUTPATIENT)
Dept: FAMILY MEDICINE CLINIC | Facility: CLINIC | Age: 45
End: 2025-07-21

## 2025-07-21 DIAGNOSIS — E66.09 CLASS 1 OBESITY DUE TO EXCESS CALORIES WITHOUT SERIOUS COMORBIDITY WITH BODY MASS INDEX (BMI) OF 30.0 TO 30.9 IN ADULT: ICD-10-CM

## 2025-07-21 DIAGNOSIS — E66.811 CLASS 1 OBESITY DUE TO EXCESS CALORIES WITHOUT SERIOUS COMORBIDITY WITH BODY MASS INDEX (BMI) OF 30.0 TO 30.9 IN ADULT: ICD-10-CM

## 2025-07-21 RX ORDER — TIRZEPATIDE 5 MG/.5ML
INJECTION, SOLUTION SUBCUTANEOUS
Refills: 0 | OUTPATIENT
Start: 2025-07-21

## 2025-07-21 NOTE — TELEPHONE ENCOUNTER
Reason for call:   [x] Prior Auth  [] Other:     Caller:  [x] Patient  [] Pharmacy  Name:   Address:   Callback Number:     Medication: tirzepatide (Zepbound) 5 mg/0.5 mL auto-injector     Dose/Frequency: Sig: Inject 0.5 mL (5 mg total) under the skin once a week      Quantity: 6mL    Ordering Provider:   [x] PCP/Provider -   [] Speciality/Provider -     Has the patient tried other medications and failed? If failed, which medications did they fail?    [] No   [] Yes -     Is the patient's insurance updated in EPIC?   [] Yes   [] No     Is a copy of the patient's insurance scanned in EPIC?   [] Yes   [] No

## 2025-07-22 RX ORDER — TIRZEPATIDE 5 MG/.5ML
INJECTION, SOLUTION SUBCUTANEOUS
Refills: 0 | OUTPATIENT
Start: 2025-07-22

## 2025-07-22 NOTE — TELEPHONE ENCOUNTER
Duplicate encounter created, please see telephone encounter from 6/24/25 regarding Zepbound 5mg PA status. Please review patient's chart to see if there is already an encounter regarding the medication in question and to document anything regarding this medication in regards to anything regarding the authorization process etc before creating another encounter Thank You.

## 2025-07-24 ENCOUNTER — OFFICE VISIT (OUTPATIENT)
Dept: FAMILY MEDICINE CLINIC | Facility: CLINIC | Age: 45
End: 2025-07-24
Payer: COMMERCIAL

## 2025-07-24 VITALS
DIASTOLIC BLOOD PRESSURE: 60 MMHG | BODY MASS INDEX: 25.52 KG/M2 | OXYGEN SATURATION: 99 % | SYSTOLIC BLOOD PRESSURE: 106 MMHG | HEIGHT: 63 IN | WEIGHT: 144 LBS | HEART RATE: 76 BPM

## 2025-07-24 DIAGNOSIS — F41.9 ANXIETY: Primary | ICD-10-CM

## 2025-07-24 DIAGNOSIS — E66.811 CLASS 1 OBESITY DUE TO EXCESS CALORIES WITHOUT SERIOUS COMORBIDITY WITH BODY MASS INDEX (BMI) OF 30.0 TO 30.9 IN ADULT: ICD-10-CM

## 2025-07-24 DIAGNOSIS — E66.09 CLASS 1 OBESITY DUE TO EXCESS CALORIES WITHOUT SERIOUS COMORBIDITY WITH BODY MASS INDEX (BMI) OF 30.0 TO 30.9 IN ADULT: ICD-10-CM

## 2025-07-24 DIAGNOSIS — B07.9 VIRAL WARTS, UNSPECIFIED TYPE: ICD-10-CM

## 2025-07-24 DIAGNOSIS — J45.50 SEVERE PERSISTENT ALLERGIC ASTHMA: ICD-10-CM

## 2025-07-24 PROCEDURE — 17110 DESTRUCTION B9 LES UP TO 14: CPT | Performed by: NURSE PRACTITIONER

## 2025-07-24 PROCEDURE — 99214 OFFICE O/P EST MOD 30 MIN: CPT | Performed by: NURSE PRACTITIONER

## 2025-07-24 RX ORDER — TIRZEPATIDE 5 MG/.5ML
5 INJECTION, SOLUTION SUBCUTANEOUS WEEKLY
Qty: 6 ML | Refills: 0 | Status: SHIPPED | OUTPATIENT
Start: 2025-07-24

## 2025-07-24 NOTE — ASSESSMENT & PLAN NOTE
Currently struggling as patient is off Dupixent due to insurance issues.  Advised to start on Xyzal at night.

## 2025-07-24 NOTE — PROGRESS NOTES
Name: Jennifer Obrien      : 1980      MRN: [id number]  Encounter Provider: LENI Campuzano  Encounter Date: 2025   Encounter department: [facility] [address]  :  Assessment & Plan  Class 1 obesity due to excess calories without serious comorbidity with body mass index (BMI) of 30.0 to 30.9 in adult    Advised to continue on Zepbound.  Patient is having a hard time with insurance coverage currently.  Given information from prior Auth so patient can call insurance.  Orders:    tirzepatide (Zepbound) 5 mg/0.5 mL auto-injector; Inject 0.5 mL (5 mg total) under the skin once a week    Anxiety  Doing well with BuSpar.       Severe persistent allergic asthma  Currently struggling as patient is off Dupixent due to insurance issues.  Advised to start on Xyzal at night.       Lesion Destruction    Date/Time: 2025 11:00 AM    Performed by: LENI Campuzano  Authorized by: LENI Campuzano    Universal Protocol:  procedure performed by consultantConsent: Verbal consent obtained  Consent given by: patient    Procedure Details - Lesion Destruction:     Number of Lesions:  3  Lesion 1:     Body area:  Upper extremity    Upper extremity location:  L small finger    Malignancy: benign lesion      Destruction method: cryotherapy    Lesion 2:     Body area:  Upper extremity    Upper extremity location:  L small finger    Malignancy: benign lesion      Destruction method: cryotherapy    Lesion 3:     Body area:  Upper extremity    Upper extremity location:  L small finger    Malignancy: benign lesion      Destruction method: cryotherapy             History of Present Illness   Patient presents for routine follow up. Having insurance issues with zepbound. Concerned with warts on left finger.       Review of Systems   Constitutional:  Negative for chills and fever.   HENT:  Negative for ear pain and sore throat.    Eyes:  Negative for pain and visual disturbance.  "  Respiratory:  Negative for cough and shortness of breath.    Cardiovascular:  Negative for chest pain and palpitations.   Gastrointestinal:  Negative for abdominal pain and vomiting.   Genitourinary:  Negative for dysuria and hematuria.   Musculoskeletal:  Negative for arthralgias and back pain.   Skin:  Negative for color change and rash.   Neurological:  Negative for seizures and syncope.   All other systems reviewed and are negative.      Objective   /60   Pulse 76   Ht 5' 3\" (1.6 m)   Wt 65.3 kg (144 lb)   SpO2 99%   BMI 25.51 kg/m²      Physical Exam  Vitals and nursing note reviewed.   Constitutional:       General: She is not in acute distress.     Appearance: She is well-developed.   HENT:      Head: Normocephalic and atraumatic.     Eyes:      Conjunctiva/sclera: Conjunctivae normal.       Cardiovascular:      Rate and Rhythm: Normal rate and regular rhythm.      Heart sounds: No murmur heard.  Pulmonary:      Effort: Pulmonary effort is normal. No respiratory distress.      Breath sounds: Normal breath sounds.   Abdominal:      Palpations: Abdomen is soft.      Tenderness: There is no abdominal tenderness.     Musculoskeletal:         General: No swelling.      Cervical back: Neck supple.     Skin:     General: Skin is warm and dry.      Capillary Refill: Capillary refill takes less than 2 seconds.     Neurological:      Mental Status: She is alert.     Psychiatric:         Mood and Affect: Mood normal.         "

## 2025-07-25 ENCOUNTER — TELEPHONE (OUTPATIENT)
Dept: FAMILY MEDICINE CLINIC | Facility: CLINIC | Age: 45
End: 2025-07-25

## 2025-07-25 DIAGNOSIS — E66.09 CLASS 1 OBESITY DUE TO EXCESS CALORIES WITHOUT SERIOUS COMORBIDITY WITH BODY MASS INDEX (BMI) OF 30.0 TO 30.9 IN ADULT: ICD-10-CM

## 2025-07-25 DIAGNOSIS — E66.811 CLASS 1 OBESITY DUE TO EXCESS CALORIES WITHOUT SERIOUS COMORBIDITY WITH BODY MASS INDEX (BMI) OF 30.0 TO 30.9 IN ADULT: ICD-10-CM

## 2025-07-25 RX ORDER — TIRZEPATIDE 5 MG/.5ML
INJECTION, SOLUTION SUBCUTANEOUS
Refills: 0 | OUTPATIENT
Start: 2025-07-25

## 2025-07-25 NOTE — TELEPHONE ENCOUNTER
PA for (Zepbound) 5 mg/0.5 mL auto-injector SUBMITTED to     via    [x]CMM-KEY: [id number]  []Surescripts-Case ID #   []Availity-Auth ID # NDC #   []Faxed to plan   []Other website   []Phone call Case ID #     [x]PA sent as URGENT    All office notes, labs and other pertaining documents and studies sent. Clinical questions answered. Awaiting determination from insurance company.     Turnaround time for your insurance to make a decision on your Prior Authorization can take 7-21 business days.

## 2025-07-25 NOTE — TELEPHONE ENCOUNTER
Forwarding to PA team for review.       Changes Requested     Name from pharmacy: ZEPBOUND 5 MG/0.5 ML PEN         Will file in chart as: Zepbound 5 MG/0.5ML auto-injector     Possible duplicate: Francoisrobbie to review recent actions on this medication    Sig: INJECT 0.5 ML (5 MG TOTAL) UNDER THE SKIN ONE TIME PER WEEK    Disp: Not specified (Pharmacy requested: 6 each)    Refills: 0    Start: 7/24/2025    Class: Normal    For: Class 1 obesity due to excess calories without serious comorbidity with body mass index (BMI) of 30.0 to 30.9 in adult    Last ordered: Yesterday (7/24/2025) by LENI Campuzano    Last refill: 7/24/2025    Rx #: [id number]    Pharmacy comment: Alternative Requested:PA.    Endocrinology:  Diabetes - GLP-1 Receptor Agonists [id number]/24/2025 06:11 PM   Protocol Details Valid encounter within last 6 months   [employer] [id number]/24/2025 06:11 PM   This is a duplicate request of medication ordered 2025-07-24. Check to see if the patient is requesting a refill from a new pharmacy.      This request has changes from the previous prescription.   To be filled at: [facility]/pharmacy #[id number] - [place] - [address]

## 2025-07-25 NOTE — TELEPHONE ENCOUNTER
PA initiated through [facility] and the following message was received.  Please have patient contact insurance as she has been on Zepbound for some time - This may be a new requirement through her plan.

## 2025-08-01 ENCOUNTER — HOSPITAL ENCOUNTER (OUTPATIENT)
Dept: MAMMOGRAPHY | Facility: CLINIC | Age: 45
Discharge: HOME | End: 2025-08-01
Attending: OBSTETRICS & GYNECOLOGY
Payer: COMMERCIAL

## 2025-08-01 VITALS — WEIGHT: 144 LBS | HEIGHT: 63 IN | BODY MASS INDEX: 25.52 KG/M2

## 2025-08-01 DIAGNOSIS — R92.8 ABNORMAL MAMMOGRAM: ICD-10-CM

## 2025-08-01 PROCEDURE — 77065 DX MAMMO INCL CAD UNI: CPT

## 2025-08-04 ENCOUNTER — APPOINTMENT (OUTPATIENT)
Dept: LAB | Facility: HOSPITAL | Age: 45
End: 2025-08-04
Payer: COMMERCIAL

## 2025-08-04 LAB — IGA SERPL-MCNC: 200 MG/DL (ref 66–433)

## 2025-08-04 PROCEDURE — 82784 ASSAY IGA/IGD/IGG/IGM EACH: CPT

## 2025-08-04 PROCEDURE — 86364 TISS TRNSGLTMNASE EA IG CLAS: CPT

## 2025-08-05 LAB — TTG IGA SER IA-ACNC: 7.1 U/ML (ref ?–10)

## 2025-08-08 ENCOUNTER — OFFICE VISIT (OUTPATIENT)
Dept: GASTROENTEROLOGY | Facility: CLINIC | Age: 45
End: 2025-08-08
Payer: COMMERCIAL

## 2025-08-08 VITALS
OXYGEN SATURATION: 98 % | WEIGHT: 142 LBS | SYSTOLIC BLOOD PRESSURE: 118 MMHG | TEMPERATURE: 97.6 F | DIASTOLIC BLOOD PRESSURE: 75 MMHG | HEIGHT: 63 IN | BODY MASS INDEX: 25.16 KG/M2 | HEART RATE: 82 BPM

## 2025-08-08 DIAGNOSIS — K90.0 CELIAC DISEASE: Primary | ICD-10-CM

## 2025-08-08 DIAGNOSIS — K58.1 IRRITABLE BOWEL SYNDROME WITH CONSTIPATION: ICD-10-CM

## 2025-08-08 PROCEDURE — 99214 OFFICE O/P EST MOD 30 MIN: CPT | Performed by: PHYSICIAN ASSISTANT

## 2025-08-22 ENCOUNTER — TELEPHONE (OUTPATIENT)
Dept: GASTROENTEROLOGY | Facility: CLINIC | Age: 45
End: 2025-08-22

## (undated) DEVICE — Device

## (undated) DEVICE — SKIN MARKER DUAL TIP WITH RULER CAP, FLEXIBLE RULER AND LABELS: Brand: DEVON

## (undated) DEVICE — SUT VICRYL 0 CTX 36 IN J978H

## (undated) DEVICE — TELFA NON-ADHERENT ABSORBENT DRESSING: Brand: TELFA

## (undated) DEVICE — GLOVE INDICATOR PI UNDERGLOVE SZ 8 BLUE

## (undated) DEVICE — GAUZE SPONGES,16 PLY: Brand: CURITY

## (undated) DEVICE — PACK C-SECTION PBDS

## (undated) DEVICE — PROXIMATE PLUS MD MULTI-DIRECTIONAL RELEASE SKIN STAPLERS CONTAINS 35 STAINLESS STEEL STAPLES APPROXIMATE CLOSED DIMENSIONS: 6.9MM X 3.9MM WIDE: Brand: PROXIMATE

## (undated) DEVICE — SUT VICRYL 2-0 SH 27 IN UNDYED J417H

## (undated) DEVICE — ABDOMINAL PAD: Brand: DERMACEA

## (undated) DEVICE — GLOVE SRG BIOGEL ECLIPSE 7.5